# Patient Record
Sex: FEMALE | Race: WHITE | NOT HISPANIC OR LATINO | Employment: OTHER | URBAN - METROPOLITAN AREA
[De-identification: names, ages, dates, MRNs, and addresses within clinical notes are randomized per-mention and may not be internally consistent; named-entity substitution may affect disease eponyms.]

---

## 2023-04-04 LAB — URINE CULTURE: NORMAL

## 2023-04-11 ENCOUNTER — DOCUMENTATION (OUTPATIENT)
Dept: PRIMARY CARE | Facility: CLINIC | Age: 78
End: 2023-04-11
Payer: MEDICARE

## 2023-04-11 PROBLEM — N32.81 OAB (OVERACTIVE BLADDER): Status: ACTIVE | Noted: 2023-04-11

## 2023-04-11 PROBLEM — R60.0 EDEMA OF BOTH LEGS: Status: ACTIVE | Noted: 2023-04-11

## 2023-04-11 PROBLEM — R73.9 BORDERLINE HYPERGLYCEMIA: Status: ACTIVE | Noted: 2023-04-11

## 2023-04-11 PROBLEM — M25.561 BILATERAL KNEE PAIN: Status: ACTIVE | Noted: 2023-04-11

## 2023-04-11 PROBLEM — M25.552 ARTHRALGIA OF LEFT HIP: Status: ACTIVE | Noted: 2023-04-11

## 2023-04-11 PROBLEM — G62.9 PERIPHERAL NEUROPATHY: Status: ACTIVE | Noted: 2023-04-11

## 2023-04-11 PROBLEM — N32.9 LESION OF BLADDER: Status: ACTIVE | Noted: 2023-04-11

## 2023-04-11 PROBLEM — H61.20 CERUMEN IMPACTION: Status: ACTIVE | Noted: 2023-04-11

## 2023-04-11 PROBLEM — R26.89 IMBALANCE: Status: ACTIVE | Noted: 2023-04-11

## 2023-04-11 PROBLEM — M17.0 PRIMARY OSTEOARTHRITIS OF BOTH KNEES: Status: ACTIVE | Noted: 2023-04-11

## 2023-04-11 PROBLEM — M25.562 BILATERAL KNEE PAIN: Status: ACTIVE | Noted: 2023-04-11

## 2023-04-11 PROBLEM — M25.462 EFFUSION OF LEFT KNEE: Status: ACTIVE | Noted: 2023-04-11

## 2023-04-11 PROBLEM — E03.9 ADULT MYXEDEMA: Status: ACTIVE | Noted: 2023-04-11

## 2023-04-11 PROBLEM — M85.80 OSTEOPENIA: Status: ACTIVE | Noted: 2023-04-11

## 2023-04-11 PROBLEM — I83.819 VARICOSE VEINS WITH PAIN: Status: ACTIVE | Noted: 2023-04-11

## 2023-04-11 PROBLEM — R92.8 ABNORMAL MAMMOGRAM: Status: ACTIVE | Noted: 2023-04-11

## 2023-04-11 PROBLEM — R82.90 ABNORMAL FINDING IN URINE: Status: ACTIVE | Noted: 2023-04-11

## 2023-04-11 PROBLEM — I87.2 VENOUS INSUFFICIENCY: Status: ACTIVE | Noted: 2023-04-11

## 2023-04-11 PROBLEM — R60.0 LOWER EXTREMITY EDEMA: Status: ACTIVE | Noted: 2023-04-11

## 2023-04-11 PROBLEM — M17.12 ARTHRITIS OF KNEE, LEFT: Status: ACTIVE | Noted: 2023-04-11

## 2023-04-11 PROBLEM — J30.9 ALLERGIC RHINITIS: Status: ACTIVE | Noted: 2023-04-11

## 2023-04-11 PROBLEM — M19.90 OSTEOARTHRITIS: Status: ACTIVE | Noted: 2023-04-11

## 2023-04-11 PROBLEM — M25.511 SHOULDER PAIN, RIGHT: Status: ACTIVE | Noted: 2023-04-11

## 2023-04-11 PROBLEM — R42 EPISODIC LIGHTHEADEDNESS: Status: ACTIVE | Noted: 2023-04-11

## 2023-04-11 PROBLEM — R42 DIZZINESS: Status: ACTIVE | Noted: 2023-04-11

## 2023-04-11 PROBLEM — D64.9 ANEMIA: Status: ACTIVE | Noted: 2023-04-11

## 2023-04-11 PROBLEM — M99.05 SEGMENTAL AND SOMATIC DYSFUNCTION OF PELVIC REGION: Status: ACTIVE | Noted: 2023-04-11

## 2023-04-11 PROBLEM — N39.0 UTI (URINARY TRACT INFECTION): Status: ACTIVE | Noted: 2023-04-11

## 2023-04-11 PROBLEM — N39.41 URGE INCONTINENCE OF URINE: Status: ACTIVE | Noted: 2023-04-11

## 2023-04-11 RX ORDER — BETAMETHASONE DIPROPIONATE 0.5 MG/G
CREAM TOPICAL 2 TIMES DAILY
COMMUNITY
Start: 2012-11-16 | End: 2024-01-04 | Stop reason: SDUPTHER

## 2023-04-11 RX ORDER — ASPIRIN 81 MG/1
1 TABLET ORAL DAILY
COMMUNITY
End: 2024-04-23 | Stop reason: HOSPADM

## 2023-04-11 RX ORDER — LORATADINE 10 MG/1
1 TABLET ORAL DAILY PRN
COMMUNITY

## 2023-04-11 RX ORDER — DIAPER,BRIEF,INFANT-TODD,DISP
1 EACH MISCELLANEOUS DAILY
COMMUNITY
Start: 2013-11-01

## 2023-04-11 RX ORDER — FLUTICASONE PROPIONATE 50 MCG
2 SPRAY, SUSPENSION (ML) NASAL DAILY
COMMUNITY
Start: 2016-01-22 | End: 2023-04-13 | Stop reason: SDUPTHER

## 2023-04-11 RX ORDER — VIT C/E/ZN/COPPR/LUTEIN/ZEAXAN 250MG-90MG
1 CAPSULE ORAL DAILY
COMMUNITY
Start: 2013-11-01

## 2023-04-11 RX ORDER — MIRABEGRON 50 MG/1
1 TABLET, EXTENDED RELEASE ORAL DAILY
COMMUNITY
Start: 2015-02-02 | End: 2023-12-11 | Stop reason: SDUPTHER

## 2023-04-11 RX ORDER — AZELASTINE 1 MG/ML
2 SPRAY, METERED NASAL 2 TIMES DAILY
COMMUNITY
Start: 2016-08-17 | End: 2023-04-13 | Stop reason: SDUPTHER

## 2023-04-12 ENCOUNTER — OFFICE VISIT (OUTPATIENT)
Dept: PRIMARY CARE | Facility: CLINIC | Age: 78
End: 2023-04-12
Payer: MEDICARE

## 2023-04-12 VITALS
WEIGHT: 145.4 LBS | BODY MASS INDEX: 24.82 KG/M2 | DIASTOLIC BLOOD PRESSURE: 79 MMHG | SYSTOLIC BLOOD PRESSURE: 119 MMHG | HEIGHT: 64 IN

## 2023-04-12 DIAGNOSIS — Z78.0 ASYMPTOMATIC POSTMENOPAUSAL STATE: Primary | ICD-10-CM

## 2023-04-12 DIAGNOSIS — Z78.9 HEALTH MAINTENANCE ALTERATION: ICD-10-CM

## 2023-04-12 PROCEDURE — 99214 OFFICE O/P EST MOD 30 MIN: CPT | Performed by: INTERNAL MEDICINE

## 2023-04-12 NOTE — PATIENT INSTRUCTIONS
Paty,     For your urinary issues, discuss with urogynecology regarding the role of pelvic floor physical therapy and the risks/benefits of procedures like Botox.   For your concern of imbalance, discuss with neurology the role of a repeat MRI since this problem is relatively new since your last MRI in November 2022; if she does not feel necessary and persistent concern remains let me know and I can order an MRI of the brain and consider another neurology referral.   You are due for a bone density on or after April 16, 2023.   I support you trying knee injections with Dr. Castro; call his office to schedule.   For your lip lesion, if dental does not have a clear answer, let me know and I can refer to ENT. I think this is a type of benign cyst but deserves to be looked at by someone with more expertise.   You will be called by [facility] to schedule dermatology.       CL

## 2023-04-13 DIAGNOSIS — J30.9 ALLERGIC RHINITIS, UNSPECIFIED SEASONALITY, UNSPECIFIED TRIGGER: ICD-10-CM

## 2023-04-17 RX ORDER — AZELASTINE 1 MG/ML
2 SPRAY, METERED NASAL 2 TIMES DAILY
Qty: 90 ML | Refills: 1 | Status: SHIPPED | OUTPATIENT
Start: 2023-04-17 | End: 2023-12-13

## 2023-04-17 RX ORDER — FLUTICASONE PROPIONATE 50 MCG
2 SPRAY, SUSPENSION (ML) NASAL DAILY
Qty: 48 G | Refills: 1 | Status: SHIPPED | OUTPATIENT
Start: 2023-04-17

## 2023-04-20 LAB
BACTERIA, URINE: ABNORMAL /HPF
MUCUS, URINE: ABNORMAL /LPF
RBC, URINE: 47 /HPF (ref 0–5)
RENAL EPITHELIAL CELLS, URINE: 3 /HPF
SQUAMOUS EPITHELIAL CELLS, URINE: 11 /HPF
WBC CLUMPS, URINE: ABNORMAL /HPF
WBC, URINE: 159 /HPF (ref 0–5)

## 2023-04-22 LAB
URINE CULTURE: ABNORMAL
URINE CULTURE: ABNORMAL

## 2023-05-03 LAB
MUCUS, URINE: NORMAL /LPF
RBC, URINE: 1 /HPF (ref 0–5)
SQUAMOUS EPITHELIAL CELLS, URINE: 3 /HPF
WBC, URINE: 4 /HPF (ref 0–5)

## 2023-05-04 LAB — URINE CULTURE: NORMAL

## 2023-06-29 LAB — URINE CULTURE: ABNORMAL

## 2023-07-04 LAB — URINE CULTURE: NORMAL

## 2023-08-23 ENCOUNTER — OFFICE VISIT (OUTPATIENT)
Dept: PRIMARY CARE | Facility: CLINIC | Age: 78
End: 2023-08-23
Payer: MEDICARE

## 2023-08-23 DIAGNOSIS — M17.9 OSTEOARTHRITIS OF KNEE, UNSPECIFIED LATERALITY, UNSPECIFIED OSTEOARTHRITIS TYPE: Primary | ICD-10-CM

## 2023-08-23 PROCEDURE — 1126F AMNT PAIN NOTED NONE PRSNT: CPT | Performed by: INTERNAL MEDICINE

## 2023-08-23 PROCEDURE — 99213 OFFICE O/P EST LOW 20 MIN: CPT | Performed by: INTERNAL MEDICINE

## 2023-08-23 NOTE — PROGRESS NOTES
Zarina Holliday is a 78 yo F presenting for FU.   CPE/MCR wellness 12.22      1. Barry hematuria s/p neg urologic workup 11.2022   -s/p cystoscopy polypoid ureothelial mucosa with mild atypia favor reactive      2. OAB, FU urogyne, recurr UTI  - still with urgency/'pinching'/nocturia  -Myrbetriq 50    -on methamine via urogyne   -has FU planned with urogyne for planned 6 mo course methanime    3. BIlateral knee OA s/p sports referral (Matthew) 1.23   -using voltaren qid   -11.22 plain films moderate DJD   -s.p multiple ortho visits and surgical consideration in interval     4. Chronic venous insufficiency   -US 6.22   -compression hose      5. R vision changes 10.22 s/p neg MRI/MRA 11.22 concern for TIA   -asa 81 started 12.22 with me      6. Worsening imbalance over past few months   -has FU with opthal   -has FU with neuro      #HM   -screen labs 12.22   -mammo 1.4.23   -dexa 5.11.23  -cscope 2018 no further    -shingrix x2; tdap 2021; pneumovax UTD           Gen alert well appearing NAD   HEENT mmm pharynx clear       A/P   Paty is doing well.   She is low risk for planned TKR spring 2023 and can proceed without further optimization.

## 2023-10-04 DIAGNOSIS — N39.0 RECURRENT UTI: Primary | ICD-10-CM

## 2023-10-06 RX ORDER — METHENAMINE HIPPURATE 1000 MG/1
1 TABLET ORAL 2 TIMES DAILY
Qty: 60 TABLET | Refills: 11 | Status: SHIPPED | OUTPATIENT
Start: 2023-10-06

## 2023-11-01 ENCOUNTER — OFFICE VISIT (OUTPATIENT)
Dept: OBSTETRICS AND GYNECOLOGY | Facility: CLINIC | Age: 78
End: 2023-11-01
Payer: MEDICARE

## 2023-11-01 VITALS
DIASTOLIC BLOOD PRESSURE: 77 MMHG | WEIGHT: 146 LBS | HEART RATE: 99 BPM | HEIGHT: 65 IN | BODY MASS INDEX: 24.32 KG/M2 | SYSTOLIC BLOOD PRESSURE: 135 MMHG

## 2023-11-01 DIAGNOSIS — R31.29 HEMATURIA, MICROSCOPIC: ICD-10-CM

## 2023-11-01 DIAGNOSIS — N39.0 RECURRENT UTI: ICD-10-CM

## 2023-11-01 LAB
POC APPEARANCE, URINE: ABNORMAL
POC BILIRUBIN, URINE: ABNORMAL
POC BLOOD, URINE: ABNORMAL
POC COLOR, URINE: ABNORMAL
POC GLUCOSE, URINE: NEGATIVE MG/DL
POC KETONES, URINE: ABNORMAL MG/DL
POC LEUKOCYTES, URINE: NEGATIVE
POC NITRITE,URINE: NEGATIVE
POC PH, URINE: 5.5 PH
POC PROTEIN, URINE: ABNORMAL MG/DL
POC SPECIFIC GRAVITY, URINE: 1.02
POC UROBILINOGEN, URINE: 0.2 EU/DL

## 2023-11-01 PROCEDURE — 99213 OFFICE O/P EST LOW 20 MIN: CPT | Mod: PO | Performed by: OBSTETRICS & GYNECOLOGY

## 2023-11-01 PROCEDURE — 1159F MED LIST DOCD IN RCRD: CPT | Performed by: OBSTETRICS & GYNECOLOGY

## 2023-11-01 PROCEDURE — 99213 OFFICE O/P EST LOW 20 MIN: CPT | Performed by: OBSTETRICS & GYNECOLOGY

## 2023-11-01 PROCEDURE — 81003 URINALYSIS AUTO W/O SCOPE: CPT | Performed by: OBSTETRICS & GYNECOLOGY

## 2023-11-01 PROCEDURE — 87086 URINE CULTURE/COLONY COUNT: CPT | Performed by: OBSTETRICS & GYNECOLOGY

## 2023-11-01 PROCEDURE — 1125F AMNT PAIN NOTED PAIN PRSNT: CPT | Performed by: OBSTETRICS & GYNECOLOGY

## 2023-11-01 ASSESSMENT — PAIN SCALES - GENERAL: PAINLEVEL: 8

## 2023-11-01 NOTE — PROGRESS NOTES
[facility] Department of Urogynecology   Sylvie Key MD, MPH   [phone]    ASSESSMENT AND PLAN:   78 y.o. female being assessed for recurrent UTIs.        Diagnoses:   #1 Recurrent UTIs     Plan:   1. Recurrent UTIs   - She has not had a UTI since her last visit. Last positive urine cx was on 04/19/23 with Proteus mirabilis.   - Patient was concerned she may have a UTI because of lower abdominal pain, lower back pain, and low energy levels. She did recently drink carbonated water. We discussed that carbonated water can affect bowels and bladder; the acidity can cause bladder irritation or cause bloating. She also ate cheese as well, which she does not normally eat which may have irritated her bowels. If she does not have a UTI and her sxs continue, she should see her PCP. Of note, she is going to see her PCP tomorrow.   - We will send urine for cx and for microscopic evaluation to evaluate if there are RBC's in her urine.   - If she has a UTI, she will be called and given a rx for antibiotics. Discontinue Hiprex when on antibiotics and restart afterwards.   - As she has not had a UTI since the last visit, she can consider discontinuing Hiprex and continuing with tv estrogen cream. If she did have a UTI today, she should take Hiprex BID after finishing the antibiotics (she has been taking Hiprex 1x daily).      Follow-up with Dr. Key for Malinda management.      Scribe Attestation:   ILeola, am scribing for virtually, and in the presence Syvlie Key MD MPH on 11/1/23 at 3:31 PM.     Problem List Items Addressed This Visit    None  Visit Diagnoses       Recurrent UTI        Relevant Orders    POCT UA Automated manually resulted (Completed)    Hematuria, microscopic        Relevant Orders    Urine Culture           I spent a total of 20 minutes in face to face and non face to face time.      Sylvie Key MD, MPH, FACOG     Established    HISTORY OF PRESENT ILLNESS:     Zarina DONIS  "Mg is a 78 y.o. female who presents for follow up on Malinda.     Record Review:   - 5/3/23 Dr. Sylvie Key note reviewed: Malinda - She was on Hiprex and after 6 months we would consider discontinuing. Last positive urine cx was on 04/19/23 with Proteus mirabilis. Using tv estrogen cream 2x per week. She was to call if she had UTI sxs.     Urine Culture Hx:   - 7/3/23 NO SIGNIFICANT GROWTH.   - 6/28/23 MULTIPLE ORGANISMS PRESENT, PROBABLE CONTAMINATION  - 05/03/23 NO SIGNIFICANT GROWTH.   - 04/19/23 Proteus mirabilis >100,000 CFU/ML  - 03/03/23 NO SIGNIFICANT GROWTH.   - 02/09/23 NO SIGNIFICANT GROWTH.  - 01/23/23 Escherichia coli 20,000-80,000 CFU/ML  - 01/20/23 MULTIPLE ORGANISMS PRESENT, PROBABLE CONTAMINATION  - 11/29/22 MIXED URETHRAL NEREIDA.  - 6/26/19 NO SIGNIFICANT GROWTH.  - 6/18/19 MULTIPLE ORGANISMS PRESENT, PROBABLE CONTAMINATION PLEASE REPEAT CULTURE.      Urinary Symptoms:   - She thought her urine looked dark today, but did not visibly see blood in her urine. Last year, she went to the hospital in November and did see blood in her urine.  - Denies dysuria, urgency, or leakage of urine.   - Feels the tv estrogen has been working well.   - Takes Hiprex 1x daily since September 2023 instead of BID.   - Does not drink caffeine and drinks a lot of water.   - She thought she had a UTI since the past 2 days. Endorses lower abdominal pain and lower back pain. She feels like \"her entire inside is bruised\". She has not had a fever. She also has low energy and feels irritable. She says she is going to see her PCP tomorrow.     Bowel Symptoms:  - Denies constipation. BMs have not been abnormal. Over the weekend, she ate pork, chips, carbonated water, and cheese which she usually does not eat.     Interval History:   - Received flu shot on 10/3/23.   - Recently returned home after 6 weeks of traveling.     Past Medical History:       Past Medical History:   Diagnosis Date    Acute upper respiratory infection, " unspecified 12/04/2017    Viral upper respiratory infection    Anesthesia of skin 12/11/2018    Numbness and tingling of right leg    Encounter for other screening for malignant neoplasm of breast 11/01/2017    Breast cancer screening, high risk patient    Encounter for screening for malignant neoplasm of colon 12/02/2017    Colon cancer screening    Encounter for screening mammogram for malignant neoplasm of breast 10/31/2016    Other screening mammogram    Other conditions influencing health status     History of vaginal delivery    Other conditions influencing health status 01/22/2016    History of cough    Pain in right foot 12/11/2018    Right foot pain    Pain in right hip 12/11/2018    Right hip pain    Pain in right leg 12/11/2018    Right leg pain    Personal history of colonic polyps 07/06/2017    History of adenomatous polyp of colon    Personal history of diseases of the skin and subcutaneous tissue 11/01/2013    History of dermatitis    Personal history of other diseases of the nervous system and sense organs     History of migraine headaches    Personal history of other diseases of the respiratory system 12/01/2017    History of sore throat    Personal history of other diseases of the respiratory system 12/01/2017    History of sinusitis    Personal history of other diseases of the respiratory system 01/22/2016    History of acute bronchitis    Personal history of other specified conditions 09/25/2017    History of edema    Personal history of transient ischemic attack (TIA), and cerebral infarction without residual deficits     History of transient cerebral ischemia    Postpartum depression     Postpartum depression    Reaction to severe stress, unspecified 08/17/2016    Stress    Strain of muscle, fascia and tendon of lower back, initial encounter 03/16/2015    Strain of coccyx    Unspecified abnormal findings in urine     Abnormal finding in urine          Past Surgical History:       Past Surgical  History:   Procedure Laterality Date    CATARACT EXTRACTION  2018    Cataract Surgery     SECTION, CLASSIC  2018     Section    HYSTERECTOMY  2018    Hysterectomy    MR HEAD ANGIO WO IV CONTRAST  2022    MR HEAD ANGIO WO IV CONTRAST 2022 [facility]    MR NECK ANGIO WO IV CONTRAST  2022    MR NECK ANGIO WO IV CONTRAST 2022 [facility]    OTHER SURGICAL HISTORY  2018    Dental Surgery    OTHER SURGICAL HISTORY  2018    Reported Hx Of 'Facelift'    TONSILLECTOMY  2018    Tonsillectomy    TOTAL ABDOMINAL HYSTERECTOMY W/ BILATERAL SALPINGOOPHORECTOMY  2018    Salpingo-oophorectomy Bilateral         Medications:       Prior to Admission medications    Medication Sig Start Date End Date Taking? Authorizing Provider   aspirin 81 mg EC tablet Take 1 tablet (81 mg) by mouth once daily.   Yes Historical Provider, MD   azelastine (Astelin) 137 mcg (0.1 %) nasal spray Administer 2 sprays into each nostril in the morning and 2 sprays before bedtime. 23  Yes Nneka Albarran MD   betamethasone, augmented, (Diprolene AF) 0.05 % cream 2 times a day. Apply sparingly to affected areas 12  Yes Historical Provider, MD   calcium citrate-vitamin D3 250 mg-5 mcg (200 unit) tablet Take 1 tablet by mouth once daily. 13  Yes Historical Provider, MD   cholecalciferol (Vitamin D-3) 25 MCG (1000 UT) capsule Take 1 capsule (25 mcg) by mouth once daily. 13  Yes Historical Provider, MD   diclofenac sodium 1 % kit Apply 4 g topically in the morning and 4 g at noon and 4 g in the evening and 4 g before bedtime. APPLY TO AFFECTED AREA OF LOWER EXTREMITIES. DO NOT APPLY MORE THAN 16 GRAMS DAILY TO ANY ONE AFFECTED JOINT. 22  Yes Historical Provider, MD   fish oil concentrate (Omega-3) 120-180 mg capsule Take 1 capsule (1 g) by mouth once daily.   Yes Historical Provider, MD   fluticasone (Flonase) 50 mcg/actuation nasal  "spray Administer 2 sprays into each nostril once daily. 4/17/23  Yes Nneka Albarran MD   loratadine (Claritin) 10 mg tablet Take 1 tablet (10 mg) by mouth once daily as needed.   Yes Historical Provider, MD   methenamine hippurate (Hiprex) 1 gram tablet TAKE 1 TABLET TWICE A DAY 10/6/23  Yes Sylvie Key MD MPH   mirabegron (Myrbetriq) 50 mg tablet extended release 24 hr 24 hr tablet Take 1 tablet (50 mg) by mouth once daily. 2/2/15  Yes Historical Provider, MD   VITAMIN B COMPLEX ORAL Take 1 tablet by mouth once daily.   Yes Historical Provider, MD RAINES  Review of Systems       PHYSICAL EXAM:      /77   Pulse 99   Ht 1.651 m (5' 5\")   Wt 66.2 kg (146 lb)   BMI 24.30 kg/m²            Declines chaperone for physical exam.      Well developed, well nourished, in no apparent distress.   Neurologic/Psychiatric:  Awake, Alert and Oriented times 3.  Affect normal.     Data and DIAGNOSTIC STUDIES REVIEWED   No results found.   Lab Results   Component Value Date    URINECULTURE NO SIGNIFICANT GROWTH. 07/03/2023      Lab Results   Component Value Date    GLUCOSE 85 12/05/2022    CALCIUM 9.2 12/05/2022     12/05/2022    K 4.4 12/05/2022    CO2 31 12/05/2022     12/05/2022    BUN 13 12/05/2022    CREATININE 0.71 12/05/2022     Lab Results   Component Value Date    WBC 4.7 12/05/2022    HGB 12.6 12/05/2022    HCT 38.8 12/05/2022    MCV 96 12/05/2022     12/05/2022        "

## 2023-11-02 ENCOUNTER — OFFICE VISIT (OUTPATIENT)
Dept: PRIMARY CARE | Facility: CLINIC | Age: 78
End: 2023-11-02
Payer: MEDICARE

## 2023-11-02 VITALS — SYSTOLIC BLOOD PRESSURE: 123 MMHG | DIASTOLIC BLOOD PRESSURE: 73 MMHG

## 2023-11-02 DIAGNOSIS — M54.50 LOW BACK PAIN WITH RADIATION: Primary | ICD-10-CM

## 2023-11-02 DIAGNOSIS — R10.9 ABDOMINAL CRAMPING: ICD-10-CM

## 2023-11-02 LAB — BACTERIA UR CULT: NORMAL

## 2023-11-02 PROCEDURE — 1125F AMNT PAIN NOTED PAIN PRSNT: CPT | Performed by: INTERNAL MEDICINE

## 2023-11-02 PROCEDURE — 1159F MED LIST DOCD IN RCRD: CPT | Performed by: INTERNAL MEDICINE

## 2023-11-02 PROCEDURE — 99213 OFFICE O/P EST LOW 20 MIN: CPT | Performed by: INTERNAL MEDICINE

## 2023-11-02 NOTE — PROGRESS NOTES
Subjective   Patient ID: Zarina Holliday is a 78 y.o. female who presents for No chief complaint on file..    HPI sick visit no chest pain no shortness of breath but some lower abdominal crampy discomfort some low back pain with radiation and an ongoing bladder issue with hematuria she is following up with urology for that she had been traveling drove home from [place] month of flyfishing no injury no prior ongoing issue with her low back has been treating conservatively    Review of Systems    Objective   There were no vitals taken for this visit.    Physical Exam vital signs noted alert and oriented x3 NCAT no JVD chest clear to auscultation CV regular rate and rhythm S1-S2 abdomen soft nontender normal active bowel sounds LS spine normal curvature nontender none tightening posterior paraspinal muscles negative straight leg raise negative logroll negative SI joint tenderness extremities no clubbing cyanosis or edema normal distal pulses normal gait DTR 2+    Assessment/Plan impression low back pain with radiation abdominal cramping  Plan she is following up with urology for her hematuria and/or possible infection she had some loose stools normally she has regular bowel movements she has yet to be on an antibiotic she thought she might also have flulike symptoms although has not had fever if needed okay for Pepto-Bismol once or twice but may use Tylenol and heating pad as well as good hydration back hygiene and stretches for the lower spine follow-up if no better and/or imaging and with Dr. Shaikh and urology

## 2023-11-03 ENCOUNTER — LAB (OUTPATIENT)
Dept: LAB | Facility: LAB | Age: 78
End: 2023-11-03
Payer: MEDICARE

## 2023-11-03 LAB — HOLD SPECIMEN: NORMAL

## 2023-11-03 PROCEDURE — 81001 URINALYSIS AUTO W/SCOPE: CPT

## 2023-11-04 LAB
APPEARANCE UR: CLEAR
BILIRUB UR STRIP.AUTO-MCNC: NEGATIVE MG/DL
COLOR UR: YELLOW
GLUCOSE UR STRIP.AUTO-MCNC: NEGATIVE MG/DL
KETONES UR STRIP.AUTO-MCNC: NEGATIVE MG/DL
LEUKOCYTE ESTERASE UR QL STRIP.AUTO: NEGATIVE
MUCOUS THREADS #/AREA URNS AUTO: NORMAL /LPF
NITRITE UR QL STRIP.AUTO: NEGATIVE
PH UR STRIP.AUTO: 5 [PH]
PROT UR STRIP.AUTO-MCNC: NEGATIVE MG/DL
RBC # UR STRIP.AUTO: ABNORMAL /UL
RBC #/AREA URNS AUTO: NORMAL /HPF
SP GR UR STRIP.AUTO: 1.01
SQUAMOUS #/AREA URNS AUTO: NORMAL /HPF
UROBILINOGEN UR STRIP.AUTO-MCNC: <2 MG/DL
WBC #/AREA URNS AUTO: NORMAL /HPF

## 2023-11-09 ENCOUNTER — OFFICE VISIT (OUTPATIENT)
Dept: ORTHOPEDIC SURGERY | Facility: CLINIC | Age: 78
End: 2023-11-09
Payer: MEDICARE

## 2023-11-09 DIAGNOSIS — M17.0 PRIMARY OSTEOARTHRITIS OF BOTH KNEES: Primary | ICD-10-CM

## 2023-11-09 PROCEDURE — 2500000004 HC RX 250 GENERAL PHARMACY W/ HCPCS (ALT 636 FOR OP/ED): Performed by: ORTHOPAEDIC SURGERY

## 2023-11-09 PROCEDURE — 2500000005 HC RX 250 GENERAL PHARMACY W/O HCPCS: Performed by: ORTHOPAEDIC SURGERY

## 2023-11-09 PROCEDURE — 1159F MED LIST DOCD IN RCRD: CPT | Performed by: ORTHOPAEDIC SURGERY

## 2023-11-09 PROCEDURE — 1125F AMNT PAIN NOTED PAIN PRSNT: CPT | Performed by: ORTHOPAEDIC SURGERY

## 2023-11-09 PROCEDURE — 20610 DRAIN/INJ JOINT/BURSA W/O US: CPT | Mod: 50 | Performed by: ORTHOPAEDIC SURGERY

## 2023-11-09 PROCEDURE — 20610 DRAIN/INJ JOINT/BURSA W/O US: CPT | Performed by: ORTHOPAEDIC SURGERY

## 2023-11-09 RX ORDER — LIDOCAINE HYDROCHLORIDE 10 MG/ML
4 INJECTION INFILTRATION; PERINEURAL
Status: COMPLETED | OUTPATIENT
Start: 2023-11-09 | End: 2023-11-09

## 2023-11-09 RX ORDER — TRIAMCINOLONE ACETONIDE 40 MG/ML
1 INJECTION, SUSPENSION INTRA-ARTICULAR; INTRAMUSCULAR
Status: COMPLETED | OUTPATIENT
Start: 2023-11-09 | End: 2023-11-09

## 2023-11-09 RX ADMIN — TRIAMCINOLONE ACETONIDE 1 ML: 40 INJECTION, SUSPENSION INTRA-ARTICULAR; INTRAMUSCULAR at 13:30

## 2023-11-09 RX ADMIN — LIDOCAINE HYDROCHLORIDE 4 ML: 10 INJECTION, SOLUTION INFILTRATION; PERINEURAL at 13:30

## 2023-11-09 NOTE — PROGRESS NOTES
L Inj/Asp: bilateral knee on 11/9/2023 1:30 PM  Indications: pain and joint swelling  Details: 22 G needle, anterolateral approach  Medications (Right): 1 mL triamcinolone acetonide 40 mg/mL; 4 mL lidocaine 10 mg/mL (1 %)  Medications (Left): 1 mL triamcinolone acetonide 40 mg/mL; 4 mL lidocaine 10 mg/mL (1 %)    Discussion:  I discussed the conservative treatment options for knee osteoarthritis including but not limited to physical therapy, oral NSAIDS, activity and lifestyle modification, and corticosteroid injections. Pt has elected to undergo a cortisone injection today. I have explained the risk and benefits of an injection including the possibility of joint infection, bleeding, damage to cartilage, allergic reaction. Patient verbalized understanding and gave verbal consent wishes to proceed with a intra-articular cortisone injection for their knee.    Procedure:  After discussing the risk and benefits of the procedure, we proceeded with an intra-articular bilateral knee injection.    With the patient's informed verbal consent, the bilateral knees were prepped in standard sterile fashion with Chlorhexidine. The skin was then anesthetized with ethyl chloride spray and cleaned again with Chlorhexidine. The bilateral knees were then apirated/injected with a prefilled 20-gauge syringe of 40 mg Kenalog + 4 ml Lidocaine using the lateral approach without complications.  The patient tolerated this well and felt immediate initial relief of symptoms. A bandaid was applied and the patient ambulated out of the clinic on ther own accord without difficulty. Patient was instructed to avoid physical activity for 24-48 hours to prevent the knees from swelling and may ice the knees as tolerated. Patient should contact the office if any signs of of infection appear: redness, fever, chills, drainage, swelling or warmth to the knees.  Pt understands that the injections can be repeated no sooner than 3 months.      Procedure,  treatment alternatives, risks and benefits explained, specific risks discussed. Consent was given by the patient. Immediately prior to procedure a time out was called to verify the correct patient, procedure, equipment, support staff and site/side marked as required. Patient was prepped and draped in the usual sterile fashion.

## 2023-11-21 ENCOUNTER — LAB (OUTPATIENT)
Dept: LAB | Facility: LAB | Age: 78
End: 2023-11-21
Payer: MEDICARE

## 2023-11-21 ENCOUNTER — TELEPHONE (OUTPATIENT)
Dept: OBSTETRICS AND GYNECOLOGY | Facility: CLINIC | Age: 78
End: 2023-11-21
Payer: MEDICARE

## 2023-11-21 DIAGNOSIS — N30.00 ACUTE CYSTITIS WITHOUT HEMATURIA: ICD-10-CM

## 2023-11-21 DIAGNOSIS — N30.00 ACUTE CYSTITIS WITHOUT HEMATURIA: Primary | ICD-10-CM

## 2023-11-21 PROCEDURE — 87086 URINE CULTURE/COLONY COUNT: CPT

## 2023-11-21 NOTE — TELEPHONE ENCOUNTER
Calling because she said she feels like she has been punched in gut and she wants to know if there is anything that she can do or if there is anything that is recommended

## 2023-11-22 LAB — BACTERIA UR CULT: NORMAL

## 2023-12-08 ENCOUNTER — TELEPHONE (OUTPATIENT)
Dept: OBSTETRICS AND GYNECOLOGY | Facility: CLINIC | Age: 78
End: 2023-12-08
Payer: MEDICARE

## 2023-12-08 NOTE — TELEPHONE ENCOUNTER
Spoke with pt for follow up on symptoms - they come and go and she is tracking when they occur. She has an appt on [place] 3. Offered suggestion to try Azo for comfort measure when she experiences the pain. All questions answered, & Zarina Holliday verbalized understanding.

## 2023-12-08 NOTE — TELEPHONE ENCOUNTER
----- Message from Zarina Holliday sent at 11/22/2023  5:25 PM EST -----  Regarding: Test results for 11/21   Contact: [phone]  The results say no significant results.   Therefore I am concerned about the discomfort and urine frequency that I am experiencing….  Hope to hear from you soon.   Thanks and Happy Thanksgiving.

## 2023-12-11 ENCOUNTER — OFFICE VISIT (OUTPATIENT)
Dept: PRIMARY CARE | Facility: CLINIC | Age: 78
End: 2023-12-11
Payer: MEDICARE

## 2023-12-11 VITALS — SYSTOLIC BLOOD PRESSURE: 122 MMHG | BODY MASS INDEX: 25.06 KG/M2 | DIASTOLIC BLOOD PRESSURE: 81 MMHG | HEIGHT: 64 IN

## 2023-12-11 DIAGNOSIS — E55.9 VITAMIN D DEFICIENCY: ICD-10-CM

## 2023-12-11 DIAGNOSIS — R73.9 BORDERLINE HYPERGLYCEMIA: ICD-10-CM

## 2023-12-11 DIAGNOSIS — E78.5 DYSLIPIDEMIA: ICD-10-CM

## 2023-12-11 DIAGNOSIS — N32.81 OVERACTIVE BLADDER: ICD-10-CM

## 2023-12-11 DIAGNOSIS — R79.89 ABNORMAL TSH: ICD-10-CM

## 2023-12-11 DIAGNOSIS — Z12.31 BREAST CANCER SCREENING BY MAMMOGRAM: ICD-10-CM

## 2023-12-11 DIAGNOSIS — N30.00 ACUTE CYSTITIS WITHOUT HEMATURIA: Primary | ICD-10-CM

## 2023-12-11 PROCEDURE — 99214 OFFICE O/P EST MOD 30 MIN: CPT | Performed by: INTERNAL MEDICINE

## 2023-12-11 PROCEDURE — 1159F MED LIST DOCD IN RCRD: CPT | Performed by: INTERNAL MEDICINE

## 2023-12-11 PROCEDURE — 1125F AMNT PAIN NOTED PAIN PRSNT: CPT | Performed by: INTERNAL MEDICINE

## 2023-12-11 RX ORDER — MIRABEGRON 50 MG/1
50 TABLET, EXTENDED RELEASE ORAL DAILY
Qty: 90 TABLET | Refills: 0 | Status: SHIPPED | OUTPATIENT
Start: 2023-12-11 | End: 2024-02-21

## 2023-12-11 RX ORDER — SULFAMETHOXAZOLE AND TRIMETHOPRIM 800; 160 MG/1; MG/1
1 TABLET ORAL 2 TIMES DAILY
Qty: 10 TABLET | Refills: 0 | Status: SHIPPED | OUTPATIENT
Start: 2023-12-11 | End: 2023-12-16

## 2023-12-11 NOTE — PROGRESS NOTES
"    Paty Holliday is a 77 yo F presenting for acute visit.   She is due for CPE/MCR wellness in 1.2024 and will be seeing Dr. Espinoza for this - seeing urogyne in FU also in 1.2024.     *Desires abx in case of UTI while on trip to [place] 2.2024   *Dysuria - has FU with urogyne pending 1.2024   *Plans TKR in 4.2024 - at time of 1.2024 CPE, Dr. Espinoza can obtain EKG and fax to Dr. Schneider.     Chronic PL:    1. Barry hematuria s/p neg urologic workup 11.2022   -s/p cystoscopy polypoid ureothelial mucosa with mild atypia favor reactive      2. OAB, FU urogyne, recurr UTI  - previously had noted urgency/'pinching'/nocturia and intercurrent more significant pelvic pain s/p FU with urogyne  -Myrbetriq 50   -methamine x6 mo course tried with urogyne - still taking     3. BIlateral knee OA s/p sports referral (Matthew) 1.23 with plan for knee replacement 4.2024 with Dr. Schneider.   -using voltaren qid   -11.22 plain films moderate DJD   -s.p multiple ortho visits and surgical consideration in interval  -last injxn 11.23   -pending TKR 4.2024      4. Chronic venous insufficiency   -US 6.22   -compression hose      5. R vision changes 10.22 s/p neg MRI/MRA 11.22 concern for TIA   -asa 81 started 12.22 with me      6. Worsening imbalance over past few months   -has FU with opthal   -has FU with neuro        #HM   -screen labs 12.22 [ ]due  -mammo 1.4.23 [ ]due 1.4.24  -dexa 5.11.23  -cscope 2018 no further    -shingrix x2; tdap 2021; pneumovax UTD        64\"   145 in 4.23       Gen alert well appearing NAD   HENT mmm pharynx clear   CV rrr nl s1/2 no m/r/h         "

## 2023-12-13 ENCOUNTER — APPOINTMENT (OUTPATIENT)
Dept: PRIMARY CARE | Facility: CLINIC | Age: 78
End: 2023-12-13
Payer: MEDICARE

## 2023-12-13 DIAGNOSIS — J30.9 ALLERGIC RHINITIS, UNSPECIFIED SEASONALITY, UNSPECIFIED TRIGGER: ICD-10-CM

## 2023-12-13 RX ORDER — AZELASTINE 1 MG/ML
2 SPRAY, METERED NASAL 2 TIMES DAILY
Qty: 90 ML | Refills: 3 | Status: SHIPPED | OUTPATIENT
Start: 2023-12-13

## 2023-12-18 ENCOUNTER — LAB (OUTPATIENT)
Dept: LAB | Facility: LAB | Age: 78
End: 2023-12-18
Payer: MEDICARE

## 2023-12-18 DIAGNOSIS — E55.9 VITAMIN D DEFICIENCY: ICD-10-CM

## 2023-12-18 DIAGNOSIS — E78.5 DYSLIPIDEMIA: ICD-10-CM

## 2023-12-18 DIAGNOSIS — N30.00 ACUTE CYSTITIS WITHOUT HEMATURIA: ICD-10-CM

## 2023-12-18 DIAGNOSIS — R73.9 BORDERLINE HYPERGLYCEMIA: ICD-10-CM

## 2023-12-18 DIAGNOSIS — R79.89 ABNORMAL TSH: ICD-10-CM

## 2023-12-18 DIAGNOSIS — Z12.31 BREAST CANCER SCREENING BY MAMMOGRAM: ICD-10-CM

## 2023-12-18 LAB
25(OH)D3 SERPL-MCNC: 40 NG/ML (ref 30–100)
ALBUMIN SERPL BCP-MCNC: 4 G/DL (ref 3.4–5)
ALP SERPL-CCNC: 41 U/L (ref 33–136)
ALT SERPL W P-5'-P-CCNC: 17 U/L (ref 7–45)
ANION GAP SERPL CALC-SCNC: 12 MMOL/L (ref 10–20)
AST SERPL W P-5'-P-CCNC: 14 U/L (ref 9–39)
BASOPHILS # BLD AUTO: 0.06 X10*3/UL (ref 0–0.1)
BASOPHILS NFR BLD AUTO: 1.1 %
BILIRUB SERPL-MCNC: 0.7 MG/DL (ref 0–1.2)
BUN SERPL-MCNC: 14 MG/DL (ref 6–23)
CALCIUM SERPL-MCNC: 9 MG/DL (ref 8.6–10.6)
CHLORIDE SERPL-SCNC: 106 MMOL/L (ref 98–107)
CHOLEST SERPL-MCNC: 253 MG/DL (ref 0–199)
CHOLESTEROL/HDL RATIO: 3
CO2 SERPL-SCNC: 28 MMOL/L (ref 21–32)
CREAT SERPL-MCNC: 0.82 MG/DL (ref 0.5–1.05)
EOSINOPHIL # BLD AUTO: 0.04 X10*3/UL (ref 0–0.4)
EOSINOPHIL NFR BLD AUTO: 0.7 %
ERYTHROCYTE [DISTWIDTH] IN BLOOD BY AUTOMATED COUNT: 12.6 % (ref 11.5–14.5)
EST. AVERAGE GLUCOSE BLD GHB EST-MCNC: 108 MG/DL
GFR SERPL CREATININE-BSD FRML MDRD: 73 ML/MIN/1.73M*2
GLUCOSE SERPL-MCNC: 90 MG/DL (ref 74–99)
HBA1C MFR BLD: 5.4 %
HCT VFR BLD AUTO: 41.5 % (ref 36–46)
HDLC SERPL-MCNC: 84.1 MG/DL
HGB BLD-MCNC: 13.3 G/DL (ref 12–16)
IMM GRANULOCYTES # BLD AUTO: 0.01 X10*3/UL (ref 0–0.5)
IMM GRANULOCYTES NFR BLD AUTO: 0.2 % (ref 0–0.9)
LDLC SERPL CALC-MCNC: 156 MG/DL
LYMPHOCYTES # BLD AUTO: 1.88 X10*3/UL (ref 0.8–3)
LYMPHOCYTES NFR BLD AUTO: 34.4 %
MCH RBC QN AUTO: 32.2 PG (ref 26–34)
MCHC RBC AUTO-ENTMCNC: 32 G/DL (ref 32–36)
MCV RBC AUTO: 101 FL (ref 80–100)
MONOCYTES # BLD AUTO: 0.37 X10*3/UL (ref 0.05–0.8)
MONOCYTES NFR BLD AUTO: 6.8 %
NEUTROPHILS # BLD AUTO: 3.1 X10*3/UL (ref 1.6–5.5)
NEUTROPHILS NFR BLD AUTO: 56.8 %
NON HDL CHOLESTEROL: 169 MG/DL (ref 0–149)
NRBC BLD-RTO: 0 /100 WBCS (ref 0–0)
PLATELET # BLD AUTO: 205 X10*3/UL (ref 150–450)
POTASSIUM SERPL-SCNC: 4.2 MMOL/L (ref 3.5–5.3)
PROT SERPL-MCNC: 6.3 G/DL (ref 6.4–8.2)
RBC # BLD AUTO: 4.13 X10*6/UL (ref 4–5.2)
SODIUM SERPL-SCNC: 142 MMOL/L (ref 136–145)
TRIGL SERPL-MCNC: 67 MG/DL (ref 0–149)
TSH SERPL-ACNC: 2.72 MIU/L (ref 0.44–3.98)
VLDL: 13 MG/DL (ref 0–40)
WBC # BLD AUTO: 5.5 X10*3/UL (ref 4.4–11.3)

## 2023-12-18 PROCEDURE — 36415 COLL VENOUS BLD VENIPUNCTURE: CPT

## 2023-12-18 PROCEDURE — 85025 COMPLETE CBC W/AUTO DIFF WBC: CPT

## 2023-12-18 PROCEDURE — 80053 COMPREHEN METABOLIC PANEL: CPT

## 2023-12-18 PROCEDURE — 80061 LIPID PANEL: CPT

## 2023-12-18 PROCEDURE — 82306 VITAMIN D 25 HYDROXY: CPT

## 2023-12-18 PROCEDURE — 84443 ASSAY THYROID STIM HORMONE: CPT

## 2023-12-18 PROCEDURE — 83036 HEMOGLOBIN GLYCOSYLATED A1C: CPT

## 2024-01-03 ENCOUNTER — OFFICE VISIT (OUTPATIENT)
Dept: OBSTETRICS AND GYNECOLOGY | Facility: CLINIC | Age: 79
End: 2024-01-03
Payer: MEDICARE

## 2024-01-03 VITALS
HEART RATE: 69 BPM | DIASTOLIC BLOOD PRESSURE: 83 MMHG | SYSTOLIC BLOOD PRESSURE: 139 MMHG | HEIGHT: 64 IN | BODY MASS INDEX: 25.1 KG/M2 | WEIGHT: 147 LBS

## 2024-01-03 DIAGNOSIS — Z87.440 HISTORY OF UTI: Primary | ICD-10-CM

## 2024-01-03 DIAGNOSIS — R39.89 BLADDER PAIN: ICD-10-CM

## 2024-01-03 PROCEDURE — 1125F AMNT PAIN NOTED PAIN PRSNT: CPT | Performed by: OBSTETRICS & GYNECOLOGY

## 2024-01-03 PROCEDURE — 1159F MED LIST DOCD IN RCRD: CPT | Performed by: OBSTETRICS & GYNECOLOGY

## 2024-01-03 PROCEDURE — 99213 OFFICE O/P EST LOW 20 MIN: CPT | Performed by: OBSTETRICS & GYNECOLOGY

## 2024-01-03 ASSESSMENT — PROMIS GLOBAL HEALTH SCALE
RATE_PHYSICAL_HEALTH: VERY GOOD
EMOTIONAL_PROBLEMS: SOMETIMES
RATE_MENTAL_HEALTH: EXCELLENT
RATE_AVERAGE_FATIGUE: MILD
CARRYOUT_SOCIAL_ACTIVITIES: EXCELLENT
CARRYOUT_PHYSICAL_ACTIVITIES: COMPLETELY
RATE_GENERAL_HEALTH: VERY GOOD
RATE_AVERAGE_PAIN: 6
RATE_SOCIAL_SATISFACTION: EXCELLENT
RATE_QUALITY_OF_LIFE: EXCELLENT

## 2024-01-03 NOTE — PROGRESS NOTES
[facility] Department of Urogynecology   Sylvie Key MD, MPH   [phone]    ASSESSMENT AND PLAN:   78 y.o. female being assessed for recurrent UTIs.         Diagnoses:   #1 Recurrent UTIs     Plan:   1. Recurrent UTIs   - Last positive urine cx was on 04/19/23 with Proteus mirabilis.   - Uses vaginal estrogen 2x per week and takes Hiprex 1x daily. Encouraged patient to continue with this UTI prevention tx as it works for her.   - We reviewed that in the past she has had hematuria in the setting of UTI, which is part of the infection but is not concerning once the infection has been treated.   - Patient is endorsing having cramping and burning sxs near her bladder. She previously reported lower abdominal pain in 11/2023 and had x2 negative urine cultures that month. Advised her to try Azo for potential bladder spasms.   - At the end of January, she is going to [place] for 2 months and can visit urgent care if she has sxs of a UTI. Patient was advised to ask for urine culture results if she receives antibiotic treatment in [place]. She can always call the office if she has any concerns.      Follow-up with Dr. Key in April 2024 before her knee replacement on 4/22/24.     Scribe Attestation:   I, Leola Martinez, am scribing for virtually, and in the presence of Sylvie Key MD MPH on 1/3/24 at 11:52 AM.     Problem List Items Addressed This Visit    None     I spent a total of 20 minutes in face to face and non face to face time.      Sylvie Key MD, MPH, FACOG     I Dr. Key, personally performed the services described in the documentation as scribed in my presence and confirm it is both complete and accurate.  Sylvie Key MD, MPH, FACOG      Established    HISTORY OF PRESENT ILLNESS:     Zarina Holliday is a 78 y.o. female who presents for FUV on recurrent UTI.     Record Review:   - 12/18/23 Creatinine was 0.82   - 11/1/23 Dr. Sylvie Key note reviewed:  Last positive urine cx was  on 04/19/23 with Proteus mirabilis. Still on Hiprex, she was told she can discontinue Hiprex and continue with tv estrogen cream. If she did have a UTI at this visit, she should take Hiprex BID after finishing the antibiotics (she had been taking Hiprex 1x daily).    - 5/3/23 Dr. Sylvie Key note reviewed: Malinda - She was on Hiprex and after 6 months we would consider discontinuing. Last positive urine cx was on 04/19/23 with Proteus mirabilis. Using tv estrogen cream 2x per week. She was to call if she had UTI sxs.   - 4/20/23 UA Micro: 47 RBC/HPF, but she had a UTI.      Urine Culture Hx:   - 11/21/23 NO SIGNIFICANT GROWTH.   - 11/1/23 NO SIGNIFICANT GROWTH.   - 7/3/23 NO SIGNIFICANT GROWTH.   - 6/28/23 MULTIPLE ORGANISMS PRESENT, PROBABLE CONTAMINATION  - 05/03/23 NO SIGNIFICANT GROWTH.   - 04/19/23 Proteus mirabilis >100,000 CFU/ML  - 03/03/23 NO SIGNIFICANT GROWTH.   - 02/09/23 NO SIGNIFICANT GROWTH.  - 01/23/23 Escherichia coli 20,000-80,000 CFU/ML  - 01/20/23 MULTIPLE ORGANISMS PRESENT, PROBABLE CONTAMINATION     Urinary Symptoms:   - In Nov. 2023, she had lower abdominal cramping but had negative urine cultures with Dr. Key. She is unsure what caused this since it was not a UTI.   - PCP, Dr. Putnam, thought she was having bladder spasms.   - Patient is not drinking tea/carbonated drinks/alcohol and only drinks hot water. She will have irritation if she drinks bladder irritants.   - She feels bladder pressure that does not go away. She also describes cramping and burning sensations. She tries to modify exercises so she does not tilt her pelvis the wrong way and worsen her pain.   - She has taken Tylenol for pain, but has not tried Azo.   - At the end of January, she is going to [place] for 2 months and wanted to make sure her bladder sxs were checked before that.   - Still uses Hiprex and vaginal estrogen. Uses vaginal estrogen 2x per week and takes Hiprex 1x daily.  - Patient thought she had hematuria  without presence of a UTI. (On 4/20/23 UA Micro: 47 RBC/HPF, but she had a UTI.)    Past Medical History:       Past Medical History:   Diagnosis Date    Acute upper respiratory infection, unspecified 12/04/2017    Viral upper respiratory infection    Anesthesia of skin 12/11/2018    Numbness and tingling of right leg    Encounter for other screening for malignant neoplasm of breast 11/01/2017    Breast cancer screening, high risk patient    Encounter for screening for malignant neoplasm of colon 12/02/2017    Colon cancer screening    Encounter for screening mammogram for malignant neoplasm of breast 10/31/2016    Other screening mammogram    Other conditions influencing health status     History of vaginal delivery    Other conditions influencing health status 01/22/2016    History of cough    Pain in right foot 12/11/2018    Right foot pain    Pain in right hip 12/11/2018    Right hip pain    Pain in right leg 12/11/2018    Right leg pain    Personal history of colonic polyps 07/06/2017    History of adenomatous polyp of colon    Personal history of diseases of the skin and subcutaneous tissue 11/01/2013    History of dermatitis    Personal history of other diseases of the nervous system and sense organs     History of migraine headaches    Personal history of other diseases of the respiratory system 12/01/2017    History of sore throat    Personal history of other diseases of the respiratory system 12/01/2017    History of sinusitis    Personal history of other diseases of the respiratory system 01/22/2016    History of acute bronchitis    Personal history of other specified conditions 09/25/2017    History of edema    Personal history of transient ischemic attack (TIA), and cerebral infarction without residual deficits     History of transient cerebral ischemia    Postpartum depression     Postpartum depression    Reaction to severe stress, unspecified 08/17/2016    Stress    Strain of muscle, fascia and tendon  of lower back, initial encounter 2015    Strain of coccyx    Unspecified abnormal findings in urine     Abnormal finding in urine          Past Surgical History:       Past Surgical History:   Procedure Laterality Date    CATARACT EXTRACTION  2018    Cataract Surgery     SECTION, CLASSIC  2018     Section    HYSTERECTOMY  2018    Hysterectomy    MR HEAD ANGIO WO IV CONTRAST  2022    MR HEAD ANGIO WO IV CONTRAST 2022 [facility]    MR NECK ANGIO WO IV CONTRAST  2022    MR NECK ANGIO WO IV CONTRAST 2022 [facility]    OTHER SURGICAL HISTORY  2018    Dental Surgery    OTHER SURGICAL HISTORY  2018    Reported Hx Of 'Facelift'    TONSILLECTOMY  2018    Tonsillectomy    TOTAL ABDOMINAL HYSTERECTOMY W/ BILATERAL SALPINGOOPHORECTOMY  2018    Salpingo-oophorectomy Bilateral         Medications:       Prior to Admission medications    Medication Sig Start Date End Date Taking? Authorizing Provider   aspirin 81 mg EC tablet Take 1 tablet (81 mg) by mouth once daily.   Yes Historical Provider, MD   azelastine (Astelin) 137 mcg (0.1 %) nasal spray USE 2 SPRAYS IN EACH NOSTRIL IN THE MORNING AND 2 SPRAYS BEFORE BEDTIME 23  Yes Josefina Hatfield MD   betamethasone, augmented, (Diprolene AF) 0.05 % cream 2 times a day. Apply sparingly to affected areas 12  Yes Historical Provider, MD   calcium citrate-vitamin D3 250 mg-5 mcg (200 unit) tablet Take 1 tablet by mouth once daily. 13  Yes Historical Provider, MD   cholecalciferol (Vitamin D-3) 25 MCG (1000 UT) capsule Take 1 capsule (25 mcg) by mouth once daily. 13  Yes Historical Provider, MD   diclofenac sodium 1 % kit Apply 4 g topically in the morning and 4 g at noon and 4 g in the evening and 4 g before bedtime. APPLY TO AFFECTED AREA OF LOWER EXTREMITIES. DO NOT APPLY MORE THAN 16 GRAMS DAILY TO ANY ONE AFFECTED JOINT. 22  Yes Historical Provider, MD   fish  "oil concentrate (Omega-3) 120-180 mg capsule Take 1 capsule (1 g) by mouth once daily.   Yes Historical Provider, MD   fluticasone (Flonase) 50 mcg/actuation nasal spray Administer 2 sprays into each nostril once daily. 4/17/23  Yes Nneka Albarran MD   loratadine (Claritin) 10 mg tablet Take 1 tablet (10 mg) by mouth once daily as needed.   Yes Historical Provider, MD   methenamine hippurate (Hiprex) 1 gram tablet TAKE 1 TABLET TWICE A DAY 10/6/23  Yes Sylvie Key MD MPH   mirabegron (Myrbetriq) 50 mg tablet extended release 24 hr 24 hr tablet Take 1 tablet (50 mg) by mouth once daily. 12/11/23 12/10/24  Nneka Albarran MD   VITAMIN B COMPLEX ORAL Take 1 tablet by mouth once daily.    Historical Provider, MD RAINES  Review of Systems       PHYSICAL EXAM:      /83   Pulse 69   Ht 1.626 m (5' 4\")   Wt 66.7 kg (147 lb)   BMI 25.23 kg/m²      No LMP recorded.      Declines chaperone for physical exam.      Well developed, well nourished, in no apparent distress.   Neurologic/Psychiatric:  Awake, Alert and Oriented times 3.  Affect normal.     Data and DIAGNOSTIC STUDIES REVIEWED   No results found.   Lab Results   Component Value Date    URINECULTURE No significant growth 11/21/2023      Lab Results   Component Value Date    GLUCOSE 90 12/18/2023    CALCIUM 9.0 12/18/2023     12/18/2023    K 4.2 12/18/2023    CO2 28 12/18/2023     12/18/2023    BUN 14 12/18/2023    CREATININE 0.82 12/18/2023     Lab Results   Component Value Date    WBC 5.5 12/18/2023    HGB 13.3 12/18/2023    HCT 41.5 12/18/2023     (H) 12/18/2023     12/18/2023        "

## 2024-01-04 ENCOUNTER — OFFICE VISIT (OUTPATIENT)
Dept: PRIMARY CARE | Facility: CLINIC | Age: 79
End: 2024-01-04
Payer: MEDICARE

## 2024-01-04 VITALS
DIASTOLIC BLOOD PRESSURE: 72 MMHG | BODY MASS INDEX: 24.49 KG/M2 | WEIGHT: 147 LBS | HEIGHT: 65 IN | SYSTOLIC BLOOD PRESSURE: 132 MMHG | HEART RATE: 73 BPM | OXYGEN SATURATION: 98 %

## 2024-01-04 DIAGNOSIS — Z87.440 HISTORY OF UTI: Primary | ICD-10-CM

## 2024-01-04 DIAGNOSIS — M54.10 NERVE ROOT PAIN: Primary | ICD-10-CM

## 2024-01-04 DIAGNOSIS — E78.5 DYSLIPIDEMIA: ICD-10-CM

## 2024-01-04 PROCEDURE — 1160F RVW MEDS BY RX/DR IN RCRD: CPT | Performed by: STUDENT IN AN ORGANIZED HEALTH CARE EDUCATION/TRAINING PROGRAM

## 2024-01-04 PROCEDURE — G0439 PPPS, SUBSEQ VISIT: HCPCS | Performed by: STUDENT IN AN ORGANIZED HEALTH CARE EDUCATION/TRAINING PROGRAM

## 2024-01-04 PROCEDURE — 1170F FXNL STATUS ASSESSED: CPT | Performed by: STUDENT IN AN ORGANIZED HEALTH CARE EDUCATION/TRAINING PROGRAM

## 2024-01-04 PROCEDURE — 1036F TOBACCO NON-USER: CPT | Performed by: STUDENT IN AN ORGANIZED HEALTH CARE EDUCATION/TRAINING PROGRAM

## 2024-01-04 PROCEDURE — 1159F MED LIST DOCD IN RCRD: CPT | Performed by: STUDENT IN AN ORGANIZED HEALTH CARE EDUCATION/TRAINING PROGRAM

## 2024-01-04 PROCEDURE — 1125F AMNT PAIN NOTED PAIN PRSNT: CPT | Performed by: STUDENT IN AN ORGANIZED HEALTH CARE EDUCATION/TRAINING PROGRAM

## 2024-01-04 RX ORDER — NITROFURANTOIN 25; 75 MG/1; MG/1
100 CAPSULE ORAL EVERY 12 HOURS SCHEDULED
Qty: 10 CAPSULE | Refills: 0 | Status: SHIPPED | OUTPATIENT
Start: 2024-01-04 | End: 2024-01-16 | Stop reason: ALTCHOICE

## 2024-01-04 RX ORDER — BETAMETHASONE DIPROPIONATE 0.5 MG/G
CREAM TOPICAL 2 TIMES DAILY
Qty: 50 G | Refills: 3 | Status: SHIPPED | OUTPATIENT
Start: 2024-01-04 | End: 2025-01-03

## 2024-01-04 ASSESSMENT — PATIENT HEALTH QUESTIONNAIRE - PHQ9
2. FEELING DOWN, DEPRESSED OR HOPELESS: NOT AT ALL
SUM OF ALL RESPONSES TO PHQ9 QUESTIONS 1 AND 2: 0
1. LITTLE INTEREST OR PLEASURE IN DOING THINGS: NOT AT ALL

## 2024-01-04 ASSESSMENT — ENCOUNTER SYMPTOMS
DEPRESSION: 0
OCCASIONAL FEELINGS OF UNSTEADINESS: 1
LOSS OF SENSATION IN FEET: 0

## 2024-01-04 ASSESSMENT — ACTIVITIES OF DAILY LIVING (ADL)
DOING_HOUSEWORK: INDEPENDENT
DRESSING: INDEPENDENT
BATHING: INDEPENDENT
MANAGING_FINANCES: INDEPENDENT
TAKING_MEDICATION: INDEPENDENT
GROCERY_SHOPPING: INDEPENDENT

## 2024-01-04 NOTE — PATIENT INSTRUCTIONS
Thank you for coming today!    We discussed that your cholesterol is likely higher due to diet, and you have already made good changes since the labs resulted. Let's recheck when you are back from [place]. If the cholesterol is still high, we could consider doing a cardiac CT calcium score to see if there is any plaque build up in the coronary arteries.    I will forward my medical optimization note to Dr. Schneider.     You are planning to schedule your mammogram.    It was a pleasure meeting you!

## 2024-01-04 NOTE — PROGRESS NOTES
"Subjective   Patient ID: Zarina Holliday is a 78 y.o. female with recurrent UTIs who presents for Annual Exam.    HPI  Concerns today:  #cholesterol on labs  -already changed diet    #Pre op optimization  -needs note for medical optimization sent to Dr. Schneider  -States that surgery will do an EKG in their office, so does not need one today     #Imbalance  -Has noticed some worsening dizzy, out of balance feelings over time  -Does yoga and pilates   -Walks with walking sticks with hiking    Chronic issues:  #Recurrent UTIs  -Sees urogyn Sylvie Key  -vaginal estrogen 2x per week, hiprex 1x daily    #OAB  -myrbetriq 50    #Barry hematuria s/p neg urologic workup 11.2022   -s/p cystoscopy polypoid ureothelial mucosa with mild atypia favor reactive     #Knee arthritis  -TKR planned for 4/2024 left  -Voltaren, injections    #Chronic venous insufficiency  -Compression hose  -R wose than L    #R vision changes 10.22 s/p neg MRI/MRA 11.22 concern for TIA   -asa 81 started 12.22   -had a lot of side effects with plavix and statin    #Worsening imbalance over past few months   -has FU with opthal   -has FU with neuro       #HM   -screen labs 12.23-- reassuring aside from increased LDL   -mammo 1.4.23-- due, patient plans to schedule   -dexa 5.11.23  -cscope 2018 no further    -shingrix x2; tdap 2021; pneumovax UTD     Social history:  -Swims and exercises, yoga everyday  -She and  live in [place] 2 months of the year  -3 grown children, 9 grandchildren  -Retired CPA  -No alcohol or smoking      Objective   Visit Vitals  /72   Pulse 73   Ht 1.651 m (5' 5\")   Wt 66.7 kg (147 lb)   SpO2 98%   BMI 24.46 kg/m²   Smoking Status Never   BSA 1.75 m²      Physical Exam  General: Well appearing, conversational, in no acute distress  HEENT: EOMI, PERRL, nares patent without congestion, MMM, moderate cerumen in bilateral EACs, not occluding or impacting   CV: RRR, no murmurs  Resp: Lungs CTAB, normal work of " breathing  GI: Soft, nondistended, nontender  Ext: Wearing compression socks, mild swelling on R  Skin: Warm, dry, no rashes  Neuro: Awake, alert, oriented x3, moving all 4 extremities, nonfocal, normal gait, ambulates without assistance  Psych: Appropriate mood and affect      Assessment/Plan   Zarina Holliday is a 78 y.o. female with recurrent UTIs who presents for Annual Exam. Doing well overall, discussed her elevated cholesterol today. Patient plans to make dietary changes and recheck when she returns from [place], declines statin at this time. Will also consider CT cardiac score if cholesterol still elevated.     For her upcoming L TKA, her RCRI score is 1 for possible prior TIA which is a 6% 30-day risk of death, MI, or cardiac arrest.     She is on ASA, unable to tolerate statin or clopidogrel. MRI/MRA in 2022 was reassuring. In terms of cardiac work-up, she had a normal echo in 2022.    She is medically optimized for her upcoming procedure    Follow up in 3 months for cholesterol follow-up.      Problem List Items Addressed This Visit    None  Visit Diagnoses       Nerve root pain    -  Primary    Relevant Medications    betamethasone, augmented, (Diprolene AF) 0.05 % cream    Dyslipidemia        Relevant Orders    Lipid Panel                 Maria Espinoza MD MPH

## 2024-01-05 ENCOUNTER — HOSPITAL ENCOUNTER (OUTPATIENT)
Dept: RADIOLOGY | Facility: HOSPITAL | Age: 79
Discharge: HOME | End: 2024-01-05
Payer: MEDICARE

## 2024-01-05 DIAGNOSIS — E78.5 HYPERLIPIDEMIA, UNSPECIFIED HYPERLIPIDEMIA TYPE: Primary | ICD-10-CM

## 2024-01-05 DIAGNOSIS — Z12.31 BREAST CANCER SCREENING BY MAMMOGRAM: ICD-10-CM

## 2024-01-05 PROCEDURE — 77063 BREAST TOMOSYNTHESIS BI: CPT | Performed by: RADIOLOGY

## 2024-01-05 PROCEDURE — 77067 SCR MAMMO BI INCL CAD: CPT | Performed by: RADIOLOGY

## 2024-01-05 PROCEDURE — 77067 SCR MAMMO BI INCL CAD: CPT

## 2024-01-11 DIAGNOSIS — E78.5 DYSLIPIDEMIA: Primary | ICD-10-CM

## 2024-01-16 ENCOUNTER — OFFICE VISIT (OUTPATIENT)
Dept: CARDIOLOGY | Facility: HOSPITAL | Age: 79
End: 2024-01-16
Payer: MEDICARE

## 2024-01-16 DIAGNOSIS — E78.5 DYSLIPIDEMIA: Primary | ICD-10-CM

## 2024-01-16 LAB
ATRIAL RATE: 81 BPM
P AXIS: 79 DEGREES
P OFFSET: 193 MS
P ONSET: 140 MS
PR INTERVAL: 128 MS
Q ONSET: 204 MS
QRS COUNT: 14 BEATS
QRS DURATION: 90 MS
QT INTERVAL: 360 MS
QTC CALCULATION(BAZETT): 418 MS
QTC FREDERICIA: 397 MS
R AXIS: 53 DEGREES
T AXIS: 65 DEGREES
T OFFSET: 384 MS
VENTRICULAR RATE: 81 BPM

## 2024-01-16 PROCEDURE — 93005 ELECTROCARDIOGRAM TRACING: CPT | Performed by: INTERNAL MEDICINE

## 2024-01-16 PROCEDURE — 1036F TOBACCO NON-USER: CPT | Performed by: INTERNAL MEDICINE

## 2024-01-16 PROCEDURE — 1125F AMNT PAIN NOTED PAIN PRSNT: CPT | Performed by: INTERNAL MEDICINE

## 2024-01-16 PROCEDURE — 99204 OFFICE O/P NEW MOD 45 MIN: CPT | Performed by: INTERNAL MEDICINE

## 2024-01-16 PROCEDURE — 93010 ELECTROCARDIOGRAM REPORT: CPT | Performed by: INTERNAL MEDICINE

## 2024-01-16 PROCEDURE — 99214 OFFICE O/P EST MOD 30 MIN: CPT | Performed by: INTERNAL MEDICINE

## 2024-01-16 PROCEDURE — 1159F MED LIST DOCD IN RCRD: CPT | Performed by: INTERNAL MEDICINE

## 2024-01-16 RX ORDER — ATORVASTATIN CALCIUM 20 MG/1
20 TABLET, FILM COATED ORAL DAILY
COMMUNITY
End: 2024-01-25 | Stop reason: SDUPTHER

## 2024-01-16 RX ORDER — CLOPIDOGREL BISULFATE 75 MG/1
TABLET ORAL DAILY
COMMUNITY
End: 2024-04-08 | Stop reason: ALTCHOICE

## 2024-01-16 ASSESSMENT — ENCOUNTER SYMPTOMS
LOSS OF SENSATION IN FEET: 0
DEPRESSION: 0
OCCASIONAL FEELINGS OF UNSTEADINESS: 0

## 2024-01-17 VITALS
BODY MASS INDEX: 24.77 KG/M2 | HEIGHT: 64 IN | WEIGHT: 145.1 LBS | DIASTOLIC BLOOD PRESSURE: 75 MMHG | HEART RATE: 81 BPM | SYSTOLIC BLOOD PRESSURE: 125 MMHG

## 2024-01-17 NOTE — PROGRESS NOTES
Chief complaint:  I am seeing patient in consultation for Dr. Albarran regarding hyperlipidemia as well as preoperative evaluation before knee surgery.    HPI:  Patient is a very pleasant 78-year-old female.  She denies any history of hypertension or diabetes.  She does have a history of hyperlipidemia.  She denies any smoking history or family history of premature coronary disease.    Patient denies any previous MIs.  There is a questionable history of a possible TIA, but her MRI imaging was unremarkable.  She denies any history of rheumatic fever or history of heart murmur.  She denies any palpitations, presyncope or syncope.  She is quite active doing some swimming as well as Pilates.  She tolerates this fine without any exertionally mediated chest pain, shortness of breath, or claudication.  She denies any orthopnea, PND or significant peripheral edema.    Given her history of a possible TIA as well as her significantly elevated cholesterol level, she has been scheduled for carotid ultrasounds and a coronary artery calcium scan in the near future.  She comes in today to establish cardiology follow-up.    PMH/PSH:  Past medical history is remarkable for the hypercholesterolemia.  She is on aspirin due to the questionable TIA.  She also struggles with bilateral knee arthritis as well as bladder spasms.  Patient's previous surgeries include a hysterectomy as well as a .  She has had a tonsillectomy.  She is also anticipating upcoming left knee replacement surgery.    Allergies: Adhesive tape.  EtOH: Denies use.  Caffeine: Limited use.    FH/SH:  Patient is a pleasant 78-year-old retired female.  She worked as an  at [employer] for many years.  She has been  for 57 years.  She has 3 children and 9 grandchildren.  She grew up in [place].  She follows regularly with Dr. Albarran.    Review of systems:  All other systems have been reviewed and are negative for  complaint.    Physical exam:  Vital signs:   P-81   BP-125/75 (blood pressure was checked in both arms and was equal bilaterally at 125/75).  HEENT: Normal EOMs without thyromegaly or lymphadenopathy.  Lungs: Clear with good air exchange and no crackles or wheezing.  Cardiac: Normal JVP and carotid upstrokes without bruit.  There is a normal S1 and S2 without murmur rub or S3.  Abdomen: Nontender with normal bowel sounds and no bruits.  Extremities: No edema.  Skin: No acute rash.  Neuro: Grossly intact without focal motor deficit.  Vascular: Normal brachial, radial and ulnar pulses bilaterally.  Normal femoral, popliteal and distal pulses bilaterally.    Lipid panel: Cholesterol-253, HDL-84, LDL-156, TG-67.    Impression/plan:  Patient is a pleasant elderly female who is quite vigorous and active without any concerning cardiac symptomatology currently.  Her blood pressure is in good range without need for antihypertensive therapy.    Given her questionable history of a TIA, I do think it is reasonable to continue her on aspirin at 81 mg daily.  I will get her on atorvastatin at 20 mg daily which hopefully she will tolerate since her lipids are rather elevated.    I will review her carotid ultrasound results with her to be sure that we do not need to entertain any additional evaluation/therapy of potential carotid disease.  I will also review her coronary artery  calcium score which will help determine how aggressive to be with her lipid-lowering therapy.    I will see her back in several months.  Should her carotid ultrasound testing look good, I do think she will be reasonable risk to proceed with knee surgery in April as planned.  I will plan on reassessing her lipid panel when she returns to be sure that this is responding well to low-dose atorvastatin.    Patient instructions:    Continue aspirin and begin atorvastatin as directed.    Obtain your carotid ultrasounds and coronary artery calcium scan as scheduled  and call to review results.    Return to clinic in 2 months.    Obtain repeat fasting lipid panel just before your next visit.

## 2024-01-18 ENCOUNTER — OFFICE VISIT (OUTPATIENT)
Dept: ORTHOPEDIC SURGERY | Facility: CLINIC | Age: 79
End: 2024-01-18
Payer: MEDICARE

## 2024-01-18 DIAGNOSIS — M17.0 PRIMARY OSTEOARTHRITIS OF BOTH KNEES: Primary | ICD-10-CM

## 2024-01-18 PROCEDURE — 2500000005 HC RX 250 GENERAL PHARMACY W/O HCPCS: Performed by: ORTHOPAEDIC SURGERY

## 2024-01-18 PROCEDURE — 1159F MED LIST DOCD IN RCRD: CPT | Performed by: ORTHOPAEDIC SURGERY

## 2024-01-18 PROCEDURE — 20610 DRAIN/INJ JOINT/BURSA W/O US: CPT | Mod: 50

## 2024-01-18 PROCEDURE — 1160F RVW MEDS BY RX/DR IN RCRD: CPT | Performed by: ORTHOPAEDIC SURGERY

## 2024-01-18 PROCEDURE — 2500000004 HC RX 250 GENERAL PHARMACY W/ HCPCS (ALT 636 FOR OP/ED): Performed by: ORTHOPAEDIC SURGERY

## 2024-01-18 PROCEDURE — 1036F TOBACCO NON-USER: CPT | Performed by: ORTHOPAEDIC SURGERY

## 2024-01-18 PROCEDURE — 1125F AMNT PAIN NOTED PAIN PRSNT: CPT | Performed by: ORTHOPAEDIC SURGERY

## 2024-01-18 ASSESSMENT — PAIN - FUNCTIONAL ASSESSMENT: PAIN_FUNCTIONAL_ASSESSMENT: NO/DENIES PAIN

## 2024-01-19 ENCOUNTER — HOSPITAL ENCOUNTER (OUTPATIENT)
Dept: RADIOLOGY | Facility: CLINIC | Age: 79
Discharge: HOME | End: 2024-01-19
Payer: MEDICARE

## 2024-01-19 DIAGNOSIS — E78.5 HYPERLIPIDEMIA, UNSPECIFIED HYPERLIPIDEMIA TYPE: ICD-10-CM

## 2024-01-19 PROCEDURE — 75571 CT HRT W/O DYE W/CA TEST: CPT

## 2024-01-23 ENCOUNTER — TELEPHONE (OUTPATIENT)
Dept: CARDIOLOGY | Facility: CLINIC | Age: 79
End: 2024-01-23
Payer: MEDICARE

## 2024-01-25 ENCOUNTER — TELEPHONE (OUTPATIENT)
Dept: CARDIOLOGY | Facility: HOSPITAL | Age: 79
End: 2024-01-25
Payer: MEDICARE

## 2024-01-25 DIAGNOSIS — G45.9 TIA (TRANSIENT ISCHEMIC ATTACK): ICD-10-CM

## 2024-01-25 DIAGNOSIS — G45.1 TIA INVOLVING CAROTID ARTERY: ICD-10-CM

## 2024-01-25 DIAGNOSIS — E78.5 HYPERLIPIDEMIA, UNSPECIFIED HYPERLIPIDEMIA TYPE: Primary | ICD-10-CM

## 2024-01-25 RX ORDER — ATORVASTATIN CALCIUM 10 MG/1
10 TABLET, FILM COATED ORAL DAILY
Qty: 90 TABLET | Refills: 3 | Status: SHIPPED | OUTPATIENT
Start: 2024-01-25 | End: 2024-04-08 | Stop reason: ALTCHOICE

## 2024-01-26 NOTE — RESULT ENCOUNTER NOTE
Your calcium score is very low at 11.88. Only one vessel has a little bit of calcium, the rest have none. This is in the lowest risk category.

## 2024-02-04 PROCEDURE — 2500000004 HC RX 250 GENERAL PHARMACY W/ HCPCS (ALT 636 FOR OP/ED): Performed by: ORTHOPAEDIC SURGERY

## 2024-02-04 PROCEDURE — 20610 DRAIN/INJ JOINT/BURSA W/O US: CPT | Performed by: ORTHOPAEDIC SURGERY

## 2024-02-04 PROCEDURE — 2500000005 HC RX 250 GENERAL PHARMACY W/O HCPCS: Performed by: ORTHOPAEDIC SURGERY

## 2024-02-04 RX ORDER — TRIAMCINOLONE ACETONIDE 40 MG/ML
1 INJECTION, SUSPENSION INTRA-ARTICULAR; INTRAMUSCULAR
Status: COMPLETED | OUTPATIENT
Start: 2024-02-04 | End: 2024-02-04

## 2024-02-04 RX ORDER — LIDOCAINE HYDROCHLORIDE 10 MG/ML
4 INJECTION INFILTRATION; PERINEURAL
Status: COMPLETED | OUTPATIENT
Start: 2024-02-04 | End: 2024-02-04

## 2024-02-04 RX ADMIN — LIDOCAINE HYDROCHLORIDE 4 ML: 10 INJECTION, SOLUTION INFILTRATION; PERINEURAL at 14:55

## 2024-02-04 RX ADMIN — TRIAMCINOLONE ACETONIDE 1 ML: 40 INJECTION, SUSPENSION INTRA-ARTICULAR; INTRAMUSCULAR at 14:55

## 2024-02-04 NOTE — PROGRESS NOTES
L Inj/Asp: bilateral knee on 2/4/2024 2:55 PM  Indications: pain and joint swelling  Details: 22 G needle, anterolateral approach  Medications (Right): 1 mL triamcinolone acetonide 40 mg/mL; 4 mL lidocaine 10 mg/mL (1 %)  Medications (Left): 1 mL triamcinolone acetonide 40 mg/mL; 4 mL lidocaine 10 mg/mL (1 %)    Discussion:  I discussed the conservative treatment options for knee osteoarthritis including but not limited to physical therapy, oral NSAIDS, activity and lifestyle modification, and corticosteroid injections. Pt has elected to undergo a cortisone injection today. I have explained the risk and benefits of an injection including the possibility of joint infection, bleeding, damage to cartilage, allergic reaction. Patient verbalized understanding and gave verbal consent wishes to proceed with a intra-articular cortisone injection for their knee.    Procedure:  After discussing the risk and benefits of the procedure, we proceeded with an intra-articular bilateral knee injection.    With the patient's informed verbal consent, the bilateral knees were prepped in standard sterile fashion with Chlorhexidine. The skin was then anesthetized with ethyl chloride spray and cleaned again with Chlorhexidine. The bilateral knees were then apirated/injected with a prefilled 20-gauge syringe of 40 mg Kenalog + 4 ml Lidocaine using the lateral approach without complications.  The patient tolerated this well and felt immediate initial relief of symptoms. A bandaid was applied and the patient ambulated out of the clinic on ther own accord without difficulty. Patient was instructed to avoid physical activity for 24-48 hours to prevent the knees from swelling and may ice the knees as tolerated. Patient should contact the office if any signs of of infection appear: redness, fever, chills, drainage, swelling or warmth to the knees.  Pt understands that the injections can be repeated no sooner than 3 months.      Procedure,  treatment alternatives, risks and benefits explained, specific risks discussed. Consent was given by the patient. Immediately prior to procedure a time out was called to verify the correct patient, procedure, equipment, support staff and site/side marked as required. Patient was prepped and draped in the usual sterile fashion.

## 2024-02-18 ENCOUNTER — TRANSCRIBE ORDERS (OUTPATIENT)
Dept: OPERATING ROOM | Facility: HOSPITAL | Age: 79
End: 2024-02-18
Payer: MEDICARE

## 2024-02-18 DIAGNOSIS — M17.12 UNILATERAL PRIMARY OSTEOARTHRITIS, LEFT KNEE: Primary | ICD-10-CM

## 2024-02-19 PROBLEM — M17.12 UNILATERAL PRIMARY OSTEOARTHRITIS, LEFT KNEE: Status: ACTIVE | Noted: 2024-02-18

## 2024-02-21 DIAGNOSIS — N32.81 OVERACTIVE BLADDER: ICD-10-CM

## 2024-02-21 RX ORDER — MIRABEGRON 50 MG/1
50 TABLET, FILM COATED, EXTENDED RELEASE ORAL DAILY
Qty: 90 TABLET | Refills: 0 | Status: SHIPPED | OUTPATIENT
Start: 2024-02-21 | End: 2024-02-29 | Stop reason: SDUPTHER

## 2024-02-29 DIAGNOSIS — N32.81 OVERACTIVE BLADDER: ICD-10-CM

## 2024-02-29 RX ORDER — MIRABEGRON 50 MG/1
50 TABLET, EXTENDED RELEASE ORAL DAILY
Qty: 90 TABLET | Refills: 0 | Status: SHIPPED | OUTPATIENT
Start: 2024-02-29

## 2024-03-04 ENCOUNTER — APPOINTMENT (OUTPATIENT)
Dept: PREADMISSION TESTING | Facility: HOSPITAL | Age: 79
End: 2024-03-04
Payer: MEDICARE

## 2024-04-03 ENCOUNTER — ANCILLARY PROCEDURE (OUTPATIENT)
Dept: VASCULAR MEDICINE | Facility: CLINIC | Age: 79
End: 2024-04-03
Payer: MEDICARE

## 2024-04-03 DIAGNOSIS — G45.9 TIA (TRANSIENT ISCHEMIC ATTACK): ICD-10-CM

## 2024-04-03 DIAGNOSIS — G45.1 TIA INVOLVING CAROTID ARTERY: ICD-10-CM

## 2024-04-03 PROCEDURE — 93880 EXTRACRANIAL BILAT STUDY: CPT

## 2024-04-03 PROCEDURE — 93880 EXTRACRANIAL BILAT STUDY: CPT | Performed by: SURGERY

## 2024-04-04 ENCOUNTER — OFFICE VISIT (OUTPATIENT)
Dept: ORTHOPEDIC SURGERY | Facility: CLINIC | Age: 79
End: 2024-04-04
Payer: MEDICARE

## 2024-04-04 VITALS — HEIGHT: 65 IN | WEIGHT: 142.8 LBS | BODY MASS INDEX: 23.79 KG/M2

## 2024-04-04 DIAGNOSIS — M17.12 PRIMARY OSTEOARTHRITIS OF LEFT KNEE: Primary | ICD-10-CM

## 2024-04-04 PROCEDURE — 1159F MED LIST DOCD IN RCRD: CPT | Performed by: ORTHOPAEDIC SURGERY

## 2024-04-04 PROCEDURE — 1036F TOBACCO NON-USER: CPT | Performed by: ORTHOPAEDIC SURGERY

## 2024-04-04 PROCEDURE — 1160F RVW MEDS BY RX/DR IN RCRD: CPT | Performed by: ORTHOPAEDIC SURGERY

## 2024-04-04 PROCEDURE — 99214 OFFICE O/P EST MOD 30 MIN: CPT | Performed by: ORTHOPAEDIC SURGERY

## 2024-04-04 ASSESSMENT — PAIN - FUNCTIONAL ASSESSMENT: PAIN_FUNCTIONAL_ASSESSMENT: NO/DENIES PAIN

## 2024-04-04 NOTE — PROGRESS NOTES
Patient is a 78-year-old female presents today for discussion of upcoming left total knee arthroplasty.  She is failed a greater than 3-month trial reasonable conservative treatment including cortisone injections, naproxen and home exercise program.  She is an avid swimmer.  She has pain going downstairs.  She rates her pain at times is 7 out of 10.  She has never had any surgery on the knee.  She would like to proceed with total knee arthroplasty which is indicated at this time.    Left knee:  AAOx3, NAD, walks with a moderate antalgic gait  Varus allignment  Range of motion lacks 10 degrees of full extension and flexes to 110 degrees  Stable to varus/valgus/anterior/posterior stress through out the range of motion  Slight laxity with varus stress  Diffuse medial  joint line tenderness to palpation  Moderate effusion  SILT in a bijal/saph/per/tib distribution  5/5 knee extension/df/pf/ehl  ½ dorsalis pedis and posterior tibial pulse  no popliteal lymphadenopathy  no other overlying lesions  mood: euthymic  Respirations non labored    Plain films were reviewed by myself in clinic today.  They have advanced osteoarthritis of their knee with significant joint space narrowing, bone on bone changes, underlying sclerosis and tricompartmental osteophytic change.  Worst in the patellofemoral compartment.    Patient is now failed a greater than 3-month trial of reasonable conservative treatment and wishes proceed with surgery which is indicated at this time.  Discussed the risk benefits alternatives to surgery.  All of their questions were answered.    Procedure: left total knee  [place] [facility] [place]  ICD10: M17.12  CPT: 78195  Stay: overnight  Equipment: [employer] [facility]    I talked with the patient at length about risks, limitations, benefits and alternatives to total knee replacement today. I reviewed concerns about implant wear, loosening, breakage, infection and infection prophylaxis, DVT, PE, death and other medical and  anesthetic complications of surgery. We talked about the potential for persistent pain following surgery since there are many possible causes for knee and leg pain. The patient was advised that knee replacement will only relieve pain that is coming from the knee. We talked about limited range of motion following knee replacement and the importance of physical therapy and their motivation. We talked about improvements in pain management post-operatively and our accelerated rehab program. We talked about wound healing issues and neurovascular complications of surgery. I reviewed functional and activity restrictions in detail. We discussed the possible need for a homologous blood transfusion. We discussed the fact that many of our patients are able to go home in 1 day or less depending on their health, mobility, pre-op preparation, individual home situation and personal preference.  The patient has identified their personal goals of their joint replacement surgery and recovery and we have discussed them. These are documented in our [employer] patient engagement platform. In addition, we have discussed the advantages and disadvantages of various implant and fixation options, as well as various surgical approaches.  The basic concepts of the joint replacement procedure has been reviewed with the patient and the patient has been provided the opportunity to see an actual implant either in the office or in our pre-op education class.  All of the patients questions were answered. The patient can call my office to schedule surgery and the pre-op teaching class. I told the patient that they should contact their primary care physician to discuss fitness for surgery.    This note was created using voice recognition software and was not corrected for typographical or grammatical errors.

## 2024-04-05 ENCOUNTER — OFFICE VISIT (OUTPATIENT)
Dept: OBSTETRICS AND GYNECOLOGY | Facility: CLINIC | Age: 79
End: 2024-04-05
Payer: MEDICARE

## 2024-04-05 VITALS
BODY MASS INDEX: 23.8 KG/M2 | DIASTOLIC BLOOD PRESSURE: 67 MMHG | HEART RATE: 80 BPM | WEIGHT: 143 LBS | SYSTOLIC BLOOD PRESSURE: 148 MMHG

## 2024-04-05 DIAGNOSIS — N39.0 RECURRENT UTI: Primary | ICD-10-CM

## 2024-04-05 DIAGNOSIS — N30.00 ACUTE CYSTITIS WITHOUT HEMATURIA: ICD-10-CM

## 2024-04-05 DIAGNOSIS — N95.2 VAGINAL ATROPHY: ICD-10-CM

## 2024-04-05 LAB
APPEARANCE UR: ABNORMAL
BILIRUB UR STRIP.AUTO-MCNC: ABNORMAL MG/DL
CAOX CRY #/AREA UR COMP ASSIST: ABNORMAL /HPF
COLOR UR: ABNORMAL
GLUCOSE UR STRIP.AUTO-MCNC: NORMAL MG/DL
HYALINE CASTS #/AREA URNS AUTO: ABNORMAL /LPF
KETONES UR STRIP.AUTO-MCNC: NEGATIVE MG/DL
LEUKOCYTE ESTERASE UR QL STRIP.AUTO: NEGATIVE
MUCOUS THREADS #/AREA URNS AUTO: ABNORMAL /LPF
NITRITE UR QL STRIP.AUTO: ABNORMAL
PH UR STRIP.AUTO: 5.5 [PH]
PROT UR STRIP.AUTO-MCNC: ABNORMAL MG/DL
RBC # UR STRIP.AUTO: NEGATIVE /UL
RBC #/AREA URNS AUTO: ABNORMAL /HPF
SP GR UR STRIP.AUTO: 1.02
SQUAMOUS #/AREA URNS AUTO: ABNORMAL /HPF
UROBILINOGEN UR STRIP.AUTO-MCNC: ABNORMAL MG/DL
WBC #/AREA URNS AUTO: >50 /HPF

## 2024-04-05 PROCEDURE — 1036F TOBACCO NON-USER: CPT | Performed by: OBSTETRICS & GYNECOLOGY

## 2024-04-05 PROCEDURE — 1160F RVW MEDS BY RX/DR IN RCRD: CPT | Performed by: OBSTETRICS & GYNECOLOGY

## 2024-04-05 PROCEDURE — 1126F AMNT PAIN NOTED NONE PRSNT: CPT | Performed by: OBSTETRICS & GYNECOLOGY

## 2024-04-05 PROCEDURE — 99214 OFFICE O/P EST MOD 30 MIN: CPT | Performed by: OBSTETRICS & GYNECOLOGY

## 2024-04-05 PROCEDURE — 81001 URINALYSIS AUTO W/SCOPE: CPT | Performed by: OBSTETRICS & GYNECOLOGY

## 2024-04-05 PROCEDURE — 87086 URINE CULTURE/COLONY COUNT: CPT | Performed by: OBSTETRICS & GYNECOLOGY

## 2024-04-05 PROCEDURE — 1159F MED LIST DOCD IN RCRD: CPT | Performed by: OBSTETRICS & GYNECOLOGY

## 2024-04-05 RX ORDER — SULFAMETHOXAZOLE AND TRIMETHOPRIM 800; 160 MG/1; MG/1
1 TABLET ORAL 2 TIMES DAILY
Qty: 10 TABLET | Refills: 0 | Status: SHIPPED | OUTPATIENT
Start: 2024-04-05 | End: 2024-04-10

## 2024-04-05 ASSESSMENT — PAIN SCALES - GENERAL: PAINLEVEL: 0-NO PAIN

## 2024-04-05 NOTE — H&P (VIEW-ONLY)
No results found for this or any previous visit (from the past 24 hour(s)).     Assessment & Plan:    78 y.o.  female  scheduled for L TKR on 4/22/24 with Dr. Schneider for  knee pain and OA L knee.   She has failed conservative therapy.     Presents to CPM today for perioperative risk stratification and optimization    Pt denies dysuria, hematuria, fevers, chills, and myalgias. Patient denies Cx pain, SOB, RODRIGUEZ, and NVDC. Patient also denies Hx DVT/PE. Current medications were reviewed and a presurgical medication schedule was provided. He has no questions at this time.     She has frequent UTI's is currently on Bactrim by her Urologist d/t trip home from [place]    Past Medical History:   Diagnosis Date    Acute upper respiratory infection, unspecified 12/04/2017    Viral upper respiratory infection    Anesthesia of skin 12/11/2018    Numbness and tingling of right leg    Dizziness     Encounter for other screening for malignant neoplasm of breast 11/01/2017    Breast cancer screening, high risk patient    Encounter for screening for malignant neoplasm of colon 12/02/2017    Colon cancer screening    Encounter for screening mammogram for malignant neoplasm of breast 10/31/2016    Other screening mammogram    GERD (gastroesophageal reflux disease)     Hyperlipidemia     Hypertension     Other conditions influencing health status     History of vaginal delivery    Other conditions influencing health status 01/22/2016    History of cough    Pain in right foot 12/11/2018    Right foot pain    Pain in right hip 12/11/2018    Right hip pain    Pain in right leg 12/11/2018    Right leg pain    Personal history of colonic polyps 07/06/2017    History of adenomatous polyp of colon    Personal history of diseases of the skin and subcutaneous tissue 11/01/2013    History of dermatitis    Personal history of other diseases of the nervous system and sense organs     History of migraine headaches    Personal history of other  diseases of the respiratory system 2017    History of sore throat    Personal history of other diseases of the respiratory system 2017    History of sinusitis    Personal history of other diseases of the respiratory system 2016    History of acute bronchitis    Personal history of other specified conditions 2017    History of edema    Personal history of transient ischemic attack (TIA), and cerebral infarction without residual deficits     History of transient cerebral ischemia    Postpartum depression     Postpartum depression    Reaction to severe stress, unspecified 2016    Stress    Strain of muscle, fascia and tendon of lower back, initial encounter 2015    Strain of coccyx    TIA (transient ischemic attack)     Unspecified abnormal findings in urine     Abnormal finding in urine    Urinary tract infection        Past Surgical History:   Procedure Laterality Date    CATARACT EXTRACTION  2018    Cataract Surgery     SECTION, CLASSIC  2018     Section    COLONOSCOPY      HYSTERECTOMY  2018    Hysterectomy    MR HEAD ANGIO WO IV CONTRAST  2022    MR HEAD ANGIO WO IV CONTRAST 2022 [facility]    MR NECK ANGIO WO IV CONTRAST  2022    MR NECK ANGIO WO IV CONTRAST 2022 [facility]    OTHER SURGICAL HISTORY  2018    Dental Surgery    OTHER SURGICAL HISTORY  2018    Reported Hx Of 'Facelift'    TONSILLECTOMY  2018    Tonsillectomy    TOTAL ABDOMINAL HYSTERECTOMY W/ BILATERAL SALPINGOOPHORECTOMY  2018    Salpingo-oophorectomy Bilateral       Family History   Problem Relation Name Age of Onset    Depression Mother      COPD Mother      Alcohol abuse Mother      Asthma Mother      Leukemia Mother      Suicide Attempts Mother      Polycythemia Mother          VERA    Leukemia Father      Alcohol abuse Father      Other (htn) Sister      Lung disease Brother      Goiter Paternal Grandmother       Depression Other FAMILY HISTORY     COPD Other FAMILY HISTORY     Asthma Other FAMILY HISTORY     Diabetes type II Other FAMILY HISTORY        Allergies   Allergen Reactions    Adhesive Tape-Silicones Other     Blisters, chin itching    Hydrocodone Other     itchiness, hot/cold sweats       Current Outpatient Medications on File Prior to Visit   Medication Sig Dispense Refill    acetaminophen (Tylenol) 500 mg tablet Take 2 tablets (1,000 mg) by mouth every 6 hours if needed for mild pain (1 - 3).      aspirin 81 mg EC tablet Take 1 tablet (81 mg) by mouth once daily.      azelastine (Astelin) 137 mcg (0.1 %) nasal spray USE 2 SPRAYS IN EACH NOSTRIL IN THE MORNING AND 2 SPRAYS BEFORE BEDTIME (Patient taking differently: Administer 2 sprays into each nostril 2 times a day as needed.) 90 mL 3    calcium citrate-vitamin D3 250 mg-5 mcg (200 unit) tablet Take 1 tablet by mouth once daily.      cholecalciferol (Vitamin D-3) 25 MCG (1000 UT) capsule Take 1 capsule (25 mcg) by mouth once daily.      diclofenac sodium 1 % kit Apply 4 g topically 4 times a day as needed. APPLY TO AFFECTED AREA OF LOWER EXTREMITIES. DO NOT APPLY MORE THAN 16 GRAMS DAILY TO ANY ONE AFFECTED JOINT      fish oil concentrate (Omega-3) 120-180 mg capsule Take 1 capsule (1 g) by mouth once daily.      fluticasone (Flonase) 50 mcg/actuation nasal spray Administer 2 sprays into each nostril once daily. (Patient taking differently: Administer 2 sprays into each nostril once daily as needed.) 48 g 1    loratadine (Claritin) 10 mg tablet Take 1 tablet (10 mg) by mouth once daily as needed.      methenamine hippurate (Hiprex) 1 gram tablet TAKE 1 TABLET TWICE A DAY 60 tablet 11    mirabegron (Myrbetriq) 50 mg tablet extended release 24 hr 24 hr tablet Take 1 tablet (50 mg) by mouth once daily. 90 tablet 0    sulfamethoxazole-trimethoprim (Bactrim DS) 800-160 mg tablet Take 1 tablet by mouth 2 times a day for 5 days. (Patient taking differently: Take 1  tablet by mouth 2 times a day. Started 4/5/24) 10 tablet 0    VITAMIN B COMPLEX ORAL Take 1 tablet by mouth once daily.      betamethasone, augmented, (Diprolene AF) 0.05 % cream Apply topically 2 times a day. Apply sparingly to affected areas (Patient not taking: Reported on 4/8/2024) 50 g 3    [DISCONTINUED] atorvastatin (Lipitor) 10 mg tablet Take 1 tablet (10 mg) by mouth once daily. (Patient not taking: Reported on 4/5/2024) 90 tablet 3    [DISCONTINUED] clopidogrel (Plavix) 75 mg tablet Take by mouth once daily.       No current facility-administered medications on file prior to visit.       Review of Systems   All other systems reviewed and are negative.     Physical Exam  Constitutional:       Appearance: Normal appearance.   HENT:      Nose: Nose normal.      Mouth/Throat:      Mouth: Mucous membranes are moist.      Pharynx: Oropharynx is clear.   Eyes:      Extraocular Movements: Extraocular movements intact.      Conjunctiva/sclera: Conjunctivae normal.      Pupils: Pupils are equal, round, and reactive to light.   Cardiovascular:      Rate and Rhythm: Normal rate and regular rhythm.      Pulses: Normal pulses.      Heart sounds: Normal heart sounds.   Pulmonary:      Effort: Pulmonary effort is normal.      Breath sounds: Normal breath sounds.   Abdominal:      General: Bowel sounds are normal.      Palpations: Abdomen is soft.   Musculoskeletal:         General: Normal range of motion.      Cervical back: Normal range of motion and neck supple.   Skin:     General: Skin is warm and dry.      Capillary Refill: Capillary refill takes 2 to 3 seconds.   Neurological:      General: No focal deficit present.      Mental Status: She is alert and oriented to person, place, and time.   Psychiatric:         Behavior: Behavior normal.        PAT AIRWAY:   Airway:     Mallampati::  III    TM distance::  >3 FB    Neck ROM::  Full      Visit Vitals  BP 99/62   Pulse 72   Temp 36.7 °C (98.1 °F) (Temporal)   Resp 16  "  Ht 1.651 m (5' 5\")   Wt 63.9 kg (140 lb 14 oz)   SpO2 96%   BMI 23.44 kg/m²   OB Status Postmenopausal   Smoking Status Never   BSA 1.71 m²       Neuro:  Peripheral neuropathy    HEENT/Airway:  No diagnosis or significant findings on chart review or clinical presentation and evaluation.     Cardiovascular:  Venous insufficiency  CAC score 11.8 1/5/24 1/25/24 Carotid US  Right Carotid: Findings are consistent with less than 50% stenosis of the right proximal internal carotid artery. Laminar flow seen by color Doppler. Right external carotid artery appears patent with no evidence of stenosis. The right vertebral artery is patent with antegrade flow. No evidence of hemodynamically significant stenosis in the right subclavian artery.  Left Carotid: Findings are consistent with less than 50% stenosis of the left proximal internal carotid artery. Laminar flow seen by color Doppler. Left external carotid artery appears patent with no evidence of stenosis. The left vertebral artery is patent with antegrade flow. No evidence of hemodynamically significant stenosis in the left subclavian artery.     1/16/24 EKG  Normal sinus rhythm  Normal ECG  When compared with ECG of 10-AUG-2016 09:09,  No significant change was found    6/15/22 Echo  1. The left ventricular systolic function is normal with a 60-65% estimated ejection fraction.   2. RVSP within normal limits.    Pulmonary:  No diagnosis or significant findings on chart review or clinical presentation and evaluation.   ARISCAT: <26 points, 1.6% risk of in-hospital postoperative pulmonary complication  PRODIGY: Low risk for opioid induced respiratory depression  Discussed smoking cessation and deep breathing handout given    Renal:   No diagnosis or significant findings on chart review, or clinical presentation and evaluation.   Pt at Low risk for perioperative JABARI based on Dynamic Predictive Scoring Tool for Perioperative JABARI  Lab Results   Component Value Date    " GLUCOSE 90 12/18/2023    CALCIUM 9.0 12/18/2023     12/18/2023    K 4.2 12/18/2023    CO2 28 12/18/2023     12/18/2023    BUN 14 12/18/2023    CREATININE 0.82 12/18/2023         Endocrine:  Adult myxedema  Lab Results   Component Value Date    HGBA1C 5.4 12/18/2023       Hematologic:  Anemia    Lab Results   Component Value Date    WBC 5.5 12/18/2023    HGB 13.3 12/18/2023    HCT 41.5 12/18/2023     (H) 12/18/2023     12/18/2023       Caprini Score 8, patient at High risk for postoperative DVT. Pt supplied education/VTE handout    Gastrointestinal:   No diagnosis or significant findings on chart review or clinical presentation and evaluation.     :  Recurrent UTI, 4/5/24 saw Dr Key who prescribed Bactrim -160 mg tablet; take 1 tablet by mouth 2 times a day for 5 days. This rx was susceptible to last infection.   - She can take Azo, but do not take Hiprex again until she finishes her antibiotics.  OAB (overactive bladder)  Lesion of bladder  UTI (urinary tract infection)  Urge incontinence of urine    Infectious disease:   No diagnosis or significant findings on chart review or clinical presentation and evaluation.   Prescription provided for CHG body wash and dental rinse. CHG use instructions reviewed and provided to patient.  Staph screen collected    Musculoskeletal:   See HPI    Preoperative risk assessment  ASA III  RCRI - 0 points  Class I Risk 3.9%  KHAI - 2 points Risk for CHAPARRITA   NSQIP - Predicted length of stay days 0-1  ARISCAT - 7 points Low Risk 1.6%  DASI 50.7 Points 9.Mets  RHINA - 0.1%  JHFRAT - points risk for falls  Clearance - not indicated  PAT Testing - CBC, BMP, UA(per MD order) MRSA PCR, EKG    *CLEARED FOR SURGERY   Seen by Cardiologist Dr Aguilar 1/16/24 who noted:  Impression/plan:  Patient is a pleasant elderly female who is quite vigorous and active without any concerning cardiac symptomatology currently.  Her blood pressure is in good range without  need for antihypertensive therapy.     Given her questionable history of a TIA, I do think it is reasonable to continue her on aspirin at 81 mg daily.  I will get her on atorvastatin at 20 mg daily which hopefully she will tolerate since her lipids are rather elevated.     I will review her carotid ultrasound results with her to be sure that we do not need to entertain any additional evaluation/therapy of potential carotid disease.  I will also review her coronary artery  calcium score which will help determine how aggressive to be with her lipid-lowering therapy.     I will see her back in several months.  Should her carotid ultrasound testing look good, I do think she will be reasonable risk to proceed with knee surgery in April as planned.  I will plan on reassessing her lipid panel when she returns to be sure that this is responding well to low-dose atorvastatin.       PENDING LABS/EKG-LABS REVIEWED, STABLE. OK TO PROCEED    Anesthesia:  No anesthesia complications    denies dental issues  Non smoker  Denies ETOH or Drug abuse  denies personal/family issues with Anesthesia  No nickel, shell fish, or iodine allergies      Patient is at acceptable risk to proceed with planned surgical procedure. Further cardiac risk stratification deferred at this time.This patient is low risk candidate undergoing moderate risk procedure, patient is medically optimized for surgery    Discussed with patient medication instructions, NPO guidelines, and any questions or concerns. Patient does not need further workup prior to preceding with elective surgery based on based on risk assessment. .     CHLORHEXIDINE .12% DENTAL RINSE E PRESCIRBED PER  INFECTION PREVENTION PROTOCOL. PATIENT EDUCATED   Patient advised to call [facility] if does not receive Mouthwash    Face to Face patient contact time 45    MEGHNA Alvarez-CNP 4/8/2024 2:30 PM

## 2024-04-05 NOTE — PROGRESS NOTES
[facility] Department of Urogynecology   Sylvie Key MD, MPH   [phone]    ASSESSMENT AND PLAN:   78 y.o. female being assessed for recurrent UTIs.           Diagnoses:   #1 Recurrent UTIs   #2 vaginal atrophy    Plan:   1. Recurrent UTIs   - Last positive urine cx was on 04/19/23 with Proteus mirabilis.   - She is here today with concerns of an acute UTI.   - Urine was sent for culture. If culture results indicate she needs to switch abx, will plan to follow up with patient with a different rx.   - Rx: Bactrim -160 mg tablet; take 1 tablet by mouth 2 times a day for 5 days. This rx was susceptible to last infection.   - She can take Azo, but do not take Hiprex again until she finishes her antibiotics.    2. GSM  - continue vaginal estrogen cream        Follow-up with Dr. Key for UTI management. Of note, she is planning a knee replacement on 4/22/24.      Scribe Attestation:   ILeola, am scribing for virtually, and in the presence of Sylvie Key MD MPH on 4/5/24 at 10:33 AM.     Problem List Items Addressed This Visit          Genitourinary and Reproductive    UTI (urinary tract infection)    Relevant Medications    sulfamethoxazole-trimethoprim (Bactrim DS) 800-160 mg tablet    Other Relevant Orders    Urine Culture     Other Visit Diagnoses       Recurrent UTI    -  Primary    Relevant Medications    sulfamethoxazole-trimethoprim (Bactrim DS) 800-160 mg tablet    Other Relevant Orders    Urinalysis with Reflex Microscopic    Urine Culture           I spent a total of 30 minutes in face to face and non face to face time.      Sylvie Key MD, MPH, FACOG     I Dr. Key, personally performed the services described in the documentation as scribed in my presence and confirm it is both complete and accurate.  Sylvie Key MD, MPH, FACOG      Established    HISTORY OF PRESENT ILLNESS:     Zarina Holliday is a 78 y.o. female who presents for recurrent UTI.     Record  Review:   - 4/4/24 Ortho note (Dr. Schneider) - Discussed upcoming left total knee arthroplasty.   - 1/3/24 note: Malinda - Last positive urine cx was on 04/19/23 with Proteus mirabilis. Uses vaginal estrogen 2x per week and takes Hiprex 1x daily.   - 12/18/23 Creatinine was 0.82   - 11/1/23 Dr. Sylvie Key note reviewed:  Last positive urine cx was on 04/19/23 with Proteus mirabilis. Still on Hiprex, she was told she can discontinue Hiprex and continue with tv estrogen cream. If she did have a UTI at this visit, she should take Hiprex BID after finishing the antibiotics (she had been taking Hiprex 1x daily).    - 5/3/23 Dr. Sylvie Key note reviewed: Malinda - She was on Hiprex and after 6 months we would consider discontinuing. Last positive urine cx was on 04/19/23 with Proteus mirabilis. Using tv estrogen cream 2x per week. She was to call if she had UTI sxs.   - 4/20/23 UA Micro: 47 RBC/HPF, but she had a UTI.     Urine Culture Hx:    - 11/21/23 NO SIGNIFICANT GROWTH.   - 11/1/23 NO SIGNIFICANT GROWTH.   - 7/3/23 NO SIGNIFICANT GROWTH.   - 6/28/23 MULTIPLE ORGANISMS PRESENT, PROBABLE CONTAMINATION  - 05/03/23 NO SIGNIFICANT GROWTH.   - 04/19/23 Proteus mirabilis >100,000 CFU/ML     Urinary Symptoms:   - Taking Azo now since she thinks she has a UTI.   - She recently came back from [place] after a 2 month visit and had no UTIs when she was there. Patient did get food poisoning on the plane when she was traveling home and then got dehydrated because she couldn't keep fluids down. Patient got home the night of 4/1/24. She then felt like her bladder was affected and tried to increase fluid intake and take Azo.   - This week she has not been taking Hiprex due to vomiting, but she is now able to keep fluids and pills down without vomiting.   - Azo 3x daily has helped with pain.     Interval History:   - Brother passed away yesterday.   - Reports allergy to adhesive tape.     Past Medical History:     Past Medical  History:   Diagnosis Date    Acute upper respiratory infection, unspecified 12/04/2017    Viral upper respiratory infection    Anesthesia of skin 12/11/2018    Numbness and tingling of right leg    Encounter for other screening for malignant neoplasm of breast 11/01/2017    Breast cancer screening, high risk patient    Encounter for screening for malignant neoplasm of colon 12/02/2017    Colon cancer screening    Encounter for screening mammogram for malignant neoplasm of breast 10/31/2016    Other screening mammogram    Other conditions influencing health status     History of vaginal delivery    Other conditions influencing health status 01/22/2016    History of cough    Pain in right foot 12/11/2018    Right foot pain    Pain in right hip 12/11/2018    Right hip pain    Pain in right leg 12/11/2018    Right leg pain    Personal history of colonic polyps 07/06/2017    History of adenomatous polyp of colon    Personal history of diseases of the skin and subcutaneous tissue 11/01/2013    History of dermatitis    Personal history of other diseases of the nervous system and sense organs     History of migraine headaches    Personal history of other diseases of the respiratory system 12/01/2017    History of sore throat    Personal history of other diseases of the respiratory system 12/01/2017    History of sinusitis    Personal history of other diseases of the respiratory system 01/22/2016    History of acute bronchitis    Personal history of other specified conditions 09/25/2017    History of edema    Personal history of transient ischemic attack (TIA), and cerebral infarction without residual deficits     History of transient cerebral ischemia    Postpartum depression     Postpartum depression    Reaction to severe stress, unspecified 08/17/2016    Stress    Strain of muscle, fascia and tendon of lower back, initial encounter 03/16/2015    Strain of coccyx    Unspecified abnormal findings in urine     Abnormal  finding in urine          Past Surgical History:     Past Surgical History:   Procedure Laterality Date    CATARACT EXTRACTION  2018    Cataract Surgery     SECTION, CLASSIC  2018     Section    HYSTERECTOMY  2018    Hysterectomy    MR HEAD ANGIO WO IV CONTRAST  2022    MR HEAD ANGIO WO IV CONTRAST 2022 [facility]    MR NECK ANGIO WO IV CONTRAST  2022    MR NECK ANGIO WO IV CONTRAST 2022 [facility]    OTHER SURGICAL HISTORY  2018    Dental Surgery    OTHER SURGICAL HISTORY  2018    Reported Hx Of 'Facelift'    TONSILLECTOMY  2018    Tonsillectomy    TOTAL ABDOMINAL HYSTERECTOMY W/ BILATERAL SALPINGOOPHORECTOMY  2018    Salpingo-oophorectomy Bilateral         Medications:     Prior to Admission medications    Medication Sig Start Date End Date Taking? Authorizing Provider   aspirin 81 mg EC tablet Take 1 tablet (81 mg) by mouth once daily.   Yes Historical Provider, MD   azelastine (Astelin) 137 mcg (0.1 %) nasal spray USE 2 SPRAYS IN EACH NOSTRIL IN THE MORNING AND 2 SPRAYS BEFORE BEDTIME 23  Yes Josefina Hatfield MD   betamethasone, augmented, (Diprolene AF) 0.05 % cream Apply topically 2 times a day. Apply sparingly to affected areas 1/4/24 1/3/25 Yes Maria Espinoza MD MPH   calcium citrate-vitamin D3 250 mg-5 mcg (200 unit) tablet Take 1 tablet by mouth once daily. 13  Yes Historical Provider, MD   cholecalciferol (Vitamin D-3) 25 MCG (1000 UT) capsule Take 1 capsule (25 mcg) by mouth once daily. 13  Yes Historical Provider, MD   diclofenac sodium 1 % kit Apply 4 g topically in the morning and 4 g at noon and 4 g in the evening and 4 g before bedtime. APPLY TO AFFECTED AREA OF LOWER EXTREMITIES. DO NOT APPLY MORE THAN 16 GRAMS DAILY TO ANY ONE AFFECTED JOINT. 22  Yes Historical Provider, MD   fish oil concentrate (Omega-3) 120-180 mg capsule Take 1 capsule (1 g) by mouth once daily.   Yes Historical  Provider, MD   fluticasone (Flonase) 50 mcg/actuation nasal spray Administer 2 sprays into each nostril once daily. 4/17/23  Yes Nneka Albarran MD   loratadine (Claritin) 10 mg tablet Take 1 tablet (10 mg) by mouth once daily as needed.   Yes Historical Provider, MD   methenamine hippurate (Hiprex) 1 gram tablet TAKE 1 TABLET TWICE A DAY 10/6/23  Yes Sylvie Key MD MPH   mirabegron (Myrbetriq) 50 mg tablet extended release 24 hr 24 hr tablet Take 1 tablet (50 mg) by mouth once daily. 2/29/24  Yes Maria Espinoza MD MPH   VITAMIN B COMPLEX ORAL Take 1 tablet by mouth once daily.   Yes Historical Provider, MD   atorvastatin (Lipitor) 10 mg tablet Take 1 tablet (10 mg) by mouth once daily.  Patient not taking: Reported on 4/5/2024 1/25/24   Silvino Aguilar MD   clopidogrel (Plavix) 75 mg tablet Take by mouth once daily.    Historical Provider, MD   sulfamethoxazole-trimethoprim (Bactrim DS) 800-160 mg tablet Take 1 tablet by mouth 2 times a day for 5 days. 4/5/24 4/10/24  Sylvie Key MD MPH        ROS  Review of Systems       PHYSICAL EXAM:    /67   Pulse 80   Wt 64.9 kg (143 lb)   BMI 23.80 kg/m²   No LMP recorded. Patient is postmenopausal.    Declines chaperone for physical exam.      Well developed, well nourished, in no apparent distress.   Neurologic/Psychiatric:  Awake, Alert and Oriented times 3.  Affect normal.       Data and DIAGNOSTIC STUDIES REVIEWED   Vascular US Carotid Artery Duplex Bilateral    Result Date: 4/3/2024           [facility] [address], [place] [address]            Tel [phone]  Vascular Lab Report [facility] US CAROTID ARTERY DUPLEX BILATERAL  Patient Name:      FRAN Lange Physician:  [id number] Selvin Albrecht MD Study Date:        4/3/2024             Ordering Physician: [id number] SILVINO AGUILAR  MRN/PID:           [id number]             Technologist:       Ashia Koehlerer RVT Accession#:        [id number]         Technologist 2: Date of Birth/Age: 1945 / 78 years Encounter#:         [id number] Gender:            F Admission Status:  Outpatient           Location Performed: [facility]  Diagnosis/ICD: Carotid artery syndrome (hemispheric)-G45.1 CPT Codes:     46239 Cerebrovascular Carotid Duplex scan complete  CONCLUSIONS: Right Carotid: Findings are consistent with less than 50% stenosis of the right proximal internal carotid artery. Laminar flow seen by color Doppler. Right external carotid artery appears patent with no evidence of stenosis. The right vertebral artery is patent with antegrade flow. No evidence of hemodynamically significant stenosis in the right subclavian artery. Left Carotid: Findings are consistent with less than 50% stenosis of the left proximal internal carotid artery. Laminar flow seen by color Doppler. Left external carotid artery appears patent with no evidence of stenosis. The left vertebral artery is patent with antegrade flow. No evidence of hemodynamically significant stenosis in the left subclavian artery.  Imaging & Doppler Findings: Right Plaque Morph: The proximal right internal carotid artery demonstrates calcified and homogenous plaque. Left Plaque Morph: The proximal left internal carotid artery demonstrates calcified plaque.   Right                        Left   PSV      EDV                PSV       cm/s           CCA P    108 cm/s 82 cm/s            CCA D    101 cm/s 82 cm/s  22 cm/s   ICA P    82 cm/s  15 cm/s 86 cm/s            ICA M    118 cm/s 112 cm/s           ICA D    83 cm/s 113 cm/s            ECA     100 cm/s 58 cm/s          Vertebral  74 cm/s 223 cm/s         Subclavian 124 cm/s               Right Left ICA/CCA Ratio  1.0  0.8   [id number] Selvin Albrecht MD Electronically signed by [id number] Selvin Albrecht MD on 4/3/2024 at 9:46:23 AM  ** Final **       Lab Results   Component Value Date    URINECULTURE No significant growth 11/21/2023      Lab Results   Component Value Date    GLUCOSE 90 12/18/2023    CALCIUM 9.0 12/18/2023     12/18/2023    K 4.2 12/18/2023    CO2 28 12/18/2023     12/18/2023    BUN 14 12/18/2023    CREATININE 0.82 12/18/2023     Lab Results   Component Value Date    WBC 5.5 12/18/2023    HGB 13.3 12/18/2023    HCT 41.5 12/18/2023     (H) 12/18/2023     12/18/2023

## 2024-04-05 NOTE — CPM/PAT H&P
No results found for this or any previous visit (from the past 24 hour(s)).     Assessment & Plan:    78 y.o.  female  scheduled for L TKR on 4/22/24 with Dr. Schneider for  knee pain and OA L knee.   She has failed conservative therapy.     Presents to CPM today for perioperative risk stratification and optimization    Pt denies dysuria, hematuria, fevers, chills, and myalgias. Patient denies Cx pain, SOB, RODRIGUEZ, and NVDC. Patient also denies Hx DVT/PE. Current medications were reviewed and a presurgical medication schedule was provided. He has no questions at this time.     She has frequent UTI's is currently on Bactrim by her Urologist d/t trip home from [place]    Past Medical History:   Diagnosis Date    Acute upper respiratory infection, unspecified 12/04/2017    Viral upper respiratory infection    Anesthesia of skin 12/11/2018    Numbness and tingling of right leg    Dizziness     Encounter for other screening for malignant neoplasm of breast 11/01/2017    Breast cancer screening, high risk patient    Encounter for screening for malignant neoplasm of colon 12/02/2017    Colon cancer screening    Encounter for screening mammogram for malignant neoplasm of breast 10/31/2016    Other screening mammogram    GERD (gastroesophageal reflux disease)     Hyperlipidemia     Hypertension     Other conditions influencing health status     History of vaginal delivery    Other conditions influencing health status 01/22/2016    History of cough    Pain in right foot 12/11/2018    Right foot pain    Pain in right hip 12/11/2018    Right hip pain    Pain in right leg 12/11/2018    Right leg pain    Personal history of colonic polyps 07/06/2017    History of adenomatous polyp of colon    Personal history of diseases of the skin and subcutaneous tissue 11/01/2013    History of dermatitis    Personal history of other diseases of the nervous system and sense organs     History of migraine headaches    Personal history of other  diseases of the respiratory system 2017    History of sore throat    Personal history of other diseases of the respiratory system 2017    History of sinusitis    Personal history of other diseases of the respiratory system 2016    History of acute bronchitis    Personal history of other specified conditions 2017    History of edema    Personal history of transient ischemic attack (TIA), and cerebral infarction without residual deficits     History of transient cerebral ischemia    Postpartum depression     Postpartum depression    Reaction to severe stress, unspecified 2016    Stress    Strain of muscle, fascia and tendon of lower back, initial encounter 2015    Strain of coccyx    TIA (transient ischemic attack)     Unspecified abnormal findings in urine     Abnormal finding in urine    Urinary tract infection        Past Surgical History:   Procedure Laterality Date    CATARACT EXTRACTION  2018    Cataract Surgery     SECTION, CLASSIC  2018     Section    COLONOSCOPY      HYSTERECTOMY  2018    Hysterectomy    MR HEAD ANGIO WO IV CONTRAST  2022    MR HEAD ANGIO WO IV CONTRAST 2022 [facility]    MR NECK ANGIO WO IV CONTRAST  2022    MR NECK ANGIO WO IV CONTRAST 2022 [facility]    OTHER SURGICAL HISTORY  2018    Dental Surgery    OTHER SURGICAL HISTORY  2018    Reported Hx Of 'Facelift'    TONSILLECTOMY  2018    Tonsillectomy    TOTAL ABDOMINAL HYSTERECTOMY W/ BILATERAL SALPINGOOPHORECTOMY  2018    Salpingo-oophorectomy Bilateral       Family History   Problem Relation Name Age of Onset    Depression Mother      COPD Mother      Alcohol abuse Mother      Asthma Mother      Leukemia Mother      Suicide Attempts Mother      Polycythemia Mother          VERA    Leukemia Father      Alcohol abuse Father      Other (htn) Sister      Lung disease Brother      Goiter Paternal Grandmother       Depression Other FAMILY HISTORY     COPD Other FAMILY HISTORY     Asthma Other FAMILY HISTORY     Diabetes type II Other FAMILY HISTORY        Allergies   Allergen Reactions    Adhesive Tape-Silicones Other     Blisters, chin itching    Hydrocodone Other     itchiness, hot/cold sweats       Current Outpatient Medications on File Prior to Visit   Medication Sig Dispense Refill    acetaminophen (Tylenol) 500 mg tablet Take 2 tablets (1,000 mg) by mouth every 6 hours if needed for mild pain (1 - 3).      aspirin 81 mg EC tablet Take 1 tablet (81 mg) by mouth once daily.      azelastine (Astelin) 137 mcg (0.1 %) nasal spray USE 2 SPRAYS IN EACH NOSTRIL IN THE MORNING AND 2 SPRAYS BEFORE BEDTIME (Patient taking differently: Administer 2 sprays into each nostril 2 times a day as needed.) 90 mL 3    calcium citrate-vitamin D3 250 mg-5 mcg (200 unit) tablet Take 1 tablet by mouth once daily.      cholecalciferol (Vitamin D-3) 25 MCG (1000 UT) capsule Take 1 capsule (25 mcg) by mouth once daily.      diclofenac sodium 1 % kit Apply 4 g topically 4 times a day as needed. APPLY TO AFFECTED AREA OF LOWER EXTREMITIES. DO NOT APPLY MORE THAN 16 GRAMS DAILY TO ANY ONE AFFECTED JOINT      fish oil concentrate (Omega-3) 120-180 mg capsule Take 1 capsule (1 g) by mouth once daily.      fluticasone (Flonase) 50 mcg/actuation nasal spray Administer 2 sprays into each nostril once daily. (Patient taking differently: Administer 2 sprays into each nostril once daily as needed.) 48 g 1    loratadine (Claritin) 10 mg tablet Take 1 tablet (10 mg) by mouth once daily as needed.      methenamine hippurate (Hiprex) 1 gram tablet TAKE 1 TABLET TWICE A DAY 60 tablet 11    mirabegron (Myrbetriq) 50 mg tablet extended release 24 hr 24 hr tablet Take 1 tablet (50 mg) by mouth once daily. 90 tablet 0    sulfamethoxazole-trimethoprim (Bactrim DS) 800-160 mg tablet Take 1 tablet by mouth 2 times a day for 5 days. (Patient taking differently: Take 1  tablet by mouth 2 times a day. Started 4/5/24) 10 tablet 0    VITAMIN B COMPLEX ORAL Take 1 tablet by mouth once daily.      betamethasone, augmented, (Diprolene AF) 0.05 % cream Apply topically 2 times a day. Apply sparingly to affected areas (Patient not taking: Reported on 4/8/2024) 50 g 3    [DISCONTINUED] atorvastatin (Lipitor) 10 mg tablet Take 1 tablet (10 mg) by mouth once daily. (Patient not taking: Reported on 4/5/2024) 90 tablet 3    [DISCONTINUED] clopidogrel (Plavix) 75 mg tablet Take by mouth once daily.       No current facility-administered medications on file prior to visit.       Review of Systems   All other systems reviewed and are negative.     Physical Exam  Constitutional:       Appearance: Normal appearance.   HENT:      Nose: Nose normal.      Mouth/Throat:      Mouth: Mucous membranes are moist.      Pharynx: Oropharynx is clear.   Eyes:      Extraocular Movements: Extraocular movements intact.      Conjunctiva/sclera: Conjunctivae normal.      Pupils: Pupils are equal, round, and reactive to light.   Cardiovascular:      Rate and Rhythm: Normal rate and regular rhythm.      Pulses: Normal pulses.      Heart sounds: Normal heart sounds.   Pulmonary:      Effort: Pulmonary effort is normal.      Breath sounds: Normal breath sounds.   Abdominal:      General: Bowel sounds are normal.      Palpations: Abdomen is soft.   Musculoskeletal:         General: Normal range of motion.      Cervical back: Normal range of motion and neck supple.   Skin:     General: Skin is warm and dry.      Capillary Refill: Capillary refill takes 2 to 3 seconds.   Neurological:      General: No focal deficit present.      Mental Status: She is alert and oriented to person, place, and time.   Psychiatric:         Behavior: Behavior normal.        PAT AIRWAY:   Airway:     Mallampati::  III    TM distance::  >3 FB    Neck ROM::  Full      Visit Vitals  BP 99/62   Pulse 72   Temp 36.7 °C (98.1 °F) (Temporal)   Resp 16  "  Ht 1.651 m (5' 5\")   Wt 63.9 kg (140 lb 14 oz)   SpO2 96%   BMI 23.44 kg/m²   OB Status Postmenopausal   Smoking Status Never   BSA 1.71 m²       Neuro:  Peripheral neuropathy    HEENT/Airway:  No diagnosis or significant findings on chart review or clinical presentation and evaluation.     Cardiovascular:  Venous insufficiency  CAC score 11.8 1/5/24 1/25/24 Carotid US  Right Carotid: Findings are consistent with less than 50% stenosis of the right proximal internal carotid artery. Laminar flow seen by color Doppler. Right external carotid artery appears patent with no evidence of stenosis. The right vertebral artery is patent with antegrade flow. No evidence of hemodynamically significant stenosis in the right subclavian artery.  Left Carotid: Findings are consistent with less than 50% stenosis of the left proximal internal carotid artery. Laminar flow seen by color Doppler. Left external carotid artery appears patent with no evidence of stenosis. The left vertebral artery is patent with antegrade flow. No evidence of hemodynamically significant stenosis in the left subclavian artery.     1/16/24 EKG  Normal sinus rhythm  Normal ECG  When compared with ECG of 10-AUG-2016 09:09,  No significant change was found    6/15/22 Echo  1. The left ventricular systolic function is normal with a 60-65% estimated ejection fraction.   2. RVSP within normal limits.    Pulmonary:  No diagnosis or significant findings on chart review or clinical presentation and evaluation.   ARISCAT: <26 points, 1.6% risk of in-hospital postoperative pulmonary complication  PRODIGY: Low risk for opioid induced respiratory depression  Discussed smoking cessation and deep breathing handout given    Renal:   No diagnosis or significant findings on chart review, or clinical presentation and evaluation.   Pt at Low risk for perioperative JABARI based on Dynamic Predictive Scoring Tool for Perioperative JABARI  Lab Results   Component Value Date    " GLUCOSE 90 12/18/2023    CALCIUM 9.0 12/18/2023     12/18/2023    K 4.2 12/18/2023    CO2 28 12/18/2023     12/18/2023    BUN 14 12/18/2023    CREATININE 0.82 12/18/2023         Endocrine:  Adult myxedema  Lab Results   Component Value Date    HGBA1C 5.4 12/18/2023       Hematologic:  Anemia    Lab Results   Component Value Date    WBC 5.5 12/18/2023    HGB 13.3 12/18/2023    HCT 41.5 12/18/2023     (H) 12/18/2023     12/18/2023       Caprini Score 8, patient at High risk for postoperative DVT. Pt supplied education/VTE handout    Gastrointestinal:   No diagnosis or significant findings on chart review or clinical presentation and evaluation.     :  Recurrent UTI, 4/5/24 saw Dr Key who prescribed Bactrim -160 mg tablet; take 1 tablet by mouth 2 times a day for 5 days. This rx was susceptible to last infection.   - She can take Azo, but do not take Hiprex again until she finishes her antibiotics.  OAB (overactive bladder)  Lesion of bladder  UTI (urinary tract infection)  Urge incontinence of urine    Infectious disease:   No diagnosis or significant findings on chart review or clinical presentation and evaluation.   Prescription provided for CHG body wash and dental rinse. CHG use instructions reviewed and provided to patient.  Staph screen collected    Musculoskeletal:   See HPI    Preoperative risk assessment  ASA III  RCRI - 0 points  Class I Risk 3.9%  KHAI - 2 points Risk for CHAPARRITA   NSQIP - Predicted length of stay days 0-1  ARISCAT - 7 points Low Risk 1.6%  DASI 50.7 Points 9.Mets  RHINA - 0.1%  JHFRAT - points risk for falls  Clearance - not indicated  PAT Testing - CBC, BMP, UA(per MD order) MRSA PCR, EKG    *CLEARED FOR SURGERY   Seen by Cardiologist Dr Aguilar 1/16/24 who noted:  Impression/plan:  Patient is a pleasant elderly female who is quite vigorous and active without any concerning cardiac symptomatology currently.  Her blood pressure is in good range without  need for antihypertensive therapy.     Given her questionable history of a TIA, I do think it is reasonable to continue her on aspirin at 81 mg daily.  I will get her on atorvastatin at 20 mg daily which hopefully she will tolerate since her lipids are rather elevated.     I will review her carotid ultrasound results with her to be sure that we do not need to entertain any additional evaluation/therapy of potential carotid disease.  I will also review her coronary artery  calcium score which will help determine how aggressive to be with her lipid-lowering therapy.     I will see her back in several months.  Should her carotid ultrasound testing look good, I do think she will be reasonable risk to proceed with knee surgery in April as planned.  I will plan on reassessing her lipid panel when she returns to be sure that this is responding well to low-dose atorvastatin.       PENDING LABS/EKG-LABS REVIEWED, STABLE. OK TO PROCEED    Anesthesia:  No anesthesia complications    denies dental issues  Non smoker  Denies ETOH or Drug abuse  denies personal/family issues with Anesthesia  No nickel, shell fish, or iodine allergies      Patient is at acceptable risk to proceed with planned surgical procedure. Further cardiac risk stratification deferred at this time.This patient is low risk candidate undergoing moderate risk procedure, patient is medically optimized for surgery    Discussed with patient medication instructions, NPO guidelines, and any questions or concerns. Patient does not need further workup prior to preceding with elective surgery based on based on risk assessment. .     CHLORHEXIDINE .12% DENTAL RINSE E PRESCIRBED PER  INFECTION PREVENTION PROTOCOL. PATIENT EDUCATED   Patient advised to call [facility] if does not receive Mouthwash    Face to Face patient contact time 45    MEGHNA Alvarez-CNP 4/8/2024 2:30 PM

## 2024-04-06 LAB — BACTERIA UR CULT: ABNORMAL

## 2024-04-08 ENCOUNTER — PRE-ADMISSION TESTING (OUTPATIENT)
Dept: PREADMISSION TESTING | Facility: HOSPITAL | Age: 79
End: 2024-04-08
Payer: MEDICARE

## 2024-04-08 ENCOUNTER — HOSPITAL ENCOUNTER (OUTPATIENT)
Dept: RADIOLOGY | Facility: HOSPITAL | Age: 79
Discharge: HOME | End: 2024-04-08
Payer: MEDICARE

## 2024-04-08 ENCOUNTER — TELEPHONE (OUTPATIENT)
Dept: OBSTETRICS AND GYNECOLOGY | Facility: CLINIC | Age: 79
End: 2024-04-08
Payer: MEDICARE

## 2024-04-08 VITALS
BODY MASS INDEX: 23.47 KG/M2 | OXYGEN SATURATION: 96 % | TEMPERATURE: 98.1 F | WEIGHT: 140.87 LBS | RESPIRATION RATE: 16 BRPM | DIASTOLIC BLOOD PRESSURE: 62 MMHG | HEIGHT: 65 IN | SYSTOLIC BLOOD PRESSURE: 99 MMHG | HEART RATE: 72 BPM

## 2024-04-08 DIAGNOSIS — N39.41 URGE INCONTINENCE OF URINE: ICD-10-CM

## 2024-04-08 DIAGNOSIS — Z01.818 PREOP TESTING: Primary | ICD-10-CM

## 2024-04-08 DIAGNOSIS — R73.09 ELEVATED GLUCOSE: ICD-10-CM

## 2024-04-08 DIAGNOSIS — M17.12 UNILATERAL PRIMARY OSTEOARTHRITIS, LEFT KNEE: ICD-10-CM

## 2024-04-08 LAB
ANION GAP SERPL CALC-SCNC: 13 MMOL/L (ref 10–20)
BASOPHILS # BLD AUTO: 0.07 X10*3/UL (ref 0–0.1)
BASOPHILS NFR BLD AUTO: 1 %
BUN SERPL-MCNC: 13 MG/DL (ref 6–23)
CALCIUM SERPL-MCNC: 9.2 MG/DL (ref 8.6–10.3)
CHLORIDE SERPL-SCNC: 101 MMOL/L (ref 98–107)
CO2 SERPL-SCNC: 27 MMOL/L (ref 21–32)
CREAT SERPL-MCNC: 0.98 MG/DL (ref 0.5–1.05)
EGFRCR SERPLBLD CKD-EPI 2021: 59 ML/MIN/1.73M*2
EOSINOPHIL # BLD AUTO: 0.06 X10*3/UL (ref 0–0.4)
EOSINOPHIL NFR BLD AUTO: 0.9 %
ERYTHROCYTE [DISTWIDTH] IN BLOOD BY AUTOMATED COUNT: 11.7 % (ref 11.5–14.5)
EST. AVERAGE GLUCOSE BLD GHB EST-MCNC: 108 MG/DL
GLUCOSE SERPL-MCNC: 89 MG/DL (ref 74–99)
HBA1C MFR BLD: 5.4 %
HCT VFR BLD AUTO: 38.9 % (ref 36–46)
HGB BLD-MCNC: 12.3 G/DL (ref 12–16)
IMM GRANULOCYTES # BLD AUTO: 0.03 X10*3/UL (ref 0–0.5)
IMM GRANULOCYTES NFR BLD AUTO: 0.4 % (ref 0–0.9)
LYMPHOCYTES # BLD AUTO: 1.6 X10*3/UL (ref 0.8–3)
LYMPHOCYTES NFR BLD AUTO: 23.7 %
MCH RBC QN AUTO: 29.9 PG (ref 26–34)
MCHC RBC AUTO-ENTMCNC: 31.6 G/DL (ref 32–36)
MCV RBC AUTO: 94 FL (ref 80–100)
MONOCYTES # BLD AUTO: 0.44 X10*3/UL (ref 0.05–0.8)
MONOCYTES NFR BLD AUTO: 6.5 %
NEUTROPHILS # BLD AUTO: 4.56 X10*3/UL (ref 1.6–5.5)
NEUTROPHILS NFR BLD AUTO: 67.5 %
NRBC BLD-RTO: ABNORMAL /100{WBCS}
PLATELET # BLD AUTO: 271 X10*3/UL (ref 150–450)
POTASSIUM SERPL-SCNC: 4.4 MMOL/L (ref 3.5–5.3)
RBC # BLD AUTO: 4.12 X10*6/UL (ref 4–5.2)
SODIUM SERPL-SCNC: 137 MMOL/L (ref 136–145)
WBC # BLD AUTO: 6.8 X10*3/UL (ref 4.4–11.3)

## 2024-04-08 PROCEDURE — 77073 BONE LENGTH STUDIES: CPT

## 2024-04-08 PROCEDURE — 87086 URINE CULTURE/COLONY COUNT: CPT

## 2024-04-08 PROCEDURE — 77073 BONE LENGTH STUDIES: CPT | Performed by: RADIOLOGY

## 2024-04-08 PROCEDURE — 83036 HEMOGLOBIN GLYCOSYLATED A1C: CPT

## 2024-04-08 PROCEDURE — 85025 COMPLETE CBC W/AUTO DIFF WBC: CPT

## 2024-04-08 PROCEDURE — 87081 CULTURE SCREEN ONLY: CPT

## 2024-04-08 PROCEDURE — 80048 BASIC METABOLIC PNL TOTAL CA: CPT

## 2024-04-08 PROCEDURE — 99204 OFFICE O/P NEW MOD 45 MIN: CPT | Performed by: NURSE PRACTITIONER

## 2024-04-08 PROCEDURE — 36415 COLL VENOUS BLD VENIPUNCTURE: CPT

## 2024-04-08 PROCEDURE — 73562 X-RAY EXAM OF KNEE 3: CPT | Mod: LT

## 2024-04-08 RX ORDER — CHLORHEXIDINE GLUCONATE ORAL RINSE 1.2 MG/ML
15 SOLUTION DENTAL DAILY
Qty: 30 ML | Refills: 0 | Status: SHIPPED | OUTPATIENT
Start: 2024-04-08 | End: 2024-04-10

## 2024-04-08 RX ORDER — ACETAMINOPHEN 500 MG
1000 TABLET ORAL EVERY 6 HOURS PRN
COMMUNITY
End: 2024-04-23 | Stop reason: HOSPADM

## 2024-04-08 ASSESSMENT — DUKE ACTIVITY SCORE INDEX (DASI)
CAN YOU PARTICIPATE IN MODERATE RECREATIONAL ACTIVITIES LIKE GOLF, BOWLING, DANCING, DOUBLES TENNIS OR THROWING A BASEBALL OR FOOTBALL: YES
DASI METS SCORE: 9
CAN YOU DO MODERATE WORK AROUND THE HOUSE LIKE VACUUMING, SWEEPING FLOORS OR CARRYING GROCERIES: YES
CAN YOU WALK A BLOCK OR TWO ON LEVEL GROUND: YES
CAN YOU DO HEAVY WORK AROUND THE HOUSE LIKE SCRUBBING FLOORS OR LIFTING AND MOVING HEAVY FURNITURE: YES
CAN YOU DO YARD WORK LIKE RAKING LEAVES, WEEDING OR PUSHING A MOWER: YES
TOTAL_SCORE: 50.7
CAN YOU DO LIGHT WORK AROUND THE HOUSE LIKE DUSTING OR WASHING DISHES: YES
CAN YOU RUN A SHORT DISTANCE: YES
CAN YOU HAVE SEXUAL RELATIONS: YES
CAN YOU PARTICIPATE IN STRENOUS SPORTS LIKE SWIMMING, SINGLES TENNIS, FOOTBALL, BASKETBALL, OR SKIING: NO
CAN YOU CLIMB A FLIGHT OF STAIRS OR WALK UP A HILL: YES
CAN YOU TAKE CARE OF YOURSELF (EAT, DRESS, BATHE, OR USE TOILET): YES
CAN YOU WALK INDOORS, SUCH AS AROUND YOUR HOUSE: YES

## 2024-04-08 ASSESSMENT — PAIN DESCRIPTION - DESCRIPTORS: DESCRIPTORS: ACHING;SHARP

## 2024-04-08 ASSESSMENT — PAIN - FUNCTIONAL ASSESSMENT: PAIN_FUNCTIONAL_ASSESSMENT: 0-10

## 2024-04-08 ASSESSMENT — PAIN SCALES - GENERAL: PAINLEVEL_OUTOF10: 6

## 2024-04-08 NOTE — TELEPHONE ENCOUNTER
Zarina Holliday informed of urine culture results, and that test must be repeated.  Urine culture order placed.  Questions answered and pt verbalized understanding.

## 2024-04-08 NOTE — PREPROCEDURE INSTRUCTIONS
Medication List            Accurate as of April 8, 2024  1:19 PM. Always use your most recent med list.                acetaminophen 500 mg tablet  Commonly known as: Tylenol  Notes to patient: If needed     aspirin 81 mg EC tablet  Medication Adjustments for Surgery: Take morning of surgery with sip of water, no other fluids  Notes to patient: If a morning med     azelastine 137 mcg (0.1 %) nasal spray  Commonly known as: Astelin  USE 2 SPRAYS IN EACH NOSTRIL IN THE MORNING AND 2 SPRAYS BEFORE BEDTIME  Notes to patient: Ok day of surgery if needed     betamethasone (augmented) 0.05 % cream  Commonly known as: Diprolene AF  Apply topically 2 times a day. Apply sparingly to affected areas  Medication Adjustments for Surgery: Stop 1 day before surgery     calcium citrate-vitamin D3 250 mg-5 mcg (200 unit) tablet  Medication Adjustments for Surgery: Stop 7 days before surgery  Notes to patient: Last dose 4/14/24     chlorhexidine 0.12 % solution  Commonly known as: Peridex  Use 15 mL in the mouth or throat once daily for 2 doses.  Notes to patient: As prescribed     cholecalciferol 25 MCG (1000 UT) capsule  Commonly known as: Vitamin D-3  Medication Adjustments for Surgery: Stop 7 days before surgery  Notes to patient: Last dose 4/14/24     Claritin 10 mg tablet  Generic drug: loratadine  Medication Adjustments for Surgery: Take morning of surgery with sip of water, no other fluids  Notes to patient: If a morning med and or if needed     diclofenac sodium 1 % kit  Medication Adjustments for Surgery: Stop 7 days before surgery     fish oil concentrate 120-180 mg capsule  Commonly known as: Omega-3  Medication Adjustments for Surgery: Stop 7 days before surgery  Notes to patient: Last dose 4/14/24     fluticasone 50 mcg/actuation nasal spray  Commonly known as: Flonase  Administer 2 sprays into each nostril once daily.  Notes to patient: As prescribed     methenamine hippurate 1 gram tablet  Commonly known as:  Hiprex  TAKE 1 TABLET TWICE A DAY  Notes to patient: As prescribed     mirabegron 50 mg tablet extended release 24 hr 24 hr tablet  Commonly known as: Myrbetriq  Take 1 tablet (50 mg) by mouth once daily.  Notes to patient: As prescribed     sulfamethoxazole-trimethoprim 800-160 mg tablet  Commonly known as: Bactrim DS  Take 1 tablet by mouth 2 times a day for 5 days.  Notes to patient: As prescribed     VITAMIN B COMPLEX ORAL  Medication Adjustments for Surgery: Stop 7 days before surgery  Notes to patient: Last dose 4/14/24            LAST DOSE OF VITAMINS, SUPPLEMENTS, HERBS, PROBIOTICS, AND FISH OIL, NO MOTRIN OR ADVIL OR ALEVE AFTER 4/14/24    CONTACT SURGEON'S OFFICE IF YOU DEVELOP:  * Fever = 100.4 F   * New respiratory symptoms (e.g. cough, shortness of breath, respiratory distress, sore throat)  * Recent loss of taste or smell  *Flu like symptoms such as headache, fatigue or gastrointestinal symptoms  * You develop any open sores, shingles, burning or painful urination   AND/OR:  * You no longer wish to have the surgery.  * Any other personal circumstances change that may lead to the need to cancel or defer this surgery.  *You were admitted to any hospital within one week of your planned procedure.    SMOKING:  *Quitting smoking can make a huge difference to your health and recovery from surgery.    *If you need help with quitting, call 6-921-QUIT-NOW.    THE DAY BEFORE SURGERY:  *Do not eat any food after midnight the night before your surgery.   *You may have up to TEN OUNCES of clear liquids until TWO hours before your instructed ARRIVAL TIME to hospital. This includes water, black tea/coffee, (no milk or cream) apple juice, clear broth and electrolyte drinks (Gatorade). Please avoid clear liquids that are red in color.   *You may chew gum/mints up to TWO hours before your surgery/procedure.    SURGICAL TIME:  *You will be contacted between 2 p.m. and 3 p.m. the business day before your surgery with your  arrival time.  *If you haven't received a call by 3pm, call [phone]  *Scheduled surgery times may change and you will be notified if this occurs-check your personal voicemail for any updates.    ON THE MORNING OF SURGERY:  *Wear comfortable, loose fitting clothing.   *Do not use moisturizers, creams, lotions or perfume.  *All jewelry and valuables should be left at home.  *Prosthetic devices such as contact lenses, hearing aids, dentures, eyelash extensions, hairpins and body piercing must be removed before surgery.    BRING WITH YOU:  *Photo ID and insurance card  *Current list of medications and allergies  *Pacemaker/Defibrillator/Heart stent cards  *CPAP machine and mask  *Slings/splints/crutches  *Copy of your complete Advanced Directive/DHPOA-if applicable  *Neurostimulator implant remote    PARKING AND ARRIVAL:  *Check in at the Main Entrance desk and let them know you are here for surgery.    IF YOU ARE HAVING OUTPATIENT/SAME DAY SURGERY:  *A responsible adult MUST accompany you at the time of discharge and stay with you for 24 hours after your surgery.  *You may NOT drive yourself home after surgery.  *You may use a taxi or ride sharing service ([employer], Uber) to return home ONLY if you are accompanied by a friend or family member.  *Instructions for resuming your medications will be provided by your surgeon.    Thank you for coming to Pre Admission testing.     If I have prescribe medication please don't forget to  at your pharmacy.     Any questions about today's visit call [phone] and leave a message in the general mailbox.    Patient instructed to ambulate as soon as possible postoperatively to decrease thromboembolic risk.    MEGHNA Alvarez-CNP      Thank you for visiting [facility].  If you have any changes to your health condition, please call the surgeons office to alert them and give them details of your symptoms.    Eva ROYC  Department of  Anesthesiology and Perioperative Medicine      Preoperative Fasting Guidelines    Why must I stop eating and drinking near surgery time?  With sedation, food or liquid in your stomach can enter your lungs causing serious complications  Increases nausea and vomiting    When do I need to stop eating and drinking before my surgery?  Do not eat any food after midnight the night before your surgery/procedure.  You may have up to TEN ounces of clear liquid until TWO hours before your instructed arrival time to the hospital.  This includes water, black tea/coffee, (no milk or cream) apple juice, and electrolyte drinks (Gatorade)  You may chew gum until TWO hours before your surgery/procedure      Additional Instructions:     The Day before Surgery:  -Review your medication instructions, stop indicated medications  -You will be contacted in the evening regarding the time of your arrival to facility and surgery time    Day of Surgery:  -Review your medication instructions, take indicated medications  -Wear comfortable loose fitting clothing  -Do not use moisturizers, creams, lotions or perfume  -All jewelry and valuables should be left at home              Preoperative Brain Exercises    What are brain exercises?  A brain exercise is any activity that engages your thinking (cognitive) skills.    What types of activities are considered brain exercises?  Jigsaw puzzles, crossword puzzles, word jumble, memory games, word search, and many more.  Many can be found free online or on your phone via a mobile oanh.    Why should I do brain exercises before my surgery?  More recent research has shown brain exercise before surgery can lower the risk of postoperative delirium (confusion) which can be especially important for older adults.  Patients who did brain exercises for 5 to 10 hours the days before surgery, cut their risk of postoperative delirium in half up to 1 week after surgery.                  [facility]    Preoperative Deep Breathing Exercises    Why it is important to do deep breathing exercises before my surgery?  Deep breathing exercises strengthen your breathing muscles.  This helps you to recover after your surgery and decreases the chance of breathing complications.      How are the deep breathing exercises done?  Sit straight with your back supported.  Breathe in deeply and slowly through your nose. Your lower rib cage should expand and your abdomen may move forward.  Hold that breath for 3 to 5 seconds.  Breathe out through pursed lips, slowly and completely.  Rest and repeat 10 times every hour while awake.  Rest longer if you become dizzy or lightheaded.                Patient and Family Education             Ways You Can Help Prevent Blood Clots             This handout explains some simple things you can do to help prevent blood clots.      Blood clots are blockages that can form in the body's veins. When a blood clot forms in your deep veins, it may be called a deep vein thrombosis, or DVT for short. Blood clots can happen in any part of the body where blood flows, but they are most common in the arms and legs. If a piece of a blood clot breaks free and travels to the lungs, it is called a pulmonary embolus (PE). A PE can be a very serious problem.         Being in the hospital or having surgery can raise your chances of getting a blood clot because you may not be well enough to move around as much as you normally do.         Ways you can help prevent blood clots in the hospital         Wearing SCDs. SCDs stands for Sequential Compression Devices.   SCDs are special sleeves that wrap around your legs  They attach to a pump that fills them with air to gently squeeze your legs every few minutes.   This helps return the blood in your legs to your heart.   SCDs should only be taken off when walking or bathing.   SCDs may not be comfortable, but they can help save your life.               Wearing  compression stockings - if your doctor orders them. These special snug fitting stockings gently squeeze your legs to help blood flow.       Walking. Walking helps move the blood in your legs.   If your doctor says it is ok, try walking the halls at least   5 times a day. Ask us to help you get up, so you don't fall.      Taking any blood thinning medicines your doctor orders.        Page 1 of 2     [facility]; 3/23   Ways you can help prevent blood clots at home       Wearing compression stockings - if your doctor orders them. ? Walking - to help move the blood in your legs.       Taking any blood thinning medicines your doctor orders.      Signs of a blood clot or PE      Tell your doctor or nurse know right away if you have of the problems listed below.    If you are at home, seek medical care right away. Call 911 for chest pain or problems breathing.               Signs of a blood clot (DVT) - such as pain,  swelling, redness or warmth in your arm or leg      Signs of a pulmonary embolism (PE) - such as chest     pain or feeling short of breath         [facility]    Preoperative Deep Breathing Exercises    Why it is important to do deep breathing exercises before my surgery?  Deep breathing exercises strengthen your breathing muscles.  This helps you to recover after your surgery and decreases the chance of breathing complications.      How are the deep breathing exercises done?  Sit straight with your back supported.  Breathe in deeply and slowly through your nose. Your lower rib cage should expand and your abdomen may move forward.  Hold that breath for 3 to 5 seconds.  Breathe out through pursed lips, slowly and completely.  Rest and repeat 10 times every hour while awake.  Rest longer if you become dizzy or lightheaded.      Patient Information: Incentive Spirometer  What is an incentive spirometer?  An incentive spirometer is a device used before and after surgery to  “exercise” your lungs.  It helps you to take deeper breaths to expand your lungs.  Below is an example of a basic incentive spirometer.  The device you receive may differ slightly but they all function the same.    Why do I need to use an incentive spirometer?  Using your incentive spirometer prepares your lungs for surgery and helps prevent lung problems after surgery.  How do I use my incentive spirometer?  When you're using your incentive spirometer, make sure to breathe through your mouth. If you breathe through your nose, the incentive spirometer won't work properly. You can hold your nose if you have trouble.  If you feel dizzy at any time, stop and rest. Try again at a later time.  Follow the steps below:  Set up your incentive spirometer, expand the flexible tubing and connect to the outlet.  Sit upright in a chair or bed. Hold the incentive spirometer at eye level.   Put the mouthpiece in your mouth and close your lips tightly around it. Slowly breathe out (exhale) completely.  Breathe in (inhale) slowly through your mouth as deeply as you can. As you take a breath, you will see the piston rise inside the large column. While the piston rises, the indicator should move upwards. It should stay in between the 2 arrows (see Figure).  Try to get the piston as high as you can, while keeping the indicator between the arrows.   If the indicator doesn't stay between the arrows, you're breathing either too fast or too slow.  When you get it as high as you can, hold your breath for 10 seconds, or as long as possible. While you're holding your breath, the piston will slowly fall to the base of the spirometer.  Once the piston reaches the bottom of the spirometer, breathe out slowly through your mouth. Rest for a few seconds.  Repeat 10 times. Try to get the piston to the same level with each breath.  Repeat every hour while awake  You can carefully clean the outside of the mouthpiece with an alcohol wipe or soap and  water.

## 2024-04-08 NOTE — TELEPHONE ENCOUNTER
----- Message from Zarina Holliday sent at 4/8/2024  9:14 AM EDT -----  Regarding: UTI  Contact: [phone]  Saw you on  Friday of last week.    You prescribed medication at that time but had not seen the culture.   It is abnormal in several areas on the report.   You thought you might have to change the meds but I am writing to check to see if that is necessary..   Also last week I sent several messages to My Chart and had no response.   Thanks and I look forward to hearing from someone… Paty

## 2024-04-09 ENCOUNTER — APPOINTMENT (OUTPATIENT)
Dept: RADIOLOGY | Facility: HOSPITAL | Age: 79
End: 2024-04-09
Payer: MEDICARE

## 2024-04-09 LAB — BACTERIA UR CULT: NORMAL

## 2024-04-10 LAB — STAPHYLOCOCCUS SPEC CULT: NORMAL

## 2024-04-11 NOTE — PROGRESS NOTES
Thank you for attending our Joint Replacement class today in preparation for your upcoming surgery.  Topics discussed include:    MyChart Enrollment  Communication with Care Team  My Chart is the best form of communication to reach all of your caregivers  You can send messages to specific care givers, or a care team  Continued Education  You will be enrolled in a Total Joint Replacement care plan to receive additional education before and after surgery  You can review a short recording of the class content  Access to Medical Records  You can access test results, office notes, appointments, etc.  You can connect to other healthcare systems who use "[email or web address] ([facility], [facility], [facility], etc.)  [place]  Program Information  Consent to Enroll    Background/Understanding of Joint Replacement Surgery  Potential for same day discharge  Any questions or concerns to be directed to the surgeon's office    How to Prepare for Surgery  Use of Nicotine Products/Smoking  Stop several weeks before surgery  Such products slow down the healing process and increase risk of post-op infection and complications  Clearance for Surgery  Medical Clearance by Specialists  Dental Clearance  Cracked/Broken/Loose teeth left untreated may postpone surgery  The importance of post-op antibiotics for dental visits per surgeon protocol  Preadmission Testing  **Potential for postponed surgery if appropriate clearance is not obtained  Medication Instruction  Follow instructions provided by the doctor who prescribes your medication (typically, but not limited to cardiologist)  Preadmission testing will provide additional instructions during your appointment on what to stop and what to take as you get closer to surgery  For clarification of these instructions, please call preadmission testing directly - [phone]  Tips for Preparing the home for discharge from the hospital  Care Partner  Requirement for surgery, the patient must have a plan to have  help at home  Potential for postponed surgery if plan for home support cannot be established  How the care partner can help after surgery  CHG Body Wash/Mouth Wash  Follow the instructions given at preadmission testing  Body wash is to be used on the body and hair for 5 washes  Mouthwash is to be used the night before and morning of surgery  **This is a system-wide protocol developed by infectious disease professionals, we will not alter our recommendations for those with sensitive skin or those who have special hair needs.  Please follow the instructions as they are written as this will provide the best infection prevention measures for surgery.  Should you have an allergy to one of the products, please discuss with your preadmission team**    What to Expect in the Hospital/At Home  Morning of Surgery NPO Guidelines  Nothing to eat after midnight  Water can be consumed up to 2 hours prior to arrival  Surgical and Post-Surgical Care Team  Surgical Team  Anesthesia Team  Nursing  Physical Therapy  Care Coordinating  Pharmacy  Hospital Arrival Instructions  Arrive at the time provided to you  Consider traffic patterns (rush-hour) based on arrival time  Have arrangements made for a ride home  If discharging same day, care partner should remain at the hospital  Recovering after Surgery  Recovery Room - Visitors are not brought back  Transition to hospital room - 2nd Floor, Visitors will be directed to your room  The presence of and strategies for controlling surgical pain and swelling  The importance of early mobility  Side effects after surgery  What to expect if staying overnight    Discharge Planning  The intended plan for discharge will be for patients to discharge home  All patients require a care partner (family, friend, neighbor, etc.) to stay with the patient for the first few nights after surgery  The inability to secure help at home will postpone surgery  Home Care Services set up per surgeon order  Physical  Therapy  Occupational Therapy  **If desired, private duty care can be arranged by the patient ahead of time**  Outpatient Physical Therapy per surgeon order    Recovering at Home  Wound Care  Follow wound care instructions found in your discharge paperwork  Bandage is water-resistant and you may shower with the bandage  Do not scrub directly over the bandage  Do not submerge in water until cleared (bathtub, hot tub, pool, etc.)    Post-Op Risk Prevention  Infection Prevention  Promptly seek treatment for any infections post-operatively  Routine dental visits must be postponed for 3 months after surgery  Your surgeon may require antibiotics prior to future dental visits  Any concerns for infection not related directly to the knee or the hip should be managed by your primary care provider  Blood Clots  Be sure to complete the course of blood thinning medication as prescribed by your surgeon  Movement every 1-2 hours during the day is encouraged to prevent blood clots  Monitor for signs of blood clots  Wear compression stockings as prescribed by your surgeon  Constipation  Constipation is common following surgery  Drink plenty of fluids  Take stool softener/laxative as prescribed by your surgeon  Move around frequently  Eat foods high in fiber  Fall Prevention  Prepare home ahead of time to clear space to move with walker  Remove throw rugs and electrical cords from walkways  Install railings near any stairways with more than 2 steps  Use night lights for increased visibility at night  Continue to use your assistive device until cleared by surgeon or physical therapy  Dislocation Prevention - Not all procedures will have dislocation precautions  Follow dislocation precautions provided by your surgeon  It is OK to resume sexual activity about 6 weeks following surgery  Be sure to follow any dislocation precautions assigned    Durable Medical Equipment  Cold Therapy  Breg Cold Therapy Machines  Ice/Gel Packs  Assistive  Devices  Folding Walker with Wheels (in the front only)  No Rollators  Crutches if approved by Physical Therapy and Surgeon after surgery  Hip Kits  Raised Toilet Seats  Additional Compression Stockings    Joint Preservation  Healthy Activities when Cleared  Walking  Swimming  Bike Riding  Activities to Avoid  Refrain from repetitive motions which have a high impact on the joint  Gradual Progression  Progress activity slowly, listen to your body  Common Findings - NORMAL after surgery  Clicking/Grinding  Numbness near incision    Physical Therapy  Prehabilitation exercises  START TODAY ON BOTH LEGS  Surgery Specific Precautions  Follow surgery specific precautions found in your discharge paperwork    Follow-Up Visit  All patients will see their surgeon for a follow up visit after surgery  The visit may range from 2-6 weeks after surgery and is surgeon specific      Please don't hesitate to reach out if you have any additional questions or concerns.    Mary Beth Vinson MBA, BSN, RN-BC  STEPHEN SchusterN, RN  [facility] Program Navigators  [facility]   [phone]

## 2024-04-15 ENCOUNTER — TELEPHONE (OUTPATIENT)
Dept: ORTHOPEDIC SURGERY | Facility: HOSPITAL | Age: 79
End: 2024-04-15

## 2024-04-15 ENCOUNTER — EDUCATION (OUTPATIENT)
Dept: ORTHOPEDIC SURGERY | Facility: HOSPITAL | Age: 79
End: 2024-04-15
Payer: MEDICARE

## 2024-04-15 ENCOUNTER — APPOINTMENT (OUTPATIENT)
Dept: ORTHOPEDIC SURGERY | Facility: CLINIC | Age: 79
End: 2024-04-15
Payer: MEDICARE

## 2024-04-15 NOTE — TELEPHONE ENCOUNTER
Thank you for taking my call today.  All questions were answered at the time of the call, but please feel free to reach out to me via MyChart or phone, [phone], with any new questions or concerns.       We confirmed that you opted to enroll in our [id number] program so your discharge prescriptions will be available to take home at the time of discharge.  Please bring any prescription insurance coverage with you on the morning of surgery so that we can enter the information into our system.     We confirmed that your plan would be to Stay Overnight.    We confirmed that you have DME needed for recovery.     Use the provided body wash for 4 days before surgery and complete a 5th shower on the morning of surgery, this includes your body and hair.  Follow the directions as provided during preadmission testing.  The mouth wash will be used the night before and the morning of surgery.       As a reminder, if you do not hear from our team, please call [phone] between 2pm and 3pm the business day before your surgery to confirm your arrival time and details.    Please don't hesitate to reach out with additional questions or concerns.    Mary Beth Vinson MBA, BSN, RN-BC  STEPHEN SchusterN, RN  Orthopedic Program Navigators  [facility]  [phone]

## 2024-04-22 ENCOUNTER — HOME HEALTH ADMISSION (OUTPATIENT)
Dept: HOME HEALTH SERVICES | Facility: HOME HEALTH | Age: 79
End: 2024-04-22
Payer: MEDICARE

## 2024-04-22 ENCOUNTER — HOSPITAL ENCOUNTER (OUTPATIENT)
Facility: HOSPITAL | Age: 79
Discharge: HOME HEALTH | End: 2024-04-23
Attending: ORTHOPAEDIC SURGERY | Admitting: ORTHOPAEDIC SURGERY
Payer: MEDICARE

## 2024-04-22 ENCOUNTER — ANESTHESIA EVENT (OUTPATIENT)
Dept: OPERATING ROOM | Facility: HOSPITAL | Age: 79
End: 2024-04-22
Payer: MEDICARE

## 2024-04-22 ENCOUNTER — PHARMACY VISIT (OUTPATIENT)
Dept: PHARMACY | Facility: CLINIC | Age: 79
End: 2024-04-22
Payer: COMMERCIAL

## 2024-04-22 ENCOUNTER — ANESTHESIA (OUTPATIENT)
Dept: OPERATING ROOM | Facility: HOSPITAL | Age: 79
End: 2024-04-22
Payer: MEDICARE

## 2024-04-22 DIAGNOSIS — M62.838 MUSCLE SPASM OF LEFT LOWER EXTREMITY: ICD-10-CM

## 2024-04-22 DIAGNOSIS — M17.12 UNILATERAL PRIMARY OSTEOARTHRITIS, LEFT KNEE: Primary | ICD-10-CM

## 2024-04-22 PROCEDURE — 2500000005 HC RX 250 GENERAL PHARMACY W/O HCPCS: Performed by: NURSE ANESTHETIST, CERTIFIED REGISTERED

## 2024-04-22 PROCEDURE — 3600000005 HC OR TIME - INITIAL BASE CHARGE - PROCEDURE LEVEL FIVE: Performed by: ORTHOPAEDIC SURGERY

## 2024-04-22 PROCEDURE — 97116 GAIT TRAINING THERAPY: CPT | Mod: GP

## 2024-04-22 PROCEDURE — A4649 SURGICAL SUPPLIES: HCPCS | Performed by: ORTHOPAEDIC SURGERY

## 2024-04-22 PROCEDURE — 2500000001 HC RX 250 WO HCPCS SELF ADMINISTERED DRUGS (ALT 637 FOR MEDICARE OP): Performed by: PHYSICIAN ASSISTANT

## 2024-04-22 PROCEDURE — 2500000005 HC RX 250 GENERAL PHARMACY W/O HCPCS: Performed by: ORTHOPAEDIC SURGERY

## 2024-04-22 PROCEDURE — RXMED WILLOW AMBULATORY MEDICATION CHARGE

## 2024-04-22 PROCEDURE — A27447 PR TOTAL KNEE ARTHROPLASTY: Performed by: NURSE ANESTHETIST, CERTIFIED REGISTERED

## 2024-04-22 PROCEDURE — 97530 THERAPEUTIC ACTIVITIES: CPT | Mod: GP

## 2024-04-22 PROCEDURE — 2780000003 HC OR 278 NO HCPCS: Performed by: ORTHOPAEDIC SURGERY

## 2024-04-22 PROCEDURE — G0378 HOSPITAL OBSERVATION PER HR: HCPCS

## 2024-04-22 PROCEDURE — 2500000004 HC RX 250 GENERAL PHARMACY W/ HCPCS (ALT 636 FOR OP/ED): Performed by: STUDENT IN AN ORGANIZED HEALTH CARE EDUCATION/TRAINING PROGRAM

## 2024-04-22 PROCEDURE — 7100000002 HC RECOVERY ROOM TIME - EACH INCREMENTAL 1 MINUTE: Performed by: ORTHOPAEDIC SURGERY

## 2024-04-22 PROCEDURE — 2500000004 HC RX 250 GENERAL PHARMACY W/ HCPCS (ALT 636 FOR OP/ED): Performed by: PHYSICIAN ASSISTANT

## 2024-04-22 PROCEDURE — 2500000001 HC RX 250 WO HCPCS SELF ADMINISTERED DRUGS (ALT 637 FOR MEDICARE OP): Performed by: STUDENT IN AN ORGANIZED HEALTH CARE EDUCATION/TRAINING PROGRAM

## 2024-04-22 PROCEDURE — 3700000001 HC GENERAL ANESTHESIA TIME - INITIAL BASE CHARGE: Performed by: ORTHOPAEDIC SURGERY

## 2024-04-22 PROCEDURE — A27447 PR TOTAL KNEE ARTHROPLASTY: Performed by: STUDENT IN AN ORGANIZED HEALTH CARE EDUCATION/TRAINING PROGRAM

## 2024-04-22 PROCEDURE — C1776 JOINT DEVICE (IMPLANTABLE): HCPCS | Performed by: ORTHOPAEDIC SURGERY

## 2024-04-22 PROCEDURE — 64447 NJX AA&/STRD FEMORAL NRV IMG: CPT | Performed by: STUDENT IN AN ORGANIZED HEALTH CARE EDUCATION/TRAINING PROGRAM

## 2024-04-22 PROCEDURE — 2500000004 HC RX 250 GENERAL PHARMACY W/ HCPCS (ALT 636 FOR OP/ED): Performed by: NURSE ANESTHETIST, CERTIFIED REGISTERED

## 2024-04-22 PROCEDURE — 3600000010 HC OR TIME - EACH INCREMENTAL 1 MINUTE - PROCEDURE LEVEL FIVE: Performed by: ORTHOPAEDIC SURGERY

## 2024-04-22 PROCEDURE — 7100000001 HC RECOVERY ROOM TIME - INITIAL BASE CHARGE: Performed by: ORTHOPAEDIC SURGERY

## 2024-04-22 PROCEDURE — 2720000007 HC OR 272 NO HCPCS: Performed by: ORTHOPAEDIC SURGERY

## 2024-04-22 PROCEDURE — 2500000005 HC RX 250 GENERAL PHARMACY W/O HCPCS: Performed by: PHYSICIAN ASSISTANT

## 2024-04-22 PROCEDURE — 2500000006 HC RX 250 W HCPCS SELF ADMINISTERED DRUGS (ALT 637 FOR ALL PAYERS): Performed by: STUDENT IN AN ORGANIZED HEALTH CARE EDUCATION/TRAINING PROGRAM

## 2024-04-22 PROCEDURE — 97161 PT EVAL LOW COMPLEX 20 MIN: CPT | Mod: GP

## 2024-04-22 PROCEDURE — 3700000002 HC GENERAL ANESTHESIA TIME - EACH INCREMENTAL 1 MINUTE: Performed by: ORTHOPAEDIC SURGERY

## 2024-04-22 PROCEDURE — 99100 ANES PT EXTEME AGE<1 YR&>70: CPT | Performed by: STUDENT IN AN ORGANIZED HEALTH CARE EDUCATION/TRAINING PROGRAM

## 2024-04-22 PROCEDURE — C1713 ANCHOR/SCREW BN/BN,TIS/BN: HCPCS | Performed by: ORTHOPAEDIC SURGERY

## 2024-04-22 PROCEDURE — 97110 THERAPEUTIC EXERCISES: CPT | Mod: GP

## 2024-04-22 PROCEDURE — 27447 TOTAL KNEE ARTHROPLASTY: CPT | Performed by: ORTHOPAEDIC SURGERY

## 2024-04-22 PROCEDURE — 2500000004 HC RX 250 GENERAL PHARMACY W/ HCPCS (ALT 636 FOR OP/ED): Mod: JZ | Performed by: PHYSICIAN ASSISTANT

## 2024-04-22 DEVICE — IMPLANTABLE DEVICE: Type: IMPLANTABLE DEVICE | Site: KNEE | Status: FUNCTIONAL

## 2024-04-22 DEVICE — BONE CEMENT, SMART SET, HIGH VISCOSITY, 40GM: Type: IMPLANTABLE DEVICE | Site: KNEE | Status: FUNCTIONAL

## 2024-04-22 DEVICE — DOME, PATELLA, MEDIALIZED, 35MM: Type: IMPLANTABLE DEVICE | Site: KNEE | Status: FUNCTIONAL

## 2024-04-22 DEVICE — TIBAL BASE ATTUNE FB, SZ 4 CEM: Type: IMPLANTABLE DEVICE | Site: KNEE | Status: FUNCTIONAL

## 2024-04-22 RX ORDER — ACETAMINOPHEN 500 MG
1000 TABLET ORAL EVERY 6 HOURS PRN
Qty: 240 TABLET | Refills: 0 | Status: SHIPPED | OUTPATIENT
Start: 2024-04-22 | End: 2024-05-22

## 2024-04-22 RX ORDER — CYCLOBENZAPRINE HCL 10 MG
5 TABLET ORAL 3 TIMES DAILY PRN
Status: DISCONTINUED | OUTPATIENT
Start: 2024-04-22 | End: 2024-04-23 | Stop reason: HOSPADM

## 2024-04-22 RX ORDER — ASPIRIN 81 MG/1
81 TABLET ORAL 2 TIMES DAILY
Qty: 60 TABLET | Refills: 0 | Status: SHIPPED | OUTPATIENT
Start: 2024-04-22 | End: 2024-05-22

## 2024-04-22 RX ORDER — ACETAMINOPHEN 325 MG/1
650 TABLET ORAL EVERY 4 HOURS PRN
Status: DISCONTINUED | OUTPATIENT
Start: 2024-04-22 | End: 2024-04-22 | Stop reason: HOSPADM

## 2024-04-22 RX ORDER — OXYCODONE HCL 10 MG/1
10 TABLET, FILM COATED, EXTENDED RELEASE ORAL ONCE
Status: COMPLETED | OUTPATIENT
Start: 2024-04-22 | End: 2024-04-22

## 2024-04-22 RX ORDER — OXYCODONE HYDROCHLORIDE 5 MG/1
5 TABLET ORAL EVERY 4 HOURS PRN
Status: DISCONTINUED | OUTPATIENT
Start: 2024-04-22 | End: 2024-04-22 | Stop reason: HOSPADM

## 2024-04-22 RX ORDER — PANTOPRAZOLE SODIUM 40 MG/1
40 TABLET, DELAYED RELEASE ORAL DAILY
Qty: 30 TABLET | Refills: 0 | Status: SHIPPED | OUTPATIENT
Start: 2024-04-22 | End: 2024-05-22

## 2024-04-22 RX ORDER — METHENAMINE HIPPURATE 1000 MG/1
1 TABLET ORAL 2 TIMES DAILY
Status: DISCONTINUED | OUTPATIENT
Start: 2024-04-23 | End: 2024-04-23 | Stop reason: HOSPADM

## 2024-04-22 RX ORDER — ALBUTEROL SULFATE 0.83 MG/ML
2.5 SOLUTION RESPIRATORY (INHALATION) ONCE AS NEEDED
Status: DISCONTINUED | OUTPATIENT
Start: 2024-04-22 | End: 2024-04-22 | Stop reason: HOSPADM

## 2024-04-22 RX ORDER — TRANEXAMIC ACID 650 MG/1
1950 TABLET ORAL ONCE
Qty: 3 TABLET | Refills: 0 | Status: COMPLETED | OUTPATIENT
Start: 2024-04-22 | End: 2024-04-22

## 2024-04-22 RX ORDER — SCOLOPAMINE TRANSDERMAL SYSTEM 1 MG/1
1 PATCH, EXTENDED RELEASE TRANSDERMAL
Status: DISCONTINUED | OUTPATIENT
Start: 2024-04-22 | End: 2024-04-22

## 2024-04-22 RX ORDER — DOCUSATE SODIUM 100 MG/1
100 CAPSULE, LIQUID FILLED ORAL 2 TIMES DAILY
Qty: 60 CAPSULE | Refills: 0 | Status: SHIPPED | OUTPATIENT
Start: 2024-04-22 | End: 2024-05-22

## 2024-04-22 RX ORDER — MELOXICAM 15 MG/1
15 TABLET ORAL DAILY
Qty: 30 TABLET | Refills: 0 | Status: SHIPPED | OUTPATIENT
Start: 2024-04-22 | End: 2024-05-22

## 2024-04-22 RX ORDER — CEFAZOLIN SODIUM 2 G/100ML
2 INJECTION, SOLUTION INTRAVENOUS ONCE
Status: COMPLETED | OUTPATIENT
Start: 2024-04-22 | End: 2024-04-22

## 2024-04-22 RX ORDER — METOCLOPRAMIDE 10 MG/1
10 TABLET ORAL EVERY 6 HOURS PRN
Status: DISCONTINUED | OUTPATIENT
Start: 2024-04-22 | End: 2024-04-23 | Stop reason: HOSPADM

## 2024-04-22 RX ORDER — PANTOPRAZOLE SODIUM 40 MG/1
40 TABLET, DELAYED RELEASE ORAL
Status: DISCONTINUED | OUTPATIENT
Start: 2024-04-23 | End: 2024-04-23 | Stop reason: HOSPADM

## 2024-04-22 RX ORDER — OXYCODONE HYDROCHLORIDE 5 MG/1
5 TABLET ORAL EVERY 6 HOURS PRN
Qty: 28 TABLET | Refills: 0 | Status: SHIPPED | OUTPATIENT
Start: 2024-04-22 | End: 2024-04-29

## 2024-04-22 RX ORDER — CEFAZOLIN SODIUM 2 G/100ML
2 INJECTION, SOLUTION INTRAVENOUS EVERY 8 HOURS
Qty: 200 ML | Refills: 0 | Status: COMPLETED | OUTPATIENT
Start: 2024-04-22 | End: 2024-04-23

## 2024-04-22 RX ORDER — ONDANSETRON HYDROCHLORIDE 2 MG/ML
4 INJECTION, SOLUTION INTRAVENOUS EVERY 8 HOURS PRN
Status: DISCONTINUED | OUTPATIENT
Start: 2024-04-22 | End: 2024-04-23 | Stop reason: HOSPADM

## 2024-04-22 RX ORDER — ACETAMINOPHEN 325 MG/1
650 TABLET ORAL EVERY 6 HOURS PRN
Status: DISCONTINUED | OUTPATIENT
Start: 2024-04-22 | End: 2024-04-23 | Stop reason: HOSPADM

## 2024-04-22 RX ORDER — PHENYLEPHRINE HCL IN 0.9% NACL 1 MG/10 ML
SYRINGE (ML) INTRAVENOUS AS NEEDED
Status: DISCONTINUED | OUTPATIENT
Start: 2024-04-22 | End: 2024-04-22

## 2024-04-22 RX ORDER — MELOXICAM 7.5 MG/1
7.5 TABLET ORAL ONCE
Status: COMPLETED | OUTPATIENT
Start: 2024-04-22 | End: 2024-04-22

## 2024-04-22 RX ORDER — MIDAZOLAM HYDROCHLORIDE 1 MG/ML
2 INJECTION, SOLUTION INTRAMUSCULAR; INTRAVENOUS ONCE
Status: COMPLETED | OUTPATIENT
Start: 2024-04-22 | End: 2024-04-22

## 2024-04-22 RX ORDER — BISACODYL 10 MG/1
10 SUPPOSITORY RECTAL DAILY PRN
Status: DISCONTINUED | OUTPATIENT
Start: 2024-04-22 | End: 2024-04-23 | Stop reason: HOSPADM

## 2024-04-22 RX ORDER — ACETAMINOPHEN 325 MG/1
975 TABLET ORAL ONCE
Status: COMPLETED | OUTPATIENT
Start: 2024-04-22 | End: 2024-04-22

## 2024-04-22 RX ORDER — ONDANSETRON HYDROCHLORIDE 2 MG/ML
4 INJECTION, SOLUTION INTRAVENOUS ONCE AS NEEDED
Status: DISCONTINUED | OUTPATIENT
Start: 2024-04-22 | End: 2024-04-22 | Stop reason: HOSPADM

## 2024-04-22 RX ORDER — DIPHENHYDRAMINE HYDROCHLORIDE 50 MG/ML
12.5 INJECTION INTRAMUSCULAR; INTRAVENOUS EVERY 6 HOURS PRN
Status: DISCONTINUED | OUTPATIENT
Start: 2024-04-22 | End: 2024-04-23 | Stop reason: HOSPADM

## 2024-04-22 RX ORDER — DOCUSATE SODIUM 100 MG/1
100 CAPSULE, LIQUID FILLED ORAL 2 TIMES DAILY
Status: DISCONTINUED | OUTPATIENT
Start: 2024-04-22 | End: 2024-04-23 | Stop reason: HOSPADM

## 2024-04-22 RX ORDER — BUPIVACAINE HYDROCHLORIDE 7.5 MG/ML
INJECTION, SOLUTION INTRASPINAL AS NEEDED
Status: DISCONTINUED | OUTPATIENT
Start: 2024-04-22 | End: 2024-04-22

## 2024-04-22 RX ORDER — METOCLOPRAMIDE HYDROCHLORIDE 5 MG/ML
10 INJECTION INTRAMUSCULAR; INTRAVENOUS EVERY 6 HOURS PRN
Status: DISCONTINUED | OUTPATIENT
Start: 2024-04-22 | End: 2024-04-23 | Stop reason: HOSPADM

## 2024-04-22 RX ORDER — KETOROLAC TROMETHAMINE 15 MG/ML
15 INJECTION, SOLUTION INTRAMUSCULAR; INTRAVENOUS EVERY 6 HOURS
Qty: 4 ML | Refills: 0 | Status: COMPLETED | OUTPATIENT
Start: 2024-04-22 | End: 2024-04-23

## 2024-04-22 RX ORDER — MIRABEGRON 50 MG/1
50 TABLET, EXTENDED RELEASE ORAL DAILY
Status: DISCONTINUED | OUTPATIENT
Start: 2024-04-23 | End: 2024-04-23 | Stop reason: HOSPADM

## 2024-04-22 RX ORDER — SODIUM CHLORIDE, SODIUM LACTATE, POTASSIUM CHLORIDE, CALCIUM CHLORIDE 600; 310; 30; 20 MG/100ML; MG/100ML; MG/100ML; MG/100ML
100 INJECTION, SOLUTION INTRAVENOUS CONTINUOUS
Status: DISCONTINUED | OUTPATIENT
Start: 2024-04-22 | End: 2024-04-22 | Stop reason: HOSPADM

## 2024-04-22 RX ORDER — BISACODYL 5 MG
10 TABLET, DELAYED RELEASE (ENTERIC COATED) ORAL DAILY PRN
Status: DISCONTINUED | OUTPATIENT
Start: 2024-04-22 | End: 2024-04-23 | Stop reason: HOSPADM

## 2024-04-22 RX ORDER — TRANEXAMIC ACID 100 MG/ML
INJECTION, SOLUTION INTRAVENOUS AS NEEDED
Status: DISCONTINUED | OUTPATIENT
Start: 2024-04-22 | End: 2024-04-22

## 2024-04-22 RX ORDER — LORATADINE 10 MG/1
10 TABLET ORAL DAILY PRN
Status: DISCONTINUED | OUTPATIENT
Start: 2024-04-22 | End: 2024-04-23 | Stop reason: HOSPADM

## 2024-04-22 RX ORDER — ONDANSETRON 4 MG/1
4 TABLET, ORALLY DISINTEGRATING ORAL EVERY 8 HOURS PRN
Status: DISCONTINUED | OUTPATIENT
Start: 2024-04-22 | End: 2024-04-23 | Stop reason: HOSPADM

## 2024-04-22 RX ORDER — ROPIVACAINE/EPI/CLONIDINE/KET 2.46-0.005
SYRINGE (ML) INJECTION AS NEEDED
Status: DISCONTINUED | OUTPATIENT
Start: 2024-04-22 | End: 2024-04-22 | Stop reason: HOSPADM

## 2024-04-22 RX ORDER — OXYCODONE HYDROCHLORIDE 5 MG/1
10 TABLET ORAL EVERY 4 HOURS PRN
Status: DISCONTINUED | OUTPATIENT
Start: 2024-04-22 | End: 2024-04-22 | Stop reason: HOSPADM

## 2024-04-22 RX ORDER — OXYCODONE HYDROCHLORIDE 5 MG/1
10 TABLET ORAL EVERY 4 HOURS PRN
Status: DISCONTINUED | OUTPATIENT
Start: 2024-04-22 | End: 2024-04-23 | Stop reason: HOSPADM

## 2024-04-22 RX ORDER — FENTANYL CITRATE 50 UG/ML
50 INJECTION, SOLUTION INTRAMUSCULAR; INTRAVENOUS ONCE
Status: COMPLETED | OUTPATIENT
Start: 2024-04-22 | End: 2024-04-22

## 2024-04-22 RX ORDER — SODIUM CHLORIDE, SODIUM LACTATE, POTASSIUM CHLORIDE, CALCIUM CHLORIDE 600; 310; 30; 20 MG/100ML; MG/100ML; MG/100ML; MG/100ML
75 INJECTION, SOLUTION INTRAVENOUS CONTINUOUS
Status: DISCONTINUED | OUTPATIENT
Start: 2024-04-22 | End: 2024-04-22

## 2024-04-22 RX ORDER — OXYCODONE HYDROCHLORIDE 5 MG/1
5 TABLET ORAL EVERY 4 HOURS PRN
Status: DISCONTINUED | OUTPATIENT
Start: 2024-04-22 | End: 2024-04-23 | Stop reason: HOSPADM

## 2024-04-22 RX ORDER — POLYETHYLENE GLYCOL 3350 17 G/17G
17 POWDER, FOR SOLUTION ORAL DAILY
Status: DISCONTINUED | OUTPATIENT
Start: 2024-04-22 | End: 2024-04-23 | Stop reason: HOSPADM

## 2024-04-22 RX ORDER — TRAMADOL HYDROCHLORIDE 50 MG/1
50 TABLET ORAL EVERY 6 HOURS PRN
Qty: 28 TABLET | Refills: 0 | Status: SHIPPED | OUTPATIENT
Start: 2024-04-22 | End: 2024-04-29

## 2024-04-22 RX ORDER — ONDANSETRON HYDROCHLORIDE 2 MG/ML
INJECTION, SOLUTION INTRAVENOUS AS NEEDED
Status: DISCONTINUED | OUTPATIENT
Start: 2024-04-22 | End: 2024-04-22

## 2024-04-22 RX ORDER — PROPOFOL 10 MG/ML
INJECTION, EMULSION INTRAVENOUS AS NEEDED
Status: DISCONTINUED | OUTPATIENT
Start: 2024-04-22 | End: 2024-04-22

## 2024-04-22 RX ORDER — HYDROMORPHONE HYDROCHLORIDE 1 MG/ML
1 INJECTION, SOLUTION INTRAMUSCULAR; INTRAVENOUS; SUBCUTANEOUS EVERY 2 HOUR PRN
Status: DISCONTINUED | OUTPATIENT
Start: 2024-04-22 | End: 2024-04-23 | Stop reason: HOSPADM

## 2024-04-22 RX ORDER — ASPIRIN 81 MG/1
81 TABLET ORAL 2 TIMES DAILY
Status: DISCONTINUED | OUTPATIENT
Start: 2024-04-23 | End: 2024-04-23 | Stop reason: HOSPADM

## 2024-04-22 RX ORDER — SODIUM CHLORIDE, SODIUM LACTATE, POTASSIUM CHLORIDE, CALCIUM CHLORIDE 600; 310; 30; 20 MG/100ML; MG/100ML; MG/100ML; MG/100ML
100 INJECTION, SOLUTION INTRAVENOUS CONTINUOUS
Status: ACTIVE | OUTPATIENT
Start: 2024-04-22 | End: 2024-04-23

## 2024-04-22 RX ORDER — POLYETHYLENE GLYCOL 3350 17 G/17G
17 POWDER, FOR SOLUTION ORAL DAILY
Qty: 238 G | Refills: 0 | Status: SHIPPED | OUTPATIENT
Start: 2024-04-22

## 2024-04-22 RX ORDER — PREGABALIN 75 MG/1
75 CAPSULE ORAL ONCE
Status: COMPLETED | OUTPATIENT
Start: 2024-04-22 | End: 2024-04-22

## 2024-04-22 RX ORDER — NALOXONE HYDROCHLORIDE 0.4 MG/ML
0.2 INJECTION, SOLUTION INTRAMUSCULAR; INTRAVENOUS; SUBCUTANEOUS EVERY 5 MIN PRN
Status: DISCONTINUED | OUTPATIENT
Start: 2024-04-22 | End: 2024-04-23 | Stop reason: HOSPADM

## 2024-04-22 RX ADMIN — KETOROLAC TROMETHAMINE 15 MG: 15 INJECTION, SOLUTION INTRAMUSCULAR; INTRAVENOUS at 18:21

## 2024-04-22 RX ADMIN — SODIUM CHLORIDE, SODIUM LACTATE, POTASSIUM CHLORIDE, AND CALCIUM CHLORIDE 50 ML/HR: 600; 310; 30; 20 INJECTION, SOLUTION INTRAVENOUS at 12:11

## 2024-04-22 RX ADMIN — MIDAZOLAM HYDROCHLORIDE 2 MG: 1 INJECTION, SOLUTION INTRAMUSCULAR; INTRAVENOUS at 08:02

## 2024-04-22 RX ADMIN — ACETAMINOPHEN 975 MG: 325 TABLET ORAL at 07:09

## 2024-04-22 RX ADMIN — Medication 100 MCG: at 09:42

## 2024-04-22 RX ADMIN — BUPIVACAINE HYDROCHLORIDE IN DEXTROSE 1.2 ML: 7.5 INJECTION, SOLUTION SUBARACHNOID at 08:33

## 2024-04-22 RX ADMIN — MELOXICAM 7.5 MG: 7.5 TABLET ORAL at 07:09

## 2024-04-22 RX ADMIN — DOCUSATE SODIUM 100 MG: 100 CAPSULE, LIQUID FILLED ORAL at 21:42

## 2024-04-22 RX ADMIN — Medication 200 MCG: at 10:01

## 2024-04-22 RX ADMIN — SCOPALAMINE 1 PATCH: 1 PATCH, EXTENDED RELEASE TRANSDERMAL at 07:09

## 2024-04-22 RX ADMIN — TRANEXAMIC ACID 1950 MG: 650 TABLET ORAL at 15:52

## 2024-04-22 RX ADMIN — POVIDONE-IODINE 1 APPLICATION: 5 SOLUTION TOPICAL at 07:10

## 2024-04-22 RX ADMIN — ONDANSETRON 4 MG: 2 INJECTION INTRAMUSCULAR; INTRAVENOUS at 10:07

## 2024-04-22 RX ADMIN — OXYCODONE HYDROCHLORIDE 10 MG: 10 TABLET, FILM COATED, EXTENDED RELEASE ORAL at 07:52

## 2024-04-22 RX ADMIN — SODIUM CHLORIDE, SODIUM LACTATE, POTASSIUM CHLORIDE, AND CALCIUM CHLORIDE 75 ML/HR: 600; 310; 30; 20 INJECTION, SOLUTION INTRAVENOUS at 07:08

## 2024-04-22 RX ADMIN — OXYCODONE 5 MG: 5 TABLET ORAL at 15:52

## 2024-04-22 RX ADMIN — CEFAZOLIN SODIUM 2 G: 2 INJECTION, SOLUTION INTRAVENOUS at 15:53

## 2024-04-22 RX ADMIN — Medication 100 MCG: at 09:09

## 2024-04-22 RX ADMIN — PROPOFOL 360 MG: 10 INJECTION, EMULSION INTRAVENOUS at 08:35

## 2024-04-22 RX ADMIN — TRANEXAMIC ACID 1000 MG: 1 INJECTION, SOLUTION INTRAVENOUS at 10:03

## 2024-04-22 RX ADMIN — Medication 100 MCG: at 09:26

## 2024-04-22 RX ADMIN — FENTANYL CITRATE 50 MCG: 50 INJECTION INTRAMUSCULAR; INTRAVENOUS at 08:02

## 2024-04-22 RX ADMIN — Medication 100 MCG: at 09:01

## 2024-04-22 RX ADMIN — Medication 100 MCG: at 09:48

## 2024-04-22 RX ADMIN — TRANEXAMIC ACID 1000 MG: 1 INJECTION, SOLUTION INTRAVENOUS at 08:37

## 2024-04-22 RX ADMIN — KETOROLAC TROMETHAMINE 15 MG: 15 INJECTION, SOLUTION INTRAMUSCULAR; INTRAVENOUS at 23:59

## 2024-04-22 RX ADMIN — Medication 100 MCG: at 09:16

## 2024-04-22 RX ADMIN — KETOROLAC TROMETHAMINE 15 MG: 15 INJECTION, SOLUTION INTRAMUSCULAR; INTRAVENOUS at 12:11

## 2024-04-22 RX ADMIN — Medication 200 MCG: at 09:31

## 2024-04-22 RX ADMIN — PREGABALIN 75 MG: 75 CAPSULE ORAL at 07:52

## 2024-04-22 RX ADMIN — POLYETHYLENE GLYCOL 3350 17 G: 17 POWDER, FOR SOLUTION ORAL at 18:22

## 2024-04-22 RX ADMIN — CEFAZOLIN SODIUM 2 G: 2 INJECTION, SOLUTION INTRAVENOUS at 23:57

## 2024-04-22 RX ADMIN — CEFAZOLIN SODIUM 2 G: 2 INJECTION, SOLUTION INTRAVENOUS at 08:24

## 2024-04-22 SDOH — ECONOMIC STABILITY: FOOD INSECURITY: WITHIN THE PAST 12 MONTHS, THE FOOD YOU BOUGHT JUST DIDN'T LAST AND YOU DIDN'T HAVE MONEY TO GET MORE.: NEVER TRUE

## 2024-04-22 SDOH — SOCIAL STABILITY: SOCIAL NETWORK: ARE YOU MARRIED, WIDOWED, DIVORCED, SEPARATED, NEVER MARRIED, OR LIVING WITH A PARTNER?: MARRIED

## 2024-04-22 SDOH — HEALTH STABILITY: MENTAL HEALTH
HOW OFTEN DO YOU NEED TO HAVE SOMEONE HELP YOU WHEN YOU READ INSTRUCTIONS, PAMPHLETS, OR OTHER WRITTEN MATERIAL FROM YOUR DOCTOR OR PHARMACY?: NEVER

## 2024-04-22 SDOH — SOCIAL STABILITY: SOCIAL NETWORK
DO YOU BELONG TO ANY CLUBS OR ORGANIZATIONS SUCH AS CHURCH GROUPS UNIONS, FRATERNAL OR ATHLETIC GROUPS, OR SCHOOL GROUPS?: YES

## 2024-04-22 SDOH — ECONOMIC STABILITY: FOOD INSECURITY: WITHIN THE PAST 12 MONTHS, YOU WORRIED THAT YOUR FOOD WOULD RUN OUT BEFORE YOU GOT MONEY TO BUY MORE.: NEVER TRUE

## 2024-04-22 SDOH — HEALTH STABILITY: MENTAL HEALTH: HOW OFTEN DO YOU HAVE A DRINK CONTAINING ALCOHOL?: MONTHLY OR LESS

## 2024-04-22 SDOH — SOCIAL STABILITY: SOCIAL INSECURITY
WITHIN THE LAST YEAR, HAVE YOU BEEN KICKED, HIT, SLAPPED, OR OTHERWISE PHYSICALLY HURT BY YOUR PARTNER OR EX-PARTNER?: NO

## 2024-04-22 SDOH — SOCIAL STABILITY: SOCIAL INSECURITY: ARE THERE ANY APPARENT SIGNS OF INJURIES/BEHAVIORS THAT COULD BE RELATED TO ABUSE/NEGLECT?: NO

## 2024-04-22 SDOH — ECONOMIC STABILITY: HOUSING INSECURITY
IN THE LAST 12 MONTHS, WAS THERE A TIME WHEN YOU DID NOT HAVE A STEADY PLACE TO SLEEP OR SLEPT IN A SHELTER (INCLUDING NOW)?: NO

## 2024-04-22 SDOH — HEALTH STABILITY: MENTAL HEALTH: HOW MANY STANDARD DRINKS CONTAINING ALCOHOL DO YOU HAVE ON A TYPICAL DAY?: 1 OR 2

## 2024-04-22 SDOH — SOCIAL STABILITY: SOCIAL INSECURITY
WITHIN THE LAST YEAR, HAVE TO BEEN RAPED OR FORCED TO HAVE ANY KIND OF SEXUAL ACTIVITY BY YOUR PARTNER OR EX-PARTNER?: NO

## 2024-04-22 SDOH — SOCIAL STABILITY: SOCIAL INSECURITY: WITHIN THE LAST YEAR, HAVE YOU BEEN AFRAID OF YOUR PARTNER OR EX-PARTNER?: NO

## 2024-04-22 SDOH — SOCIAL STABILITY: SOCIAL INSECURITY: WERE YOU ABLE TO COMPLETE ALL THE BEHAVIORAL HEALTH SCREENINGS?: YES

## 2024-04-22 SDOH — SOCIAL STABILITY: SOCIAL INSECURITY: WITHIN THE LAST YEAR, HAVE YOU BEEN HUMILIATED OR EMOTIONALLY ABUSED IN OTHER WAYS BY YOUR PARTNER OR EX-PARTNER?: NO

## 2024-04-22 SDOH — HEALTH STABILITY: MENTAL HEALTH: HOW OFTEN DO YOU HAVE 6 OR MORE DRINKS ON ONE OCCASION?: NEVER

## 2024-04-22 SDOH — HEALTH STABILITY: MENTAL HEALTH
STRESS IS WHEN SOMEONE FEELS TENSE, NERVOUS, ANXIOUS, OR CAN'T SLEEP AT NIGHT BECAUSE THEIR MIND IS TROUBLED. HOW STRESSED ARE YOU?: NOT AT ALL

## 2024-04-22 SDOH — SOCIAL STABILITY: SOCIAL INSECURITY: DOES ANYONE TRY TO KEEP YOU FROM HAVING/CONTACTING OTHER FRIENDS OR DOING THINGS OUTSIDE YOUR HOME?: NO

## 2024-04-22 SDOH — HEALTH STABILITY: PHYSICAL HEALTH: ON AVERAGE, HOW MANY DAYS PER WEEK DO YOU ENGAGE IN MODERATE TO STRENUOUS EXERCISE (LIKE A BRISK WALK)?: 5 DAYS

## 2024-04-22 SDOH — ECONOMIC STABILITY: INCOME INSECURITY: IN THE PAST 12 MONTHS, HAS THE ELECTRIC, GAS, OIL, OR WATER COMPANY THREATENED TO SHUT OFF SERVICE IN YOUR HOME?: NO

## 2024-04-22 SDOH — SOCIAL STABILITY: SOCIAL INSECURITY: HAVE YOU HAD ANY THOUGHTS OF HARMING ANYONE ELSE?: NO

## 2024-04-22 SDOH — ECONOMIC STABILITY: TRANSPORTATION INSECURITY
IN THE PAST 12 MONTHS, HAS THE LACK OF TRANSPORTATION KEPT YOU FROM MEDICAL APPOINTMENTS OR FROM GETTING MEDICATIONS?: NO

## 2024-04-22 SDOH — ECONOMIC STABILITY: INCOME INSECURITY: IN THE LAST 12 MONTHS, WAS THERE A TIME WHEN YOU WERE NOT ABLE TO PAY THE MORTGAGE OR RENT ON TIME?: NO

## 2024-04-22 SDOH — SOCIAL STABILITY: SOCIAL INSECURITY: DO YOU FEEL ANYONE HAS EXPLOITED OR TAKEN ADVANTAGE OF YOU FINANCIALLY OR OF YOUR PERSONAL PROPERTY?: NO

## 2024-04-22 SDOH — ECONOMIC STABILITY: HOUSING INSECURITY: IN THE LAST 12 MONTHS, HOW MANY PLACES HAVE YOU LIVED?: 1

## 2024-04-22 SDOH — ECONOMIC STABILITY: TRANSPORTATION INSECURITY
IN THE PAST 12 MONTHS, HAS LACK OF TRANSPORTATION KEPT YOU FROM MEETINGS, WORK, OR FROM GETTING THINGS NEEDED FOR DAILY LIVING?: NO

## 2024-04-22 SDOH — SOCIAL STABILITY: SOCIAL INSECURITY: ARE YOU OR HAVE YOU BEEN THREATENED OR ABUSED PHYSICALLY, EMOTIONALLY, OR SEXUALLY BY ANYONE?: NO

## 2024-04-22 SDOH — ECONOMIC STABILITY: INCOME INSECURITY: HOW HARD IS IT FOR YOU TO PAY FOR THE VERY BASICS LIKE FOOD, HOUSING, MEDICAL CARE, AND HEATING?: NOT HARD AT ALL

## 2024-04-22 SDOH — SOCIAL STABILITY: SOCIAL NETWORK: HOW OFTEN DO YOU ATTEND CHURCH OR RELIGIOUS SERVICES?: MORE THAN 4 TIMES PER YEAR

## 2024-04-22 SDOH — SOCIAL STABILITY: SOCIAL NETWORK: HOW OFTEN DO YOU ATTENT MEETINGS OF THE CLUB OR ORGANIZATION YOU BELONG TO?: MORE THAN 4 TIMES PER YEAR

## 2024-04-22 SDOH — HEALTH STABILITY: PHYSICAL HEALTH: ON AVERAGE, HOW MANY MINUTES DO YOU ENGAGE IN EXERCISE AT THIS LEVEL?: 30 MIN

## 2024-04-22 SDOH — SOCIAL STABILITY: SOCIAL NETWORK: HOW OFTEN DO YOU GET TOGETHER WITH FRIENDS OR RELATIVES?: ONCE A WEEK

## 2024-04-22 SDOH — SOCIAL STABILITY: SOCIAL INSECURITY: DO YOU FEEL UNSAFE GOING BACK TO THE PLACE WHERE YOU ARE LIVING?: NO

## 2024-04-22 SDOH — SOCIAL STABILITY: SOCIAL INSECURITY: HAS ANYONE EVER THREATENED TO HURT YOUR FAMILY OR YOUR PETS?: NO

## 2024-04-22 SDOH — SOCIAL STABILITY: SOCIAL INSECURITY: ABUSE: ADULT

## 2024-04-22 SDOH — SOCIAL STABILITY: SOCIAL NETWORK: IN A TYPICAL WEEK, HOW MANY TIMES DO YOU TALK ON THE PHONE WITH FAMILY, FRIENDS, OR NEIGHBORS?: ONCE A WEEK

## 2024-04-22 SDOH — SOCIAL STABILITY: SOCIAL INSECURITY: HAVE YOU HAD THOUGHTS OF HARMING ANYONE ELSE?: NO

## 2024-04-22 ASSESSMENT — COGNITIVE AND FUNCTIONAL STATUS - GENERAL
DAILY ACTIVITIY SCORE: 22
CLIMB 3 TO 5 STEPS WITH RAILING: A LITTLE
STANDING UP FROM CHAIR USING ARMS: A LITTLE
WALKING IN HOSPITAL ROOM: A LITTLE
MOBILITY SCORE: 24
TOILETING: A LITTLE
MOVING TO AND FROM BED TO CHAIR: A LITTLE
WALKING IN HOSPITAL ROOM: A LITTLE
DRESSING REGULAR LOWER BODY CLOTHING: A LITTLE
MOVING TO AND FROM BED TO CHAIR: A LITTLE
MOBILITY SCORE: 20
CLIMB 3 TO 5 STEPS WITH RAILING: A LITTLE
DAILY ACTIVITIY SCORE: 24
MOBILITY SCORE: 20
STANDING UP FROM CHAIR USING ARMS: A LITTLE
PATIENT BASELINE BEDBOUND: NO

## 2024-04-22 ASSESSMENT — LIFESTYLE VARIABLES
AUDIT-C TOTAL SCORE: 0
HOW OFTEN DO YOU HAVE A DRINK CONTAINING ALCOHOL: NEVER
SUBSTANCE_ABUSE_PAST_12_MONTHS: NO
SKIP TO QUESTIONS 9-10: 1
HOW MANY STANDARD DRINKS CONTAINING ALCOHOL DO YOU HAVE ON A TYPICAL DAY: PATIENT DOES NOT DRINK
AUDIT-C TOTAL SCORE: 0
PRESCIPTION_ABUSE_PAST_12_MONTHS: NO
HOW OFTEN DO YOU HAVE 6 OR MORE DRINKS ON ONE OCCASION: NEVER
AUDIT-C TOTAL SCORE: 1
SKIP TO QUESTIONS 9-10: 1

## 2024-04-22 ASSESSMENT — PAIN SCALES - GENERAL
PAINLEVEL_OUTOF10: 0 - NO PAIN
PAINLEVEL_OUTOF10: 4
PAINLEVEL_OUTOF10: 5 - MODERATE PAIN
PAINLEVEL_OUTOF10: 0 - NO PAIN
PAINLEVEL_OUTOF10: 3
PAINLEVEL_OUTOF10: 0 - NO PAIN
PAINLEVEL_OUTOF10: 8
PAINLEVEL_OUTOF10: 2
PAINLEVEL_OUTOF10: 0 - NO PAIN

## 2024-04-22 ASSESSMENT — ACTIVITIES OF DAILY LIVING (ADL)
ASSISTIVE_DEVICE: WALKER
ADEQUATE_TO_COMPLETE_ADL: YES
HEARING - RIGHT EAR: FUNCTIONAL
WALKS IN HOME: INDEPENDENT
LACK_OF_TRANSPORTATION: NO
TOILETING: INDEPENDENT
DRESSING YOURSELF: INDEPENDENT
GROOMING: INDEPENDENT
JUDGMENT_ADEQUATE_SAFELY_COMPLETE_DAILY_ACTIVITIES: YES
PATIENT'S MEMORY ADEQUATE TO SAFELY COMPLETE DAILY ACTIVITIES?: YES
ADL_ASSISTANCE: INDEPENDENT
ADL_ASSISTANCE: INDEPENDENT
BATHING: INDEPENDENT
FEEDING YOURSELF: INDEPENDENT
ADLS_ADDRESSED: YES
HEARING - LEFT EAR: FUNCTIONAL

## 2024-04-22 ASSESSMENT — PAIN DESCRIPTION - ORIENTATION
ORIENTATION: LEFT
ORIENTATION: LEFT

## 2024-04-22 ASSESSMENT — COLUMBIA-SUICIDE SEVERITY RATING SCALE - C-SSRS
1. IN THE PAST MONTH, HAVE YOU WISHED YOU WERE DEAD OR WISHED YOU COULD GO TO SLEEP AND NOT WAKE UP?: NO
2. HAVE YOU ACTUALLY HAD ANY THOUGHTS OF KILLING YOURSELF?: NO
1. IN THE PAST MONTH, HAVE YOU WISHED YOU WERE DEAD OR WISHED YOU COULD GO TO SLEEP AND NOT WAKE UP?: NO
2. HAVE YOU ACTUALLY HAD ANY THOUGHTS OF KILLING YOURSELF?: NO
6. HAVE YOU EVER DONE ANYTHING, STARTED TO DO ANYTHING, OR PREPARED TO DO ANYTHING TO END YOUR LIFE?: NO

## 2024-04-22 ASSESSMENT — PATIENT HEALTH QUESTIONNAIRE - PHQ9
SUM OF ALL RESPONSES TO PHQ9 QUESTIONS 1 & 2: 0
2. FEELING DOWN, DEPRESSED OR HOPELESS: NOT AT ALL
1. LITTLE INTEREST OR PLEASURE IN DOING THINGS: NOT AT ALL

## 2024-04-22 ASSESSMENT — PAIN DESCRIPTION - LOCATION
LOCATION: KNEE
LOCATION: LEG

## 2024-04-22 NOTE — CARE PLAN
The patient's goals for the shift include Ambulation without pain    The clinical goals for the shift include No nausea

## 2024-04-22 NOTE — PROGRESS NOTES
04/22/24 1126   Discharge Planning   Living Arrangements Spouse/significant other   Support Systems Spouse/significant other   Assistance Needed HOME with [facility] and 24/7 PD care   Type of Residence Private residence   Number of Stairs to Enter Residence 2   Number of Stairs Within Residence 0   Do you have animals or pets at home? No   Who is requesting discharge planning? Provider   Home or Post Acute Services In home services   Type of Home Care Services Home PT   Patient expects to be discharged to: HOME with [facility] and 24/7 PD care   Does the patient need discharge transport arranged? No   Financial Resource Strain   How hard is it for you to pay for the very basics like food, housing, medical care, and heating? Not hard   Housing Stability   In the last 12 months, was there a time when you were not able to pay the mortgage or rent on time? N   In the last 12 months, how many places have you lived? 1   In the last 12 months, was there a time when you did not have a steady place to sleep or slept in a shelter (including now)? N   Transportation Needs   In the past 12 months, has lack of transportation kept you from medical appointments or from getting medications? no   In the past 12 months, has lack of transportation kept you from meetings, work, or from getting things needed for daily living? No   Patient Choice   Patient / Family choosing to utilize agency / facility established prior to hospitalization Yes

## 2024-04-22 NOTE — OP NOTE
Knee Replacement Total Cement Unilat ( DePuy Attune Knee, Pineapple [place] ) (L) Operative Note     Date: 2024  OR Location: [facility]    Name: Zarina Holliday, : 1945, Age: 78 y.o., MRN: [id number], Sex: female    Diagnosis  Pre-op Diagnosis     * Unilateral primary osteoarthritis, left knee [M17.12] Post-op Diagnosis     * Unilateral primary osteoarthritis, left knee [M17.12]     Procedures  Knee Replacement Total Cement Unilat ( DePuy Attune Knee, Pineapple [place] )  96791 - [place] ARTHRP KNE CONDYLE&PLATU MEDIAL&LAT COMPARTMENTS      Surgeons      * Moses Schneider - Primary    Resident/Fellow/Other Assistant:  Surgeons and Role:  * No surgeons found with a matching role *    Procedure Summary  Anesthesia: Consult  ASA: III  Anesthesia Staff: Anesthesiologist: Jimena Salvador MD  CRNA: MEGHNA Walker-CRNA  Estimated Blood Loss: 25mL  Intra-op Medications:   Administrations occurring from 0830 to 1030 on 24:   Medication Name Total Dose   ropivacaine-epinephrine-clonidine-ketorolac 2.46-0.005- 0.0008-0.3mg/mL periarticular syringe 50 mL              Anesthesia Record               Intraprocedure I/O Totals          Intake    Tranexamic Acid 0.00 mL    The total shown is the total volume documented since Anesthesia Start was filed.    Total Intake 0 mL       Output    Est. Blood Loss 25 mL    Total Output 25 mL       Net    Net Volume -25 mL          Specimen: No specimens collected     Staff:   Circulator: Milagros Calhoun RN; Kyara Fallon RN  Relief Scrub: Corrine Olmedo  Scrub Person: Ashley Amaya         Drains and/or Catheters: * None in log *    Tourniquet Times:   * Missing tourniquet times found for documented tourniquets in lo *     Implants:  Implants       Type Name Action Serial No.      Joint Knee BONE CEMENT, SMART SET, HIGH VISCOSITY, 40GM - [id number] Implanted      Joint Knee DOME, PATELLA, MEDIALIZED, 35MM - [id number] Implanted      Joint Knee  TIBAL BASE ATTUNE FB, SZ 4 CAMELIA - [id number] Implanted      Joint Knee INSERT, ATTUNE PS FB, SZ 5, 5MM - [id number] Implanted      Joint Knee FEMORAL, ATTUNE PS, CAMELIA, NRW, SZ 5, LT - [id number] Implanted               Findings: advanced OA    Indications: Zarina Holliday is an 78 y.o. female who is having surgery for Unilateral primary osteoarthritis, left knee [M17.12].     The patient was seen in the preoperative area. The risks, benefits, complications, treatment options, non-operative alternatives, expected recovery and outcomes were discussed with the patient. The possibilities of reaction to medication, pulmonary aspiration, injury to surrounding structures, bleeding, recurrent infection, the need for additional procedures, failure to diagnose a condition, and creating a complication requiring transfusion or operation were discussed with the patient. The patient concurred with the proposed plan, giving informed consent.  The site of surgery was properly noted/marked if necessary per policy. The patient has been actively warmed in preoperative area. Preoperative antibiotics have been ordered and given within 1 hours of incision. Venous thrombosis prophylaxis have been ordered including bilateral sequential compression devices    Procedure Details: L TKA  Complications:  None; patient tolerated the procedure well.    Disposition: PACU - hemodynamically stable.  Condition: stable     PREOPERATIVE DIAGNOSIS:  left knee osteoarthritis     POSTOPERATIVE DIAGNOSIS:  left knee osteoarthritis     OPERATION/PROCEDURE:  left total knee arthroplasty     SURGEON:  Moses Schneider MD     ASSISTANT(S):  Eliseo PGY4     ANESTHESIA:  spinal     LOCATION:  [facility]     ESTIMATED BLOOD LOSS AND INTRAVENOUS FLUIDS:  Please see Anesthesia record.     COMPONENTS USED:  DePuy Attune knee system  1. 5N femur  2. 4 tibia  3. 35 patella  4. 5mm insert     BRIEF CLINICAL NOTE:  The patient is a 79 yo female with advanced osteoarthritis  of  their left knee.  They failed conservative treatment and wished to  proceed with total knee arthroplasty which is indicated at this time.  We discussed the risks, benefits, and alternatives of surgery  including but not limited to infection, damage to vessel or nerve,  bleeding, soft tissue pain, DVT, PE, problems with anesthesia, lack  of range of motion, continued soft tissue pain, need for further  surgery, etc.  Consent was obtained.  They were taken to the operating  room in order to undergo the procedure.    PRE-OP ROM:      OPERATIVE REPORT:  The patient was transferred to the operating room table.  Time-out  was performed confirming patient name, medical record number,  surgical site, and adequate and appropriate imaging.  The patient  received appropriate IV antibiotics as well as tranexamic acid.  Once we were prepped and draped,midline skin incision was performed.  Hemostasis was obtained using electrocautery.  Underlying extensor mechanism was easily identified and entered using a standard medial parapatellar arthrotomy performedsharply.  The infrapatellar and suprapatellar fat pads were debrided.Initial medial release was performed.  The patella was subluxed laterally.  Distal femur was entered using a step drill.  Distal  femoral cut was performed, and the femur was sized and prepared.  The tibia was subluxed forward using a double-prong PCL retractor.  The proximal tibia was cut in neutral mechanical axis using an  extramedullary tibial guide.  We then used the lamina  to clear out the posterior osteophytes and clear the meniscal remnants. We then sized the tibia and prepared it. We cut the patella freehand using a caliper to restore the native patellar height. We then trialed with multiple polyethylene inserts.  Once I was happy with the position of components and stability of the knee, all trial components were removed.  The  cut bony surfaces were thoroughly irrigated and dried.   We then cemented into place the real components.  The knee was held in extension until all cement was hardened.  All extraneous cement was  removed.  We then closed the extensor mechanism over a drain using interrupted #2 Ethibond as well as interrupted 0 Vicryl.  The subcu was closed with interrupted 2-0 Vicryl.  The skin was closed with  running 3-0 Biosyn followed by Dermabond and Steri-Strips.  Dry sterile dressing was placed.  The patient was transferred back to the hospital bed without incident or complication.  She will be  weightbearing as tolerated.  They will be on ASA and SCDs for DVT  prophylaxis.     Additional Details: WBAT, ASA    Attending Attestation: I was present and scrubbed for the key portions of the procedure.

## 2024-04-22 NOTE — DISCHARGE INSTRUCTIONS
MD Criss Sims MPAS, SHIRAZ, ATC  Adult Reconstruction and Joint Replacement Surgery  Phone: [phone]     Fax: [phone]        DISCHARGE INSTRUCTIONS      PLEASE READ CAREFULLY BEFORE CONTACTING YOUR PROVIDER.    WE WORK COLLABORATIVELY AS A TEAM. CALLING MULTIPLE STAFF MEMBERS REGARDING THE SAME ISSUE WILL DELAY YOUR CARE.    [employer] IS THE PREFERRED COMMUNICATION FOR ALL TEAM MEMBERS.    POSTOPERATIVE INSTRUCTIONS: TOTAL HIP & TOTAL KNEE ARTHROPLASTY    JOINT CARE TEAM  Please use the information below to contact your care team following surgery.  If you are leaving a message or using the [employer] Chart portal, please include your full name, date of birth and date of surgery so that we can correctly identify you.  Your call will be returned within 1-2 business days, please do not leave multiple messages with other staff regarding a single issue while you are awaiting a return call.     Who to call Contact Information Matters needing handled   Criss Rod PA-C  Physician Assistant [employer]   Medical questions/concerns       Fabi Bain-    [email or web address]           [phone]  opt. 2    [phone] fax  [phone]         Scheduling office Visits  Medical questions/concerns  Leave of Absence or other paperwork  Any concerns more than 6 weeks from surgery - an appointment will need to be made   Mary Beth Vinson MBA, BSN, RN-BC  Ortho Nurse Navigator [facility]    Alycia Davidson RN, BSN  Ortho Nurse Navigator [facility]        __________________________________    Yfn Allan RN, BSN  Ortho Coordinator [facility] MITCHELLN-BC  Ortho Nurse Navigator [facility]       [phone] Please call staff at the institution in which you had surgery for prescription refills        Prescription Refills  Nursing, medical question related questions or concerns within 6 weeks of surgery   Orders  for Outpatient Physical Therapy             MEDICATION REFILLS - MyChart or Nurse Navigator (Above) at the institution in which you had surgery. [facility] or [facility].    -You will NOT receive a call indicating that your prescription has been filled.  Please contact your pharmacy with any questions.    Medication refills will be filled Monday-Friday 7am to 1pm ONLY. Please call the nurse navigator office or send a [employer] message for a refill request.  Any requests received outside of this timeframe will be handled on the next business day.  Please do not call multiple times or call other members of the care team for medication needs, this will cause the refill to take longer.    Per State and Institutional policy, pain medications can only be refilled every 7 days for up to six weeks following surgery.    My Chart Portal: If you are using the My Chart portal and are requesting a medication refill, please list what type of surgery you had and left or right side, medication that needs refilled, and pharmacy you would like your medication sent.     WEIGHT BEARING- weight bearing as tolerated to operative extremity     ACTIVITY-As Tolerated    DRIVING & TRAVEL AFTER SURGERY   Patients should anticipate waiting at least 4-6 weeks before traveling long distances after surgery.  You will need to stop to walk around ever 1 hour during your travel to help with blood clot prevention.    Patients may not drive until cleared by the joint nurse or the office and you are off of all narcotics.    DENTAL PROCEDURES & CLEANINGS  You must wait a minimum of 3 months for elective dental appointments after a total joint replacement, including routine cleanings or dental work including bridges, crowns, extractions, etc.. Unless, it is an emergency. You will need a prophylactic antibiotic lifelong prior to any dental visit, cleaning or procedure. Your surgeon's office or your dentist may provide a prescription antibiotic. Antibiotics are  a lifelong need before dental appointments.      You do not need antibiotics for endoscopic procedures such as colonoscopy or EGD, dermatologic biopsies or eye surgeries.    WOUND CARE  If you experience continued drainage or bleeding, you may cover with abdominal/Maxi pads (purchase at local drug store).  Knee replacements should wrap with an ace wrap.  You may shower with waterproof dressing on. Your surgical bandage will be removed by your home therapist 1 week after surgery. If you have staples intact, home care will remove in 2 weeks. If you have sutures intact, you will need to return to the office in 2 weeks for suture removal. Once the dressing is removed by home care, you may continue to shower. Let soap and water wash over the wound. DO NOT SCRUB.  Steri-strips under the bandage will remain in place until they fall off on their own.  If they are loose, you may gently remove.  If they have not fallen off in two weeks, gently peel them off. Do not remove if pulling causes resistance against the incision.  You will see suture tails sticking out of the ends of the incision.  DO NOT CUT THEM.  They will fall off when the sutures dissolve.  If they are bothersome, cover with a band aid.  Do not soak in a bath tub, hot tub, pool or lake until you are 8 weeks out from surgery.  Do not apply lotions, creams or ointments until you are 6 weeks out from surgery,    PAIN, SWELLING, BRUISING & CLICKING  Pain and swelling are a natural part of your recovery which is considered normal for up to a year after surgery.  Symptoms may be treated with movement, ice, compression stockings, elevating your leg, and by following the pain medication regimen as prescribed.  Bruising is normal for several weeks after surgery. You may also have leg swelling and pain in your shin.  You may ice areas that are tender to help with discomfort.  You are required to wear the provided compression stockings, every day, for 4 weeks following  surgery.  Remove the stockings at night and place them back on in the morning.  Pain and swelling may temporarily increase with an increase in activity or exercise.  Use ice after activity.  Audible clicking with movement or exercises is considered normal following joint replacement.  If this persists at 6 months or 1 year, please notify your surgeon.  You may also feel decreased sensation or numbness near the incision site.  This is normal and sensation may or may not return.    PERSONAL HYGIENE  You may shower upon discharging from the hospital.  Soap and water is permitted to run over the surgical dressing, steri-strips and incision.  Do not scrub directly over these items.  DO NOT soak your incision in a bath, hot tub, pool or pond/lake for a minimum of 8 weeks following your surgery.  DO NOT use lotions, creams, ointments on your wound for a minimum of 6 weeks following your surgery. At that time you may use vitamin E to assist with softening of your incision.      RESTARTING HOME ROUTINE - DIET & MEDICATIONS  Post-operative constipation can result due to a combination of inactivity, anesthesia and pain medication. To help prevent this, you should increase your water and fiber intake. Physical activity such as walking will also help stimulate the bowels.   You may resume your normal diet when you discharge home.    To avoid constipation, choose foods that help promote good bowel habits, such as foods high in fiber.  You may restart your home medications the following day after your surgery UNLESS you have been given alternate instructions.  Follow the instructions given to you on your hospital discharge instructions for more information regarding your home medications.  IF YOU EXPERIENCE NAUSEA OR DIARRHEA, FOLLOW THE B.R.A.T. DIET UNTIL SYMPTOMS RESOLVE.  If you are experiencing vomiting that lasts more than 24 hours, please contact your joint nurse.      IN-HOME PHYSICAL THERAPY & OUTPATIENT PHYSICAL  THERAPY  In-home physical therapy will start 1-2 days after you get home from the hospital.    The home care agency will call within the first 24-48 hours to set up their first visit.  Please do not call your care team to inquire during this timeframe.  Continue the exercises you were given in the hospital until you have been seen by in-home therapy.  Make sure to provide a phone number with the ability for the home care staff to leave a message if you do not answer your phone.    Outpatient physical therapy following knee replacement surgery should begin 2-3 weeks after surgery.  You will be given physical therapy order prior to discharge from the hospital. You should call to schedule this appointment ASAP if not already scheduled before surgery.  Waiting until you are ready for outpatient physical therapy will cause a delay in your care.  You may choose any outpatient physical therapy location.      EMERGENCIES - WHEN TO CONTACT THE SURGEON'S OFFICE IMMEDIATELY  Fever >101 with chills that has been present for at least 48 hours.   Excessive bleeding from incision that will not slow down. A small amount of drainage is normal and expected.  Once pressure is applied and the area is covered, do not continue to check the area regularly.  This will remove pressure and bleeding will continue.  Leave in place for 4-6 hours.  Signs of infection of incision-excessive drainage that is soaking through your dressing (especially if it is pus-like), redness that is spreading out from the edges of your incision, or increased warmth around the area.  Excruciating pain for which the pain medication, taken as instructed, is not helping.  Severe calf pain.  Go directly to the emergency room or call 911, if you are experiencing chest pain or difficulty breathing.    ICE & COLD THERAPY INSTRUCTIONS    To assist with pain control and post-op swelling, you should be using ice regularly throughout recovery, especially for the first 6  weeks, regardless of the cold therapy method you use.      Always make sure there is a layer of protection between the cold pad and your skin.    If you are using ICE PACKS or GEL PACKS, you will need to alternate 20 minutes on, 20 minutes off twice per hour.    If you are using an ICE MACHINE, please follow the provided ice machine instructions.  These devices differ from ice or ice packs whereas the mechanism circulates water through tubing and a pad to provide longer periods of cold therapy to the desired site.  You can use your cold devices around the clock for optimal comfort.  We recommend using cold therapy after working with therapy or completing exercises on your own.  There is no set schedule in which you must follow while using cold therapy.  Below are a few points to remember when using a cold therapy device:    You do not need to need to use the 20 on, 20 off method.  Detach the pad from the cooler and ambulate at least once every hour.  You can check your skin under the pad at this time.  You may wear the cold therapy device during periods of sleep including overnight.  If you wake up during the night, you can check the skin at this time.  You do not need to wake up specifically to perform skin checks.  Empty the cooler and pad when device is not in use.  Follow 's instructions for cleaning your cold therapy device.    DISCHARGE MEDICATIONS - Please reference the sample schedule on the reverse side for instructions on how to best schedule medications.    PAIN MEDICATION    ___X_ Tramadol / Oxycodone  Tramadol and Oxycodone have been prescribed for post-operative pain control.    These medications will only be refilled ONCE every 7 days for a period of up to 6 weeks following surgery.  After 6 weeks, you will transition to acetaminophen and over -the- counter anti-inflammatories such as Ibuprofen, Advil or Aleve in conjunction with ICE/COLD THERAPY.   Side effects may be constipation and  nausea, vomiting, sleepiness, dizziness, lightheadedness, headache, blurred vision, dry mouth sweating, itching (if you have itching, over-the -counter Benadryl can be used as needed).  You may NOT operate a motor vehicle while taking these medications or have been cleared by your care team.     ___X_ Acetaminophen (Tylenol)  Acetaminophen has been prescribed as an adjunct for pain control. Take two 500 mg tablets every 6 hours for 4 weeks. You will not receive a refill on this medication.  Do not exceed 4000mg of acetaminophen within a 24 hour period.  Side effects may include nausea, heartburn, drowsiness, and headache.    ___X_ Meloxicam (Mobic)-Meloxicam has been prescribed as an adjunct anti-inflammatory to assist in pain control.    Take one 15mg tablet once daily for 4 weeks.  You will not receive refills on this medication.   Side effects may include nausea.  May not be prescribed if you are on a more potent blood thinner than aspirin or have chronic kidney disease.    BLOOD THINNER    ___X_ Blood Thinner   A blood thinner has been prescribed to prevent blood clots in your leg or lungs. Take as prescribed on the bottle for 4 weeks. You will not receive a refill on this medication.    ANTI NAUSEA    ___X_ Proton Pump Inhibitor (PPI)-Stomach Acid Reduction Medication  If you are already on a PPI, you will continue your regular medication. If you are not, you will be prescribed Pantoprazole to help with nausea and protect your stomach while taking pain medication.  You will not receive a refill on this medication.    STOOL SOFTENERS    ___X_ Colace (Docusate Sodium) & Miralax (polyethylene glycol)  Take both medications to help with constipation while using the Oxycodone and Tramadol for pain control.  You will not receive a refill on this medication.    Continued Constipation  If you continue to be constipated despite daily use of Miralax and Colace, you try an over-the-counter Dulcolax Suppository and use per  instructions on the package.       SPECIAL INSTRUCTIONS   None    You will not receive refills on the following medications.   Acetaminophen (Tylenol  Meloxicam  Miralax  Colace  Proton Pump Inhibitor (PPI)  Blood Thinner    Pain Medication Refills - Ortho Nurse Navigator or [facility]- Monday through Friday 7am-1pm    FOLLOW-UP- You should have an appointment with either Dr. Schneider or DEREK Menard in 6 weeks.         SAMPLE              The times below are an example of how to organize medications to optimize pain control  Your actual medication schedule may vary based on your last dose taken IN THE HOSPITAL      Time 3:00 am 6:00 am 9:00 am 12:00 pm 3:00 pm 6:00 pm 9:00 pm 12:00 am   Medications Tramadol Tylenol  Oxycodone  Miralax   Blood Thinner  Colace  Pantoprazole or other PPI  Tramadol  Meloxicam Tylenol  Oxycodone Tramadol Tylenol  Oxycodone  Miralax Blood Thinner  Colace  Tramadol   Tylenol  Oxycodone            You may begin to wean off the pain medication as your pain remains controlled with increased activity.  The schedules provided are meant to serve as an example.  You may wean off based on your pain control.  Please note that pain medications are not filled beyond 6 weeks after surgery.              The times below are an example of how to WEAN OFF medications WHILE CONTINUING TO OPTIMIZE PAIN CONTROL.  Your actual medication schedule may vary based on your last dose taken.    Time 12:00am 4:00am 8:00am 12:00pm 4:00pm 8:00pm   Med Tramadol Oxycodone   Tramadol Oxycodone Tramadol Oxycodone     Time 12:00am 6:00am 12:00pm 6:00pm   Med Tramadol Oxycodone   Tramadol Oxycodone     Time 12:00am 8:00am 4:00pm   Med Tramadol Oxycodone   Tramadol     Time 12:00am 12:00pm   Med Tramadol Tramadol         TOTAL KNEE REPLACEMENT PATIENTS SHOULD TRANSITION TO OUTPATIENT PHYSICAL THERAPY NO MORE THAN 3 WEEKS FOLLOWING SURGERY.  PLEASE SEE THE LIST OF  FACILITIES BELOW.  CALL TO SCHEDULE YOUR FIRST  APPOINTMENT BEFORE YOU HAVE YOUR SURGERY.

## 2024-04-22 NOTE — ANESTHESIA PROCEDURE NOTES
Spinal Block    Patient location during procedure: OR  Start time: 4/22/2024 8:30 AM  End time: 4/22/2024 8:33 AM  Reason for block: primary anesthetic and at surgeon's request  Staffing  Performed: CRNA   Authorized by: Jimena Salvador MD    Performed by: MEGHNA Walker-CRNA    Preanesthetic Checklist  Completed: patient identified, IV checked, site marked, risks and benefits discussed, surgical consent, monitors and equipment checked, pre-op evaluation, timeout performed and sterile techniques followed  Block Timeout  RN/Licensed healthcare professional reads aloud to the Anesthesia provider and entire team: Patient identity, procedure with side and site, patient position, and as applicable the availability of implants/special equipment/special requirements.  Patient on coagulant treatment: no  Timeout performed at: 4/22/2024 8:29 AM  Spinal Block  Patient position: sitting  Prep: Betadine  Sterility prep: cap, drape, gloves, hand hygiene and mask  Sedation level: light sedation  Patient monitoring: blood pressure, continuous pulse oximetry and heart rate  Approach: midline  Vertebral space: L3-4  Injection technique: single-shot  Needle  Needle type: pencil-point   Needle gauge: 25 G  Needle length: 3.5 in  Free flowing CSF: yes    Assessment  Sensory level: T10  Block outcome: patient comfortable  Procedure assessment: patient sedated but conversant throughout procedure and patient tolerated procedure well with no immediate complications  Additional Notes  Introducer needle used   Injected 9 mg of spinal bupivacaine  Spinal kit lot # [id number]  Exp date 1/31/26

## 2024-04-22 NOTE — ANESTHESIA PROCEDURE NOTES
Peripheral Block    Patient location during procedure: pre-op  Start time: 4/22/2024 8:06 AM  End time: 4/22/2024 8:07 AM  Reason for block: at surgeon's request and post-op pain management  Staffing  Performed: attending   Authorized by: Jimena Salvador MD    Performed by: Jimena Salvador MD  Preanesthetic Checklist  Completed: patient identified, IV checked, site marked, risks and benefits discussed, surgical consent, monitors and equipment checked, pre-op evaluation and timeout performed   Timeout performed at: 4/22/2024 8:02 AM  Peripheral Block  Patient position: laying flat  Prep: ChloraPrep  Patient monitoring: heart rate, continuous pulse ox and cardiac monitor  Block type: adductor canal  Laterality: left  Injection technique: single-shot  Guidance: ultrasound guided  Local infiltration: ropivacaine  Infiltration strength: 0.5 %  Dose: 20 mL  Needle  Needle gauge: 21 G  Needle length: 10 cm  Needle localization: ultrasound guidance     image stored in chart  Assessment  Injection assessment: incremental injection, negative aspiration for heme, local visualized surrounding nerve on ultrasound and no paresthesia on injection  Paresthesia pain: none  Heart rate change: no  Slow fractionated injection: yes

## 2024-04-22 NOTE — DISCHARGE SUMMARY
MD Criss Sims, MPAS, PASofiaC, ATC  Adult Reconstruction and Joint Replacement Surgery  Phone: [phone]     Fax:[phone]             Discharge Summary    Discharge Diagnosis  Left Total Knee Arthroplasty     Issues Requiring Follow-Up  Home care services to start within 48 hours. Outpatient PT to start 2 weeks  S/P total Joint for Knees only. Hips optional.    Test Results Pending At Discharge  Pending Labs       No current pending labs.          Hospital Course  Patient underwent Left Total Knee Arthroplasty  on 4/22/24 without complications. The patient was then taken to the PACU in stable condition. Patient was then transferred to the or.  Pain was appropriately controlled. Diet was advanced as tolerated. Patient progressed adequately through their recovery during hospital stay including PT/ OT and were recommended for discharge. Patient was then discharged on  to home in stable condition. Patient had uneventful hospital course. Patient was instructed on the use of pain medications as needed for pain. The signs and symptoms of infection were discussed and the patient was given our number to call should they have any questions or concerns following discharge.    Based on my clinical judgment, the patient was provided with a 7-day prescription for opioid medication at 30 MED, indicated for treatment of acute pain in the setting of recent Total Joint Arthroplasty. OARRS report was run and has demonstrated an appropriate time course.  The patient has been provided with counseling pertaining to safe use of opioid medication.    Patient may use operative extremity WBAT with use of walker for assistance with ambulation .  Mepilex dressing to be removed POD # 7 by home care and incision left open to air  OAC for DVT prophylaxis started on POD #1 and to be taken for 30 days    Patient is to follow-up in 6 weeks at scheduled post-op visit.     Face-to Face after surgery progress  note  Pertinent Physical Exam At Time of Discharge  Review of Systems   Constitutional: Negative.  Negative for activity change, chills, fatigue and fever.   HENT: Negative.     Eyes: Negative.    Respiratory: Negative.  Negative for cough, chest tightness, shortness of breath and wheezing.    Cardiovascular: Negative.  Negative for palpitations.   Gastrointestinal:  Negative for abdominal pain, blood in stool, nausea and vomiting.   Endocrine: Negative.  Negative for cold intolerance and polyuria.   Genitourinary: Negative.  Negative for difficulty urinating, dysuria, frequency, hematuria and urgency.   Musculoskeletal:  Positive for gait problem and joint swelling. Negative for arthralgias and back pain.   Skin: Negative.  Negative for color change, pallor, rash and wound.   Allergic/Immunologic: Negative.  Negative for environmental allergies.   Neurological:  Negative for dizziness, weakness and light-headedness.   Hematological: Negative.    Psychiatric/Behavioral:  Negative for agitation, confusion and suicidal ideas. The patient is not nervous/anxious.    All other systems reviewed and are negative    Physical Exam  side: left side  Vitals and nursing note reviewed. VSS, Afebrile  Constitutional:       Appearance: Normal appearance, awake and alert.  HENT:      Head: Normocephalic and atraumatic.       Pupils: Pupils are equal, round, and reactive to light.   Cardiovascular:      Rate and Rhythm: Normal rate and regular rhythm.   Pulmonary:      Effort: Pulmonary effort is normal.     Abdominal:         Palpations: Abdomen is soft.   Musculoskeletal:   Sensation intact bilaterally, sural/saph/sp/tibal n.  Motor intact flexion/extension/DF/PF/EHL/FHL bilaterally. Palpable symmetric DP/PT pulse bilaterally. Spinal wearing off.    Skin:      Bulky Dressing intact to the surgical extremity. No signs of gross bloody or purulent drainage.     General: Skin is warm and dry.      Capillary Refill: Capillary refill  takes less than 2 seconds.   Neurological:      General: No focal deficit present.      Mental Status: She is alert and oriented to person, place, and time. Mental status is at baseline.   Psychiatric:         Mood and Affect: Mood normal.        Home Medications  Scheduled medications    Current Facility-Administered Medications:     acetaminophen (Tylenol) tablet 975 mg, 975 mg, oral, Once, Criss Rod PA-C    ceFAZolin in dextrose (iso-os) (Ancef) IVPB 2 g, 2 g, intravenous, Once, Criss Rod PA-C    fentaNYL PF (Sublimaze) injection 50 mcg, 50 mcg, intravenous, Once, Jimena Salvador MD    lactated Ringer's infusion, 75 mL/hr, intravenous, Continuous, Criss Rod PA-C    meloxicam (Mobic) tablet 7.5 mg, 7.5 mg, oral, Once, Criss Rod PA-C    midazolam (Versed) injection 2 mg, 2 mg, intravenous, Once, Jimena Salvador MD    oxyCODONE ER (OxyCONTIN) 12 hr tablet 10 mg, 10 mg, oral, Once, Criss Rod PA-C    povidone-iodine 5 % kit kit, , Topical, Once, Criss Rod PA-C    pregabalin (Lyrica) capsule 75 mg, 75 mg, oral, Once, Criss Rod PA-C    scopolamine (Transderm-Scop) patch 1 patch, 1 patch, transdermal, q72h, Criss Rod PA-C     PRN medications      Discharge medications     Your medication list        START taking these medications        Instructions Last Dose Given Next Dose Due   docusate sodium 100 mg capsule  Commonly known as: Colace      Take 1 capsule (100 mg) by mouth 2 times a day.       meloxicam 15 mg tablet  Commonly known as: Mobic      Take 1 tablet (15 mg) by mouth once daily.       oxyCODONE 5 mg immediate release tablet  Commonly known as: Roxicodone      Take 1 tablet (5 mg) by mouth every 6 hours if needed for severe pain (7 - 10) for up to 7 days.       pantoprazole 40 mg EC tablet  Commonly known as: ProtoNix      Take 1 tablet (40 mg) by mouth once daily. Do not crush, chew, or split.       polyethylene glycol 17 gram packet  Commonly known as:  Glycolax, Miralax      Take 17 g by mouth once daily. Mix 1 cap (17g) into 8 ounces of fluid.       traMADol 50 mg tablet  Commonly known as: Ultram      Take 1 tablet (50 mg) by mouth every 6 hours if needed for severe pain (7 - 10) for up to 7 days.              CHANGE how you take these medications        Instructions Last Dose Given Next Dose Due   aspirin 81 mg EC tablet  What changed: when to take this      Take 1 tablet (81 mg) by mouth 2 times a day.              CONTINUE taking these medications        Instructions Last Dose Given Next Dose Due   acetaminophen 500 mg tablet  Commonly known as: Tylenol Extra Strength      Take 2 tablets (1,000 mg) by mouth every 6 hours if needed for mild pain (1 - 3).       azelastine 137 mcg (0.1 %) nasal spray  Commonly known as: Astelin      USE 2 SPRAYS IN EACH NOSTRIL IN THE MORNING AND 2 SPRAYS BEFORE BEDTIME       calcium citrate-vitamin D3 250 mg-5 mcg (200 unit) tablet           cholecalciferol 25 MCG (1000 UT) capsule  Commonly known as: Vitamin D-3           Claritin 10 mg tablet  Generic drug: loratadine           diclofenac sodium 1 % kit           fish oil concentrate 120-180 mg capsule  Commonly known as: Omega-3           fluticasone 50 mcg/actuation nasal spray  Commonly known as: Flonase      Administer 2 sprays into each nostril once daily.       methenamine hippurate 1 gram tablet  Commonly known as: Hiprex      TAKE 1 TABLET TWICE A DAY       mirabegron 50 mg tablet extended release 24 hr 24 hr tablet  Commonly known as: Myrbetriq      Take 1 tablet (50 mg) by mouth once daily.       VITAMIN B COMPLEX ORAL                  ASK your doctor about these medications        Instructions Last Dose Given Next Dose Due   betamethasone (augmented) 0.05 % cream  Commonly known as: Diprolene AF      Apply topically 2 times a day. Apply sparingly to affected areas                 Where to Get Your Medications        These medications were sent to [facility]  [address] [facility] [address] [address]      Hours: 8 AM to 6 PM Mon-Fri, 9 AM to 1 PM Saturday Phone: [phone]   acetaminophen 500 mg tablet  aspirin 81 mg EC tablet  docusate sodium 100 mg capsule  meloxicam 15 mg tablet  oxyCODONE 5 mg immediate release tablet  pantoprazole 40 mg EC tablet  polyethylene glycol 17 gram packet  traMADol 50 mg tablet         You have not been prescribed any medications.     Outpatient Follow-Up  Patient to follow-up with /Criss Rod PA-C.  Thank you for trusting us with your care. You should be scheduled for a follow-up post-surgical visit in 6 weeks.    Special Instructions  None    Please read discharge instructions provided by your surgeon before calling with questions as this will delay care.    Medication refills-Oxycodone and Tramadol will be refilled every 7 days per state law. Request refills through [facility] at the institution in which you had surgery or MyChart. All medication requests may take up to 72 hours to refill and refills after Friday 1pm will be refilled on the next business day.      No future appointments.    SYLVIA Patton, PA-C ATC  Orthopedic Physician Assisant  Adult Reconstruction and Total Joint Replacement  General Orthopedics  Department of Orthopaedic Surgery  [place] [address]  [employer] messaging preferred

## 2024-04-22 NOTE — ANESTHESIA POSTPROCEDURE EVALUATION
Patient: Zarina Holliday    Procedure Summary       Date: 04/22/24 Room / Location: [facility] / [facility]    Anesthesia Start: 0826 Anesthesia Stop: 1047    Procedure: Knee Replacement Total Cement Unilat ( DePuy Attune Knee, Pineapple Carlos ) (Left: Knee) Diagnosis:       Unilateral primary osteoarthritis, left knee      (Unilateral primary osteoarthritis, left knee [M17.12])    Surgeons: Moses Schneider MD Responsible Provider: Jimena Salvador MD    Anesthesia Type: spinal, regional ASA Status: 3            Anesthesia Type: spinal, regional    Vitals Value Taken Time   /57 04/22/24 1105   Temp 36.3 °C (97.3 °F) 04/22/24 1105   Pulse 67 04/22/24 1105   Resp 14 04/22/24 1105   SpO2 98 % 04/22/24 1105       Anesthesia Post Evaluation    Patient location during evaluation: bedside  Patient participation: complete - patient participated  Level of consciousness: awake and alert  Pain management: adequate  Multimodal analgesia pain management approach  Airway patency: patent  Cardiovascular status: acceptable  Respiratory status: acceptable  Hydration status: acceptable  Postoperative Nausea and Vomiting: none        No notable events documented.

## 2024-04-22 NOTE — CONSULTS
Inpatient consult to Medicine  Consult performed by: Sharmila Parry PA-C  Consult ordered by: Moses Schneider MD  Reason for consult: questionable TIA in , OAB, leg edema        History and Physical    Zarina Holliday is a 78 y.o. female on day 0 of admission presenting with Unilateral primary osteoarthritis, left knee.  Room: 221    Subjective   78 year old female with pmhx questionable TIA in  (maintained on 81mg aspirin, intolerant to statin), OAB, R leg edema presents s/p L TKR.  She is hemodynamically stable although last BP measurement was hypertensive.  We will continue to monitor.       PMHx:  Past Medical History:   Diagnosis Date    GERD (gastroesophageal reflux disease)     History of recurrent UTIs     Hyperlipidemia     Leg edema     OAB (overactive bladder)     Osteopenia after menopause     PONV (postoperative nausea and vomiting)     TIA (transient ischemic attack) 10/2022    Questionable: while on vacation in late 2022, she had new onset R eye blurriness and fatigue that was persistent on and off for 1-2 wks; Brain scans were negative. On Aspirin and Statin.        Past Surgical Hx:  Past Surgical History:  No date: CATARACT EXTRACTION  No date:  SECTION, CLASSIC  No date: COLONOSCOPY  No date: DENTAL SURGERY  No date: FACIAL COSMETIC SURGERY      Comment:  face lift  2022: MR HEAD ANGIO WO IV CONTRAST      Comment:  MR HEAD ANGIO WO IV CONTRAST 2022 [facility]                [facility]  2022: MR NECK ANGIO WO IV CONTRAST      Comment:  MR NECK ANGIO WO IV CONTRAST 2022 [facility]                [facility]  2023: SALIVARY GLAND SURGERY      Comment:  Left lower lip excision:Left lower lip, excision: -                Mucocele and minor salivary glands exhibiting chronic                sialadenitis.  No date: TONSILLECTOMY  No date: TOTAL ABDOMINAL HYSTERECTOMY W/ BILATERAL SALPINGOOPHORECTOMY  2024: TOTAL KNEE ARTHROPLASTY;  Left    Social history:   reports that she has never smoked. She has never used smokeless tobacco. She reports that she does not currently use alcohol. She reports that she does not use drugs.    Family history: COPD, ETOH abuse, cancer    Allergies   Allergen Reactions    Adhesive Tape-Silicones Other     Blisters, chin itching       Home Medication:   Medication Details   acetaminophen (Tylenol) 500 mg tablet Take 2 tablets (1,000 mg) by mouth every 6 hours if needed for mild pain (1 - 3).   aspirin 81 mg EC tablet Take 1 tablet (81 mg) by mouth once daily.   azelastine (Astelin) 137 mcg (0.1 %) nasal spray USE 2 SPRAYS IN EACH NOSTRIL IN THE MORNING AND 2 SPRAYS BEFORE BEDTIME    Patient taking differently: Administer 2 sprays into each nostril 2 times a day as needed.   betamethasone, augmented, (Diprolene AF) 0.05 % cream Apply topically 2 times a day. Apply sparingly to affected areas    Patient not taking: Reported on 4/8/2024   calcium citrate-vitamin D3 250 mg-5 mcg (200 unit) tablet Take 1 tablet by mouth once daily.   cholecalciferol (Vitamin D-3) 25 MCG (1000 UT) capsule Take 1 capsule (25 mcg) by mouth once daily.   diclofenac sodium 1 % kit Apply 4 g topically 4 times a day as needed. APPLY TO AFFECTED AREA OF LOWER EXTREMITIES. DO NOT APPLY MORE THAN 16 GRAMS DAILY TO ANY ONE AFFECTED JOINT   fish oil concentrate (Omega-3) 120-180 mg capsule Take 1 capsule (1 g) by mouth once daily.   fluticasone (Flonase) 50 mcg/actuation nasal spray Administer 2 sprays into each nostril once daily.    Patient taking differently: Administer 2 sprays into each nostril once daily as needed.   loratadine (Claritin) 10 mg tablet Take 1 tablet (10 mg) by mouth once daily as needed.   methenamine hippurate (Hiprex) 1 gram tablet TAKE 1 TABLET TWICE A DAY   mirabegron (Myrbetriq) 50 mg tablet extended release 24 hr 24 hr tablet Take 1 tablet (50 mg) by mouth once daily.   VITAMIN B COMPLEX ORAL Take 1 tablet by mouth once  "daily.       Last Recorded Vitals  Blood pressure 154/69, pulse 71, temperature 36.3 °C (97.3 °F), temperature source Temporal, resp. rate 16, height 1.651 m (5' 5\"), weight 65.2 kg (143 lb 11.8 oz), SpO2 97%.    Physical Exam  General: Patient in no acute distress, lying in bed  HEENT: EOMI, moist mucous membranes, anicteric  Neck: No JVD, no cervical lymphadenopathy  Heart: RRR, no murmurs, rubs, or gallops  Lungs: Clear to auscultation bilaterally, no wheezing, rhonchi, or rales  Abdomen: Soft, nontender, nondistended, no organomegaly  Extremities: No peripheral edema, able to pump bilateral ankles, Hemovac drain draining blood from the left knee skin: No rash or cyanosis  Neurological: AOx3, non focal  Psychiatric: Cooperative and pleasant    Recent Results:   Latest Reference Range & Units 04/08/24 14:19   GLUCOSE 74 - 99 mg/dL 89   SODIUM 136 - 145 mmol/L 137   POTASSIUM 3.5 - 5.3 mmol/L 4.4   CHLORIDE 98 - 107 mmol/L 101   Bicarbonate 21 - 32 mmol/L 27   Anion Gap 10 - 20 mmol/L 13   Blood Urea Nitrogen 6 - 23 mg/dL 13   Creatinine 0.50 - 1.05 mg/dL 0.98   EGFR >60 mL/min/1.73m*2 59 (L)   Calcium 8.6 - 10.3 mg/dL 9.2   Hemoglobin A1C See below % 5.4   Estimated Average Glucose Not Established mg/dL 108   LEUKOCYTES (10*3/UL) IN BLOOD BY AUTOMATED COUNT, [place] 4.4 - 11.3 x10*3/uL 6.8   nRBC  COMMENT ONLY   ERYTHROCYTES (10*6/UL) IN BLOOD BY AUTOMATED COUNT, [place] 4.00 - 5.20 x10*6/uL 4.12   HEMOGLOBIN 12.0 - 16.0 g/dL 12.3   HEMATOCRIT 36.0 - 46.0 % 38.9   MCV 80 - 100 fL 94   MCH 26.0 - 34.0 pg 29.9   MCHC 32.0 - 36.0 g/dL 31.6 (L)   RED CELL DISTRIBUTION WIDTH 11.5 - 14.5 % 11.7   PLATELETS (10*3/UL) IN BLOOD AUTOMATED COUNT, [place] 150 - 450 x10*3/uL 271     Assessment/Plan   1) status post left total knee replacement   Ortho management   PT eval/treat  Incentive spirometry  BM management  Supportive Care  Antibiotic prophylaxis per ortho  DVT prophylaxis per ortho  2) DVT prophylaxis   Per Ortho " management  81 mg ASA BID  SCDs  Ambulate as tolerated  3) Questionable history of TIA in 2022.  Maintained on aspirin (tolerant to statin)   Clinically stable.  Continue aspirin   4) OAB   Continue Myrbetriq       I spent 35 minutes in the professional and overall care of this patient.      Sharmila Parry PA-C

## 2024-04-22 NOTE — ANESTHESIA PREPROCEDURE EVALUATION
Patient: Zarina Holliday    Procedure Information       Anesthesia Start Date/Time: 04/22/24 0830    Procedure: Knee Replacement Total Cement Unilat ( DePuy Attune Knee, Pineapple Carlos ) (Left: Knee) - DePuy Attune Knee, Pineapple Carlos    Location: [facility] / [facility]    Surgeons: Moses Schneider MD            Relevant Problems   Anesthesia (within normal limits)      Cardiac (within normal limits)      Pulmonary (within normal limits)      Neuro   (+) Peripheral neuropathy      GI (within normal limits)      /Renal   (+) UTI (urinary tract infection)      Liver (within normal limits)      Endocrine   (+) Adult myxedema      Hematology   (+) Anemia      Musculoskeletal   (+) Osteoarthritis   (+) Primary osteoarthritis of both knees   (+) Unilateral primary osteoarthritis, left knee      HEENT (within normal limits)      ID   (+) UTI (urinary tract infection)      Skin (within normal limits)      GYN (within normal limits)     Past Medical History:   Diagnosis Date    Acute upper respiratory infection, unspecified 12/04/2017    Viral upper respiratory infection    Anesthesia of skin 12/11/2018    Numbness and tingling of right leg    Dizziness     Encounter for other screening for malignant neoplasm of breast 11/01/2017    Breast cancer screening, high risk patient    Encounter for screening for malignant neoplasm of colon 12/02/2017    Colon cancer screening    Encounter for screening mammogram for malignant neoplasm of breast 10/31/2016    Other screening mammogram    GERD (gastroesophageal reflux disease)     Hyperlipidemia     Hypertension     Other conditions influencing health status     History of vaginal delivery    Other conditions influencing health status 01/22/2016    History of cough    Pain in right foot 12/11/2018    Right foot pain    Pain in right hip 12/11/2018    Right hip pain    Pain in right leg 12/11/2018    Right leg pain    Personal history of colonic polyps 07/06/2017     History of adenomatous polyp of colon    Personal history of diseases of the skin and subcutaneous tissue 2013    History of dermatitis    Personal history of other diseases of the nervous system and sense organs     History of migraine headaches    Personal history of other diseases of the respiratory system 2017    History of sore throat    Personal history of other diseases of the respiratory system 2017    History of sinusitis    Personal history of other diseases of the respiratory system 2016    History of acute bronchitis    Personal history of other specified conditions 2017    History of edema    Personal history of transient ischemic attack (TIA), and cerebral infarction without residual deficits     History of transient cerebral ischemia    PONV (postoperative nausea and vomiting)     Postpartum depression     Postpartum depression    Reaction to severe stress, unspecified 2016    Stress    Strain of muscle, fascia and tendon of lower back, initial encounter 2015    Strain of coccyx    TIA (transient ischemic attack)     Unspecified abnormal findings in urine     Abnormal finding in urine    Urinary tract infection      Past Surgical History:   Procedure Laterality Date    CATARACT EXTRACTION  2018    Cataract Surgery     SECTION, CLASSIC  2018     Section    COLONOSCOPY      HYSTERECTOMY  2018    Hysterectomy    MR HEAD ANGIO WO IV CONTRAST  2022    MR HEAD ANGIO WO IV CONTRAST 2022 [facility]    MR NECK ANGIO WO IV CONTRAST  2022    MR NECK ANGIO WO IV CONTRAST 2022 [facility]    OTHER SURGICAL HISTORY  2018    Dental Surgery    OTHER SURGICAL HISTORY  2018    Reported Hx Of 'Facelift'    TONSILLECTOMY  2018    Tonsillectomy    TOTAL ABDOMINAL HYSTERECTOMY W/ BILATERAL SALPINGOOPHORECTOMY  2018    Salpingo-oophorectomy Bilateral       Allergies   Allergen  Reactions    Adhesive Tape-Silicones Other     Blisters, chin itching         Clinical information reviewed:   Tobacco  Allergies  Meds   Med Hx  Surg Hx   Fam Hx  Soc Hx        NPO Detail:  NPO/Void Status  Date of Last Liquid: 04/22/24  Time of Last Liquid: 0330  Date of Last Solid: 04/21/24  Time of Last Solid: 1900  Last Intake Type: Clear fluids  Time of Last Void: 0620         Physical Exam    Airway  Mallampati: I  TM distance: >3 FB  Neck ROM: full     Cardiovascular   Rhythm: regular  Rate: normal     Dental    Pulmonary   Breath sounds clear to auscultation     Abdominal            Anesthesia Plan    History of general anesthesia?: yes  History of complications of general anesthesia?: no    ASA 3     spinal and regional     intravenous induction   Postoperative administration of opioids is intended.  Anesthetic plan and risks discussed with patient.  Use of blood products discussed with patient who.

## 2024-04-22 NOTE — PROGRESS NOTES
Physical Therapy Evaluation & Treatment    Patient Name: Zarina Holliday  MRN: [id number]  Today's Date: 2024   Time Calculation  Start Time: 1508  Stop Time: 1632  Time Calculation (min): 84 min    Assessment/Plan   PT Assessment  PT Assessment Results: Decreased strength, Decreased endurance, Impaired balance, Decreased range of motion, Decreased mobility, Orthopedic restrictions, Pain  Rehab Prognosis: Excellent  Evaluation/Treatment Tolerance: Patient tolerated treatment well  End of Session Communication: Bedside nurse  Assessment Comment: Pt presents with impaired functional mobility s/p L TKR. Recommend discharge home with intermittent assistance and home health PT. Pt was issued HEP handout. Pt verbalized and demonstrated understanding.   End of Session Patient Position: Up in chair, BLE elevated, ice to surgical site, call light in reach, needs met, dtr and spouse present, RN aware.  IP OR SWING BED PT PLAN  Inpatient or Swing Bed: Inpatient  PT Plan  Treatment/Interventions: Bed mobility, Transfer training, Gait training, Stair training, Balance training, Strengthening, Endurance training, Range of motion, Therapeutic exercise, Therapeutic activity, Home exercise program, Positioning  PT Plan: Skilled PT  PT Frequency: BID  PT Discharge Recommendations: Low intensity level of continued care  Equipment Recommended upon Discharge: Wheeled walker  PT Recommended Transfer Status: Assistive device, Contact guard      Subjective     General Visit Information:  General  Reason for Referral: L TKR  Referred By: Dr. Schneider  Past Medical History Relevant to Rehab: OA, UTI, GERD, HLD, HTN, TIA, , hysterectomy, B salpingo oophorectomy, overactive bladder  Family/Caregiver Present: Yes (spouse and dtr)  Prior to Session Communication: Bedside nurse  General Comment: L knee post-op dressing dry and intact.     Home Living:  Home Living  Type of Home: House  Lives With: Spouse  Home Adaptive Equipment:  Walker rolling or standard  Home Layout: One level  Home Access: Stairs to enter without rails  Entrance Stairs-Rails: None  Entrance Stairs-Number of Steps: 2  Bathroom Shower/Tub: Tub/shower unit  Prior Level of Function:  Prior Function Per Pt/Caregiver Report  Level of [place]: Independent with ADLs and functional transfers, Independent with homemaking with ambulation  ADL Assistance: Independent  Homemaking Assistance: Independent  Ambulatory Assistance: Independent  Vocational: Retired  Prior Function Comments: Pt denies falls prior to admission.  Precautions:  Precautions  LE Weight Bearing Status: Weight Bearing as Tolerated  Medical Precautions: Fall precautions  Post-Surgical Precautions: Left total knee precautions      Objective   Pain:  Pain Assessment  Pain Assessment: 0-10  Pain Score: 8  Pain Type: Surgical pain  Pain Location: Knee  Pain Orientation: Left  Pain Interventions: Medication (See MAR), Cold applied, Repositioned  Cognition:  Cognition  Overall Cognitive Status: Within Functional Limits  Attention: Within Functional Limits  Memory: Within Funtional Limits  Problem Solving: Within Functional Limits  Safety/Judgement: Within Functional Limits  Insight: Within function limits    General Assessments:  Activity Tolerance  Endurance: Decreased tolerance for upright activites (pt mildly dizzy at end of PT session.)    Sensation  Light Touch: No apparent deficits    Coordination  Movements are Fluid and Coordinated: Yes    Postural Control  Postural Control: Within Functional Limits    Static Sitting Balance  Static Sitting-Balance Support: Feet supported  Static Sitting-Level of Assistance: Distant supervision  Dynamic Sitting Balance  Dynamic Sitting-Balance Support: Feet supported  Dynamic Sitting-Comments: Distant supervision    Static Standing Balance  Static Standing-Balance Support: Bilateral upper extremity supported (with RW)  Static Standing-Level of Assistance: Close  supervision  Dynamic Standing Balance  Dynamic Standing-Balance Support: Bilateral upper extremity supported (with RW)  Dynamic Standing-Comments: Contact guard  Functional Assessments:  ADL  ADL's Addressed: Yes  Toileting Assistance with Device: Stand by  Toileting Deficit: Verbal cueing, Grab bar use, Supervison/safety  Functional Assistance: Minimal  Functional Deficit: Verbal cueing, Toilet transfer    Bed Mobility  Bed Mobility: Yes  Bed Mobility 1  Bed Mobility 1: Supine to sitting  Level of Assistance 1: Close supervision    Transfers  Transfer: Yes  Transfer 1  Transfer From 1: Bed to, Toilet to  Transfer to 1: Toilet, Chair with arms  Technique 1: Sit to stand, Stand to sit  Transfer Device 1: Walker  Transfer Level of Assistance 1: Contact guard, Minimal verbal cues (cues for hand placement)    Ambulation/Gait Training  Ambulation/Gait Training Performed: Yes  Ambulation/Gait Training 1  Surface 1: Level tile  Device 1: Rolling walker  Assistance 1: Contact guard, Minimal verbal cues (cues for sequencing)  Quality of Gait 1: Decreased step length (decreased anamaria, no LOB noted.)  Comments/Distance (ft) 1: 30 feet x 2  Extremity/Trunk Assessments:  RUE   RUE : Within Functional Limits  LUE   LUE: Within Functional Limits  RLE   RLE : Within Functional Limits  LLE   LLE : Exceptions to WFL, knee ROM/strength limited due to post-op pain and surgeon restrictions.     Treatments:  Therapeutic Exercise  Therapeutic Exercise Performed: Yes  B ankle pumps, L quad sets, L gluteal sets, L heel slides, L SAQ, L hip abduction, and L SLR x 10 reps each.    Outcome Measures:  [facility] Basic Mobility  Turning from your back to your side while in a flat bed without using bedrails: None  Moving from lying on your back to sitting on the side of a flat bed without using bedrails: None  Moving to and from bed to chair (including a wheelchair): A little  Standing up from a chair using your arms (e.g. wheelchair or bedside  chair): A little  To walk in hospital room: A little  Climbing 3-5 steps with railing: A little  Basic Mobility - Total Score: 20    Encounter Problems       Encounter Problems (Active)       Balance       LTG - Patient will demonstrate Intervention to enhance balance for safe completion of daily activities (Progressing)       Start:  04/22/24            LTG - Patient will maintain balance to allow for safe mobility (Progressing)       Start:  04/22/24               Compromised Skin Integrity       LTG - Patient will be free from infection (Progressing)       Start:  04/22/24               Mobility       LTG - Patient will ambulate household distance with RW at a modified independent level.   (Progressing)       Start:  04/22/24            LTG - Patient will navigate 2 steps with RW at a modified independent level.   (Progressing)       Start:  04/22/24               PT Transfers       LTG - Patient will demonstrate safe transfer techniques (Progressing)       Start:  04/22/24               Pain          Pain - Adult          Safety       LTG - Patient will demonstrate safety requirements appropriate to situation/environment (Progressing)       Start:  04/22/24                   Education Documentation  Handouts, taught by Brandi Crenshaw PT at 4/22/2024  4:48 PM.  Learner: Significant Other, Family, Patient  Readiness: Acceptance  Method: Explanation, Demonstration, Handout  Response: Verbalizes Understanding, Demonstrated Understanding    Precautions, taught by Brandi Crenshaw PT at 4/22/2024  4:48 PM.  Learner: Significant Other, Family, Patient  Readiness: Acceptance  Method: Explanation, Demonstration, Handout  Response: Verbalizes Understanding, Demonstrated Understanding    Body Mechanics, taught by Brandi Crenshaw PT at 4/22/2024  4:48 PM.  Learner: Significant Other, Family, Patient  Readiness: Acceptance  Method: Explanation, Demonstration, Handout  Response: Verbalizes Understanding,  Demonstrated Understanding    Home Exercise Program, taught by Brandi Crenshaw, LILIANE at 4/22/2024  4:48 PM.  Learner: Significant Other, Family, Patient  Readiness: Acceptance  Method: Explanation, Demonstration, Handout  Response: Verbalizes Understanding, Demonstrated Understanding    Mobility Training, taught by Brandi Crenshaw PT at 4/22/2024  4:48 PM.  Learner: Significant Other, Family, Patient  Readiness: Acceptance  Method: Explanation, Demonstration, Handout  Response: Verbalizes Understanding, Demonstrated Understanding    Education Comments  No comments found.    Brandi Crenshaw, LILIANE, DPT

## 2024-04-22 NOTE — ADDENDUM NOTE
Addendum  created 04/22/24 1144 by MEGHNA Walker-CRNA    Flowsheet accepted, Intraprocedure Meds edited, Orders acknowledged in Narrator

## 2024-04-22 NOTE — NURSING NOTE
Assumed care of patient from PACU, report taken from Mariella MILLER.  Patient arrived to room 221 awake and alert X 4.  No c/o of pain voiced.  Patient did become dizzy once she was repositioned in bed.  It subsided quickly.   and daughter at bedside.  Patient underwent a left total knee today by Dr. Schneider.  Hemovac drain to site open to gravity, serous drainage and blood in tubing.  Iceman to site along with silver meplix clean with no drainage covered with ace wrap.  Oriented to room, menu and call light.

## 2024-04-22 NOTE — NURSING NOTE
Admission vital signs obtained.  Pt. Oriented to the room.  Encouraged pt. To order food.  Call light within reach.  Bed alarm on.  Scd's on.

## 2024-04-22 NOTE — PROGRESS NOTES
04/22/24 1155   Discharge Planning   Assistance Needed HOME with [facility] 24/7 assist   Patient expects to be discharged to: HOME with [facility] and 24/7 supervision     Pt will also have private duty care through 4/30 from -.      Secure chat sent to [facility] with referral information. SOC to be confirmed on DOD.

## 2024-04-22 NOTE — PROGRESS NOTES
Message received from [facility], patient's insurance is out of contract, total cost of prescriptions will be 41.97.  Patient/spouse in agreement to have meds filled my [facility] despike cost.

## 2024-04-22 NOTE — PERIOPERATIVE NURSING NOTE
Pt did well in recovery. Pt tolerates po well. No c/o ponv. Attempt to call report, RN will call back.

## 2024-04-23 ENCOUNTER — DOCUMENTATION (OUTPATIENT)
Dept: HOME HEALTH SERVICES | Facility: HOME HEALTH | Age: 79
End: 2024-04-23
Payer: MEDICARE

## 2024-04-23 VITALS
SYSTOLIC BLOOD PRESSURE: 114 MMHG | DIASTOLIC BLOOD PRESSURE: 47 MMHG | TEMPERATURE: 98.6 F | HEIGHT: 65 IN | RESPIRATION RATE: 18 BRPM | WEIGHT: 143.74 LBS | BODY MASS INDEX: 23.95 KG/M2 | HEART RATE: 72 BPM | OXYGEN SATURATION: 96 %

## 2024-04-23 PROBLEM — M17.12 PRIMARY OSTEOARTHRITIS OF LEFT KNEE: Status: ACTIVE | Noted: 2024-04-23

## 2024-04-23 LAB
ANION GAP SERPL CALC-SCNC: 14 MMOL/L (ref 10–20)
BUN SERPL-MCNC: 14 MG/DL (ref 6–23)
CALCIUM SERPL-MCNC: 9 MG/DL (ref 8.6–10.3)
CHLORIDE SERPL-SCNC: 102 MMOL/L (ref 98–107)
CO2 SERPL-SCNC: 25 MMOL/L (ref 21–32)
CREAT SERPL-MCNC: 0.82 MG/DL (ref 0.5–1.05)
EGFRCR SERPLBLD CKD-EPI 2021: 73 ML/MIN/1.73M*2
ERYTHROCYTE [DISTWIDTH] IN BLOOD BY AUTOMATED COUNT: 12.1 % (ref 11.5–14.5)
GLUCOSE SERPL-MCNC: 105 MG/DL (ref 74–99)
HCT VFR BLD AUTO: 33.4 % (ref 36–46)
HGB BLD-MCNC: 10.7 G/DL (ref 12–16)
MCH RBC QN AUTO: 29.9 PG (ref 26–34)
MCHC RBC AUTO-ENTMCNC: 32 G/DL (ref 32–36)
MCV RBC AUTO: 93 FL (ref 80–100)
NRBC BLD-RTO: ABNORMAL /100{WBCS}
PLATELET # BLD AUTO: 225 X10*3/UL (ref 150–450)
POTASSIUM SERPL-SCNC: 4 MMOL/L (ref 3.5–5.3)
RBC # BLD AUTO: 3.58 X10*6/UL (ref 4–5.2)
SODIUM SERPL-SCNC: 137 MMOL/L (ref 136–145)
WBC # BLD AUTO: 11 X10*3/UL (ref 4.4–11.3)

## 2024-04-23 PROCEDURE — 97110 THERAPEUTIC EXERCISES: CPT | Mod: GP

## 2024-04-23 PROCEDURE — 36415 COLL VENOUS BLD VENIPUNCTURE: CPT | Performed by: STUDENT IN AN ORGANIZED HEALTH CARE EDUCATION/TRAINING PROGRAM

## 2024-04-23 PROCEDURE — 2500000004 HC RX 250 GENERAL PHARMACY W/ HCPCS (ALT 636 FOR OP/ED): Performed by: STUDENT IN AN ORGANIZED HEALTH CARE EDUCATION/TRAINING PROGRAM

## 2024-04-23 PROCEDURE — 2500000001 HC RX 250 WO HCPCS SELF ADMINISTERED DRUGS (ALT 637 FOR MEDICARE OP): Performed by: PHYSICIAN ASSISTANT

## 2024-04-23 PROCEDURE — 97116 GAIT TRAINING THERAPY: CPT | Mod: GP

## 2024-04-23 PROCEDURE — G0378 HOSPITAL OBSERVATION PER HR: HCPCS

## 2024-04-23 PROCEDURE — 85027 COMPLETE CBC AUTOMATED: CPT | Performed by: STUDENT IN AN ORGANIZED HEALTH CARE EDUCATION/TRAINING PROGRAM

## 2024-04-23 PROCEDURE — 97530 THERAPEUTIC ACTIVITIES: CPT | Mod: GP

## 2024-04-23 PROCEDURE — 7100000011 HC EXTENDED STAY RECOVERY HOURLY - NURSING UNIT

## 2024-04-23 PROCEDURE — 2500000006 HC RX 250 W HCPCS SELF ADMINISTERED DRUGS (ALT 637 FOR ALL PAYERS): Performed by: PHYSICIAN ASSISTANT

## 2024-04-23 PROCEDURE — 2500000001 HC RX 250 WO HCPCS SELF ADMINISTERED DRUGS (ALT 637 FOR MEDICARE OP): Performed by: STUDENT IN AN ORGANIZED HEALTH CARE EDUCATION/TRAINING PROGRAM

## 2024-04-23 PROCEDURE — 80048 BASIC METABOLIC PNL TOTAL CA: CPT | Performed by: STUDENT IN AN ORGANIZED HEALTH CARE EDUCATION/TRAINING PROGRAM

## 2024-04-23 RX ORDER — CYCLOBENZAPRINE HCL 5 MG
5 TABLET ORAL 2 TIMES DAILY PRN
Qty: 10 TABLET | Refills: 0 | Status: SHIPPED | OUTPATIENT
Start: 2024-04-23

## 2024-04-23 RX ADMIN — CYCLOBENZAPRINE 5 MG: 10 TABLET, FILM COATED ORAL at 08:10

## 2024-04-23 RX ADMIN — KETOROLAC TROMETHAMINE 15 MG: 15 INJECTION, SOLUTION INTRAMUSCULAR; INTRAVENOUS at 06:04

## 2024-04-23 RX ADMIN — METHENAMINE HIPPURATE 1 G: 1 TABLET ORAL at 08:11

## 2024-04-23 RX ADMIN — OXYCODONE 5 MG: 5 TABLET ORAL at 11:18

## 2024-04-23 RX ADMIN — POLYETHYLENE GLYCOL 3350 17 G: 17 POWDER, FOR SOLUTION ORAL at 08:11

## 2024-04-23 RX ADMIN — PANTOPRAZOLE SODIUM 40 MG: 40 TABLET, DELAYED RELEASE ORAL at 06:04

## 2024-04-23 RX ADMIN — ASPIRIN 81 MG: 81 TABLET, COATED ORAL at 08:10

## 2024-04-23 RX ADMIN — ACETAMINOPHEN 650 MG: 325 TABLET ORAL at 12:56

## 2024-04-23 RX ADMIN — MIRABEGRON 50 MG: 50 TABLET, FILM COATED, EXTENDED RELEASE ORAL at 08:11

## 2024-04-23 RX ADMIN — DOCUSATE SODIUM 100 MG: 100 CAPSULE, LIQUID FILLED ORAL at 08:11

## 2024-04-23 ASSESSMENT — PAIN - FUNCTIONAL ASSESSMENT
PAIN_FUNCTIONAL_ASSESSMENT: 0-10

## 2024-04-23 ASSESSMENT — COGNITIVE AND FUNCTIONAL STATUS - GENERAL
STANDING UP FROM CHAIR USING ARMS: A LITTLE
MOVING TO AND FROM BED TO CHAIR: A LITTLE
CLIMB 3 TO 5 STEPS WITH RAILING: A LITTLE
CLIMB 3 TO 5 STEPS WITH RAILING: A LITTLE
MOVING FROM LYING ON BACK TO SITTING ON SIDE OF FLAT BED WITH BEDRAILS: A LITTLE
TOILETING: A LITTLE
WALKING IN HOSPITAL ROOM: A LITTLE
MOBILITY SCORE: 22
WALKING IN HOSPITAL ROOM: A LITTLE
MOBILITY SCORE: 18
DAILY ACTIVITIY SCORE: 22
DRESSING REGULAR LOWER BODY CLOTHING: A LITTLE
TURNING FROM BACK TO SIDE WHILE IN FLAT BAD: A LITTLE

## 2024-04-23 ASSESSMENT — PAIN SCALES - GENERAL
PAINLEVEL_OUTOF10: 0 - NO PAIN
PAINLEVEL_OUTOF10: 5 - MODERATE PAIN
PAINLEVEL_OUTOF10: 6
PAINLEVEL_OUTOF10: 3
PAINLEVEL_OUTOF10: 3
PAINLEVEL_OUTOF10: 4
PAINLEVEL_OUTOF10: 0 - NO PAIN
PAINLEVEL_OUTOF10: 4
PAINLEVEL_OUTOF10: 3

## 2024-04-23 ASSESSMENT — PAIN DESCRIPTION - ORIENTATION: ORIENTATION: LEFT

## 2024-04-23 ASSESSMENT — PAIN DESCRIPTION - LOCATION: LOCATION: KNEE

## 2024-04-23 NOTE — NURSING NOTE
Assumed care of pt from night shift, RN. Pt resting in bed with call light in reach. Lung sounds clear bilaterally and denies SOB. SCD's and iceman in place. Surgical dressing dry and intact. Hemovac in place without kinks. Bed alarm activated. Pt states pain is 4/10 and c/o muscle spasms. Will medicate appropriately.  No other stated needs.

## 2024-04-23 NOTE — NURSING NOTE
Reviewed medications and discharge instructions with patient at the bedside. Pt and family verbalized understanding. IV discontinued by ANGELA Aj without incidence. Surgical dressing dry and intact. Pt wheeled down via wheelchair and assisted into vehicle.

## 2024-04-23 NOTE — CARE PLAN
TCC met with patient at the bedside during IDR to discuss care/discharge plan.   Pt POD #1 left knee replacement.  Plan is home today with [facility] PT.  Confirmed SOC on 4/24/24.  Family to transport home.

## 2024-04-23 NOTE — PROGRESS NOTES
Interdisciplinary Rounds were completed at the bedside with Patient, Care Partner, and Family.  Staff participating in rounds included: Clinical Nurse Orthopedic Coordinator Transitional Care Coordinator Nursing Leadership Hospitalist MD/PA.  Topics discussed included: Today's Plan of Care Discharge Plan and Accommodations Physical Therapy Medications/Preferred Pharmacy and the Patient, Care Partner, and Family was given the opportunity to ask additional questions or bring up any concerns at that time.  During the final discharge discussion and review of instructions, they will have another opportunity to review questions or concerns prior to leaving our care.  Patient, Care Partner, and Family was given information on who to call post-discharge should new questions or concerns arise.      The patient's plan includes:    Discharge Date/Disposition:  Home, Today with, Home Care Services  Discharge Needs: No Equipment Needs Identified  Medications/Pharmacy: [id number] service utilized for discharge prescriptions through [facility]

## 2024-04-23 NOTE — PROGRESS NOTES
Medication Education     Medication education for Zarina Holliday was provided to the patient and family for the following medication(s):  Aspirin  Docusate  Meloxicam  Oxycodone  Tylenol  Pantoprazole  Miralax  Tramadol    Medication education provided by a Pharmacist:  -Proper dose, indication, possible ADRs   -How the medication works and benefits of taking it  -Importance of compliance   -Potential duration of therapy    Identified potential barriers to education:  None    Method(s) of Education:  Verbal Written materials provided and reviewed    An opportunity to ask questions and receive answers was provided.     Assessment of understanding the patient and family:  2= meets goals/outcomes    Additional Notes (if applicable): Meds to beds given to patient and family.    Ashtyn Huitron, AshwiniD

## 2024-04-23 NOTE — NURSING NOTE
Resting in bed with call light within reach and bed in lowest/locked position.  Bed alarm on.  Respirations even and unlabored.  Dressing to left knee incision is clean, dry, and intact.  Ice man cooler to left knee for comfort.

## 2024-04-23 NOTE — CARE PLAN
The patient's goals for the shift include Ambulation without pain    The clinical goals for the shift include pain control

## 2024-04-23 NOTE — PROGRESS NOTES
"  Progress Note:    Zarina Holliday is a 78 y.o. female on day 2 of admission presenting with Unilateral primary osteoarthritis, left knee.    Subjective   Knee pain 5-6/10 and \"tolerable.\" Slept \"ok.\"     ROS  Constitutional:  Alert, oriented  HEENT: Denies headache, vision changes, hearing change, loss of taste or smell. Does complain of sore throat (post op)   Neck: Denies swallowing difficulty, swelling, new stiffness/pain  CV: Denies CP, Palpitations, chest wall pain  Resp: No cough, wheeze, dyspnea  Abdomen: Denies abdominal pain, cramping, constipation, diarrhea  : No dysuria, hematuria, discharge  Musculoskeletal: Aches over all joints (not new). Generalized weakness 2/2 pain  Extremities: Swelling of RLE  Neuro: No focal deficits other than mild [place]    Scheduled medications  aspirin, 81 mg, oral, BID  docusate sodium, 100 mg, oral, BID  methenamine hippurate, 1 g, oral, BID  mirabegron, 50 mg, oral, Daily  pantoprazole, 40 mg, oral, Daily before breakfast  polyethylene glycol, 17 g, oral, Daily    Continuous medications  lactated Ringer's, 100 mL/hr, Last Rate: Stopped (04/23/24 0030)      PRN medications  PRN medications: acetaminophen, benzocaine-menthol, bisacodyl, bisacodyl, cyclobenzaprine, diphenhydrAMINE, HYDROmorphone, loratadine, metoclopramide **OR** metoclopramide, naloxone, ondansetron ODT **OR** ondansetron, oxyCODONE, oxyCODONE, oxygen      Physical Exam  General: No acute distress, alert & oriented  Cardiac: RRR, NL S1 and S2, no murmurs, rubs or gallops  Pulmonary: Lungs clear to auscultation bilaterally, no wheezes, rhales or rhonchi  Abdomen: Soft, non-tender, non-distended  Extremities: No clubbing , cyanosis or edema. Left knee w/ iceman.     Last Recorded Vitals  Blood pressure 97/79, pulse 70, temperature 36.2 °C (97.2 °F), temperature source Temporal, resp. rate 16, height 1.651 m (5' 5\"), weight 65.2 kg (143 lb 11.8 oz), SpO2 97%.    Scheduled medications   Medication Dose Route " Frequency    aspirin  81 mg oral BID    docusate sodium  100 mg oral BID    methenamine hippurate  1 g oral BID    mirabegron  50 mg oral Daily    pantoprazole  40 mg oral Daily before breakfast    polyethylene glycol  17 g oral Daily       Relevant Results  Results from last 7 days   Lab Units 04/23/24  0322   WBC AUTO x10*3/uL 11.0   HEMOGLOBIN g/dL 10.7*   HEMATOCRIT % 33.4*   PLATELETS AUTO x10*3/uL 225      Results from last 7 days   Lab Units 04/23/24  0322   SODIUM mmol/L 137   POTASSIUM mmol/L 4.0   CHLORIDE mmol/L 102   CO2 mmol/L 25   BUN mg/dL 14   CREATININE mg/dL 0.82   GLUCOSE mg/dL 105*   CALCIUM mg/dL 9.0      Results for orders placed or performed during the hospital encounter of 04/22/24 (from the past 24 hour(s))   CBC   Result Value Ref Range    WBC 11.0 4.4 - 11.3 x10*3/uL    nRBC      RBC 3.58 (L) 4.00 - 5.20 x10*6/uL    Hemoglobin 10.7 (L) 12.0 - 16.0 g/dL    Hematocrit 33.4 (L) 36.0 - 46.0 %    MCV 93 80 - 100 fL    MCH 29.9 26.0 - 34.0 pg    MCHC 32.0 32.0 - 36.0 g/dL    RDW 12.1 11.5 - 14.5 %    Platelets 225 150 - 450 x10*3/uL   Basic metabolic panel   Result Value Ref Range    Glucose 105 (H) 74 - 99 mg/dL    Sodium 137 136 - 145 mmol/L    Potassium 4.0 3.5 - 5.3 mmol/L    Chloride 102 98 - 107 mmol/L    Bicarbonate 25 21 - 32 mmol/L    Anion Gap 14 10 - 20 mmol/L    Urea Nitrogen 14 6 - 23 mg/dL    Creatinine 0.82 0.50 - 1.05 mg/dL    eGFR 73 >60 mL/min/1.73m*2    Calcium 9.0 8.6 - 10.3 mg/dL      Assessment/Plan   1) Osteoarthritis Left Knee: POD#1 Left TKR   Perioperative antibiotics, dressing care, pain meds, PT/OT per Orthopaedics   Patient seen, examined. Notes, labs, vitals reviewed.   The patient is medically cleared for discharge, pending PT clearance &   Ortho OK     2) DVT Prophylaxis:   Rx; Aspirin 81 mgq 12 hr x 30 days. Stop date May 22, 2024    3) Hypotension:   BP this morning 97/79. Recheck      4) Disposition:   Medications reviewed.  Medication Reconciliation completed.  Orders placed   Plan discharge later today if passes PT kwame BATISTA spent 35 minutes in the professional and overall care of this patient.      Chato Kaminski MD

## 2024-04-23 NOTE — NURSING NOTE
Resting in bed with call light within reach and bed alarm on.  Voices no c/o pain at this time. Iceman cooler to left knee for comfort.

## 2024-04-23 NOTE — HH CARE COORDINATION
Home Care received a Referral for Physical Therapy. We have processed the referral for a Start of Care on 4/24/2024.     If you have any questions or concerns, please feel free to contact us at [phone]. Follow the prompts, enter your five digit zip code, and you will be directed to your care team on [facility].

## 2024-04-24 ENCOUNTER — HOME CARE VISIT (OUTPATIENT)
Dept: HOME HEALTH SERVICES | Facility: HOME HEALTH | Age: 79
End: 2024-04-24
Payer: MEDICARE

## 2024-04-24 VITALS
DIASTOLIC BLOOD PRESSURE: 75 MMHG | OXYGEN SATURATION: 95 % | SYSTOLIC BLOOD PRESSURE: 131 MMHG | TEMPERATURE: 97.8 F | RESPIRATION RATE: 18 BRPM | HEART RATE: 72 BPM

## 2024-04-24 PROCEDURE — G0151 HHCP-SERV OF PT,EA 15 MIN: HCPCS | Mod: HHH

## 2024-04-24 PROCEDURE — 0023 HH SOC

## 2024-04-24 PROCEDURE — 1090000001 HH PPS REVENUE CREDIT

## 2024-04-24 PROCEDURE — 1090000002 HH PPS REVENUE DEBIT

## 2024-04-24 PROCEDURE — 169592 NO-PAY CLAIM PROCEDURE

## 2024-04-24 SDOH — HEALTH STABILITY: PHYSICAL HEALTH: RESISTANCE: AS TOLERATED

## 2024-04-24 SDOH — HEALTH STABILITY: PHYSICAL HEALTH: EXERCISE TYPE: TKA PROTOCOL EXERCISES

## 2024-04-24 SDOH — HEALTH STABILITY: PHYSICAL HEALTH: EXERCISE ACTIVITY: TKA PROTOCOL EXERCISES

## 2024-04-24 SDOH — HEALTH STABILITY: PHYSICAL HEALTH: PHYSICAL EXERCISE: SUPINE, SITTING, STANDING

## 2024-04-24 ASSESSMENT — ENCOUNTER SYMPTOMS
LIMITED RANGE OF MOTION: 1
LOWEST PAIN SEVERITY IN PAST 24 HOURS: 6/10
HIGHEST PAIN SEVERITY IN PAST 24 HOURS: 8/10
ARTHRALGIAS: 1
PAIN SEVERITY GOAL: 1/10
PAIN: 1
PAIN LOCATION: LEFT KNEE
MUSCLE WEAKNESS: 1
PERSON REPORTING PAIN: PATIENT
SUBJECTIVE PAIN PROGRESSION: GRADUALLY IMPROVING

## 2024-04-24 ASSESSMENT — ACTIVITIES OF DAILY LIVING (ADL)
ENTERING_EXITING_HOME: MAXIMUM ASSIST
AMBULATION ASSISTANCE: 1
OASIS_M1830: 03
PHYSICAL TRANSFERS ASSESSED: 1
CURRENT_FUNCTION: MAXIMUM ASSIST
AMBULATION ASSISTANCE: MAXIMUM ASSIST
PHYSICAL_TRANSFERS_DEVICES: BODY SUPPORT

## 2024-04-24 NOTE — Clinical Note
Patient has been admitted for home health therapy today. Plan to progress with 2 x 3 weeks of therapy.

## 2024-04-24 NOTE — HOME HEALTH
CURRENT CONDITION: STATUS POST LEFT TKA    GOALS: PATIENT WILL DEMONSTRATE ABILITY TO WALK WITHOUT LEFT KNEE PAIN.    SUBJECTIVE: THE PATIENT REPORTS 2/10 PAIN TO HER LEFT KNEE WHILE LYING DOWN AND 8/10 WHILE WALKING.    LIVING CONDITION: PATIENT IS CURRENTLY LIVING IN AN APT BUILDING. PATIENT IS CURRENTLY LIVING WITH HER FAMILY.    OBJECTIVE: PATIENT DEMONSTRATED 45 DEGRESS OF LEFT KNEE FLEXION AND 56 TO NEUTRAL. THERE IS TENDERNESS TO PALPATION AROUND THE LEFT KNEE ALONG WITH SWELLING. MANUAL MUSCLE TESTING WAS PERFORMED TO DETERMINE LE STRENGTH. PATIENT DEMONSTRATED 1+ MUSCLE STRENGTH TO LEFT KNEE FLEXION AND EXTENSION. BALANCE TESTING WAS PERFOMED WHICH SHOWED PATIENT TO BE A HIGH FALL RISK. PATIENT WAS NOT ABLE TO WALK TODAY DUE TO PAIN AND GUARDING.    THERAPEUTIC EXERCISES: MMT AND ROM ASSESSMENT COMPLETED.    ASSESSMENT: DEMONSTRATED REDUCED ROM TO LEFT KNEE ROM SECONDARY TO POST-SURGICAL STATUS. GROSSLY REDUCED STRENGTH AND ENDURANCE TO LEFT LE SECONDARY TO THE KNEE SURGERY. PATIENT, PRESENTLY, IS A FALL RISK, CURRENTLY AMBULATING WITH A WALKER. NO SIGNS OF INFECTION NOTED AROUND THE WOUND SITE.    PLAN: CONSIDER COMORBID FACTORS WHEN IMPLEMENTING POC. CONTINUE WITH ROM TRAINING LEFT KNEE, GENERAL ENDURANCE AND STRENGTH TRAINING LE'S, GAIT TRAINING ACTIVITIES, BALANCE AND PROPRIOCEPTIVE TRAINING, AND FALL PREVENTION STRATEGIES AS PER PT POC. PROGRESS AS TOLERATED.

## 2024-04-24 NOTE — PROGRESS NOTES
Physical Therapy Treatment    Patient Name: Zarina Holliday  MRN: [id number]  Today's Date: 4/23/2024  Time Calculation  Start Time: 1048  Stop Time: 1200  Time Calculation (min): 72 min       Assessment/Plan   PT Assessment  PT Assessment Results: Decreased strength, Decreased endurance, Impaired balance, Decreased range of motion, Decreased mobility, Orthopedic restrictions, Pain  Rehab Prognosis: Excellent  Evaluation/Treatment Tolerance: Patient tolerated treatment well  End of Session Communication: Bedside nurse  Assessment Comment: Pt ambulated increased distance, completed stair negotiation, and was able to dress herself. PT spent time answering all patient and her family's questions within PT scope of practice. Pt is prepared for discharge home with assist from spouse and HHPT.  End of Session Patient Position: Up in chair, BLE elevated, ice to surgical site, call light in reach, needs met, spouse and dtr present, RN aware.  PT Plan  Inpatient/Swing Bed or Outpatient: Inpatient  PT Plan  Treatment/Interventions: Bed mobility, Transfer training, Gait training, Stair training, Balance training, Strengthening, Endurance training, Range of motion, Therapeutic exercise, Therapeutic activity, Home exercise program, Positioning  PT Plan: Skilled PT  PT Frequency: BID  PT Discharge Recommendations: Low intensity level of continued care  Equipment Recommended upon Discharge: Wheeled walker  PT Recommended Transfer Status: Stand by assist, Assistive device  PT - OK to Discharge: Yes      General Visit Information:   PT  Visit  PT Received On: 04/23/24  General  Family/Caregiver Present: Yes (spouse and dtr)  Prior to Session Communication: Bedside nurse  Patient Position Received: Up in chair  General Comment: hemovac to L knee removed by PT, pressure held, clean ABDs and ace wrap applied, long leg ace wrap reapplied, RN aware.      Subjective   Precautions:  Precautions  LE Weight Bearing Status: Weight Bearing as  Tolerated  Medical Precautions: Fall precautions  Post-Surgical Precautions: Left total knee precautions      Objective   Pain:  Pain Assessment  Pain Assessment: 0-10  Pain Score: 5 - Moderate pain  Pain Type: Surgical pain  Pain Location: Knee  Pain Orientation: Left  Pain Interventions: Cold applied, Repositioned, Medication (See MAR) (RN admin pain meds during PT session.)    Activity Tolerance:  Activity Tolerance  Endurance: Endurance does not limit participation in activity  Treatments:  Therapeutic Exercise  Therapeutic Exercise Performed: Yes  B ankle pumps, L quad sets, L gluteal sets, L heel slides, L SAQ, L hip abduction, and L SLR x 10 reps each.    Therapeutic Activity  Therapeutic Activity Performed: Yes  Therapeutic Activity 1: PT instructed patient in donning pants onto surigical LE first. Pt was able to complete with increased time, but no hands on assist.    Bed Mobility  Bed Mobility: Yes  Bed Mobility 1  Bed Mobility 1: Supine to sitting (from elevated EOB surface and use of stool to simulate home set up)  Level of Assistance 1: Close supervision  Bed Mobility Comments 1: cues for sequencing and safety.    Ambulation/Gait Training  Ambulation/Gait Training Performed: Yes  Ambulation/Gait Training 1  Surface 1: Level tile  Device 1: Rolling walker  Assistance 1: Close supervision, Minimal verbal cues (cues for sequencing)  Quality of Gait 1:  (decreased anamaria, step to progressing to reciprocal pattern, no LOB noted.)  Comments/Distance (ft) 1: 150 feet x 2    Transfers  Transfer: Yes  Transfer 1  Transfer From 1: Chair with arms to  Transfer to 1: Chair with arms  Technique 1: Sit to stand, Stand to sit  Transfer Device 1: Walker  Transfer Level of Assistance 1: Close supervision, Minimal verbal cues (cues for hand placement)  Trials/Comments 1: 5 trials    Stairs  Stairs: Yes  Stairs  Rails 1: Right  Device 1: Railing  Assistance 1: Close supervision, Minimal verbal cues (cues for  sequencing)  Comment/Number of Steps 1: 3  Pt demonstrated step to pattern, decreased anamaria, no LOB noted.     Outcome Measures:  [facility] Basic Mobility  Turning from your back to your side while in a flat bed without using bedrails: None  Moving from lying on your back to sitting on the side of a flat bed without using bedrails: None  Moving to and from bed to chair (including a wheelchair): None  Standing up from a chair using your arms (e.g. wheelchair or bedside chair): None  To walk in hospital room: A little  Climbing 3-5 steps with railing: A little  Basic Mobility - Total Score: 22      Encounter Problems       Encounter Problems (Resolved)       Balance       LTG - Patient will demonstrate Intervention to enhance balance for safe completion of daily activities (Adequate for Discharge)       Start:  04/22/24    Resolved:  04/23/24         LTG - Patient will maintain balance to allow for safe mobility (Adequate for Discharge)       Start:  04/22/24    Resolved:  04/23/24            Compromised Skin Integrity       LTG - Patient will be free from infection (Adequate for Discharge)       Start:  04/22/24    Resolved:  04/23/24            Mobility       LTG - Patient will ambulate household distance with RW at a modified independent level.   (Adequate for Discharge)       Start:  04/22/24    Resolved:  04/23/24         LTG - Patient will navigate 2 steps with RW at a modified independent level.   (Adequate for Discharge)       Start:  04/22/24    Resolved:  04/23/24            PT Transfers       LTG - Patient will demonstrate safe transfer techniques (Adequate for Discharge)       Start:  04/22/24    Resolved:  04/23/24            Pain          Pain - Adult          Safety       LTG - Patient will demonstrate safety requirements appropriate to situation/environment (Adequate for Discharge)       Start:  04/22/24    Resolved:  04/23/24                Brandi Crenshaw, PT, DPT

## 2024-04-25 ENCOUNTER — HOME CARE VISIT (OUTPATIENT)
Dept: HOME HEALTH SERVICES | Facility: HOME HEALTH | Age: 79
End: 2024-04-25
Payer: MEDICARE

## 2024-04-25 PROCEDURE — 1090000002 HH PPS REVENUE DEBIT

## 2024-04-25 PROCEDURE — 1090000001 HH PPS REVENUE CREDIT

## 2024-04-26 ENCOUNTER — HOME CARE VISIT (OUTPATIENT)
Dept: HOME HEALTH SERVICES | Facility: HOME HEALTH | Age: 79
End: 2024-04-26
Payer: MEDICARE

## 2024-04-26 PROCEDURE — 1090000002 HH PPS REVENUE DEBIT

## 2024-04-26 PROCEDURE — 1090000001 HH PPS REVENUE CREDIT

## 2024-04-26 PROCEDURE — G0151 HHCP-SERV OF PT,EA 15 MIN: HCPCS | Mod: HHH

## 2024-04-27 VITALS — HEART RATE: 74 BPM | DIASTOLIC BLOOD PRESSURE: 82 MMHG | RESPIRATION RATE: 18 BRPM | SYSTOLIC BLOOD PRESSURE: 134 MMHG

## 2024-04-27 PROCEDURE — 1090000002 HH PPS REVENUE DEBIT

## 2024-04-27 PROCEDURE — 1090000001 HH PPS REVENUE CREDIT

## 2024-04-27 SDOH — HEALTH STABILITY: PHYSICAL HEALTH: PHYSICAL EXERCISE: SUPINE, SITTING, STANDING

## 2024-04-27 SDOH — HEALTH STABILITY: PHYSICAL HEALTH: PHYSICAL EXERCISE: 10

## 2024-04-27 SDOH — HEALTH STABILITY: PHYSICAL HEALTH: EXERCISE ACTIVITY: TKA PROTOCOL EXERCISES

## 2024-04-27 SDOH — HEALTH STABILITY: PHYSICAL HEALTH: RESISTANCE: AS TOLERATED

## 2024-04-27 SDOH — HEALTH STABILITY: PHYSICAL HEALTH: EXERCISE ACTIVITIES SETS: 2

## 2024-04-27 SDOH — HEALTH STABILITY: PHYSICAL HEALTH: EXERCISE ACTIVITY: GAIT TRAINING

## 2024-04-27 SDOH — HEALTH STABILITY: PHYSICAL HEALTH: EXERCISE TYPE: TKA PROTOCOL EXERCISES

## 2024-04-27 ASSESSMENT — ACTIVITIES OF DAILY LIVING (ADL)
AMBULATION_DISTANCE/DURATION_TOLERATED: 10 FT
AMBULATION ASSISTANCE ON FLAT SURFACES: 1

## 2024-04-27 ASSESSMENT — ENCOUNTER SYMPTOMS
PAIN: 1
PAIN LOCATION - EXACERBATING FACTORS: WALKING
PAIN LOCATION: LEFT KNEE
PERSON REPORTING PAIN: PATIENT
PAIN LOCATION - PAIN SEVERITY: 6/10
PAIN LOCATION - RELIEVING FACTORS: PAIN MEDS, ICING

## 2024-04-28 PROCEDURE — 1090000001 HH PPS REVENUE CREDIT

## 2024-04-28 PROCEDURE — 1090000002 HH PPS REVENUE DEBIT

## 2024-04-28 NOTE — HOME HEALTH
S: Patient seen for routine follow-up PT visit today. Patient reported 6/10 left knee pain. Patient reported pain of /10 to his . Patient reported he/she has been performing the home exercises as planned twice daily.    O: Patient ambulated in the home using walker. Performed skilled therapeutic exercises 2 sets x 10 reps; gait training with walker x 10 FT.    A: Patient tolerated the treatment well today and was able to complete the exercise regimen with pain, moderate difficulty, and intermittent dizziness. Patient felt lightheaded during the exercise session and needed to take a 10 min sitting rest interval before resuming walking. Blood pressure WNL, taken post exercise session. Reported no lightheadedness post lying down. Patient demonstrated ability to perform safe STS transfer from chair with hand support and initiate gait with hestiancy. Patient can expect consistent increase in strength and mobility by continuing with current exercise regimen.    P: Consider comorbid conditions when implementing and progressing POC. Continue current exercise regimen including skilled LE therapeutic exercises to improve gross strength and endurance, 2 x 10 reps; gait training with walker/cane in the home environment; balance and proprioceptive training emphasizing focus towards narrow base of support and vesticular challenges.

## 2024-04-29 PROCEDURE — 1090000002 HH PPS REVENUE DEBIT

## 2024-04-29 PROCEDURE — 1090000001 HH PPS REVENUE CREDIT

## 2024-04-30 ENCOUNTER — HOME CARE VISIT (OUTPATIENT)
Dept: HOME HEALTH SERVICES | Facility: HOME HEALTH | Age: 79
End: 2024-04-30
Payer: MEDICARE

## 2024-04-30 VITALS — DIASTOLIC BLOOD PRESSURE: 74 MMHG | SYSTOLIC BLOOD PRESSURE: 131 MMHG | HEART RATE: 78 BPM | RESPIRATION RATE: 18 BRPM

## 2024-04-30 DIAGNOSIS — Z96.652 S/P TKR (TOTAL KNEE REPLACEMENT) USING CEMENT, LEFT: Primary | ICD-10-CM

## 2024-04-30 PROCEDURE — G0151 HHCP-SERV OF PT,EA 15 MIN: HCPCS | Mod: HHH

## 2024-04-30 PROCEDURE — 1090000001 HH PPS REVENUE CREDIT

## 2024-04-30 PROCEDURE — 1090000002 HH PPS REVENUE DEBIT

## 2024-04-30 RX ORDER — ONDANSETRON 4 MG/1
4 TABLET, ORALLY DISINTEGRATING ORAL EVERY 8 HOURS PRN
Qty: 20 TABLET | Refills: 0 | Status: SHIPPED | OUTPATIENT
Start: 2024-04-30

## 2024-04-30 SDOH — HEALTH STABILITY: PHYSICAL HEALTH: EXERCISE ACTIVITY: TKA PROTOCOL EXERCISES

## 2024-04-30 SDOH — HEALTH STABILITY: PHYSICAL HEALTH

## 2024-04-30 SDOH — HEALTH STABILITY: PHYSICAL HEALTH: EXERCISE ACTIVITIES SETS: 2

## 2024-04-30 SDOH — HEALTH STABILITY: PHYSICAL HEALTH: PHYSICAL EXERCISE: 10

## 2024-04-30 SDOH — HEALTH STABILITY: PHYSICAL HEALTH: EXERCISE TYPE: TKA PROTOCOL EXERCISES

## 2024-04-30 SDOH — HEALTH STABILITY: PHYSICAL HEALTH: RESISTANCE: AS TOLERATED

## 2024-04-30 SDOH — HEALTH STABILITY: PHYSICAL HEALTH: EXERCISE ACTIVITY: ACTIVE-ASSISTED LEFT KNEE ROM

## 2024-04-30 SDOH — HEALTH STABILITY: PHYSICAL HEALTH: EXERCISE ACTIVITIES SETS: 1

## 2024-04-30 SDOH — HEALTH STABILITY: PHYSICAL HEALTH: PHYSICAL EXERCISE: SITTING, STANDING, SUPINE

## 2024-04-30 SDOH — HEALTH STABILITY: PHYSICAL HEALTH: EXERCISE ACTIVITY: GAIT TRAINING

## 2024-04-30 ASSESSMENT — ENCOUNTER SYMPTOMS
PAIN LOCATION: LEFT KNEE
PAIN LOCATION - PAIN SEVERITY: 4/10
PAIN LOCATION - RELIEVING FACTORS: PAIN MEDS, ICING
PAIN: 1
PERSON REPORTING PAIN: PATIENT

## 2024-04-30 ASSESSMENT — ACTIVITIES OF DAILY LIVING (ADL)
AMBULATION_DISTANCE/DURATION_TOLERATED: 30 FT
AMBULATION ASSISTANCE ON FLAT SURFACES: 1

## 2024-05-01 PROCEDURE — 1090000002 HH PPS REVENUE DEBIT

## 2024-05-01 PROCEDURE — 1090000001 HH PPS REVENUE CREDIT

## 2024-05-01 NOTE — HOME HEALTH
S: Patient seen for routine follow-up PT visit today. Patient reported 4/10 pain to her left knee. Patient reported she has been performing the home exercises as planned twice daily.   O: Patient ambulated in the home using walker. Performed skilled therapeutic exercises 2 sets x 10 reps; gait training with walker x 30 FT.   A: Patient tolerated the treatment well today and was able to complete the exercise regimen with pain and moderate difficulty. Patient demonstrated ability to perform safe STS transfer from chair with hand support and initiate gait with hestiancy. Patient can expect consistent increase in strength and mobility by continuing with current exercise regimen. Removed dressing today. No signs of infection noted around the wound site.  P: Consider comorbid conditions when implementing and progressing POC. Continue current exercise regimen including skilled LE therapeutic exercises to improve gross strength and endurance, 2 x 10 reps; gait training with walker/cane in the home environment; balance and proprioceptive training emphasizing focus towards narrow base of support and vesticular challenges.

## 2024-05-02 ENCOUNTER — HOME CARE VISIT (OUTPATIENT)
Dept: HOME HEALTH SERVICES | Facility: HOME HEALTH | Age: 79
End: 2024-05-02
Payer: MEDICARE

## 2024-05-02 VITALS — SYSTOLIC BLOOD PRESSURE: 142 MMHG | RESPIRATION RATE: 18 BRPM | DIASTOLIC BLOOD PRESSURE: 80 MMHG | HEART RATE: 74 BPM

## 2024-05-02 PROCEDURE — 1090000001 HH PPS REVENUE CREDIT

## 2024-05-02 PROCEDURE — G0151 HHCP-SERV OF PT,EA 15 MIN: HCPCS | Mod: HHH

## 2024-05-02 PROCEDURE — 1090000002 HH PPS REVENUE DEBIT

## 2024-05-03 PROCEDURE — 1090000002 HH PPS REVENUE DEBIT

## 2024-05-03 PROCEDURE — 1090000001 HH PPS REVENUE CREDIT

## 2024-05-04 PROCEDURE — 1090000002 HH PPS REVENUE DEBIT

## 2024-05-04 PROCEDURE — 1090000001 HH PPS REVENUE CREDIT

## 2024-05-04 SDOH — HEALTH STABILITY: PHYSICAL HEALTH: EXERCISE TYPE: TKA PROTOCOL EXERCISES

## 2024-05-04 SDOH — HEALTH STABILITY: PHYSICAL HEALTH

## 2024-05-04 SDOH — HEALTH STABILITY: PHYSICAL HEALTH: EXERCISE ACTIVITY: TKA PROTOCOL EXERCISES

## 2024-05-04 SDOH — HEALTH STABILITY: PHYSICAL HEALTH: EXERCISE ACTIVITIES SETS: 2

## 2024-05-04 SDOH — HEALTH STABILITY: PHYSICAL HEALTH: EXERCISE ACTIVITIES SETS: 1

## 2024-05-04 SDOH — HEALTH STABILITY: PHYSICAL HEALTH: PHYSICAL EXERCISE: 10

## 2024-05-04 SDOH — HEALTH STABILITY: PHYSICAL HEALTH: EXERCISE ACTIVITY: STAIR TRAINING

## 2024-05-04 SDOH — HEALTH STABILITY: PHYSICAL HEALTH: RESISTANCE: AS TOLERATED

## 2024-05-04 SDOH — HEALTH STABILITY: PHYSICAL HEALTH: EXERCISE ACTIVITY: ACTIVE-ASSISTED LEFT KNEE ROM

## 2024-05-04 SDOH — HEALTH STABILITY: PHYSICAL HEALTH: PHYSICAL EXERCISE: 8

## 2024-05-04 SDOH — HEALTH STABILITY: PHYSICAL HEALTH: EXERCISE ACTIVITY: GAIT TRAINING

## 2024-05-04 SDOH — HEALTH STABILITY: PHYSICAL HEALTH: PHYSICAL EXERCISE: SUPINE, SITTING, STANDING

## 2024-05-04 ASSESSMENT — ENCOUNTER SYMPTOMS
PERSON REPORTING PAIN: PATIENT
PAIN LOCATION - PAIN SEVERITY: 3/10
PAIN LOCATION: LEFT KNEE
PAIN: 1

## 2024-05-04 ASSESSMENT — ACTIVITIES OF DAILY LIVING (ADL)
AMBULATION_DISTANCE/DURATION_TOLERATED: 40 FT
AMBULATION ASSISTANCE ON FLAT SURFACES: 1

## 2024-05-04 NOTE — HOME HEALTH
S: Patient seen for routine follow-up PT visit today. Patient reported 3/10 pain to her left knee. Patient reported she has been performing the home exercises as planned twice daily.   O: Patient ambulated in the home using walker. Performed skilled therapeutic exercises 2 sets x 10 reps; gait training with walker x 40 FT. Stair training 1 x 8 steps with support.  A: Patient tolerated the treatment well today and was able to complete the exercise regimen with pain and mild difficulty. Patient demonstrated ability to perform safe STS transfer from chair with hand support and initiate gait with hestiancy. Patient can expect consistent increase in strength and mobility by continuing with current exercise regimen.  P: Consider comorbid conditions when implementing and progressing POC. Continue current exercise regimen including skilled LE therapeutic exercises to improve gross strength and endurance, 2 x 10 reps; gait training with walker/cane in the home environment; balance and proprioceptive training emphasizing focus towards narrow base of support and vesticular challenges.

## 2024-05-05 PROCEDURE — 1090000001 HH PPS REVENUE CREDIT

## 2024-05-05 PROCEDURE — 1090000002 HH PPS REVENUE DEBIT

## 2024-05-06 ENCOUNTER — HOME CARE VISIT (OUTPATIENT)
Dept: HOME HEALTH SERVICES | Facility: HOME HEALTH | Age: 79
End: 2024-05-06
Payer: MEDICARE

## 2024-05-06 VITALS — DIASTOLIC BLOOD PRESSURE: 70 MMHG | RESPIRATION RATE: 18 BRPM | HEART RATE: 78 BPM | SYSTOLIC BLOOD PRESSURE: 138 MMHG

## 2024-05-06 PROCEDURE — 1090000002 HH PPS REVENUE DEBIT

## 2024-05-06 PROCEDURE — G0151 HHCP-SERV OF PT,EA 15 MIN: HCPCS | Mod: HHH

## 2024-05-06 PROCEDURE — 1090000001 HH PPS REVENUE CREDIT

## 2024-05-06 SDOH — HEALTH STABILITY: PHYSICAL HEALTH: EXERCISE ACTIVITY: GAIT TRAINING

## 2024-05-06 SDOH — HEALTH STABILITY: PHYSICAL HEALTH: EXERCISE ACTIVITY: STAIR TRAINING

## 2024-05-06 SDOH — HEALTH STABILITY: PHYSICAL HEALTH: EXERCISE ACTIVITIES SETS: 2

## 2024-05-06 SDOH — HEALTH STABILITY: PHYSICAL HEALTH: PHYSICAL EXERCISE: 10

## 2024-05-06 SDOH — HEALTH STABILITY: PHYSICAL HEALTH: RESISTANCE: AS TOLERATED

## 2024-05-06 SDOH — HEALTH STABILITY: PHYSICAL HEALTH

## 2024-05-06 SDOH — HEALTH STABILITY: PHYSICAL HEALTH: PHYSICAL EXERCISE: 8

## 2024-05-06 SDOH — HEALTH STABILITY: PHYSICAL HEALTH: EXERCISE ACTIVITY: TKA PROTOCOL EXERCISES

## 2024-05-06 SDOH — HEALTH STABILITY: PHYSICAL HEALTH: PHYSICAL EXERCISE: SUPINE, SITTING, STANDING

## 2024-05-06 SDOH — HEALTH STABILITY: PHYSICAL HEALTH: EXERCISE TYPE: TKA PROTOCOL EXERCISES

## 2024-05-06 SDOH — HEALTH STABILITY: PHYSICAL HEALTH: EXERCISE ACTIVITY: ACTIVE-ASSISTED ROM LEFT KNEE

## 2024-05-06 ASSESSMENT — ENCOUNTER SYMPTOMS
PAIN: 1
PAIN LOCATION: LEFT KNEE
PERSON REPORTING PAIN: PATIENT
PAIN LOCATION - PAIN SEVERITY: 2/10

## 2024-05-06 ASSESSMENT — ACTIVITIES OF DAILY LIVING (ADL)
AMBULATION ASSISTANCE ON FLAT SURFACES: 1
AMBULATION_DISTANCE/DURATION_TOLERATED: 40 FT

## 2024-05-06 NOTE — HOME HEALTH
S: Patient seen for routine follow-up PT visit today. Patient reported 2/10 pain to her left knee. Patient reported she has been performing the home exercises as planned twice daily.   O: Patient ambulated in the home using walker. Performed skilled therapeutic exercises 2 sets x 10 reps; gait training with walker x 40 FT. Stair training 2 x 8 steps with support.   A: Patient tolerated the treatment well today and was able to complete the exercise regimen with pain and mild difficulty. Patient demonstrated ability to perform safe STS transfer from chair with hand support and initiate gait with hestiancy. Patient can expect consistent increase in strength and mobility by continuing with current exercise regimen.   P: Consider comorbid conditions when implementing and progressing POC. Continue current exercise regimen including skilled LE therapeutic exercises to improve gross strength and endurance, 2 x 10 reps; gait training with walker/cane in the home environment; balance and proprioceptive training emphasizing focus towards narrow base of support and vesticular challenges.

## 2024-05-07 ENCOUNTER — HOME CARE VISIT (OUTPATIENT)
Dept: HOME HEALTH SERVICES | Facility: HOME HEALTH | Age: 79
End: 2024-05-07
Payer: MEDICARE

## 2024-05-07 VITALS — RESPIRATION RATE: 18 BRPM

## 2024-05-07 PROCEDURE — G0151 HHCP-SERV OF PT,EA 15 MIN: HCPCS | Mod: HHH

## 2024-05-07 PROCEDURE — 1090000001 HH PPS REVENUE CREDIT

## 2024-05-07 PROCEDURE — 1090000002 HH PPS REVENUE DEBIT

## 2024-05-07 SDOH — HEALTH STABILITY: PHYSICAL HEALTH: EXERCISE TYPE: TKA HEP

## 2024-05-07 ASSESSMENT — ACTIVITIES OF DAILY LIVING (ADL)
AMBULATION ASSISTANCE ON FLAT SURFACES: 1
OASIS_M1830: 00
AMBULATION_DISTANCE/DURATION_TOLERATED: 150 FT
HOME_HEALTH_OASIS: 01

## 2024-05-07 ASSESSMENT — ENCOUNTER SYMPTOMS
LOWEST PAIN SEVERITY IN PAST 24 HOURS: 4/10
PAIN LOCATION - PAIN SEVERITY: 4/10
PAIN: 1
PAIN LOCATION: LEFT KNEE
PAIN SEVERITY GOAL: 0/10
PERSON REPORTING PAIN: PATIENT
SUBJECTIVE PAIN PROGRESSION: GRADUALLY IMPROVING
HIGHEST PAIN SEVERITY IN PAST 24 HOURS: 6/10
PAIN LOCATION - RELIEVING FACTORS: PAIN MEDS, ICING

## 2024-05-07 NOTE — Clinical Note
Patient has been discharged from home health therapy today. Patient will be continuing PT services in outpatient PT setting.

## 2024-05-08 ENCOUNTER — EVALUATION (OUTPATIENT)
Dept: PHYSICAL THERAPY | Facility: CLINIC | Age: 79
End: 2024-05-08
Payer: MEDICARE

## 2024-05-08 DIAGNOSIS — M17.12 UNILATERAL PRIMARY OSTEOARTHRITIS, LEFT KNEE: ICD-10-CM

## 2024-05-08 PROCEDURE — 97110 THERAPEUTIC EXERCISES: CPT | Mod: GP

## 2024-05-08 PROCEDURE — G0180 MD CERTIFICATION HHA PATIENT: HCPCS | Performed by: ORTHOPAEDIC SURGERY

## 2024-05-08 PROCEDURE — 97161 PT EVAL LOW COMPLEX 20 MIN: CPT | Mod: GP

## 2024-05-08 PROCEDURE — 97535 SELF CARE MNGMENT TRAINING: CPT | Mod: GP

## 2024-05-08 ASSESSMENT — ENCOUNTER SYMPTOMS
OCCASIONAL FEELINGS OF UNSTEADINESS: 0
LOSS OF SENSATION IN FEET: 0
DEPRESSION: 0

## 2024-05-08 ASSESSMENT — PAIN SCALES - GENERAL: PAINLEVEL_OUTOF10: 3

## 2024-05-08 NOTE — PROGRESS NOTES
Physical Therapy  Physical Therapy Orthopedic Evaluation    Patient Name: Zarina Holliday  MRN: [id number]  Today's Date: 2024  Time Calculation  Start Time: 815  Stop Time: 915  Time Calculation (min): 60 min    Insurance:  Visit number: 1 of [place]  Authorization info: No Auth  Insurance Type: Medicare and  for Life  Medicare Certification Period: Beginnin24 Endin24  Onset date: 24    General:  Reason for visit: S/P L TKA  Referred by: Moses Schneider MD    Current Problem:  1. Unilateral primary osteoarthritis, left knee  Referral to Physical Therapy    Follow Up In Physical Therapy    Follow Up In Physical Therapy          Precautions:   Precautions  STEADI Fall Risk Score (The score of 4 or more indicates an increased risk of falling): 5      Medical History Form: Reviewed (scanned into chart)    Subjective:   Subjective   Chief Complaint: Patient presents to clinic following L knee TKA 24.  Had knee pain x 4 years prior to surgery. Proceeded to surgery when pain persisted despite previous PT and injections.  Since surgery, she has made gradual gains in motion.  She has progressed from a walker to a cane for ambulation.  She has been diligent with home PT exercises and use of her ice and compression machine.  She denies any calf pain or warmth.  She has been wearing compression socks, diligently performing ankle pumps and taking 2 baby aspirin daily.    Onset Date: 24    Current Condition:   Better    Pain:  Pain level currently: Pain Score: 3/10  Pain level at worst: 8-9/10  Pain level at best: 2-3/10  Location: Left Knee   Description: tingling near incision, tightness in knee, cramps/era horses in quadriceps, generalized tiredness  Aggravating Factors: Unsure  Relieving Factors: Ice machine, Tylenol    Relevant Information:  PMH:   Previous Tests/Imaging: x-rays  Previous Interventions/Treatments: Home PT x 6 sessions.  Completed yesterday    Prior Level of  "Function (PLOF)  Patient previously independent with all ADLs. Currently at 10% of PLOF.  Functional limitations: Ambulates with cane.   Exercise/Physical Activity: Previously very active.  Yoga 3-5x/week, Mat Pilates 3x/week, swimming 3x/week, regular walking  Work/School: Retired CPA    Patients Living Environment: Condo.  3 steps to enter with handrail.  Elevator. Lives with spouse. Walk in shower. Raises toilet seat.     Primary Language: English    Patient's Goal(s) for Therapy: Restore ROM, Return to PLOF    Red Flags: Do you have any of the following?   No: Fever/chills, unexplained weight changes, dizziness/fainting, unexplained change in bowel or bladder functions, unexplained malaise or muscle weakness, night pain/sweats,   Yes: numbness or tingling (near incisional area)      Objective:  Objective     Posture: WNL    Gait: Ambulates slowly with use of standard cane     Observation: Incisional scar covered with steri-strips.  No drainage, johanny-incisional redness or signs of infection    Palpation: (-) tenderness or warmth to L calf        GIRTH (cm):       (R)  (L)  Midpatellar:  40.5  43.5       ROM    Knee AROM (Degrees)       (R)  (L)  Flexion:  142  100      Extension:  0  0         Knee PROM (Degrees)       (R)  (L)  Flexion:  142  100      Extension:  0  0         Ankle AROM (Degrees)       (R)  (L)  Plantarflexion:  WNL  WNL      Dorsiflexion:  WNL  WNL       Inversion:  WNL  WNL        Eversion:  WNL  WNL               Strength Testing    Hip     (R)  (L)  Flexion:  4+  3              Knee     (R)  (L)  Flexion:  5  4      Extension:  5  3; SLR without quadriceps lag           Flexibility       (R)  (L)  Hamstrings:  long  long  Hip Flexors:  WNL  WNL  Quadriceps:  NT  NT       Functional Movement  Sit to stand: UE assist, unweights LLE moderately  Squat: Reduced depth  Bed Mobility: Independent       Balance    Feet Together, Eyes Closed: WNL  Tandem Balance: (R post): >10\" (L post): 5\", mild " sway         Patella Mobility: good med-lat mobility L knee        Outcome Measures:  Other Measures  Lower Extremity Funtional Score (LEFS): 38/80       Goals: Set and discussed today  Active       PT Problem       PT Goal 1       Start:  05/08/24    Expected End:  06/19/24       Left knee ROM 0-110 and without pain > 2/10 by week 6.  LE strength measures improved to at least 4/5 by week 6.  Stable gait without assistive device by week 6.  L knee girth within 1 cm or R, indicative of reduced swelling by week 6.          PT Goal 2       Start:  05/08/24    Expected End:  08/06/24       Left knee ROM 0-120 and without pain > 2/10 by week 12.  LE strength measures improved to at least 5/5 by week 12.  Walk 30 minutes with normalized gait mechanics and without assistive device by week 12.  Reciprocal ascent/descent of stairs with use of railing by week 12.  Squat with good form and without pain > 2/10 by week 12.   LEFS improved by at least 18 points by week 12.   Patient will rate pain < 3/10 at worst to improve ADL tolerance by week 12.  Independent with home exercise program for symptom reduction and functional gains by week 12.              Plan of care was developed with input and agreement by the patient    Treatments:     Self Care instruction:   10 min  Education: home exercise program, plan of care, edema management, signs and symptoms of inflammation, signs and symptoms of DVT (patient denies experiencing these), falls risk, use of cane, proper cane height  (recommend purchase of adjustable cane as her current cane is too high)       Therapeutic Exercise:    20 min  Instructed in initial home exercise program (Handout provided)    Personalized Home Exercise Program:   Access Code: [email or web address]  URL: [email or web address]  Date: 05/08/2024  Prepared by: Shira Wallace    Exercises  - Supine Active Ankle Pumps  - 4-5 x daily - 7 x weekly - 20 reps  - Long Sitting Quad Set  - 3 x daily - 7  x weekly - 20 reps - 5 seconds hold  - Supine Heel Slide (Mirrored)  - 3 x daily - 7 x weekly - 20 reps  - Small Range Straight Leg Raise  - 2 x daily - 7 x weekly - 3 sets - 10 reps  - Standing Heel Raise with Support  - 1-2 x daily - 7 x weekly - 3 sets - 10 reps  - Standing Knee Flexion (Mirrored)  - 1-2 x daily - 7 x weekly - 3 sets - 10 reps  - Standing Hip Abduction with Counter Support  - 1-2 x daily - 7 x weekly - 3 sets - 10 reps  - Mini Squat with Counter Support  - 1-2 x daily - 7 x weekly - 3 sets - 10 reps      Assessment: Patient presents to PT 2 weeks s/p L cemented TKA.  Upon examination, patient demonstrates reduced knee flexion ROM, reduced LLE strength, and swelling.  These impairments are resulting in difficulty with gait, stairs, sit-stand, squatting, and performance of ADLs and recreational activities.  Patient would benefit from course of PT to address these impairments, reduce pain, and assist patient in returning to OF.           Plan:     Planned Interventions include: therapeutic exercise, self-care home management, manual therapy, therapeutic activities, gait training, neuromuscular coordination, vasopneumatic, aquatic therapy (prn following full closure of incision)  Frequency: 1-2 x Week  Duration: 12 Weeks      Shira Wallace, PT

## 2024-05-08 NOTE — HOME HEALTH
S: Patient seen for discharge visit today. Patient reported left knee pain of 4/10. Patient reported she has been performing the home exercises as planned twice daily.    O: Patient ambulated in the home using walker. Performed skilled therapeutic exercises 2 sets x 10 reps; gait training with cane x 150 FT.    A: Patient tolerated the treatment well today and was able to complete the exercise regimen, but with mild pain and difficulty. Patient demonstrated ability to perform safe STS transfer from chair with hand support and initiate gait with hestiancy.    P: Patient has been discharged from home health therapy today. Patient will be continuing PT services in outpatient PT setting.

## 2024-05-10 ENCOUNTER — TREATMENT (OUTPATIENT)
Dept: PHYSICAL THERAPY | Facility: CLINIC | Age: 79
End: 2024-05-10
Payer: MEDICARE

## 2024-05-10 DIAGNOSIS — M17.12 UNILATERAL PRIMARY OSTEOARTHRITIS, LEFT KNEE: ICD-10-CM

## 2024-05-10 PROCEDURE — 97112 NEUROMUSCULAR REEDUCATION: CPT | Mod: GP,CQ

## 2024-05-10 PROCEDURE — 97016 VASOPNEUMATIC DEVICE THERAPY: CPT | Mod: GP,CQ

## 2024-05-10 PROCEDURE — 97110 THERAPEUTIC EXERCISES: CPT | Mod: GP,CQ

## 2024-05-10 ASSESSMENT — PAIN - FUNCTIONAL ASSESSMENT: PAIN_FUNCTIONAL_ASSESSMENT: 0-10

## 2024-05-10 ASSESSMENT — PAIN SCALES - GENERAL: PAINLEVEL_OUTOF10: 2

## 2024-05-10 NOTE — PROGRESS NOTES
Physical Therapy  Physical Therapy  Physical Therapy Treatment    Patient Name: Zarina Holliday  MRN: [id number]  Today's Date: 5/10/2024  Time Calculation  Start Time: 0702  Stop Time: 0823  Time Calculation (min): 81 min    Insurance:  Visit number:  2 of [place]  Authorization info: no auth   Insurance Type:  Medicare and  for Life   Cert date start:  5/8/24   Cert date end:  8/6/24                General   Reason for visit: S/P L TKA  Referred by: Moses Schneider MD    Current Problem  1. Unilateral primary osteoarthritis, left knee  Follow Up In Physical Therapy          Precautions  STEADI Fall Risk Score (The score of 4 or more indicates an increased risk of falling): 5    Pain Assessment: 0-10  Pain Score: 2    Subjective:   Patient reports doing well exercise wise and states that they want to get on the floor as soon as possible and that they are having some trouble sleeping.     HEP Performed:  Yes    Objective:   Knee flexion  degrees (POST EXERCISE)  Knee extension LLE 0 degrees (POST EXERCISE)  Girth CM 44 (POST EXERCISE)     Treatments:   Recumbent bike 5'  HEP REVIEW  Calve stretch 1 ' x2   Squats 2x10   Heel raises 2x10   Seated cybex hip abduction 3x10 17.5#  Seated leg curl machine 3x8  Hamstring stretch on steps 1' x2  Hurdles tall hurdles  4x laps   Supine slides 2x10  Quad sets 2x10  Vaso 15'  Sleeping education (sleeping with thick pillow between the knees)   ROM education     Charges: vaso x1 TE x3 NM x1    Assessment:   Pt tolerated longer treatment session well today with no increases in pain. Measurements were performed post exercise and girth measurement was 0.5 CM more than evaluation but expected due to exercise.     Plan:   Continue to progress knee flexion and extension to tolerance of swelling.     Nilo Rg, PTA

## 2024-05-14 ENCOUNTER — TREATMENT (OUTPATIENT)
Dept: PHYSICAL THERAPY | Facility: CLINIC | Age: 79
End: 2024-05-14
Payer: MEDICARE

## 2024-05-14 DIAGNOSIS — M17.12 UNILATERAL PRIMARY OSTEOARTHRITIS, LEFT KNEE: ICD-10-CM

## 2024-05-14 PROCEDURE — 97110 THERAPEUTIC EXERCISES: CPT | Mod: GP,CQ

## 2024-05-14 ASSESSMENT — PAIN SCALES - GENERAL: PAINLEVEL_OUTOF10: 1

## 2024-05-14 ASSESSMENT — PAIN - FUNCTIONAL ASSESSMENT: PAIN_FUNCTIONAL_ASSESSMENT: 0-10

## 2024-05-14 NOTE — PROGRESS NOTES
Physical Therapy  Physical Therapy  Physical Therapy Treatment    Patient Name: Zarina Holliday  MRN: [id number]  Today's Date: 5/14/2024  Time Calculation  Start Time: 1030  Stop Time: 1113  Time Calculation (min): 43 min    Insurance:  Visit number:  3 of [place]  Authorization info: no auth   Insurance Type:  Medicare and  for Life   Cert date start:  5/8/24   Cert date end:  8/6/24                General   Reason for visit: S/P L TKA  Referred by: Moses Schneider MD    Current Problem  1. Unilateral primary osteoarthritis, left knee  Follow Up In Physical Therapy        Precautions  STEADI Fall Risk Score (The score of 4 or more indicates an increased risk of falling): 5  Pain Assessment: 0-10  Pain Score: 1    Subjective:   Patient reports doing well exercise wise and states that they want to get on the floor as soon as possible and that they are having some trouble sleeping.     HEP Performed:  Yes    Objective:   Knee flexion LLE pre 96 degrees  LLE post (POST EXERCISE) 114 degrees   Knee extension LLE pre -1 degrees    (POST EXERCISE) -1 degrees   LLE Girth pre CM 44.5  (POST EXERCISE) 44.5 CM  RLE Girth pre CM 41.5   (POST EXERCISE) 41.5 CM     Treatments:   Recumbent bike 5'  Calve stretch 1'x2  Seated cybex hip abduction 3x10 25#  Standing knee flexion ROM bending 1'   Seated leg curl machine 3x10 15# one set 10# 2 sets   SLR 10x2  Hamstring stretch on steps 1' x2  Heel slides x10     Charges: TE x3     Assessment:   Pt tolerated treatment session well with no increase pain in knee but some minor increases in hip and ankle pain during ambulation around the clinic.     Plan:   Continue to progress ROM knee flexion and monitor swelling bilaterally.     Nilo Rg, PTA

## 2024-05-16 ENCOUNTER — TREATMENT (OUTPATIENT)
Dept: PHYSICAL THERAPY | Facility: CLINIC | Age: 79
End: 2024-05-16
Payer: MEDICARE

## 2024-05-16 DIAGNOSIS — M17.12 UNILATERAL PRIMARY OSTEOARTHRITIS, LEFT KNEE: ICD-10-CM

## 2024-05-16 PROCEDURE — 97110 THERAPEUTIC EXERCISES: CPT | Mod: GP,CQ

## 2024-05-16 ASSESSMENT — PAIN SCALES - GENERAL: PAINLEVEL_OUTOF10: 2

## 2024-05-16 ASSESSMENT — PAIN - FUNCTIONAL ASSESSMENT: PAIN_FUNCTIONAL_ASSESSMENT: 0-10

## 2024-05-16 NOTE — PROGRESS NOTES
"Physical Therapy  Physical Therapy  Physical Therapy Treatment    Patient Name: Zarina Holliday  MRN: [id number]  Today's Date: 5/16/2024  Time Calculation  Start Time: 1115  Stop Time: 1157  Time Calculation (min): 42 min    Insurance:  [place]:  [place]  Authorization info: no auth   Insurance Type:  Medicare and  for Life   Cert date start:  5/8/24   Cert date end:  8/6/24                General   Reason for visit: S/P L TKA  Referred by: Moses Schneider MD    Current Problem  1. Unilateral primary osteoarthritis, left knee  Follow Up In Physical Therapy        Precautions  STEADI Fall Risk Score (The score of 4 or more indicates an increased risk of falling): 5  Pain Assessment: 0-10  Pain Score: 2    Subjective:   Patient reports sleeping better last night than in the past. Pt reports going on a 3 hour drive a week from today and asked for tips to be able to better tolerate that duration.     HEP Performed:  Yes    Objective:   Knee flexion LLE pre 104 degrees  LLE post (POST EXERCISE) 112 degrees   Knee extension LLE pre -1 degrees    (POST EXERCISE) -1 degrees   LLE Girth pre CM 43.5  (POST EXERCISE)  43.5 CM  RLE Girth pre CM 40.5   (POST EXERCISE) 40.5  CM     Treatments:   Heel slides 2x10  Calve stretch 1'  Soleus stretch 1'  Recumbent bike 5'  Hamstring stretch 1'   Knee flexion stretch on step 1'   Seated cybex hip abduction 3x8 32.5#  Standing TKE blue band 5\"x10  Seated leg curl machine 3x10 15#   Heel slides x5 supine  Charges: TE x3     Assessment:   Pt was able to tolerate 3x10 leg curl machine with 15# whereas in last appointment the last set could not be completed. Pt showed decrease in knee swelling by 1 CM in visit today compared to previous day.     Plan:   Continue to progress ROM knee flexion and monitor swelling bilaterally.     Nilo Rg, PTA  "

## 2024-05-20 ENCOUNTER — TREATMENT (OUTPATIENT)
Dept: PHYSICAL THERAPY | Facility: CLINIC | Age: 79
End: 2024-05-20
Payer: MEDICARE

## 2024-05-20 DIAGNOSIS — M17.12 UNILATERAL PRIMARY OSTEOARTHRITIS, LEFT KNEE: ICD-10-CM

## 2024-05-20 PROCEDURE — 97110 THERAPEUTIC EXERCISES: CPT | Mod: GP,KX

## 2024-05-20 ASSESSMENT — PAIN SCALES - GENERAL: PAINLEVEL_OUTOF10: 2

## 2024-05-20 NOTE — PROGRESS NOTES
"  Physical Therapy  Physical Therapy Treatment Note    Patient Name: Zarina Holliday  MRN: [id number]  Today's Date: 2024  Time Calculation  Start Time: 1453  Stop Time: 1535  Time Calculation (min): 42 min    Insurance:  [place]:  [place]  Authorization info: no auth   Insurance Type:  Medicare and  for Life   Medicare Certification Period: Beginnin24 Endin24  Onset date: 24     General:  Reason for visit: S/P L TKA  Referred by: Moses Schneider MD    Current Problem  1. Unilateral primary osteoarthritis, left knee  Follow Up In Physical Therapy          Precautions: ERICHADI Fall Risk Score (The score of 4 or more indicates an increased risk of falling): 5       Subjective:     Patient reports that she is doing well. Starting to feel more like her self.  Takes Tylenol to manage pain.  Has continued with Aspirin. Has been consistently icing. Pain typically about 2-3/10.  Some stiffness. Doing stairs daily to get in and out of home.  6 stairs with railing.     Seeing PCP and cardiologist tomorrow.     Pain  Pain Score: 2    Performing HEP?: Yes,        Objective:   Posture: WNL     Gait: Ambulates slowly with use of standard cane      Observation: Incisional scar covered with steri-strips.  No drainage, johanny-incisional redness or signs of infection    Palpation: (-) calf tenderness or warmth.          ROM     Knee AROM (Degrees)                                         (R)                    (L)  Flexion:                        142                  122                                           Extension:                    0                      0                Treatment Performed:    Therapeutic Exercise:    42 min  R. Bike L3 6'  Incline calf stretch 1'  Step-ups L 6\"x 20  Step-downs L 6\" x20   Leg Press 60# bilaterally 3x10  HS curls 15# 3x10  Cybex seated hip abduction 32.5# 3x10  Heel raises 3x10 bilaterally  Heel slides with strap x 2'         Personalized Home Exercise " Program:  Access Code: [email or web address]  URL: [email or web address]  Date: 05/08/2024  Prepared by: Shira Wallace     Exercises  - Supine Active Ankle Pumps  - 4-5 x daily - 7 x weekly - 20 reps  - Long Sitting Quad Set  - 3 x daily - 7 x weekly - 20 reps - 5 seconds hold  - Supine Heel Slide (Mirrored)  - 3 x daily - 7 x weekly - 20 reps  - Small Range Straight Leg Raise  - 2 x daily - 7 x weekly - 3 sets - 10 reps  - Standing Heel Raise with Support  - 1-2 x daily - 7 x weekly - 3 sets - 10 reps  - Standing Knee Flexion (Mirrored)  - 1-2 x daily - 7 x weekly - 3 sets - 10 reps  - Standing Hip Abduction with Counter Support  - 1-2 x daily - 7 x weekly - 3 sets - 10 reps  - Mini Squat with Counter Support  - 1-2 x daily - 7 x weekly - 3 sets - 10 reps         Assessment:   4 weeks s/p L TKA.  Progressing very nicely with gains in ROM, improvements in gait and overall function and reduction in swelling.  Still has moderate swelling into the lower leg, but no signs or symptoms concerning for DVT. Obtained 122 degrees of flexion with ease today.  Also performed step-up and step downs with ease.  No difficulty with addition of new weight exercises.      Plan:  Continue to progress per plan with ROM, strength, and functional recovery. Incorporate additional balance and gait activities next session.       Shira Wallace, PT

## 2024-05-21 ENCOUNTER — OFFICE VISIT (OUTPATIENT)
Dept: CARDIOLOGY | Facility: HOSPITAL | Age: 79
End: 2024-05-21
Payer: MEDICARE

## 2024-05-21 VITALS — SYSTOLIC BLOOD PRESSURE: 130 MMHG | HEART RATE: 72 BPM | DIASTOLIC BLOOD PRESSURE: 75 MMHG

## 2024-05-21 DIAGNOSIS — E78.5 HYPERLIPIDEMIA, UNSPECIFIED HYPERLIPIDEMIA TYPE: Primary | ICD-10-CM

## 2024-05-21 PROCEDURE — 1159F MED LIST DOCD IN RCRD: CPT | Performed by: INTERNAL MEDICINE

## 2024-05-21 PROCEDURE — 1160F RVW MEDS BY RX/DR IN RCRD: CPT | Performed by: INTERNAL MEDICINE

## 2024-05-21 PROCEDURE — 99214 OFFICE O/P EST MOD 30 MIN: CPT | Performed by: INTERNAL MEDICINE

## 2024-05-21 PROCEDURE — 1036F TOBACCO NON-USER: CPT | Performed by: INTERNAL MEDICINE

## 2024-05-21 NOTE — PROGRESS NOTES
Subjective:  Patient returns for a postop follow-up.  She is a pleasant elderly female we follow for some hyperlipidemia.  She did have a minimally elevated coronary calcium score of 11.88.  She did have carotid ultrasounds for a possible remote TIA.  This revealed some limited plaque but no significant stenoses.    She got through her knee surgery uneventfully and is recovering quite well at this time.  She denies any chest pain or dyspnea and is starting to escalate her activity level.  She denies any palpitations or syncope.  She denies any stroke or TIA symptomatology.  She remains on her aspirin therapy.    Review of her history does reveal that she appears to have had some trouble tolerating a statin in the past.  She will get me the name of the statin and the dose that she took previously.  It appears this may have caused from GI upset rather than myalgias.    Objective:  General: Alert, usual delightful self.  HEENT: Unchanged.  Lungs: Clear with good air exchange no crackles or wheezing.  Cardiac: Normal S1 and S2 with unchanged 1/6 systolic murmur.  Abdomen: Nontender with normal bowel sounds.  Extremities: No edema.  Skin: No acute rash.  Neuro: Grossly intact.    Impression/plan:  Zarina is making a very good recovery early after left knee replacement surgery.  Her blood pressure remains in good range, so we do not need to initiate any antihypertensive therapy.  Given her difficulty tolerating statins in the past, I will hold on initiating therapy at this time.  Of note, her LDL was moderately elevated and she did have a minimally elevated coronary calcium score so I do suspect we may want to consider this in the not-too-distant future.  I would do this particularly given some documented carotid plaque.    I elected to embark on no repeat testing at this time, and I will see her back in 4 months.  I will reassess her lipid panel at that time.  Should her LDL remain significantly elevated, I will plan on  probably initiating rosuvastatin at a very low dose.    She knows to call for any intercurrent concerns before next visit.    Patient instructions:    Continue current medications unchanged.    Return to clinic in 4 months.    Obtain repeat lipid panel just before your next visit.

## 2024-05-22 ENCOUNTER — TREATMENT (OUTPATIENT)
Dept: PHYSICAL THERAPY | Facility: CLINIC | Age: 79
End: 2024-05-22
Payer: MEDICARE

## 2024-05-22 DIAGNOSIS — M17.12 UNILATERAL PRIMARY OSTEOARTHRITIS, LEFT KNEE: ICD-10-CM

## 2024-05-22 PROCEDURE — 97110 THERAPEUTIC EXERCISES: CPT | Mod: GP,KX

## 2024-05-22 ASSESSMENT — PAIN SCALES - GENERAL: PAINLEVEL_OUTOF10: 0 - NO PAIN

## 2024-05-22 NOTE — PROGRESS NOTES
"  Physical Therapy  Physical Therapy Treatment Note    Patient Name: Zarina Holliday  MRN: [id number]  Today's Date: 2024  Time Calculation  Start Time: 1315  Stop Time: 1402  Time Calculation (min): 47 min    Insurance:  [place]:  6 of [place]  Authorization info: no auth   Insurance Type:  Medicare and  for Life   Medicare Certification Period: Beginnin24 Endin24  Onset date: 24     General:  Reason for visit: S/P L TKA  Referred by: Moses Schneider MD    Current Problem  1. Unilateral primary osteoarthritis, left knee  Follow Up In Physical Therapy            Precautions: ERICHADI Fall Risk Score (The score of 4 or more indicates an increased risk of falling): 5       Subjective:     Patient reports that she is doing well.  Notes that she gets jittery legs which makes it hard to fall asleep. Dr. Putnam prescribed low dose Gabapentin.  Not having knee pain.  Some tightness.      Now using a reciprocal pattern consistently on stairs.     Saw cardiologist, and was not concerned about level of swelling.    Pain  Pain Score: 0 - No pain    Performing HEP?: Yes,        Objective:   Posture: WNL     Gait: Ambulates slowly with use of standard cane      Observation: Incisional scar covered with steri-strips.  No drainage, johanny-incisional redness or signs of infection    Palpation: (-) calf tenderness or warmth.          ROM     Knee AROM (Degrees)                                         (R)                    (L)  Flexion:                        142                  125                                           Extension:                    0                      0                Treatment Performed:    Therapeutic Exercise:    37 min  Upright Bike L3 5'  Incline calf stretch 1'  Step-downs L 6\" x20   Leg Press 70# bilaterally 3x10  HS curls 15# 3x10  Cybex seated hip abduction 32.5# 3x10  Heel raises 3x10 bilaterally  Tball bridge 5\" hold 1'x2  Heel slides with strap x 2'     Gait " Trainin min  Low cecil fwd step-overs 2'  Low hurdles lateral step-overs 1'     Neuromuscular Re-education:  5 min  SLS on LLE 1'  Air-ex MIP 2'      Personalized Home Exercise Program:  Access Code: [email or web address]  URL: [email or web address]  Date: 2024  Prepared by: Shira Wallace     Exercises  - Supine Active Ankle Pumps  - 4-5 x daily - 7 x weekly - 20 reps  - Long Sitting Quad Set  - 3 x daily - 7 x weekly - 20 reps - 5 seconds hold  - Supine Heel Slide (Mirrored)  - 3 x daily - 7 x weekly - 20 reps  - Small Range Straight Leg Raise  - 2 x daily - 7 x weekly - 3 sets - 10 reps  - Standing Heel Raise with Support  - 1-2 x daily - 7 x weekly - 3 sets - 10 reps  - Standing Knee Flexion (Mirrored)  - 1-2 x daily - 7 x weekly - 3 sets - 10 reps  - Standing Hip Abduction with Counter Support  - 1-2 x daily - 7 x weekly - 3 sets - 10 reps  - Mini Squat with Counter Support  - 1-2 x daily - 7 x weekly - 3 sets - 10 reps         Assessment:   4 1/2 weeks s/p L TKA.  Progressing very nicely with gains in ROM and improvements in function.  Continues to demonstrate improvements in gait, and is safe to wean from use of her cane.      Plan:  Continue to progress per plan with ROM, strength, and functional recovery.     Shira Wallace, PT

## 2024-05-31 ENCOUNTER — TREATMENT (OUTPATIENT)
Dept: PHYSICAL THERAPY | Facility: CLINIC | Age: 79
End: 2024-05-31
Payer: MEDICARE

## 2024-05-31 DIAGNOSIS — M17.12 UNILATERAL PRIMARY OSTEOARTHRITIS, LEFT KNEE: ICD-10-CM

## 2024-05-31 PROCEDURE — 97110 THERAPEUTIC EXERCISES: CPT | Mod: GP,CQ

## 2024-05-31 PROCEDURE — 97140 MANUAL THERAPY 1/> REGIONS: CPT | Mod: GP,CQ

## 2024-05-31 ASSESSMENT — PAIN - FUNCTIONAL ASSESSMENT: PAIN_FUNCTIONAL_ASSESSMENT: 0-10

## 2024-05-31 ASSESSMENT — PAIN SCALES - GENERAL: PAINLEVEL_OUTOF10: 4

## 2024-05-31 NOTE — PROGRESS NOTES
Physical Therapy  Physical Therapy Treatment Note    Patient Name: Zarina Holliday  MRN: [id number]  Today's Date: 2024  Time Calculation  Start Time: 744  Stop Time: 828  Time Calculation (min): 44 min    Insurance:  [place]:  [place] of [place]  Authorization info: no auth   Insurance Type:  Medicare and  for Life   Medicare Certification Period: Beginnin24 Endin24  Onset date: 24     General:  Reason for visit: S/P L TKA  Referred by: Moses Schneider MD    Current Problem  1. Unilateral primary osteoarthritis, left knee  Follow Up In Physical Therapy            Precautions: ERICHADI Fall Risk Score (The score of 4 or more indicates an increased risk of falling): 5       Subjective:   Patient reports going on trip and having a lot of stiffness due to long commutes to the location  being a 2.5 hour car ride but stopping multiple times to get out and stretch.     Pain  Pain Assessment: 0-10  Pain Score: 4    Performing HEP?: Yes,        Objective:        ROM     Knee AROM (Degrees)                                         (R)                    (L)  Flexion:                        140                  120                                           Extension:                    0                      0                Treatment Performed:    Therapeutic Exercise:    30 min  Upright Bike L3 5'  Calve stretch on rocker board 1'x2   Hamstring stretch supine with strap 1'  Quad sets x10  HS curls standing with UE support 2x10   Hip abduction standing x10   Heel slides x10 on glass board with stretch strap    Manual :     10 min  PA tibial mobilizations (gentle) 5'   STM to medial and lateral portions of posterior knee on hamstring attachment sites 5'     Neuromuscular Re-education:  4 min  SLS on LLE 2'  Air-ex MIP 2'      Personalized Home Exercise Program:    Access Code: [email or web address]  URL: [email or web address]  Date: 2024  Prepared by: Shira Wallace      Exercises  - Supine Active Ankle Pumps  - 4-5 x daily - 7 x weekly - 20 reps  - Long Sitting Quad Set  - 3 x daily - 7 x weekly - 20 reps - 5 seconds hold  - Supine Heel Slide (Mirrored)  - 3 x daily - 7 x weekly - 20 reps  - Small Range Straight Leg Raise  - 2 x daily - 7 x weekly - 3 sets - 10 reps  - Standing Heel Raise with Support  - 1-2 x daily - 7 x weekly - 3 sets - 10 reps  - Standing Knee Flexion (Mirrored)  - 1-2 x daily - 7 x weekly - 3 sets - 10 reps  - Standing Hip Abduction with Counter Support  - 1-2 x daily - 7 x weekly - 3 sets - 10 reps  - Mini Squat with Counter Support  - 1-2 x daily - 7 x weekly - 3 sets - 10 reps         Assessment:   Pt tolerated treatment session well today with minor increases in pain in the back of the knee during at the knee flexor attachment points laterally and medially. Focus of today's treatment was pain relief and strengthening.     Plan:  Attempt side stepping with yellow band in next visit.     Nilo Rg, PTA

## 2024-06-03 ENCOUNTER — TREATMENT (OUTPATIENT)
Dept: PHYSICAL THERAPY | Facility: CLINIC | Age: 79
End: 2024-06-03
Payer: MEDICARE

## 2024-06-03 DIAGNOSIS — M17.12 UNILATERAL PRIMARY OSTEOARTHRITIS, LEFT KNEE: ICD-10-CM

## 2024-06-03 PROCEDURE — 97110 THERAPEUTIC EXERCISES: CPT | Mod: GP

## 2024-06-03 PROCEDURE — 97140 MANUAL THERAPY 1/> REGIONS: CPT | Mod: GP

## 2024-06-03 ASSESSMENT — PAIN SCALES - GENERAL: PAINLEVEL_OUTOF10: 4

## 2024-06-03 NOTE — PROGRESS NOTES
Physical Therapy  Physical Therapy Treatment Note    Patient Name: Zarina Holliday  MRN: [id number]  Today's Date: 6/3/2024  Time Calculation  Start Time: 831  Stop Time: 916  Time Calculation (min): 45 min    Insurance:  [place]:  [place] of [place]  Authorization info: no auth   Insurance Type:  Medicare and  for Life   Medicare Certification Period: Beginnin24 Endin24  Onset date: 24     General:  Reason for visit: S/P L TKA  Referred by: Moses Schneider MD    Current Problem  1. Unilateral primary osteoarthritis, left knee  Follow Up In Physical Therapy            Precautions: ERICHADI Fall Risk Score (The score of 4 or more indicates an increased risk of falling): 5       Subjective:     Patient reports that she has had more pain this past week.  Not sure if it is related to the long car ride (2.5 hours).  It has felt stiff.  She has continued to do her exercises with diligence.  She notes the most discomfort at night.  She does ice her knee before bedtime.     Has her 6 week follow-up with Dr. Schneider on Thursday.     Pain  Pain Score: 4    Performing HEP?: Yes,        Objective:   Posture: WNL     Gait: Ambulates with stable gait without an assistive device.        Observation: Incisional scar covered with steri-strips.  No drainage, johanny-incisional redness or signs of infection    Palpation: (-) calf tenderness or warmth.          ROM     Knee AROM (Degrees)                                         (R)                    (L)  Flexion:                        142                  120 pre-treatment; 126 post treatment                                           Extension:                    0                      0                Treatment Performed:    Therapeutic Exercise:    30 min  Upright Bike L3 5'  Incline calf stretch 1'  Leg Press 70# bilaterally 3x10  HS curls 30# 3x10  Cybex seated hip abduction 32.5# 3x10  Heel raises 3x10 bilaterally  Heel slides with strap x 2'     Gait  Trainin min  Low cecil fwd step-overs 2'  Low hurdles lateral step-overs 1'     Manual :                                             5 min  STM to medial and lateral portions of knee      Neuromuscular Re-education:  2 min  Air-ex MIP 2'      Personalized Home Exercise Program:  Access Code: [email or web address]  URL: [email or web address]  Date: 2024  Prepared by: Shira Wallace     Exercises  - Supine Active Ankle Pumps  - 4-5 x daily - 7 x weekly - 20 reps  - Long Sitting Quad Set  - 3 x daily - 7 x weekly - 20 reps - 5 seconds hold  - Supine Heel Slide (Mirrored)  - 3 x daily - 7 x weekly - 20 reps  - Small Range Straight Leg Raise  - 2 x daily - 7 x weekly - 3 sets - 10 reps  - Standing Heel Raise with Support  - 1-2 x daily - 7 x weekly - 3 sets - 10 reps  - Standing Knee Flexion (Mirrored)  - 1-2 x daily - 7 x weekly - 3 sets - 10 reps  - Standing Hip Abduction with Counter Support  - 1-2 x daily - 7 x weekly - 3 sets - 10 reps  - Mini Squat with Counter Support  - 1-2 x daily - 7 x weekly - 3 sets - 10 reps         Assessment:   6 weeks s/p L TKA.  Presented to PT today discouraged re: the increased stiffness and pain that she has felt this past week.  Discussed normal post-operative course and provided reassurance.  Encouraged her to elevate her leg for reductions in swelling, which can contribute to stiffness.  Discussed gradual activity progression while monitoring signs of inflammation.  Patient felt better as session progressed, reported decreased stiffness, and demonstrated excellent ROM.     Plan:  Continue to progress per plan with ROM, strength, and functional recovery. Follow-up with Dr. Schneider on 24.    Shira Wallace, PT

## 2024-06-06 ENCOUNTER — HOSPITAL ENCOUNTER (OUTPATIENT)
Dept: RADIOLOGY | Facility: CLINIC | Age: 79
Discharge: HOME | End: 2024-06-06
Payer: MEDICARE

## 2024-06-06 ENCOUNTER — OFFICE VISIT (OUTPATIENT)
Dept: ORTHOPEDIC SURGERY | Facility: CLINIC | Age: 79
End: 2024-06-06
Payer: MEDICARE

## 2024-06-06 ENCOUNTER — APPOINTMENT (OUTPATIENT)
Dept: ORTHOPEDIC SURGERY | Facility: CLINIC | Age: 79
End: 2024-06-06
Payer: MEDICARE

## 2024-06-06 VITALS — WEIGHT: 143 LBS | BODY MASS INDEX: 23.82 KG/M2 | HEIGHT: 65 IN

## 2024-06-06 DIAGNOSIS — Z47.1 AFTERCARE FOLLOWING LEFT KNEE JOINT REPLACEMENT SURGERY: ICD-10-CM

## 2024-06-06 DIAGNOSIS — Z96.652 AFTERCARE FOLLOWING LEFT KNEE JOINT REPLACEMENT SURGERY: ICD-10-CM

## 2024-06-06 PROCEDURE — 1125F AMNT PAIN NOTED PAIN PRSNT: CPT | Performed by: ORTHOPAEDIC SURGERY

## 2024-06-06 PROCEDURE — 1036F TOBACCO NON-USER: CPT | Performed by: ORTHOPAEDIC SURGERY

## 2024-06-06 PROCEDURE — 1159F MED LIST DOCD IN RCRD: CPT | Performed by: ORTHOPAEDIC SURGERY

## 2024-06-06 PROCEDURE — 99024 POSTOP FOLLOW-UP VISIT: CPT | Performed by: ORTHOPAEDIC SURGERY

## 2024-06-06 PROCEDURE — 73562 X-RAY EXAM OF KNEE 3: CPT | Mod: LEFT SIDE | Performed by: STUDENT IN AN ORGANIZED HEALTH CARE EDUCATION/TRAINING PROGRAM

## 2024-06-06 PROCEDURE — 73562 X-RAY EXAM OF KNEE 3: CPT | Mod: LT

## 2024-06-06 ASSESSMENT — PAIN DESCRIPTION - DESCRIPTORS: DESCRIPTORS: OTHER (COMMENT)

## 2024-06-06 ASSESSMENT — PAIN SCALES - GENERAL: PAINLEVEL_OUTOF10: 2

## 2024-06-06 ASSESSMENT — PAIN - FUNCTIONAL ASSESSMENT: PAIN_FUNCTIONAL_ASSESSMENT: 0-10

## 2024-06-06 NOTE — PROGRESS NOTES
Physical Therapy  Physical Therapy Treatment Note    Patient Name: Zarina Holliday  MRN: [id number]  Today's Date: 2024  Time Calculation  Start Time: 48  Stop Time: 844  Time Calculation (min): 56 min    Insurance:  Visit [place]:  [place] of [place]  Authorization info: no auth   Insurance Type:  Medicare and  for Life   Medicare Certification Period: Beginnin24 Endin24  Onset date: 24     General:  Reason for visit: S/P L TKA  Referred by: Moses Schneider MD    Current Problem  1. Unilateral primary osteoarthritis, left knee  Follow Up In Physical Therapy        Precautions: RUFINO Fall Risk Score (The score of 4 or more indicates an increased risk of falling): 5     Subjective:   Patient reports having their follow up with MD and states that they will not be seeing them for a whole year. Pt reports being happy with where they are at in therapy currently.     Pain  Pain Score: 2    Performing HEP?: Yes,        Objective:        ROM     Knee AROM (Degrees)                                         (R)                    (L)  Flexion:                        142                  116 pre-treatment;  120 post treatment                                           Extension:                    0                      -1                    Treatment Performed:    Therapeutic Exercise:    30 min  Upright Bike L3 5'  Incline calf stretch 1'  Leg Press 90# bilaterally 3x10  Resisted side stepping 3 laps RTB   Soleous stretch 1'   SLS stance 30 seconds RLE  Tandem stance walking 3 laps   Long sitting 2x15 BTB plantar flexion  Heel slides   TKE BTB 2x10   Manual   PA tibial mobilizations 5'  STM hamstring posterior knee 5'      Personalized Home Exercise Program:  Access Code: [email or web address]  URL: [email or web address]  Date: 2024  Prepared by: Shira Wallace     Exercises  - Supine Active Ankle Pumps  - 4-5 x daily - 7 x weekly - 20 reps  - Long Sitting Quad Set  - 3 x daily -  7 x weekly - 20 reps - 5 seconds hold  - Supine Heel Slide (Mirrored)  - 3 x daily - 7 x weekly - 20 reps  - Small Range Straight Leg Raise  - 2 x daily - 7 x weekly - 3 sets - 10 reps  - Standing Heel Raise with Support  - 1-2 x daily - 7 x weekly - 3 sets - 10 reps  - Standing Knee Flexion (Mirrored)  - 1-2 x daily - 7 x weekly - 3 sets - 10 reps  - Standing Hip Abduction with Counter Support  - 1-2 x daily - 7 x weekly - 3 sets - 10 reps  - Mini Squat with Counter Support  - 1-2 x daily - 7 x weekly - 3 sets - 10 reps    Charges: TE x3 Manual x1     Assessment:   Pt tolerated leg press poorly with increase in resistance from 70# to 90# having increase pain post. Manual was performed to decrease pain levels post leg press. Pt required cueing to decrease posterior hip sway compensation for TKE.     Plan:  Continue to progress LE strengthening with machines and standing exercises.     Nilo Rg, PTA

## 2024-06-06 NOTE — PROGRESS NOTES
6-week status post left total knee arthroplasty, doing excellent, happy with results the operation, reports no complications    Pain free range of motion of left knee from 0-110, stable to varus/valgus/ant/post stress, alignment is neutral, incision is well healed with no signs of surrounding infection. Neurovascular exam is at baseline.    X-rays reviewed by myself today in clinic reveal excellent alignment of the total knee arthroplasty components with no signs of early loosening or failure.    S/P left total knee arthroplasty doing well, F/U in 1 year, continue activity as tolerated, new x-rays at next visit    This note was created using voice recognition software and was not corrected for typographical or grammatical errors.

## 2024-06-07 ENCOUNTER — TREATMENT (OUTPATIENT)
Dept: PHYSICAL THERAPY | Facility: CLINIC | Age: 79
End: 2024-06-07
Payer: MEDICARE

## 2024-06-07 DIAGNOSIS — M17.12 UNILATERAL PRIMARY OSTEOARTHRITIS, LEFT KNEE: Primary | ICD-10-CM

## 2024-06-07 PROCEDURE — 97110 THERAPEUTIC EXERCISES: CPT | Mod: GP,CQ

## 2024-06-07 PROCEDURE — 97140 MANUAL THERAPY 1/> REGIONS: CPT | Mod: GP,CQ

## 2024-06-07 ASSESSMENT — PAIN SCALES - GENERAL: PAINLEVEL_OUTOF10: 2

## 2024-06-11 ENCOUNTER — LAB (OUTPATIENT)
Dept: LAB | Facility: LAB | Age: 79
End: 2024-06-11
Payer: MEDICARE

## 2024-06-11 ENCOUNTER — TREATMENT (OUTPATIENT)
Dept: PHYSICAL THERAPY | Facility: CLINIC | Age: 79
End: 2024-06-11
Payer: MEDICARE

## 2024-06-11 DIAGNOSIS — M17.12 UNILATERAL PRIMARY OSTEOARTHRITIS, LEFT KNEE: ICD-10-CM

## 2024-06-11 DIAGNOSIS — R53.81 MALAISE AND FATIGUE: ICD-10-CM

## 2024-06-11 DIAGNOSIS — E55.9 VITAMIN D DEFICIENCY: ICD-10-CM

## 2024-06-11 DIAGNOSIS — R53.83 MALAISE AND FATIGUE: ICD-10-CM

## 2024-06-11 LAB
25(OH)D3 SERPL-MCNC: 57 NG/ML (ref 30–100)
ALBUMIN SERPL BCP-MCNC: 3.9 G/DL (ref 3.4–5)
ALP SERPL-CCNC: 54 U/L (ref 33–136)
ALT SERPL W P-5'-P-CCNC: 10 U/L (ref 7–45)
ANION GAP SERPL CALC-SCNC: 13 MMOL/L (ref 10–20)
AST SERPL W P-5'-P-CCNC: 12 U/L (ref 9–39)
BASOPHILS # BLD AUTO: 0.08 X10*3/UL (ref 0–0.1)
BASOPHILS NFR BLD AUTO: 1.5 %
BILIRUB SERPL-MCNC: 0.5 MG/DL (ref 0–1.2)
BUN SERPL-MCNC: 13 MG/DL (ref 6–23)
CALCIUM SERPL-MCNC: 9.1 MG/DL (ref 8.6–10.6)
CHLORIDE SERPL-SCNC: 106 MMOL/L (ref 98–107)
CO2 SERPL-SCNC: 28 MMOL/L (ref 21–32)
CREAT SERPL-MCNC: 0.74 MG/DL (ref 0.5–1.05)
CRP SERPL-MCNC: 0.2 MG/DL
EGFRCR SERPLBLD CKD-EPI 2021: 82 ML/MIN/1.73M*2
EOSINOPHIL # BLD AUTO: 0.05 X10*3/UL (ref 0–0.4)
EOSINOPHIL NFR BLD AUTO: 1 %
ERYTHROCYTE [DISTWIDTH] IN BLOOD BY AUTOMATED COUNT: 12.2 % (ref 11.5–14.5)
ERYTHROCYTE [SEDIMENTATION RATE] IN BLOOD BY WESTERGREN METHOD: 9 MM/H (ref 0–30)
GLUCOSE SERPL-MCNC: 78 MG/DL (ref 74–99)
HCT VFR BLD AUTO: 35.8 % (ref 36–46)
HGB BLD-MCNC: 11.7 G/DL (ref 12–16)
IMM GRANULOCYTES # BLD AUTO: 0.01 X10*3/UL (ref 0–0.5)
IMM GRANULOCYTES NFR BLD AUTO: 0.2 % (ref 0–0.9)
LYMPHOCYTES # BLD AUTO: 1.32 X10*3/UL (ref 0.8–3)
LYMPHOCYTES NFR BLD AUTO: 25.5 %
MCH RBC QN AUTO: 31.1 PG (ref 26–34)
MCHC RBC AUTO-ENTMCNC: 32.7 G/DL (ref 32–36)
MCV RBC AUTO: 95 FL (ref 80–100)
MONOCYTES # BLD AUTO: 0.47 X10*3/UL (ref 0.05–0.8)
MONOCYTES NFR BLD AUTO: 9.1 %
NEUTROPHILS # BLD AUTO: 3.24 X10*3/UL (ref 1.6–5.5)
NEUTROPHILS NFR BLD AUTO: 62.7 %
NRBC BLD-RTO: 0 /100 WBCS (ref 0–0)
PLATELET # BLD AUTO: 218 X10*3/UL (ref 150–450)
POTASSIUM SERPL-SCNC: 4.6 MMOL/L (ref 3.5–5.3)
PROT SERPL-MCNC: 6.3 G/DL (ref 6.4–8.2)
RBC # BLD AUTO: 3.76 X10*6/UL (ref 4–5.2)
SODIUM SERPL-SCNC: 142 MMOL/L (ref 136–145)
TSH SERPL-ACNC: 2.39 MIU/L (ref 0.44–3.98)
VIT B12 SERPL-MCNC: 677 PG/ML (ref 211–911)
WBC # BLD AUTO: 5.2 X10*3/UL (ref 4.4–11.3)

## 2024-06-11 PROCEDURE — 82607 VITAMIN B-12: CPT

## 2024-06-11 PROCEDURE — 82306 VITAMIN D 25 HYDROXY: CPT

## 2024-06-11 PROCEDURE — 97140 MANUAL THERAPY 1/> REGIONS: CPT | Mod: GP,CQ

## 2024-06-11 PROCEDURE — 36415 COLL VENOUS BLD VENIPUNCTURE: CPT

## 2024-06-11 PROCEDURE — 87086 URINE CULTURE/COLONY COUNT: CPT

## 2024-06-11 PROCEDURE — 80053 COMPREHEN METABOLIC PANEL: CPT

## 2024-06-11 PROCEDURE — 85025 COMPLETE CBC W/AUTO DIFF WBC: CPT

## 2024-06-11 PROCEDURE — 85652 RBC SED RATE AUTOMATED: CPT

## 2024-06-11 PROCEDURE — 84443 ASSAY THYROID STIM HORMONE: CPT

## 2024-06-11 PROCEDURE — 86140 C-REACTIVE PROTEIN: CPT

## 2024-06-11 PROCEDURE — 97110 THERAPEUTIC EXERCISES: CPT | Mod: GP,CQ

## 2024-06-11 ASSESSMENT — PAIN - FUNCTIONAL ASSESSMENT: PAIN_FUNCTIONAL_ASSESSMENT: 0-10

## 2024-06-11 ASSESSMENT — PAIN SCALES - GENERAL: PAINLEVEL_OUTOF10: 2

## 2024-06-11 NOTE — PROGRESS NOTES
"  Physical Therapy  Physical Therapy Treatment Note    Patient Name: Zarina Holliday  MRN: [id number]  Today's Date: 2024  Time Calculation  Start Time: 815  Stop Time: 55  Time Calculation (min): 40 min    Insurance:  Visit [place]:  [place]  Authorization info: no auth   Insurance Type:  Medicare and  for Life   Medicare Certification Period: Beginnin24 Endin24  Onset date: 24     General:  Reason for visit: S/P L TKA  Referred by: Moses Schneider MD    Current Problem  1. Unilateral primary osteoarthritis, left knee  Follow Up In Physical Therapy          Precautions: STEADI Fall Risk Score (The score of 4 or more indicates an increased risk of falling): 5     Subjective:   Patient reports having a rough weekend due to last weeks treatment session pushing themselves harder than they are used to.     Pain  Pain Assessment: 0-10  Pain Score: 2    Performing HEP?: Yes,        Objective:        ROM     Knee AROM (Degrees)                                         (R)                    (L)  Flexion:                        142                 120 post treatment                                           Extension:                    0                      -1                    Treatment Performed:    Bike 5' lvl 2.2  Calf stretch rocker board 1'x2  Hamstring stretch on stairs 1'x2  STM hamstring and calf muscular attachment site 10'  PA mobilizations of tibia 10'   Knee flexion bending 5'   Heel slides 2x10  Quad sets 5\" x10  Standing hamstring curls SL 2x10    Charges: TE x1 Manual x2     Assessment:   Pt had mild to moderate increases in pain today during a manual focused treatment session. Pt reported an increase in pain during activate hamstring use and was unable to perform hip flexor stretch in supine. Pt was advised to only perform stretching and ROM exercises at home for the next two days to allow tissues to heal.     Plan:  Continue to progress standing strengthening " exercises per pt tolerance to exercise at that time.     Nilo Rg, PTA

## 2024-06-12 LAB — BACTERIA UR CULT: NORMAL

## 2024-06-14 ENCOUNTER — TREATMENT (OUTPATIENT)
Dept: PHYSICAL THERAPY | Facility: CLINIC | Age: 79
End: 2024-06-14
Payer: MEDICARE

## 2024-06-14 DIAGNOSIS — M17.12 UNILATERAL PRIMARY OSTEOARTHRITIS, LEFT KNEE: ICD-10-CM

## 2024-06-14 PROCEDURE — 97110 THERAPEUTIC EXERCISES: CPT | Mod: GP,CQ

## 2024-06-14 PROCEDURE — 97140 MANUAL THERAPY 1/> REGIONS: CPT | Mod: GP,CQ

## 2024-06-14 PROCEDURE — 97112 NEUROMUSCULAR REEDUCATION: CPT | Mod: GP,CQ

## 2024-06-14 ASSESSMENT — PAIN SCALES - GENERAL: PAINLEVEL_OUTOF10: 2

## 2024-06-14 ASSESSMENT — PAIN - FUNCTIONAL ASSESSMENT: PAIN_FUNCTIONAL_ASSESSMENT: 0-10

## 2024-06-19 ENCOUNTER — TREATMENT (OUTPATIENT)
Dept: PHYSICAL THERAPY | Facility: CLINIC | Age: 79
End: 2024-06-19
Payer: MEDICARE

## 2024-06-19 DIAGNOSIS — M17.12 UNILATERAL PRIMARY OSTEOARTHRITIS, LEFT KNEE: ICD-10-CM

## 2024-06-19 PROCEDURE — 97112 NEUROMUSCULAR REEDUCATION: CPT | Mod: GP

## 2024-06-19 PROCEDURE — 97110 THERAPEUTIC EXERCISES: CPT | Mod: GP

## 2024-06-19 ASSESSMENT — PAIN SCALES - GENERAL: PAINLEVEL_OUTOF10: 1

## 2024-06-19 NOTE — PROGRESS NOTES
Physical Therapy  Physical Therapy Treatment Note    Patient Name: Zarina Holliday  MRN: [id number]  Today's Date: 2024  Time Calculation  Start Time: 1050  Stop Time: 1131  Time Calculation (min): 41 min    Insurance:  [place]:  [place]  Authorization info: no auth   Insurance Type:  Medicare and  for Life   Medicare Certification Period: Beginnin24 Endin24  Onset date: 24     General:  Reason for visit: S/P L TKA  Referred by: Moses Schneider MD    Current Problem  1. Unilateral primary osteoarthritis, left knee  Follow Up In Physical Therapy              Precautions: ERICHADI Fall Risk Score (The score of 4 or more indicates an increased risk of falling): 5       Subjective:     Patient reports that she has gone to the pool a few times starting last Saturday.  Also, initiated yoga yesterday without issues.  It has been encouraging to resume these activities.    Saw Dr. Schneider early .  Does not need to follow-up until 1 year post-op.        Pain  0-10 (Numeric) Pain Score: 1-2/10    Performing HEP?: Yes,        Objective:   Posture: WNL     Gait: Ambulates with stable gait without an assistive device.        Observation: Incisional scar closed.  No drainage, johanny-incisional redness or signs of infection    Palpation: (-) calf tenderness or warmth.          ROM     Knee AROM (Degrees)                                         (R)                    (L)  Flexion:                        142                  120                                           Extension:                    0                      0                Treatment Performed:    Therapeutic Exercise:    33 min  Upright Bike L3 5'  Incline calf stretch 1'  Leg Press 70# bilaterally 3x10  HS curls 30# 3x10  Cybex seated hip abduction 32.5# 3x10  Heel raises 2x20 bilaterally  Heel slides with strap x 2'      Neuromuscular Re-education:  8 min  Air-ex MIP 2'  SLS 1'x2 R/L       Personalized Home Exercise  Program:  Access Code: [email or web address]  URL: [email or web address]  Date: 05/08/2024  Prepared by: Shira Wallace     Exercises  - Supine Active Ankle Pumps  - 4-5 x daily - 7 x weekly - 20 reps  - Long Sitting Quad Set  - 3 x daily - 7 x weekly - 20 reps - 5 seconds hold  - Supine Heel Slide (Mirrored)  - 3 x daily - 7 x weekly - 20 reps  - Small Range Straight Leg Raise  - 2 x daily - 7 x weekly - 3 sets - 10 reps  - Standing Heel Raise with Support  - 1-2 x daily - 7 x weekly - 3 sets - 10 reps  - Standing Knee Flexion (Mirrored)  - 1-2 x daily - 7 x weekly - 3 sets - 10 reps  - Standing Hip Abduction with Counter Support  - 1-2 x daily - 7 x weekly - 3 sets - 10 reps  - Mini Squat with Counter Support  - 1-2 x daily - 7 x weekly - 3 sets - 10 reps         Assessment:   Mrs. Holliday is doing well with recovery of ROM, balance, strength and function.  Gait stability has improved, and she is safely ambulating without an assistive device.  Her risk of falls is low, and she is able to maintain SLS for a full minute.  The pain that she experienced last week has tapered down, and she proceeded with exercises this date without difficulty.    Plan:  Continue to progress per plan with ROM, strength, and functional recovery. Continue 2x/week through next week, then taper to every other week to increase independence with HEP and recovery.     Shira Wallace, PT

## 2024-06-21 ENCOUNTER — TREATMENT (OUTPATIENT)
Dept: PHYSICAL THERAPY | Facility: CLINIC | Age: 79
End: 2024-06-21
Payer: MEDICARE

## 2024-06-21 DIAGNOSIS — M17.12 UNILATERAL PRIMARY OSTEOARTHRITIS, LEFT KNEE: ICD-10-CM

## 2024-06-21 PROCEDURE — 97140 MANUAL THERAPY 1/> REGIONS: CPT | Mod: GP,CQ

## 2024-06-21 PROCEDURE — 97110 THERAPEUTIC EXERCISES: CPT | Mod: GP,CQ

## 2024-06-21 NOTE — PROGRESS NOTES
"  Physical Therapy  Physical Therapy Treatment Note    Patient Name: Zarina Holliday  MRN: [id number]  Today's Date: 2024  Time Calculation  Start Time: 0830  Stop Time: 0910  Time Calculation (min): 40 min    Insurance:  Visit number:  13 of [place]  Authorization info: no auth   Insurance Type:  Medicare and  for Life   Medicare Certification Period: Beginnin24 Endin24  Onset date: 24     General:  Reason for visit: S/P L TKA  Referred by: Moses Schneider MD    Current Problem  1. Unilateral primary osteoarthritis, left knee  Follow Up In Physical Therapy              Precautions: STEADI Fall Risk Score (The score of 4 or more indicates an increased risk of falling): 5       Subjective:     Patient reports that they are doing well in general but is having a \"grabbing sensation\"  during lateral movements today and getting in and out of chairs and car seats.        Pain   1/10    Performing HEP?: Yes,        Objective:   Knee valgus observed while performing STS in hamstring curl machine seat.     Treatment Performed:    Therapeutic Exercise:    33 min  Upright Bike L2 5' seat height 2   Incline calf stretch 1'  Standing hamstring curls with 2# 2x10  Leg Press 50# bilaterally 3x10 seat 4 (more focused on knee flexion rather than resistance)   Standing hamstring stretch 2x1' each leg    Manual :  10 min  STM bilateral hamstring insertion points 5'  PA tibial mobilizations 5'       Personalized Home Exercise Program:  Access Code: [email or web address]  URL: [email or web address]  Date: 2024  Prepared by: Shira Wallace     Exercises  - Supine Active Ankle Pumps  - 4-5 x daily - 7 x weekly - 20 reps  - Long Sitting Quad Set  - 3 x daily - 7 x weekly - 20 reps - 5 seconds hold  - Supine Heel Slide (Mirrored)  - 3 x daily - 7 x weekly - 20 reps  - Small Range Straight Leg Raise  - 2 x daily - 7 x weekly - 3 sets - 10 reps  - Standing Heel Raise with Support  - 1-2 x " daily - 7 x weekly - 3 sets - 10 reps  - Standing Knee Flexion (Mirrored)  - 1-2 x daily - 7 x weekly - 3 sets - 10 reps  - Standing Hip Abduction with Counter Support  - 1-2 x daily - 7 x weekly - 3 sets - 10 reps  - Mini Squat with Counter Support  - 1-2 x daily - 7 x weekly - 3 sets - 10 reps         Assessment:   Pt tolerated treatment session well and reports doing plenty of other exercise outside of therapy. Pt continues to report pain in posterior knee which was relieved with modififed hamstring stretch standing on stairs with cueing minor increase in knee flexion to increase stretch on insertion point as opposed to origin.     Plan:  Continue to progress or maintain strength with weighted machines and attempt RDL's and squats in next visit. Squats with band around knees as well.     Nilo Rg, PTA

## 2024-06-24 ENCOUNTER — TREATMENT (OUTPATIENT)
Dept: PHYSICAL THERAPY | Facility: CLINIC | Age: 79
End: 2024-06-24
Payer: MEDICARE

## 2024-06-24 DIAGNOSIS — M17.12 UNILATERAL PRIMARY OSTEOARTHRITIS, LEFT KNEE: Primary | ICD-10-CM

## 2024-06-24 PROCEDURE — 97110 THERAPEUTIC EXERCISES: CPT | Mod: GP

## 2024-06-24 PROCEDURE — 97112 NEUROMUSCULAR REEDUCATION: CPT | Mod: GP

## 2024-06-24 NOTE — PROGRESS NOTES
Physical Therapy  Physical Therapy Orthopedic Progress Note    Patient Name: Zarina Holliday  MRN: [id number]  Today's Date: 2024  Time Calculation  Start Time: 830  Stop Time: 916  Time Calculation (min): 46 min    Insurance:  Visit number:  [place] of [place]  Authorization info: no auth   Insurance Type:  Medicare and  for Life   Medicare Certification Period: Beginnin24 Endin24  Onset date: 24     General:  Reason for visit: S/P L TKA  Referred by: Moses Schneider MD    Current Problem  1. Unilateral primary osteoarthritis, left knee  Follow Up In Physical Therapy          Precautions: ERICHADI Fall Risk Score (The score of 4 or more indicates an increased risk of falling): 5       Subjective:     Patient reports that she is progressing.  Has resumed 6 yoga poses and some swimming.  Walking 5,000 steps on an average day.  Walking is slower than PLOF. Not using cane much at all.  Using a reciprocal pattern on steps.     Current Condition:   better     Pain  Current:  2/10    Location: Posterior knee, distal hamstring  Description: Grabbing    Exacerbating Factors: Intermittently with car transfers    Functional Limitations: Difficulty with getting leg in-out of car. Moving slower with yoga poses    Self Reported Function (0-100%) = 70%  - 100% being back to PLOF    Performing HEP?: Yes      Objective:   Posture: WNL     Gait: Stable pattern without use of assistive device.  Good stride length and gait mechanics.       Observation: Incisional scar closed.  No drainage, johanny-incisional redness or signs of infection     Palpation: (-) tenderness or warmth to L calf                                           GIRTH (cm):                                         (R)                    (L)  Midpatellar:                 40.5                  41.2                                         ROM     Knee AROM (Degrees)                                         (R)                    (L)  Flexion:                         142                  121                                             Extension:                    0                      0                                          Knee PROM (Degrees)                                         (R)                    (L)  Flexion:                        142                  122                                             Extension:                    0                      0                                          Ankle AROM (Degrees)                                         (R)                    (L)  Plantarflexion:              WNL                 WNL                                           Dorsiflexion:                 WNL                 WNL                                                       Inversion:                     WNL                 WNL                                                                   Eversion:                      WNL                 WNL                                                                                                             Strength Testing     Hip                                      (R)                    (L)  Flexion:                        4+                    4                                                                                                     Knee                                      (R)                    (L)  Flexion:                        5                      4+                                                Extension:                    5                      4+                                  Flexibility                          (R)                    (L)  Hamstrings:                 long                 long  Hip Flexors:                  WNL                 WNL  Quadriceps:                 NT                    NT                                         Functional Movement  Sit to stand: without UE assist  Squat: Reduced depth  Bed Mobility: Independent                                          Balance     Feet Together, Eyes Closed: WNL  SLS: R 1 minute, L 1 minute                                       Patella Mobility: good        Outcome Measures: Updated 6/24/2024  Other Measures  Lower Extremity Funtional Score (LEFS): 46/80      Goals: Updated 6/24/2024  Active       PT Problem       PT Goal 1 (Met)       Start:  05/08/24    Expected End:  06/19/24    Resolved:  06/24/24    Left knee ROM 0-110 and without pain > 2/10 by week 6.  LE strength measures improved to at least 4/5 by week 6.  Stable gait without assistive device by week 6.  L knee girth within 1 cm or R, indicative of reduced swelling by week 6.          PT Goal 2 (Progressing)       Start:  05/08/24    Expected End:  08/06/24       Left knee ROM 0-120 and without pain > 2/10 by week 12.  LE strength measures improved to at least 5/5 by week 12.  Walk 30 minutes with normalized gait mechanics and without assistive device by week 12.  Reciprocal ascent/descent of stairs with use of railing by week 12.  Squat with good form and without pain > 2/10 by week 12.   LEFS improved by at least 18 points by week 12.   Patient will rate pain < 3/10 at worst to improve ADL tolerance by week 12.  Independent with home exercise program for symptom reduction and functional gains by week 12.                  Treatment Performed:    Therapeutic Exercise:                          38 min  Upright Bike L3 5'  Performed recheck  Leg Press 70# bilaterally 3x10  HS curls 30# 2x10  Cybex seated hip abduction 32.5# 3x10  VR1 Standing hip flexion 22# 2x10 R/L  Heel raises 2x20 bilaterally  Heel slides with strap x 2'       Neuromuscular Re-education:            8 min  Air-ex MIP 2'  SLS 1'x2 R/L      Other:       Educated re: normal post-operative progression and POC      Personalized Home Exercise Program:  Access Code: [email or web address]  URL: [email or web address]  Date: 05/08/2024  Prepared by: Shira Wallace     Exercises  - Supine  Active Ankle Pumps  - 4-5 x daily - 7 x weekly - 20 reps  - Long Sitting Quad Set  - 3 x daily - 7 x weekly - 20 reps - 5 seconds hold  - Supine Heel Slide (Mirrored)  - 3 x daily - 7 x weekly - 20 reps  - Small Range Straight Leg Raise  - 2 x daily - 7 x weekly - 3 sets - 10 reps  - Standing Heel Raise with Support  - 1-2 x daily - 7 x weekly - 3 sets - 10 reps  - Standing Knee Flexion (Mirrored)  - 1-2 x daily - 7 x weekly - 3 sets - 10 reps  - Standing Hip Abduction with Counter Support  - 1-2 x daily - 7 x weekly - 3 sets - 10 reps  - Mini Squat with Counter Support  - 1-2 x daily - 7 x weekly - 3 sets - 10 reps    EDUCATION: home exercise program, plan of care, activity modifications, pain management, and injury pathology       Assessment:  Mrs. Holliday is 2 months s/p L TKA and doing very well.  She is making consistent gains in ROM, balance, strength and function. Swelling has improved.  She demonstrates good single leg balance control.  She is walking well, and gaining confidence for increased duration and speed without use of an assistive device.  She is very diligent with performance of her rehab exercises and has been incorporating yoga and swimming in to her routine.     Plan of Care: Updated 6/24/2024     1 more session this week, then taper visits to every other week for 2-3 sessions.         Shira Wallace, PT

## 2024-06-26 ENCOUNTER — TREATMENT (OUTPATIENT)
Dept: PHYSICAL THERAPY | Facility: CLINIC | Age: 79
End: 2024-06-26
Payer: MEDICARE

## 2024-06-26 DIAGNOSIS — M17.12 UNILATERAL PRIMARY OSTEOARTHRITIS, LEFT KNEE: ICD-10-CM

## 2024-06-26 PROCEDURE — 97112 NEUROMUSCULAR REEDUCATION: CPT | Mod: GP

## 2024-06-26 PROCEDURE — 97110 THERAPEUTIC EXERCISES: CPT | Mod: GP

## 2024-06-26 ASSESSMENT — PAIN SCALES - GENERAL: PAINLEVEL_OUTOF10: 2

## 2024-06-26 NOTE — PROGRESS NOTES
"  Physical Therapy  Physical Therapy Treatment Note    Patient Name: Zarina Holliday  MRN: [id number]  Today's Date: 2024  Time Calculation  Start Time: 745  Stop Time: 830  Time Calculation (min): 45 min    Insurance:  Visit number:  15 of [place]  Authorization info: no auth   Insurance Type:  Medicare and  for Life   Medicare Certification Period: Beginnin24 Endin24  Onset date: 24     General:  Reason for visit: S/P L TKA  Referred by: Moses Schneider MD    Current Problem  1. Unilateral primary osteoarthritis, left knee  Follow Up In Physical Therapy              Precautions: ERICHADI Fall Risk Score (The score of 4 or more indicates an increased risk of falling): 5       Subjective:     Patient reports that she walked a half of a mile yesterday and felt ok.  Has continued to be very consistent with home exercises and also performing pool exercises and some yoga poses.  Notes that she still gets some grabbing pain in the back of the knee (distal hamstring region).     Pain  0-10 (Numeric) Pain Score: 22/10    Performing HEP?: Yes        Objective:   Posture: WNL     Gait: Ambulates with stable gait without an assistive device.        Observation: Incisional scar closed.  No drainage, johanny-incisional redness or signs of infection    Palpation: (-) calf tenderness or warmth.          ROM     Knee AROM (Degrees)                                         (R)                    (L)  Flexion:                        142                  125                                          Extension:                    0                      0                Treatment Performed:    Therapeutic Exercise:    33 min  Upright Bike L3 5'  Incline calf stretch 1'  Leg Press 70# bilaterally 3x10  HS curls 30# 3x10--not performed this date  Cybex seated hip abduction 32.5# 3x10  Mini Squats 3x10  Step-downs 6\" 2x10 L   Heel raises 2x20 bilaterally  Heel slides with strap x 2'      Neuromuscular " Re-education:  12 min  Air-ex MIP 2'  SLS 1'x2 R/L  Alternating BOSU lunges 2'   Alternating hip abduction 1'x2      Personalized Home Exercise Program:  Access Code: [email or web address]  URL: [email or web address]  Date: 06/26/2024  Prepared by: Shira Wallace    Exercises  - Supine Active Ankle Pumps  - 4-5 x daily - 7 x weekly - 20 reps  - Long Sitting Quad Set  - 3 x daily - 7 x weekly - 20 reps - 5 seconds hold  - Supine Heel Slide (Mirrored)  - 3 x daily - 7 x weekly - 20 reps  - Small Range Straight Leg Raise  - 2 x daily - 7 x weekly - 3 sets - 10 reps  - Standing Heel Raise with Support  - 1 x daily - 7 x weekly - 3 sets - 10 reps  - Standing Knee Flexion (Mirrored)  - 1 x daily - 7 x weekly - 3 sets - 10 reps  - Standing Hip Abduction with Counter Support  - 1 x daily - 7 x weekly - 3 sets - 10 reps  - Mini Squat with Counter Support  - 1 x daily - 7 x weekly - 3 sets - 10 reps  - Standing Soleus Stretch on Foam 1/2 Roll  - 1 x daily - 7 x weekly - 1 sets - 3 reps - 30 hold  - Standing Hamstring Stretch on Chair  - 1 x daily - 7 x weekly - 1 sets - 3 reps - 30 hold         Assessment:   Mrs. Holliday is doing very well with recovery of ROM, balance, strength and function.  She has an expected level of soreness/stiffnes for post-operative time frame.  Some intermittent pain at her distal hamstring, for which she was advised to take some time off from her hamstring strengthening and proceed very gently with her hamstring stretching.  She was also briefly advised on cross friction massage.  Moderate challenge with L SLS balance control with alternating hip abduction.  She will work on this further at home.     Plan:  Continue to progress per plan with ROM, strength, and functional recovery. Taper to every other week for 2-3 sessions to increase independence with HEP and recovery.     Shira Wallace, PT

## 2024-07-12 ENCOUNTER — OFFICE VISIT (OUTPATIENT)
Dept: ORTHOPEDIC SURGERY | Facility: CLINIC | Age: 79
End: 2024-07-12
Payer: MEDICARE

## 2024-07-12 VITALS — BODY MASS INDEX: 23.8 KG/M2 | WEIGHT: 143 LBS

## 2024-07-12 DIAGNOSIS — M17.11 LOCALIZED OSTEOARTHRITIS OF RIGHT KNEE: Primary | ICD-10-CM

## 2024-07-12 PROCEDURE — 20610 DRAIN/INJ JOINT/BURSA W/O US: CPT | Mod: RT | Performed by: PHYSICIAN ASSISTANT

## 2024-07-12 PROCEDURE — 2500000004 HC RX 250 GENERAL PHARMACY W/ HCPCS (ALT 636 FOR OP/ED): Performed by: PHYSICIAN ASSISTANT

## 2024-07-12 PROCEDURE — 2500000005 HC RX 250 GENERAL PHARMACY W/O HCPCS: Performed by: PHYSICIAN ASSISTANT

## 2024-07-12 PROCEDURE — 99214 OFFICE O/P EST MOD 30 MIN: CPT | Performed by: PHYSICIAN ASSISTANT

## 2024-07-12 RX ORDER — LIDOCAINE HYDROCHLORIDE 10 MG/ML
4 INJECTION INFILTRATION; PERINEURAL
Status: COMPLETED | OUTPATIENT
Start: 2024-07-12 | End: 2024-07-12

## 2024-07-12 RX ORDER — TRIAMCINOLONE ACETONIDE 40 MG/ML
1 INJECTION, SUSPENSION INTRA-ARTICULAR; INTRAMUSCULAR
Status: COMPLETED | OUTPATIENT
Start: 2024-07-12 | End: 2024-07-12

## 2024-07-12 ASSESSMENT — PAIN SCALES - GENERAL: PAINLEVEL: 3

## 2024-07-12 NOTE — PROGRESS NOTES
SYLVIA Patton, PASofiaC, ATC  Adult Reconstruction and Joint Replacement Surgery  Phone: [phone]     Fax:[phone]            Chief Complaint   Patient presents with    Right Knee - Pain       HPI  Zarina Holliday is a pleasant 78 y.o. year-old female here for follow-up of known underlying side: right knee osteoarthritis. He/She presents for repeat evaluation.    Pain level: 5.  She had a left total knee arthroplasty 11 weeks ago and is doing very well.  She is here today for an injection in her right knee.  She points the medial aspect of the knee as the main source of her pain.  She rates her pain as a 5 out of 10.  She has some mechanical symptoms but no instability.  Past Medical History:   Diagnosis Date    GERD (gastroesophageal reflux disease)     History of recurrent UTIs     Hyperlipidemia     Leg edema     OAB (overactive bladder)     Osteopenia after menopause     PONV (postoperative nausea and vomiting)     TIA (transient ischemic attack) 10/2022    Questionable: while on vacation in late October 2022, she had new onset R eye blurriness and fatigue that was persistent on and off for 1-2 wks; Brain scans were negative. On Aspirin and Statin.     Patient Active Problem List   Diagnosis    Effusion of left knee    Arthritis of knee, left    Edema of both legs    Episodic lightheadedness    Dizziness    Cerumen impaction    Borderline hyperglycemia    Bilateral knee pain    Arthralgia of left hip    Anemia    Allergic rhinitis    Adult myxedema    Abnormal mammogram    Abnormal finding in urine    Shoulder pain, right    Segmental and somatic dysfunction of pelvic region    Primary osteoarthritis of both knees    Peripheral neuropathy    Osteopenia    Osteoarthritis    OAB (overactive bladder)    Lower extremity edema    Lesion of bladder    Imbalance    Venous insufficiency    Varicose veins with pain    UTI (urinary tract infection)    Urge incontinence of urine    Unilateral primary  osteoarthritis, left knee    Arthritis of left knee    Primary osteoarthritis of left knee    Aftercare following left knee joint replacement surgery       Medication Documentation Review Audit       Reviewed by CEDRIC Ryan (Patient Care Technician) on 07/12/24 at 0845      Medication Order Taking? Sig Documenting Provider Last Dose Status   azelastine (Astelin) 137 mcg (0.1 %) nasal spray [id number] No USE 2 SPRAYS IN EACH NOSTRIL IN THE MORNING AND 2 SPRAYS BEFORE BEDTIME   Patient taking differently: Administer 2 sprays into each nostril 2 times a day as needed for rhinitis.    Josefina Hatfield MD Taking Active   betamethasone, augmented, (Diprolene AF) 0.05 % cream [id number] No Apply topically 2 times a day. Apply sparingly to affected areas Maria Espinoza MD MPH Taking Active   calcium citrate-vitamin D3 250 mg-5 mcg (200 unit) tablet [id number] No Take 1 tablet by mouth once daily. Historical Provider, MD Taking Active   cholecalciferol (Vitamin D-3) 25 MCG (1000 UT) capsule [id number] No Take 1 capsule (25 mcg) by mouth once daily. Historical Provider, MD Taking Active   cyclobenzaprine (Flexeril) 5 mg tablet [id number] No Take 1 tablet (5 mg) by mouth 2 times a day as needed for muscle spasms. Sharmila Parry PA-C Taking Active   diclofenac sodium 1 % kit [id number] No Apply 4 g topically 4 times a day as needed. APPLY TO AFFECTED AREA OF LOWER EXTREMITIES. DO NOT APPLY MORE THAN 16 GRAMS DAILY TO ANY ONE AFFECTED JOINT Historical Provider, MD Taking Active   fish oil concentrate (Omega-3) 120-180 mg capsule [id number] No Take 1 capsule (1 g) by mouth once daily. Historical Provider, MD Taking Active   fluticasone (Flonase) 50 mcg/actuation nasal spray [id number] No Administer 2 sprays into each nostril once daily.   Patient taking differently: Administer 2 sprays into each nostril once daily as needed for rhinitis.    Nneka Albarran MD Taking Active   loratadine (Claritin) 10 mg tablet [id number] No  Take 1 tablet (10 mg) by mouth once daily as needed for allergies. Historical Provider, MD Taking Active   methenamine hippurate (Hiprex) 1 gram tablet [id number] No TAKE 1 TABLET TWICE A DAY   Patient taking differently: Take 1 tablet (1 g) by mouth 2 times a day. Take one tablet 2x daily by mouth    Sylvie Key MD MPH Taking Active   mirabegron (Myrbetriq) 50 mg tablet extended release 24 hr 24 hr tablet [id number] No Take 1 tablet (50 mg) by mouth once daily. Maria Espinoza MD MPH Taking Active   ondansetron ODT (Zofran-ODT) 4 mg disintegrating tablet [id number] No Take 1 tablet (4 mg) by mouth every 8 hours if needed for nausea or vomiting. Take one Tablet dissolved under tongue three times per day for nausea. Criss Rod PA-C Taking Active   pantoprazole (ProtoNix) 40 mg EC tablet [id number] No Take 1 tablet (40 mg) by mouth once daily. Do not crush, chew, or split. Juan Gomes MD Taking  24 5234   polyethylene glycol (Glycolax, Miralax) 17 gram/dose powder [id number] No Mix 1 capful (17g) into 8 ounces of fluid then take by mouth once daily. Juan Gomes MD Taking Active   VITAMIN B COMPLEX ORAL [id number] No Take 1 tablet by mouth once daily. Historical Provider, MD Taking Active                    Allergies   Allergen Reactions    Adhesive Tape-Silicones Other     Blisters, chin itching       Social History     Socioeconomic History    Marital status:      Spouse name: Not on file    Number of children: Not on file    Years of education: Not on file    Highest education level: Not on file   Occupational History    Not on file   Tobacco Use    Smoking status: Never    Smokeless tobacco: Never   Substance and Sexual Activity    Alcohol use: Not Currently    Drug use: Never    Sexual activity: Not on file   Other Topics Concern    Not on file   Social History Narrative    Not on file     Social Determinants of Health     Financial Resource Strain: Low Risk  (2024)    Overall  Financial Resource Strain (CARDIA)     Difficulty of Paying Living Expenses: Not hard at all   Food Insecurity: No Food Insecurity (2024)    Hunger Vital Sign     Worried About Running Out of Food in the Last Year: Never true     Ran Out of Food in the Last Year: Never true   Transportation Needs: No Transportation Needs (2024)    OASIS : Transportation     Lack of Transportation (Medical): No     Lack of Transportation (Non-Medical): No     Patient Unable or Declines to Respond: No   Physical Activity: Sufficiently Active (2024)    Exercise Vital Sign     Days of Exercise per Week: 5 days     Minutes of Exercise per Session: 30 min   Stress: No Stress Concern Present (2024)    [place] [employer] - Occupational Stress Questionnaire     Feeling of Stress : Not at all   Social Connections: Feeling Socially Integrated (2024)    OASIS : Social Isolation     Frequency of experiencing loneliness or isolation: Never   Intimate Partner Violence: Not At Risk (2024)    Humiliation, Afraid, Rape, and Kick questionnaire     Fear of Current or Ex-Partner: No     Emotionally Abused: No     Physically Abused: No     Sexually Abused: No   Housing Stability: Low Risk  (2024)    Housing Stability Vital Sign     Unable to Pay for Housing in the Last Year: No     Number of Places Lived in the Last Year: 1     Unstable Housing in the Last Year: No       Past Surgical History:   Procedure Laterality Date    CATARACT EXTRACTION       SECTION, CLASSIC      COLONOSCOPY      DENTAL SURGERY      FACIAL COSMETIC SURGERY      face lift    MR HEAD ANGIO WO IV CONTRAST  2022    MR HEAD ANGIO WO IV CONTRAST 2022 [facility]    MR NECK ANGIO WO IV CONTRAST  2022    MR NECK ANGIO WO IV CONTRAST 2022 [facility]    SALIVARY GLAND SURGERY  2023    Left lower lip excision:Left lower lip, excision: - Mucocele and minor salivary glands  exhibiting chronic sialadenitis.    TONSILLECTOMY      TOTAL ABDOMINAL HYSTERECTOMY W/ BILATERAL SALPINGOOPHORECTOMY      TOTAL KNEE ARTHROPLASTY Left 04/22/2024       Review of Systems    Physical Exam:  side: right Knee:  General: Well-appearing female in no acute distress.  Awake, alert and oriented.  Pleasant and cooperative.  Respiratory: Non-labored breathing  Mood: Euthymic   Gait: Normal  Assistive Device: cane  Skin: warm, dry and intact without lesions  Tenderness to palpation over anterior and medial, Joint line.  Effusion: mild  ROM Flexion/Extension: Unchanged  No instability varus or valgus stressing the knee at 0, 30 or 60 degrees.  No instability in the AP plane at 90 degrees.  5/5 hip flexion/knee extension/DF/PF/EHL  SILT in a ibjal/saph/per/tib distribution  Neuro L5-S1 sensation intact bilaterally, No clonus. 2+ symmetric patella and achilles reflexes bilateral.  2+ Femoral/DP/PT pulses bilaterally  Compartments supple and non-tender.  Extremities warm and well perfused.    There were no vitals filed for this visit.  Body mass index is 23.8 kg/m².    Impression/Plan  Zarina Holliday is getting adequate relief from injection therapy.     I discussed non-operative treatments for the patients' arthritis in detail and briefly discussed indications for surgery vs conservative treatment. The patient has elected to undergo knee side: right injections today.  He understands he can repeat the injections every 3 to 4 months.  Hopefully gets the relief he is looking for.    Primary Osteoarthritis side: right Knee    Procedure  L Inj/Asp: R knee on 7/12/2024 8:56 AM  Indications: pain and joint swelling  Details: 22 G needle, anterolateral approach  Medications: 1 mL triamcinolone acetonide 40 mg/mL; 4 mL lidocaine 10 mg/mL (1 %)  Outcome: tolerated well, no immediate complications    Discussion:  I discussed the conservative treatment options for knee osteoarthritis including but not limited to physical  therapy, oral NSAIDS, activity and lifestyle modification, and corticosteroid injections. Pt has elected to undergo a cortisone injection today. I have explained the risk and benefits of an injection including the possibility of joint infection, bleeding, damage to cartilage, allergic reaction. Patient verbalized understanding and gave verbal consent wishes to proceed with a intra-articular cortisone injection for their knee.    Procedure:  After discussing the risk and benefits of the procedure, we proceeded with an intra-articular right knee injection.    With the patient's informed verbal consent, the right knee was prepped in standard sterile fashion with Chlorhexidine. The skin was then anesthetized with ethyl chloride spray and cleaned again with Chlorhexidine. The knee was then apirated/injected with a prefilled 20-gauge syringe of 40 mg Kenalog + 4 ml Lidocaine using the lateral approach without complications.  The patient tolerated this well and felt immediate initial relief of symptoms. A bandaid was applied and the patient ambulated out of the clinic on ther own accord without difficulty. Patient was instructed to avoid physical activity for 24-48 hours to prevent the knees from swelling and may ice the knees as tolerated. Patient should contact the office if any signs of of infection appear: redness, fever, chills, drainage, swelling or warmth to the knees.  Pt understands that the injections can be repeated no sooner than 3 months.    Procedure, treatment alternatives, risks and benefits explained, specific risks discussed. Consent was given by the patient. Immediately prior to procedure a time out was called to verify the correct patient, procedure, equipment, support staff and site/side marked as required. Patient was prepped and draped in the usual sterile fashion.           All questions were answered.    Follow-up 3-4 months/PRN    SYLVIA Patton, PA-C, ATC  Orthopedic Physician  Assisant  Adult Reconstruction and Total Joint Replacement  General Orthopedics  Department of Orthopaedic Surgery  [place] [address]  Chet messaging preferred

## 2024-07-13 ENCOUNTER — LAB (OUTPATIENT)
Dept: LAB | Facility: LAB | Age: 79
End: 2024-07-13
Payer: MEDICARE

## 2024-07-13 DIAGNOSIS — E78.5 HYPERLIPIDEMIA, UNSPECIFIED HYPERLIPIDEMIA TYPE: ICD-10-CM

## 2024-07-13 LAB
CHOLEST SERPL-MCNC: 236 MG/DL (ref 0–199)
CHOLESTEROL/HDL RATIO: 3.2
HDLC SERPL-MCNC: 72.9 MG/DL
LDLC SERPL CALC-MCNC: 144 MG/DL
NON HDL CHOLESTEROL: 163 MG/DL (ref 0–149)
TRIGL SERPL-MCNC: 97 MG/DL (ref 0–149)
VLDL: 19 MG/DL (ref 0–40)

## 2024-07-13 PROCEDURE — 36415 COLL VENOUS BLD VENIPUNCTURE: CPT

## 2024-07-13 PROCEDURE — 80061 LIPID PANEL: CPT

## 2024-07-17 ENCOUNTER — TREATMENT (OUTPATIENT)
Dept: PHYSICAL THERAPY | Facility: CLINIC | Age: 79
End: 2024-07-17
Payer: MEDICARE

## 2024-07-17 DIAGNOSIS — M17.12 UNILATERAL PRIMARY OSTEOARTHRITIS, LEFT KNEE: ICD-10-CM

## 2024-07-17 PROCEDURE — 97112 NEUROMUSCULAR REEDUCATION: CPT | Mod: GP,CQ

## 2024-07-17 PROCEDURE — 97110 THERAPEUTIC EXERCISES: CPT | Mod: GP,CQ

## 2024-07-17 NOTE — PROGRESS NOTES
Physical Therapy  Physical Therapy Treatment Note    Patient Name: Zarina Holliday  MRN: [id number]  Today's Date: 2024  Time Calculation  Start Time:   Stop Time:   Time Calculation (min): 50 min    Insurance:  Visit number:  [place] of [place]  Authorization info: no auth   Insurance Type:  Medicare and  for Life   Medicare Certification Period: Beginnin24 Endin24  Onset date: 24     General:  Reason for visit: S/P L TKA  Referred by: Moses Schneider MD    Current Problem  1. Unilateral primary osteoarthritis, left knee  Follow Up In Physical Therapy          Precautions: ERICHADI Fall Risk Score (The score of 4 or more indicates an increased risk of falling): 5       Subjective:     Patient reports being extremely happy with the progress they have made thus far and is back to performing all of their ADL's and performing multiple different types of exercise throughout the week.     Pain   0/10    Performing HEP?: Yes        Objective:   Posture: WNL     Gait: Ambulates with stable gait without an assistive device.        Observation: Incisional scar closed.  No drainage, johanny-incisional redness or signs of infection    Palpation: (-) calf tenderness or warmth.          ROM     Knee AROM (Degrees)                                         (R)                    (L)  Flexion:                        140                  126                                          Extension:                    0                      -1              Treatment Performed:    Therapeutic Exercise:    20 min  Upright Bike L3 5'  Incline calf stretch 1'  Hamstring stretch 1' stairs  Standing to ground transfer on yoga mat x2   Fwd and bwd walking with resistance band (2nd heaviest band x5 each)   Neuromusculuar re-education:   30 min  SL ball tosses 2x10 each leg  SL cone taps x20 each leg   Tandem ball tosses into rebounder 2x20  Standing on airex pad SL 5' alternating legs each minute   Personalized Home  Exercise Program:  Access Code: [email or web address]  URL: [email or web address]  Date: 06/26/2024  Prepared by: Shira Wallace    Exercises  - Supine Active Ankle Pumps  - 4-5 x daily - 7 x weekly - 20 reps  - Long Sitting Quad Set  - 3 x daily - 7 x weekly - 20 reps - 5 seconds hold  - Supine Heel Slide (Mirrored)  - 3 x daily - 7 x weekly - 20 reps  - Small Range Straight Leg Raise  - 2 x daily - 7 x weekly - 3 sets - 10 reps  - Standing Heel Raise with Support  - 1 x daily - 7 x weekly - 3 sets - 10 reps  - Standing Knee Flexion (Mirrored)  - 1 x daily - 7 x weekly - 3 sets - 10 reps  - Standing Hip Abduction with Counter Support  - 1 x daily - 7 x weekly - 3 sets - 10 reps  - Mini Squat with Counter Support  - 1 x daily - 7 x weekly - 3 sets - 10 reps  - Standing Soleus Stretch on Foam 1/2 Roll  - 1 x daily - 7 x weekly - 1 sets - 3 reps - 30 hold  - Standing Hamstring Stretch on Chair  - 1 x daily - 7 x weekly - 1 sets - 3 reps - 30 hold       Assessment:   Pt tolerated treatment session well having reported getting a little hot during treatment session and requesting to end it when this was reported. Pt tolerated every exercise today well with no increase in pain reported just having some minor difficulty with SL activities.     Plan:  Continue to progress standing balance exercises in next session.     Nilo Rg, PTA

## 2024-07-19 ENCOUNTER — APPOINTMENT (OUTPATIENT)
Dept: OBSTETRICS AND GYNECOLOGY | Facility: CLINIC | Age: 79
End: 2024-07-19
Payer: MEDICARE

## 2024-07-19 VITALS
HEART RATE: 80 BPM | SYSTOLIC BLOOD PRESSURE: 121 MMHG | HEIGHT: 65 IN | BODY MASS INDEX: 22.99 KG/M2 | DIASTOLIC BLOOD PRESSURE: 72 MMHG | WEIGHT: 138 LBS

## 2024-07-19 DIAGNOSIS — N39.0 RECURRENT UTI: ICD-10-CM

## 2024-07-19 DIAGNOSIS — N95.2 VAGINAL ATROPHY: Primary | ICD-10-CM

## 2024-07-19 LAB
POC APPEARANCE, URINE: CLEAR
POC BILIRUBIN, URINE: NEGATIVE
POC BLOOD, URINE: ABNORMAL
POC COLOR, URINE: ABNORMAL
POC GLUCOSE, URINE: NEGATIVE MG/DL
POC KETONES, URINE: NEGATIVE MG/DL
POC LEUKOCYTES, URINE: NEGATIVE
POC NITRITE,URINE: NEGATIVE
POC PH, URINE: 7 PH
POC PROTEIN, URINE: NEGATIVE MG/DL
POC SPECIFIC GRAVITY, URINE: 1.02
POC UROBILINOGEN, URINE: 0.2 EU/DL

## 2024-07-19 PROCEDURE — 99214 OFFICE O/P EST MOD 30 MIN: CPT | Performed by: OBSTETRICS & GYNECOLOGY

## 2024-07-19 PROCEDURE — 1036F TOBACCO NON-USER: CPT | Performed by: OBSTETRICS & GYNECOLOGY

## 2024-07-19 PROCEDURE — 1159F MED LIST DOCD IN RCRD: CPT | Performed by: OBSTETRICS & GYNECOLOGY

## 2024-07-19 PROCEDURE — 1126F AMNT PAIN NOTED NONE PRSNT: CPT | Performed by: OBSTETRICS & GYNECOLOGY

## 2024-07-19 PROCEDURE — 81003 URINALYSIS AUTO W/O SCOPE: CPT | Performed by: OBSTETRICS & GYNECOLOGY

## 2024-07-19 ASSESSMENT — ENCOUNTER SYMPTOMS
CARDIOVASCULAR NEGATIVE: 1
EYES NEGATIVE: 1
ENDOCRINE NEGATIVE: 1
RESPIRATORY NEGATIVE: 1
PSYCHIATRIC NEGATIVE: 1
UNEXPECTED WEIGHT CHANGE: 1
NEUROLOGICAL NEGATIVE: 1
GASTROINTESTINAL NEGATIVE: 1

## 2024-07-19 ASSESSMENT — PAIN SCALES - GENERAL: PAINLEVEL: 0-NO PAIN

## 2024-07-19 NOTE — PROGRESS NOTES
[facility] Department of Urogynecology   Sylvie Key MD, MPH   [phone]    ASSESSMENT AND PLAN:     78-year-old female being assessed for recurrent UTI. Comorbidities include: venous insufficiency, borderline hyperglycemia, and peripheral neuropathy.    Diagnoses:  #1 Recurrent UTI  #2 Vaginal atrophy    Plan:  Recurrent UTI, vaginal atrophy  - Continue current regimen of Hiprex to prevent UTIs.  - Continue using vaginal estrogen cream.  - Discussed possibility of coming off of Hiprex in 6 months.    Follow-up with Dr. Key in 6 months to reassess and consider discontinuing Hiprex if appropriate.    Scribe Attestation  By signing my name below, ILauro, Celopatra, attest that this documentation has been prepared under the direction and in the presence of Sylvie Key MD MPH on 07/19/2024 at 10:37 AM.      Problem List Items Addressed This Visit    None  Visit Diagnoses       Recurrent UTI        Relevant Orders    POCT UA Automated manually resulted    Urinalysis with Reflex Microscopic           I spent a total of 30 minutes in face to face and non face to face time.     I Dr. Key, personally performed the services described in the documentation as scribed in my presence and confirm it is both complete and accurate.  Sylvie Key MD, MPH, FACOG       Sylvie Key MD, MPH, FACOG     Established    HISTORY OF PRESENT ILLNESS:     78-year-old female being assessed for recurrent UTI.     Record Review:   - 4/5/2024 Dr. Key note RE: Recurrent UTIs - Last positive urine culture was on 4/19/2023 with Proteus mirabilis. She has concerns today of acute UTI. Urine sent for culture. Rx for Bactrim -160 mg tablet to be taken once by mouth 2x/day for 5 days. She is allowed to take Azo but instructed not to take Hiprex again until she finishes antibiotics. GSM - Continue vaginal estrogen cream.  - 4/4/24 Ortho note (Dr. Schneider) - Discussed upcoming left total knee  arthroplasty.   - 1/3/24 note: Malinda - Last positive urine cx was on 04/19/23 with Proteus mirabilis. Uses vaginal estrogen 2x per week and takes Hiprex 1x daily.   - 12/18/23 Creatinine was 0.82   - 11/1/23 Dr. Sylvie Key note reviewed:  Last positive urine cx was on 04/19/23 with Proteus mirabilis. Still on Hiprex, she was told she can discontinue Hiprex and continue with tv estrogen cream. If she did have a UTI at this visit, she should take Hiprex BID after finishing the antibiotics (she had been taking Hiprex 1x daily).    - 5/3/23 Dr. Sylvie Key note reviewed: Malinda - She was on Hiprex and after 6 months we would consider discontinuing. Last positive urine cx was on 04/19/23 with Proteus mirabilis. Using tv estrogen cream 2x per week. She was to call if she had UTI sxs.   - 4/20/23 UA Micro: 47 RBC/HPF, but she had a UTI.     Urine Culture Hx:    - 11/21/23 NO SIGNIFICANT GROWTH.   - 11/1/23 NO SIGNIFICANT GROWTH.   - 7/3/23 NO SIGNIFICANT GROWTH.   - 6/28/23 MULTIPLE ORGANISMS PRESENT, PROBABLE CONTAMINATION  - 05/03/23 NO SIGNIFICANT GROWTH.   - 04/19/23 Proteus mirabilis >100,000 CFU/ML     Urinary Symptoms:   - She reports no recent bladder infections.  - She has not had any problems urinating post-knee surgery.  - She is regularly taking Hiprex and vaginal estrogen cream as prescribed.  - She does not currently need refills on either of her medications.        Past Medical History:     Past Medical History:   Diagnosis Date    GERD (gastroesophageal reflux disease)     History of recurrent UTIs     Hyperlipidemia     Leg edema     OAB (overactive bladder)     Osteopenia after menopause     PONV (postoperative nausea and vomiting)     TIA (transient ischemic attack) 10/2022    Questionable: while on vacation in late October 2022, she had new onset R eye blurriness and fatigue that was persistent on and off for 1-2 wks; Brain scans were negative. On Aspirin and Statin.          Past Surgical History:      Past Surgical History:   Procedure Laterality Date    CATARACT EXTRACTION       SECTION, CLASSIC      COLONOSCOPY      DENTAL SURGERY      FACIAL COSMETIC SURGERY      face lift    MR HEAD ANGIO WO IV CONTRAST  2022    MR HEAD ANGIO WO IV CONTRAST 2022 [facility]    MR NECK ANGIO WO IV CONTRAST  2022    MR NECK ANGIO WO IV CONTRAST 2022 [facility]    SALIVARY GLAND SURGERY  2023    Left lower lip excision:Left lower lip, excision: - Mucocele and minor salivary glands exhibiting chronic sialadenitis.    TONSILLECTOMY      TOTAL ABDOMINAL HYSTERECTOMY W/ BILATERAL SALPINGOOPHORECTOMY      TOTAL KNEE ARTHROPLASTY Left 2024         Medications:     Prior to Admission medications    Medication Sig Start Date End Date Taking? Authorizing Provider   azelastine (Astelin) 137 mcg (0.1 %) nasal spray USE 2 SPRAYS IN EACH NOSTRIL IN THE MORNING AND 2 SPRAYS BEFORE BEDTIME  Patient taking differently: Administer 2 sprays into each nostril 2 times a day as needed for rhinitis. 23  Yes Josefina Hatfield MD   betamethasone, augmented, (Diprolene AF) 0.05 % cream Apply topically 2 times a day. Apply sparingly to affected areas 1/4/24 1/3/25 Yes Maria Espinoza MD MPH   calcium citrate-vitamin D3 250 mg-5 mcg (200 unit) tablet Take 1 tablet by mouth once daily. 13  Yes Historical Provider, MD   cholecalciferol (Vitamin D-3) 25 MCG (1000 UT) capsule Take 1 capsule (25 mcg) by mouth once daily. 13  Yes Historical Provider, MD   cyclobenzaprine (Flexeril) 5 mg tablet Take 1 tablet (5 mg) by mouth 2 times a day as needed for muscle spasms. 24  Yes Sharmila Parry PA-C   fish oil concentrate (Omega-3) 120-180 mg capsule Take 1 capsule (1 g) by mouth once daily.   Yes Historical Provider, MD   fluticasone (Flonase) 50 mcg/actuation nasal spray Administer 2 sprays into each nostril once daily.  Patient taking differently: Administer 2 sprays into each nostril  once daily as needed for rhinitis. 4/17/23  Yes Nneka Albarran MD   loratadine (Claritin) 10 mg tablet Take 1 tablet (10 mg) by mouth once daily as needed for allergies.   Yes Historical Provider, MD   methenamine hippurate (Hiprex) 1 gram tablet TAKE 1 TABLET TWICE A DAY  Patient taking differently: Take 1 tablet (1 g) by mouth 2 times a day. Take one tablet 2x daily by mouth 10/6/23  Yes Sylvie Key MD MPH   mirabegron (Myrbetriq) 50 mg tablet extended release 24 hr 24 hr tablet Take 1 tablet (50 mg) by mouth once daily. 2/29/24  Yes Maria Espinoza MD MPH   ondansetron ODT (Zofran-ODT) 4 mg disintegrating tablet Take 1 tablet (4 mg) by mouth every 8 hours if needed for nausea or vomiting. Take one Tablet dissolved under tongue three times per day for nausea. 4/30/24  Yes Criss Rod PA-C   polyethylene glycol (Glycolax, Miralax) 17 gram/dose powder Mix 1 capful (17g) into 8 ounces of fluid then take by mouth once daily. 4/22/24  Yes Juan Gomes MD   VITAMIN B COMPLEX ORAL Take 1 tablet by mouth once daily.   Yes Historical Provider, MD   diclofenac sodium 1 % kit Apply 4 g topically 4 times a day as needed. APPLY TO AFFECTED AREA OF LOWER EXTREMITIES. DO NOT APPLY MORE THAN 16 GRAMS DAILY TO ANY ONE AFFECTED JOINT 11/29/22   Historical Provider, MD   pantoprazole (ProtoNix) 40 mg EC tablet Take 1 tablet (40 mg) by mouth once daily. Do not crush, chew, or split. 4/22/24 5/22/24  Juan Gomes MD        ROS  Review of Systems   Constitutional:  Positive for unexpected weight change (weight loss from 143 lbs to 136 lbs).   HENT: Negative.     Eyes: Negative.    Respiratory: Negative.     Cardiovascular: Negative.    Gastrointestinal: Negative.    Endocrine: Negative.    Genitourinary: Negative.         No recent bladder infections; no issues with urination   Musculoskeletal:         ROM is 126 degrees; received a cortisone shot in the knee; residual puffiness in the ankle   Neurological:  "Negative.    Psychiatric/Behavioral: Negative.            PHYSICAL EXAM:    /72   Pulse 80   Ht 1.651 m (5' 5\")   Wt 62.6 kg (138 lb)   BMI 22.96 kg/m²   No LMP recorded. Patient is postmenopausal.    Declines chaperone for physical exam.      Well developed, well nourished, in no apparent distress.   Neurologic/Psychiatric:  Awake, Alert and Oriented times 3.  Affect normal.           Data and DIAGNOSTIC STUDIES REVIEWED   No results found.   Lab Results   Component Value Date    URINECULTURE Normal genitourinary last 06/11/2024      Lab Results   Component Value Date    GLUCOSE 78 06/11/2024    CALCIUM 9.1 06/11/2024     06/11/2024    K 4.6 06/11/2024    CO2 28 06/11/2024     06/11/2024    BUN 13 06/11/2024    CREATININE 0.74 06/11/2024     Lab Results   Component Value Date    WBC 5.2 06/11/2024    HGB 11.7 (L) 06/11/2024    HCT 35.8 (L) 06/11/2024    MCV 95 06/11/2024     06/11/2024        "

## 2024-07-20 LAB
APPEARANCE UR: CLEAR
BILIRUB UR STRIP.AUTO-MCNC: NEGATIVE MG/DL
COLOR UR: NORMAL
GLUCOSE UR STRIP.AUTO-MCNC: NORMAL MG/DL
KETONES UR STRIP.AUTO-MCNC: NEGATIVE MG/DL
LEUKOCYTE ESTERASE UR QL STRIP.AUTO: NEGATIVE
NITRITE UR QL STRIP.AUTO: NEGATIVE
PH UR STRIP.AUTO: 6.5 [PH]
PROT UR STRIP.AUTO-MCNC: NEGATIVE MG/DL
RBC # UR STRIP.AUTO: NEGATIVE /UL
SP GR UR STRIP.AUTO: 1.02
UROBILINOGEN UR STRIP.AUTO-MCNC: NORMAL MG/DL

## 2024-07-22 ENCOUNTER — APPOINTMENT (OUTPATIENT)
Dept: PHYSICAL THERAPY | Facility: CLINIC | Age: 79
End: 2024-07-22
Payer: MEDICARE

## 2024-07-26 ENCOUNTER — TELEPHONE (OUTPATIENT)
Dept: ORTHOPEDIC SURGERY | Facility: HOSPITAL | Age: 79
End: 2024-07-26

## 2024-07-26 DIAGNOSIS — Z96.652 S/P TKR (TOTAL KNEE REPLACEMENT) USING CEMENT, LEFT: Primary | ICD-10-CM

## 2024-07-26 RX ORDER — AMOXICILLIN 500 MG/1
CAPSULE ORAL
Qty: 4 CAPSULE | Refills: 6 | Status: SHIPPED | OUTPATIENT
Start: 2024-07-26

## 2024-07-26 NOTE — PROGRESS NOTES
Dental antibiotic was sent to pharmacy today for prophylactic dental cleanings or procedures per protocol after total joint arthroplasty.

## 2024-07-26 NOTE — TELEPHONE ENCOUNTER
Patient is requesting medication refills for DENTAL ANTIBIOTICS.  Patient had surgery L TKA on 4/22/2024.   Patient Allergy list is up to date.  Patient pharmacy is up to date.    Thanks,  Fabi Bain

## 2024-07-31 ENCOUNTER — TREATMENT (OUTPATIENT)
Dept: PHYSICAL THERAPY | Facility: CLINIC | Age: 79
End: 2024-07-31
Payer: MEDICARE

## 2024-07-31 DIAGNOSIS — M17.12 UNILATERAL PRIMARY OSTEOARTHRITIS, LEFT KNEE: ICD-10-CM

## 2024-07-31 PROCEDURE — 97110 THERAPEUTIC EXERCISES: CPT | Mod: GP,KX

## 2024-07-31 ASSESSMENT — PAIN SCALES - GENERAL: PAINLEVEL_OUTOF10: 0 - NO PAIN

## 2024-07-31 NOTE — PROGRESS NOTES
Physical Therapy  Physical Therapy Orthopedic Progress and Discharge Note    Patient Name: Zarina Holliday  MRN: [id number]  Today's Date: 2024  Time Calculation  Start Time: 1006  Stop Time: 1040  Time Calculation (min): 34 min    Insurance:  Visit number:  [place] of [place]  Authorization info: no auth   Insurance Type:  Medicare and  for Life   Medicare Certification Period: Beginnin24 Endin24  Onset date: 24     General:  Reason for visit: S/P L TKA  Referred by: Moses Schneider MD    Current Problem  1. Unilateral primary osteoarthritis, left knee  Follow Up In Physical Therapy            Precautions: STEADI Fall Risk Score (The score of 4 or more indicates an increased risk of falling): 5       Subjective:     Patient reports that she is doing well.  Walking 5,000-7,000 steps on an average day.  No longer using an assistive device. Using a reciprocal pattern on steps.  Doing yoga regularly. Has resumed some swimming.  Very pleased with progress.      Current Condition:   better     Pain  Current: 0-10 (Numeric) Pain Score: 0 - No pain    Functional Limitations: Minimal getting leg in-out of car.    Self Reported Function (0-100%) = 80-85%  - 100% being back to PLOF    Performing HEP?: Yes. Very diligently.      Objective:   Posture: WNL     Gait: Stable pattern without use of assistive device.  Good stride length and gait mechanics.       Observation: Incisional scar closed.  No drainage, johanny-incisional redness or signs of infection. Very minimal swelling     Palpation: (-) tenderness or warmth to L calf                                                                                ROM     Knee AROM (Degrees)                                         (R)                    (L)  Flexion:                        142                  131                                             Extension:                    0                      0                                          Knee PROM  (Degrees)                                         (R)                    (L)  Flexion:                        142                  131                                            Extension:                    0                      0                                          Ankle AROM (Degrees)                                         (R)                    (L)  Plantarflexion:              WNL                 WNL                                           Dorsiflexion:                 WNL                 WNL                                                       Inversion:                     WNL                 WNL                                                                   Eversion:                      WNL                 WNL                                                                                                             Strength Testing     Hip                                      (R)                    (L)  Flexion:                       5                       4+      External Rotation: 5  5  Internal Rotation:  5  4+                                                                                                 Knee                                      (R)                    (L)  Flexion:                        5                      4+                                                Extension:                    5                      5                                 Flexibility                          (R)                    (L)  Hamstrings:                 long                 long  Hip Flexors:                  WNL                 WNL                                         Functional Movement  Sit to stand: without UE assist  Squat: WNL  Bed Mobility: Independent                                         Balance     Feet Together, Eyes Closed: WNL  SLS: R 1 minute, L 1 minute                                       Patella Mobility: good        Outcome Measures: Updated 7/31/2024         Goals:  Updated 7/31/2024  Resolved       PT Problem       PT Goal 1 (Met)       Start:  05/08/24    Expected End:  06/19/24    Resolved:  06/24/24    Left knee ROM 0-110 and without pain > 2/10 by week 6.  LE strength measures improved to at least 4/5 by week 6.  Stable gait without assistive device by week 6.  L knee girth within 1 cm or R, indicative of reduced swelling by week 6.          PT Goal 2 (Met)       Start:  05/08/24    Expected End:  08/06/24    Resolved:  07/31/24    Left knee ROM 0-120 and without pain > 2/10 by week 12. Met  LE strength measures improved to at least 5/5 by week 12. Nearly Met  Walk 30 minutes with normalized gait mechanics and without assistive device by week 12. Met  Reciprocal ascent/descent of stairs with use of railing by week 12. Met  Squat with good form and without pain > 2/10 by week 12. Met  LEFS improved by at least 18 points by week 12. Met  Patient will rate pain < 3/10 at worst to improve ADL tolerance by week 12. Met  Independent with home exercise program for symptom reduction and functional gains by week 12. Met                 Treatment Performed:    Therapeutic Exercise:                          34 min  R. Stepper L3 5'  Performed recheck  Discussed exercise routine and discharge recommendations        Personalized Home Exercise Program:  Access Code: [email or web address]      EDUCATION: home exercise program, plan of care     Assessment:  Mrs. Holliday is 3 months s/p L TKA and doing extremely well.  She demonstrates excellent ROM,balance, and flexibility measures and continues to make very good gains in strength.  Swelling has nearly resolved.  She has nearly regained full function, and is consistently walking, swimming and performing yoga.  She is ready for discharge to her home exercise program at this time.     Plan of Care: Updated 7/31/2024     Discharge to [facility].       Shira Wallace, PT

## 2024-10-08 ENCOUNTER — APPOINTMENT (OUTPATIENT)
Dept: CARDIOLOGY | Facility: HOSPITAL | Age: 79
End: 2024-10-08
Payer: MEDICARE

## 2024-10-16 DIAGNOSIS — N32.81 OVERACTIVE BLADDER: ICD-10-CM

## 2024-10-17 ENCOUNTER — OFFICE VISIT (OUTPATIENT)
Dept: ORTHOPEDIC SURGERY | Facility: CLINIC | Age: 79
End: 2024-10-17
Payer: MEDICARE

## 2024-10-17 ENCOUNTER — LAB (OUTPATIENT)
Dept: LAB | Facility: LAB | Age: 79
End: 2024-10-17
Payer: OTHER GOVERNMENT

## 2024-10-17 DIAGNOSIS — M17.11 LOCALIZED OSTEOARTHRITIS OF RIGHT KNEE: ICD-10-CM

## 2024-10-17 DIAGNOSIS — E78.5 HYPERLIPIDEMIA, UNSPECIFIED HYPERLIPIDEMIA TYPE: ICD-10-CM

## 2024-10-17 LAB
ALBUMIN SERPL BCP-MCNC: 4.1 G/DL (ref 3.4–5)
ALP SERPL-CCNC: 56 U/L (ref 33–136)
ALT SERPL W P-5'-P-CCNC: 13 U/L (ref 7–45)
ANION GAP SERPL CALC-SCNC: 12 MMOL/L (ref 10–20)
AST SERPL W P-5'-P-CCNC: 17 U/L (ref 9–39)
BILIRUB SERPL-MCNC: 0.4 MG/DL (ref 0–1.2)
BUN SERPL-MCNC: 12 MG/DL (ref 6–23)
CALCIUM SERPL-MCNC: 8.9 MG/DL (ref 8.6–10.3)
CHLORIDE SERPL-SCNC: 105 MMOL/L (ref 98–107)
CHOLEST SERPL-MCNC: 151 MG/DL (ref 0–199)
CHOLESTEROL/HDL RATIO: 1.9
CO2 SERPL-SCNC: 27 MMOL/L (ref 21–32)
CREAT SERPL-MCNC: 0.75 MG/DL (ref 0.5–1.05)
EGFRCR SERPLBLD CKD-EPI 2021: 81 ML/MIN/1.73M*2
ERYTHROCYTE [DISTWIDTH] IN BLOOD BY AUTOMATED COUNT: 11.9 % (ref 11.5–14.5)
GLUCOSE SERPL-MCNC: 90 MG/DL (ref 74–99)
HCT VFR BLD AUTO: 38.6 % (ref 36–46)
HDLC SERPL-MCNC: 77.5 MG/DL
HGB BLD-MCNC: 12.7 G/DL (ref 12–16)
LDLC SERPL CALC-MCNC: 62 MG/DL
MCH RBC QN AUTO: 30.8 PG (ref 26–34)
MCHC RBC AUTO-ENTMCNC: 32.9 G/DL (ref 32–36)
MCV RBC AUTO: 94 FL (ref 80–100)
NON HDL CHOLESTEROL: 74 MG/DL (ref 0–149)
NRBC BLD-RTO: 0 /100 WBCS (ref 0–0)
PLATELET # BLD AUTO: 219 X10*3/UL (ref 150–450)
POTASSIUM SERPL-SCNC: 4.1 MMOL/L (ref 3.5–5.3)
PROT SERPL-MCNC: 6.6 G/DL (ref 6.4–8.2)
RBC # BLD AUTO: 4.12 X10*6/UL (ref 4–5.2)
SODIUM SERPL-SCNC: 140 MMOL/L (ref 136–145)
TRIGL SERPL-MCNC: 60 MG/DL (ref 0–149)
VLDL: 12 MG/DL (ref 0–40)
WBC # BLD AUTO: 4.3 X10*3/UL (ref 4.4–11.3)

## 2024-10-17 PROCEDURE — 85027 COMPLETE CBC AUTOMATED: CPT

## 2024-10-17 PROCEDURE — 36415 COLL VENOUS BLD VENIPUNCTURE: CPT

## 2024-10-17 PROCEDURE — 20610 DRAIN/INJ JOINT/BURSA W/O US: CPT | Mod: RT | Performed by: PHYSICIAN ASSISTANT

## 2024-10-17 PROCEDURE — 99213 OFFICE O/P EST LOW 20 MIN: CPT | Performed by: PHYSICIAN ASSISTANT

## 2024-10-17 PROCEDURE — 80061 LIPID PANEL: CPT

## 2024-10-17 PROCEDURE — 80053 COMPREHEN METABOLIC PANEL: CPT

## 2024-10-17 PROCEDURE — 2500000004 HC RX 250 GENERAL PHARMACY W/ HCPCS (ALT 636 FOR OP/ED): Performed by: PHYSICIAN ASSISTANT

## 2024-10-17 PROCEDURE — 1159F MED LIST DOCD IN RCRD: CPT | Performed by: PHYSICIAN ASSISTANT

## 2024-10-17 RX ORDER — ROSUVASTATIN CALCIUM 5 MG/1
1 TABLET, COATED ORAL
COMMUNITY
Start: 2024-08-05

## 2024-10-17 RX ORDER — TRIAMCINOLONE ACETONIDE 40 MG/ML
1 INJECTION, SUSPENSION INTRA-ARTICULAR; INTRAMUSCULAR
Status: COMPLETED | OUTPATIENT
Start: 2024-10-17 | End: 2024-10-17

## 2024-10-17 RX ORDER — ESTRADIOL 0.1 MG/G
CREAM VAGINAL
COMMUNITY
Start: 2024-04-11

## 2024-10-17 RX ORDER — LIDOCAINE HYDROCHLORIDE 10 MG/ML
4 INJECTION, SOLUTION INFILTRATION; PERINEURAL
Status: COMPLETED | OUTPATIENT
Start: 2024-10-17 | End: 2024-10-17

## 2024-10-17 RX ORDER — MIRABEGRON 50 MG/1
50 TABLET, FILM COATED, EXTENDED RELEASE ORAL DAILY
Qty: 90 TABLET | Refills: 3 | Status: SHIPPED | OUTPATIENT
Start: 2024-10-17

## 2024-10-17 RX ORDER — MINOXIDIL 5 G/100G
AEROSOL, FOAM TOPICAL
COMMUNITY
Start: 2024-08-05

## 2024-10-17 ASSESSMENT — PAIN - FUNCTIONAL ASSESSMENT: PAIN_FUNCTIONAL_ASSESSMENT: 0-10

## 2024-10-17 ASSESSMENT — PAIN SCALES - GENERAL: PAINLEVEL_OUTOF10: 5 - MODERATE PAIN

## 2024-10-17 ASSESSMENT — PAIN DESCRIPTION - DESCRIPTORS: DESCRIPTORS: ACHING;THROBBING

## 2024-10-17 NOTE — PROGRESS NOTES
SYLVIA Patton, PASofiaC, ATC  Adult Reconstruction and Joint Replacement Surgery  Phone: [phone]     Fax:[phone]            Chief Complaint   Patient presents with    Right Knee - Pain, Injections       HPI  Zarina Holliday is a pleasant 79 y.o. year-old female here for follow-up of known underlying side: right knee osteoarthritis. He/She presents for repeat evaluation.  The patient notes persistent pain as well as some occasional mechanical symptoms.  Pain level: 5. He/She is getting about 3 monthsof relief out of her injections.   The Pt has elected to undergo injection/s today.    Past Medical History:   Diagnosis Date    GERD (gastroesophageal reflux disease)     History of recurrent UTIs     Hyperlipidemia     Leg edema     OAB (overactive bladder)     Osteopenia after menopause     PONV (postoperative nausea and vomiting)     TIA (transient ischemic attack) 10/2022    Questionable: while on vacation in late October 2022, she had new onset R eye blurriness and fatigue that was persistent on and off for 1-2 wks; Brain scans were negative. On Aspirin and Statin.     Patient Active Problem List   Diagnosis    Effusion of left knee    Arthritis of knee, left    Edema of both legs    Episodic lightheadedness    Dizziness    Cerumen impaction    Borderline hyperglycemia    Bilateral knee pain    Arthralgia of left hip    Anemia    Allergic rhinitis    Adult myxedema    Abnormal mammogram    Abnormal finding in urine    Shoulder pain, right    Segmental and somatic dysfunction of pelvic region    Primary osteoarthritis of both knees    Peripheral neuropathy    Osteopenia    Osteoarthritis    OAB (overactive bladder)    Lower extremity edema    Lesion of bladder    Imbalance    Venous insufficiency    Varicose veins with pain    UTI (urinary tract infection)    Urge incontinence of urine    Unilateral primary osteoarthritis, left knee    Arthritis of left knee    Primary osteoarthritis of left  knee    Aftercare following left knee joint replacement surgery       Medication Documentation Review Audit       Reviewed by Nneka Wood LPN (Licensed Nurse) on 10/17/24 at 0834      Medication Order Taking? Sig Documenting Provider Last Dose Status   amoxicillin (Amoxil) 500 mg capsule [id number] Yes Take 4 Tablets 1 Hour Prior to Dental Procedure or Cleaning. Criss Rod PA-C  Active   azelastine (Astelin) 137 mcg (0.1 %) nasal spray [id number] Yes USE 2 SPRAYS IN EACH NOSTRIL IN THE MORNING AND 2 SPRAYS BEFORE BEDTIME   Patient taking differently: Administer 2 sprays into each nostril 2 times a day as needed for rhinitis.    Josefina Hatfield MD  Active   betamethasone, augmented, (Diprolene AF) 0.05 % cream [id number] Yes Apply topically 2 times a day. Apply sparingly to affected areas Maria Espinoza MD [facility]  Active   calcium citrate-vitamin D3 250 mg-5 mcg (200 unit) tablet [id number] Yes Take 1 tablet by mouth once daily. Historical Provider, MD  Active   cholecalciferol (Vitamin D-3) 25 MCG (1000 UT) capsule [id number] Yes Take 1 capsule (25 mcg) by mouth once daily. Historical Provider, MD  Active   cyclobenzaprine (Flexeril) 5 mg tablet [id number] Yes Take 1 tablet (5 mg) by mouth 2 times a day as needed for muscle spasms. Sharmila Parry PA-C  Active   diclofenac sodium 1 % kit [id number] Yes Apply 4 g topically 4 times a day as needed. APPLY TO AFFECTED AREA OF LOWER EXTREMITIES. DO NOT APPLY MORE THAN 16 GRAMS DAILY TO ANY ONE AFFECTED JOINT Historical Provider, MD  Active   estradiol (Estrace) 0.01 % (0.1 mg/gram) vaginal cream [id number] Yes  Historical Provider, MD  Active   fish oil concentrate (Omega-3) 120-180 mg capsule [id number] Yes Take 1 capsule (1 g) by mouth once daily. Historical Provider, MD  Active   fluticasone (Flonase) 50 mcg/actuation nasal spray [id number] Yes Administer 2 sprays into each nostril once daily.   Patient taking differently: Administer 2 sprays into each nostril once daily as  needed for rhinitis.    Nneka Albarran MD  Active   Hair Regrowth Treatment 5 % foam [id number] Yes APPLY 1 CAPFUL ONCE DAILY AT BEDTIME Historical Provider, MD  Active   loratadine (Claritin) 10 mg tablet [id number] Yes Take 1 tablet (10 mg) by mouth once daily as needed for allergies. Historical Provider, MD  Active   methenamine hippurate (Hiprex) 1 gram tablet [id number] Yes TAKE 1 TABLET TWICE A DAY   Patient taking differently: Take 1 tablet (1 g) by mouth 2 times a day. Take one tablet 2x daily by mouth    Sylvie Key MD MPH  Active   mirabegron (Myrbetriq) 50 mg tablet extended release 24 hr 24 hr tablet [id number] Yes Take 1 tablet (50 mg) by mouth once daily. Maria Espinoza MD MPH  Active   ondansetron ODT (Zofran-ODT) 4 mg disintegrating tablet [id number] Yes Take 1 tablet (4 mg) by mouth every 8 hours if needed for nausea or vomiting. Take one Tablet dissolved under tongue three times per day for nausea. Criss Rod PA-C  Active   pantoprazole (ProtoNix) 40 mg EC tablet [id number]  Take 1 tablet (40 mg) by mouth once daily. Do not crush, chew, or split. Juan Gomes MD   [id number] [phone]   polyethylene glycol (Glycolax, Miralax) 17 gram/dose powder [id number] Yes Mix 1 capful (17g) into 8 ounces of fluid then take by mouth once daily. Juan Gomes MD  Active   rosuvastatin (Crestor) 5 mg tablet [id number] Yes Take 1 tablet (5 mg) by mouth early in the morning.. Historical Provider, MD  Active   VITAMIN B COMPLEX ORAL [id number] Yes Take 1 tablet by mouth once daily. Historical Provider, MD  Active                    Allergies   Allergen Reactions    Adhesive Tape-Silicones Other     Blisters, chin itching       Social History     Socioeconomic History    Marital status:      Spouse name: Not on file    Number of children: Not on file    Years of education: Not on file    Highest education level: Not on file   Occupational History    Not on file   Tobacco Use    Smoking status:  Never    Smokeless tobacco: Never   Substance and Sexual Activity    Alcohol use: Not Currently    Drug use: Never    Sexual activity: Not on file   Other Topics Concern    Not on file   Social History Narrative    Not on file     Social Drivers of Health     Financial Resource Strain: Low Risk  (2024)    Overall Financial Resource Strain (CARDIA)     Difficulty of Paying Living Expenses: Not hard at all   Food Insecurity: No Food Insecurity (2024)    Hunger Vital Sign     Worried About Running Out of Food in the Last Year: Never true     Ran Out of Food in the Last Year: Never true   Transportation Needs: No Transportation Needs (2024)    OASIS : Transportation     Lack of Transportation (Medical): No     Lack of Transportation (Non-Medical): No     Patient Unable or Declines to Respond: No   Physical Activity: Sufficiently Active (2024)    Exercise Vital Sign     Days of Exercise per Week: 5 days     Minutes of Exercise per Session: 30 min   Stress: No Stress Concern Present (2024)    [employer] - Occupational Stress Questionnaire     Feeling of Stress : Not at all   Social Connections: Feeling Socially Integrated (2024)    OASIS : Social Isolation     Frequency of experiencing loneliness or isolation: Never   Intimate Partner Violence: Not At Risk (2024)    Humiliation, Afraid, Rape, and Kick questionnaire     Fear of Current or Ex-Partner: No     Emotionally Abused: No     Physically Abused: No     Sexually Abused: No   Housing Stability: Low Risk  (2024)    Housing Stability Vital Sign     Unable to Pay for Housing in the Last Year: No     Number of Places Lived in the Last Year: 1     Unstable Housing in the Last Year: No       Past Surgical History:   Procedure Laterality Date    CATARACT EXTRACTION       SECTION, CLASSIC      COLONOSCOPY      DENTAL SURGERY      FACIAL COSMETIC SURGERY      face lift    MR HEAD ANGIO WO IV  CONTRAST  11/02/2022    MR HEAD ANGIO WO IV CONTRAST 11/2/2022 [facility] ANCILLARY LEGACY    MR NECK ANGIO WO IV CONTRAST  11/02/2022    MR NECK ANGIO WO IV CONTRAST 11/2/2022 [facility] ANCILLARY LEGACY    SALIVARY GLAND SURGERY  04/17/2023    Left lower lip excision:Left lower lip, excision: - Mucocele and minor salivary glands exhibiting chronic sialadenitis.    TONSILLECTOMY      TOTAL ABDOMINAL HYSTERECTOMY W/ BILATERAL SALPINGOOPHORECTOMY      TOTAL KNEE ARTHROPLASTY Left 04/22/2024       Review of Systems    Physical Exam:  side: right Knee:  General: Well-appearing female in no acute distress.  Awake, alert and oriented.  Pleasant and cooperative.  Respiratory: Non-labored breathing  Mood: Euthymic   Gait: Normal  Assistive Device: no device  Skin: warm, dry and intact without lesions  Tenderness to palpation over anterior and medial, Joint line.  Effusion: mild  ROM Flexion/Extension: Unchanged  No instability varus or valgus stressing the knee at 0, 30 or 60 degrees.  No instability in the AP plane at 90 degrees.  5/5 hip flexion/knee extension/DF/PF/EHL  SILT in a bijal/saph/per/tib distribution  Neuro L5-S1 sensation intact bilaterally, No clonus. 2+ symmetric patella and achilles reflexes bilateral.  2+ Femoral/DP/PT pulses bilaterally  Compartments supple and non-tender.  Extremities warm and well perfused.    There were no vitals filed for this visit.  There is no height or weight on file to calculate BMI.    Impression/Plan  Zarina Holliday is getting adequate relief from injection therapy.     I discussed non-operative treatments for the patients' arthritis in detail and briefly discussed indications for surgery vs conservative treatment. The patient has elected to undergo knee side: right injections today.  He understands he can repeat the injections every 3 to 4 months.  Hopefully gets the relief he is looking for.    Primary Osteoarthritis side: right Knee    Procedure  L Inj/Asp: R knee on 10/17/2024 8:43  AM  Indications: pain and joint swelling  Details: 22 G needle, anterolateral approach  Medications: 1 mL triamcinolone acetonide 40 mg/mL; 4 mL lidocaine 10 mg/mL (1 %)  Outcome: tolerated well, no immediate complications    Discussion:  I discussed the conservative treatment options for knee osteoarthritis including but not limited to physical therapy, oral NSAIDS, activity and lifestyle modification, and corticosteroid injections. Pt has elected to undergo a cortisone injection today. I have explained the risk and benefits of an injection including the possibility of joint infection, bleeding, damage to cartilage, allergic reaction. Patient verbalized understanding and gave verbal consent wishes to proceed with a intra-articular cortisone injection for their knee.    Procedure:  After discussing the risk and benefits of the procedure, we proceeded with an intra-articular right knee injection.    With the patient's informed verbal consent, the right knee was prepped in standard sterile fashion with Chlorhexidine. The skin was then anesthetized with ethyl chloride spray and cleaned again with Chlorhexidine. The knee was then apirated/injected with a prefilled 20-gauge syringe of 40 mg Kenalog + 4 ml Lidocaine using the lateral approach without complications.  The patient tolerated this well and felt immediate initial relief of symptoms. A bandaid was applied and the patient ambulated out of the clinic on ther own accord without difficulty. Patient was instructed to avoid physical activity for 24-48 hours to prevent the knees from swelling and may ice the knees as tolerated. Patient should contact the office if any signs of of infection appear: redness, fever, chills, drainage, swelling or warmth to the knees.  Pt understands that the injections can be repeated no sooner than 3 months.    Procedure, treatment alternatives, risks and benefits explained, specific risks discussed. Consent was given by the patient.  Immediately prior to procedure a time out was called to verify the correct patient, procedure, equipment, support staff and site/side marked as required. Patient was prepped and draped in the usual sterile fashion.         All questions were answered.    Follow-up 3-4 months/PRN    SYLVIA Patton, PASofiaC, ATC  Orthopedic Physician Assisant  Adult Reconstruction and Total Joint Replacement  General Orthopedics  Department of Orthopaedic Surgery  [place] [phone]  Jinnaging preferred

## 2024-10-18 ENCOUNTER — TELEPHONE (OUTPATIENT)
Dept: ORTHOPEDIC SURGERY | Facility: HOSPITAL | Age: 79
End: 2024-10-18

## 2024-10-24 DIAGNOSIS — N39.0 RECURRENT UTI: ICD-10-CM

## 2024-10-24 RX ORDER — METHENAMINE HIPPURATE 1000 MG/1
1 TABLET ORAL 2 TIMES DAILY
Qty: 60 TABLET | Refills: 11 | Status: SHIPPED | OUTPATIENT
Start: 2024-10-24

## 2025-01-06 ENCOUNTER — HOSPITAL ENCOUNTER (OUTPATIENT)
Dept: RADIOLOGY | Facility: CLINIC | Age: 80
Discharge: HOME | End: 2025-01-06
Payer: MEDICARE

## 2025-01-06 VITALS — WEIGHT: 138 LBS | BODY MASS INDEX: 22.99 KG/M2 | HEIGHT: 65 IN

## 2025-01-06 DIAGNOSIS — Z12.31 BREAST CANCER SCREENING BY MAMMOGRAM: ICD-10-CM

## 2025-01-06 PROCEDURE — 77067 SCR MAMMO BI INCL CAD: CPT

## 2025-01-06 PROCEDURE — 77063 BREAST TOMOSYNTHESIS BI: CPT | Performed by: RADIOLOGY

## 2025-01-06 PROCEDURE — 77067 SCR MAMMO BI INCL CAD: CPT | Performed by: RADIOLOGY

## 2025-01-10 ENCOUNTER — LAB (OUTPATIENT)
Dept: LAB | Facility: LAB | Age: 80
End: 2025-01-10
Payer: MEDICARE

## 2025-01-10 ENCOUNTER — HOSPITAL ENCOUNTER (OUTPATIENT)
Dept: RADIOLOGY | Facility: CLINIC | Age: 80
Discharge: HOME | End: 2025-01-10
Payer: MEDICARE

## 2025-01-10 ENCOUNTER — OFFICE VISIT (OUTPATIENT)
Dept: ORTHOPEDIC SURGERY | Facility: CLINIC | Age: 80
End: 2025-01-10
Payer: MEDICARE

## 2025-01-10 DIAGNOSIS — E78.5 DYSLIPIDEMIA: ICD-10-CM

## 2025-01-10 DIAGNOSIS — M17.11 LOCALIZED OSTEOARTHRITIS OF RIGHT KNEE: ICD-10-CM

## 2025-01-10 DIAGNOSIS — Z96.652 S/P TKR (TOTAL KNEE REPLACEMENT) USING CEMENT, LEFT: ICD-10-CM

## 2025-01-10 LAB
CHOLEST SERPL-MCNC: 202 MG/DL (ref 0–199)
CHOLESTEROL/HDL RATIO: 2.6
HDLC SERPL-MCNC: 78.6 MG/DL
LDLC SERPL CALC-MCNC: 110 MG/DL
NON HDL CHOLESTEROL: 123 MG/DL (ref 0–149)
TRIGL SERPL-MCNC: 69 MG/DL (ref 0–149)
VLDL: 14 MG/DL (ref 0–40)

## 2025-01-10 PROCEDURE — 80061 LIPID PANEL: CPT

## 2025-01-10 PROCEDURE — 99213 OFFICE O/P EST LOW 20 MIN: CPT | Performed by: ORTHOPAEDIC SURGERY

## 2025-01-10 PROCEDURE — 73564 X-RAY EXAM KNEE 4 OR MORE: CPT | Mod: RT

## 2025-01-10 RX ORDER — AMOXICILLIN 500 MG/1
CAPSULE ORAL
Qty: 4 CAPSULE | Refills: 6 | Status: SHIPPED | OUTPATIENT
Start: 2025-01-10

## 2025-01-17 ENCOUNTER — OFFICE VISIT (OUTPATIENT)
Dept: OBSTETRICS AND GYNECOLOGY | Facility: CLINIC | Age: 80
End: 2025-01-17
Payer: MEDICARE

## 2025-01-17 VITALS
SYSTOLIC BLOOD PRESSURE: 145 MMHG | BODY MASS INDEX: 24.66 KG/M2 | DIASTOLIC BLOOD PRESSURE: 80 MMHG | HEART RATE: 77 BPM | WEIGHT: 148.2 LBS

## 2025-01-17 DIAGNOSIS — N95.2 VAGINAL ATROPHY: ICD-10-CM

## 2025-01-17 DIAGNOSIS — N39.0 RECURRENT UTI: Primary | ICD-10-CM

## 2025-01-17 PROCEDURE — 99214 OFFICE O/P EST MOD 30 MIN: CPT | Performed by: OBSTETRICS & GYNECOLOGY

## 2025-01-17 PROCEDURE — 1159F MED LIST DOCD IN RCRD: CPT | Performed by: OBSTETRICS & GYNECOLOGY

## 2025-01-17 PROCEDURE — 81003 URINALYSIS AUTO W/O SCOPE: CPT | Performed by: OBSTETRICS & GYNECOLOGY

## 2025-01-17 PROCEDURE — 1126F AMNT PAIN NOTED NONE PRSNT: CPT | Performed by: OBSTETRICS & GYNECOLOGY

## 2025-01-17 PROCEDURE — 87086 URINE CULTURE/COLONY COUNT: CPT | Performed by: OBSTETRICS & GYNECOLOGY

## 2025-01-17 ASSESSMENT — PAIN SCALES - GENERAL: PAINLEVEL_OUTOF10: 0-NO PAIN

## 2025-01-17 NOTE — PROGRESS NOTES
[facility] Department of Urogynecology   Sylvie Key MD, MPH   [phone]    ASSESSMENT AND PLAN:   79 y.o. female being assessed for recurrent UTI. Comorbidities include: venous insufficiency, borderline hyperglycemia, and peripheral neuropathy.     Diagnoses:   #1 Recurrent UTI  #2 Vaginal atrophy    Plan:   1. Recurrent UTI, vaginal atrophy  - Patient declines wanting to stop the Hiprex once daily now as she feels this regimen is working for her. This summer, she may consider stopping the Hiprex. It is safe to take long term.   - Continue tv estrogen cream 2x/week.   - Her UTI ppx regimen is working very well.      Follow-up in 1 year with Dr. Key. Patient does spend time in [place] with her  when not in [place].     Scribe Attestation:   ILeola, am scribing for virtually, and in the presence of Sylvie Key MD MPH on 01/17/2025 at 10:34 AM.    Problem List Items Addressed This Visit    None  Visit Diagnoses       Recurrent UTI    -  Primary    Relevant Orders    POCT UA Automated manually resulted    Measure post void residual (Completed)    Urine Culture           I spent a total of *** minutes in face to face and non face to face time.      Sylvie Key MD, MPH, FACOG     Established    HISTORY OF PRESENT ILLNESS:     Zarina Holliday is a 79 y.o. female who presents for Malinda follow up.      Record Review:   - 7/19/24 Dr. Sylvie Key note reviewed: Malinda - on Hiprex and vaginal estrogen cream.   - 4/5/2024 Dr. Key note RE: Recurrent UTIs - Last positive urine culture was on 4/19/2023 with Proteus mirabilis. She has concerns today of acute UTI. Urine sent for culture. Rx for Bactrim -160 mg tablet to be taken once by mouth 2x/day for 5 days. She is allowed to take Azo but instructed not to take Hiprex again until she finishes antibiotics. GSM - Continue vaginal estrogen cream.  - 4/4/24 Ortho note (Dr. Schneider) - Discussed upcoming left total knee  arthroplasty.   - 1/3/24 note: Malinda - Last positive urine cx was on 23 with Proteus mirabilis. Uses vaginal estrogen 2x per week and takes Hiprex 1x daily.   - 23 Creatinine was 0.82   - 23 Dr. Sylvie Key note reviewed:  Last positive urine cx was on 23 with Proteus mirabilis. Still on Hiprex, she was told she can discontinue Hiprex and continue with tv estrogen cream. If she did have a UTI at this visit, she should take Hiprex BID after finishing the antibiotics (she had been taking Hiprex 1x daily).    - 5/3/23 Dr. Sylvie Key note reviewed: Malinda - She was on Hiprex and after 6 months we would consider discontinuing. Last positive urine cx was on 23 with Proteus mirabilis. Using tv estrogen cream 2x per week. She was to call if she had UTI sxs.   - 23 UA Micro: 47 RBC/HPF, but she had a UTI.     Urine Culture Hx:    - 23 Proteus mirabilis >100,000 CFU/ML (last positive cx per 25)     Urinary Symptoms:   - No dysuria.   - She takes Hiprex once daily and uses tv estrogen cream 1-2x/week.        Past Medical History:     - She reports her knee is healing well. Patient is doing PF muscle exercises, which has been helping with her pain.   - She is active - swims 3x/week, does pilates and yoga   Past Medical History:   Diagnosis Date    GERD (gastroesophageal reflux disease)     History of recurrent UTIs     Hyperlipidemia     Leg edema     OAB (overactive bladder)     Osteopenia after menopause     PONV (postoperative nausea and vomiting)     TIA (transient ischemic attack) 10/2022    Questionable: while on vacation in late 2022, she had new onset R eye blurriness and fatigue that was persistent on and off for 1-2 wks; Brain scans were negative. On Aspirin and Statin.          Past Surgical History:     Past Surgical History:   Procedure Laterality Date    CATARACT EXTRACTION       SECTION, CLASSIC      COLONOSCOPY      DENTAL SURGERY      FACIAL  COSMETIC SURGERY      face lift    MR HEAD ANGIO WO IV CONTRAST  11/02/2022    MR HEAD ANGIO WO IV CONTRAST 11/2/2022 [facility]    MR NECK ANGIO WO IV CONTRAST  11/02/2022    MR NECK ANGIO WO IV CONTRAST 11/2/2022 [facility]    SALIVARY GLAND SURGERY  04/17/2023    Left lower lip excision:Left lower lip, excision: - Mucocele and minor salivary glands exhibiting chronic sialadenitis.    TONSILLECTOMY      TOTAL ABDOMINAL HYSTERECTOMY W/ BILATERAL SALPINGOOPHORECTOMY      TOTAL KNEE ARTHROPLASTY Left 04/22/2024         Medications:     Prior to Admission medications    Medication Sig Start Date End Date Taking? Authorizing Provider   azelastine (Astelin) 137 mcg (0.1 %) nasal spray USE 2 SPRAYS IN EACH NOSTRIL IN THE MORNING AND 2 SPRAYS BEFORE BEDTIME 12/13/23  Yes Josefina Hatfield MD   calcium citrate-vitamin D3 250 mg-5 mcg (200 unit) tablet Take 1 tablet by mouth once daily. 11/1/13  Yes Historical Provider, MD   cholecalciferol (Vitamin D-3) 25 MCG (1000 UT) capsule Take 1 capsule (25 mcg) by mouth once daily. 11/1/13  Yes Historical Provider, MD   estradiol (Estrace) 0.01 % (0.1 mg/gram) vaginal cream  4/11/24  Yes Historical Provider, MD   fish oil concentrate (Omega-3) 120-180 mg capsule Take 1 capsule (1 g) by mouth once daily.   Yes Historical Provider, MD   fluticasone (Flonase) 50 mcg/actuation nasal spray Administer 2 sprays into each nostril once daily. 4/17/23  Yes Nneka Albarran MD   Hair Regrowth Treatment 5 % foam APPLY 1 CAPFUL ONCE DAILY AT BEDTIME 8/5/24  Yes Historical Provider, MD   methenamine hippurate (Hiprex) 1 gram tablet TAKE 1 TABLET TWICE A DAY 10/24/24  Yes Maribel Daigle MD   Myrbetriq 50 mg tablet extended release 24 hr 24 hr tablet TAKE 1 TABLET DAILY 10/17/24  Yes Maria Espinoza MD MPH   rosuvastatin (Crestor) 5 mg tablet Take 1 tablet (5 mg) by mouth early in the morning.. 8/5/24  Yes Historical Provider, MD   VITAMIN B COMPLEX ORAL Take 1 tablet by  mouth once daily.   Yes Historical Provider, MD   amoxicillin (Amoxil) 500 mg capsule Take 4 Tablets 1 Hour Prior to Dental Procedure or Cleaning.  Patient not taking: Reported on 1/17/2025 1/10/25   Criss Rod PA-C   cyclobenzaprine (Flexeril) 5 mg tablet Take 1 tablet (5 mg) by mouth 2 times a day as needed for muscle spasms.  Patient not taking: Reported on 1/17/2025 4/23/24   Sharmila Parry PA-C   diclofenac sodium 1 % kit Apply 4 g topically 4 times a day as needed. APPLY TO AFFECTED AREA OF LOWER EXTREMITIES. DO NOT APPLY MORE THAN 16 GRAMS DAILY TO ANY ONE AFFECTED JOINT  Patient not taking: Reported on 1/17/2025 11/29/22   Historical Provider, MD   loratadine (Claritin) 10 mg tablet Take 1 tablet (10 mg) by mouth once daily as needed for allergies.  Patient not taking: Reported on 1/17/2025    Historical Provider, MD   ondansetron ODT (Zofran-ODT) 4 mg disintegrating tablet Take 1 tablet (4 mg) by mouth every 8 hours if needed for nausea or vomiting. Take one Tablet dissolved under tongue three times per day for nausea.  Patient not taking: Reported on 1/17/2025 4/30/24   Criss Rod PA-C   pantoprazole (ProtoNix) 40 mg EC tablet Take 1 tablet (40 mg) by mouth once daily. Do not crush, chew, or split. 4/22/24 5/22/24  Juan Gomes MD   polyethylene glycol (Glycolax, Miralax) 17 gram/dose powder Mix 1 capful (17g) into 8 ounces of fluid then take by mouth once daily.  Patient not taking: Mix of powder and drink. Reported on 1/17/2025 4/22/24   Juan Gomes MD        ROS  Review of Systems       PHYSICAL EXAM:    /80 (BP Location: Left arm, Patient Position: Sitting)   Pulse 77   Wt 67.2 kg (148 lb 3.2 oz)   BMI 24.66 kg/m²   No LMP recorded. Patient has had a hysterectomy.    Declines chaperone for physical exam.      Well developed, well nourished, in no apparent distress.   Neurologic/Psychiatric:  Awake, Alert and Oriented times 3.  Affect normal.       Data and DIAGNOSTIC STUDIES  REVIEWED   XR knee right 4+ views    Result Date: 1/12/2025  Interpreted By:  Macario Vazquez, STUDY: XR KNEE RIGHT 4+ VIEWS   INDICATION: Signs/Symptoms:pain.   COMPARISON: November 22, 2022   ACCESSION NUMBER(S): [id number]   ORDERING CLINICIAN: CONRADO SHAFFER   FINDINGS: Advanced osteoarthritis right knee worst medially. No evidence of fracture. Small joint effusion.       Advanced osteoarthritis right knee worst in the medial compartment with a joint effusion.   Signed by: Macario Vazquez 1/12/2025 9:59 AM Dictation workstation:   [id number]    BI mammo bilateral screening tomosynthesis    Result Date: 1/7/2025  Interpreted By:  Pema Hines, STUDY: BI MAMMO BILATERAL SCREENING TOMOSYNTHESIS;  1/6/2025 9:33 am   ACCESSION NUMBER(S): [id number]   ORDERING CLINICIAN: ADOLPH SAEED   INDICATION: Screening.   ,Z12.31 Encounter for screening mammogram for malignant neoplasm of breast   COMPARISON: 01/05/2024 01/04/2023   FINDINGS: 2D and tomosynthesis images were reviewed at 1 mm slice thickness.   Density:  The breasts are heterogeneously dense, which may obscure small masses.   No suspicious masses or calcifications are identified.       No mammographic evidence of malignancy.   BI-RADS CATEGORY: BI-RADS Category:  1 Negative. Recommendation:  Annual Screening. Recommended Date:  1 Year. Laterality:  Bilateral.       For any future breast imaging appointments, please call [phone]).     MACRO: None   Signed by: Pema Hines 1/7/2025 11:20 AM Dictation workstation:   [id number]     Lab Results   Component Value Date    URINECULTURE Normal genitourinary last 06/11/2024      Lab Results   Component Value Date    GLUCOSE 90 10/17/2024    CALCIUM 8.9 10/17/2024     10/17/2024    K 4.1 10/17/2024    CO2 27 10/17/2024     10/17/2024    BUN 12 10/17/2024    CREATININE 0.75 10/17/2024     Lab Results   Component Value Date    WBC 4.3 (L) 10/17/2024    HGB 12.7 10/17/2024    HCT 38.6  10/17/2024    MCV 94 10/17/2024     10/17/2024

## 2025-01-19 LAB — BACTERIA UR CULT: NORMAL

## 2025-01-23 ENCOUNTER — OFFICE VISIT (OUTPATIENT)
Dept: ORTHOPEDIC SURGERY | Facility: CLINIC | Age: 80
End: 2025-01-23
Payer: MEDICARE

## 2025-01-23 ENCOUNTER — PREP FOR PROCEDURE (OUTPATIENT)
Dept: ORTHOPEDIC SURGERY | Facility: CLINIC | Age: 80
End: 2025-01-23

## 2025-01-23 DIAGNOSIS — M17.11 LOCALIZED OSTEOARTHRITIS OF RIGHT KNEE: ICD-10-CM

## 2025-01-23 PROCEDURE — 99213 OFFICE O/P EST LOW 20 MIN: CPT | Performed by: PHYSICIAN ASSISTANT

## 2025-01-23 PROCEDURE — 1160F RVW MEDS BY RX/DR IN RCRD: CPT | Performed by: PHYSICIAN ASSISTANT

## 2025-01-23 PROCEDURE — 99213 OFFICE O/P EST LOW 20 MIN: CPT | Mod: 25 | Performed by: PHYSICIAN ASSISTANT

## 2025-01-23 PROCEDURE — 1036F TOBACCO NON-USER: CPT | Performed by: PHYSICIAN ASSISTANT

## 2025-01-23 PROCEDURE — 20610 DRAIN/INJ JOINT/BURSA W/O US: CPT | Mod: RT | Performed by: PHYSICIAN ASSISTANT

## 2025-01-23 PROCEDURE — 1159F MED LIST DOCD IN RCRD: CPT | Performed by: PHYSICIAN ASSISTANT

## 2025-01-23 NOTE — PROGRESS NOTES
SYLVIA Patton, PASofiaC, ATC  Adult Reconstruction and Joint Replacement Surgery  Phone: [phone]     Fax:[phone]            Chief Complaint   Patient presents with    Right Knee - Pain, Injections       HPI  Zarina Holliday is a pleasant 79 y.o. year-old female here for follow-up of known underlying side: right knee osteoarthritis. The patient presents for repeat evaluation.  The patient notes persistent pain as well as some occasional mechanical symptoms.  Pain level: 5. The patient is getting about 1 monthof relief out of her injections.   The Pt has elected to undergo injection/s today.    Past Medical History:   Diagnosis Date    GERD (gastroesophageal reflux disease)     History of recurrent UTIs     Hyperlipidemia     Leg edema     OAB (overactive bladder)     Osteopenia after menopause     PONV (postoperative nausea and vomiting)     TIA (transient ischemic attack) 10/2022    Questionable: while on vacation in late October 2022, she had new onset R eye blurriness and fatigue that was persistent on and off for 1-2 wks; Brain scans were negative. On Aspirin and Statin.     Patient Active Problem List   Diagnosis    Effusion of left knee    Arthritis of knee, left    Edema of both legs    Episodic lightheadedness    Dizziness    Cerumen impaction    Borderline hyperglycemia    Bilateral knee pain    Arthralgia of left hip    Anemia    Allergic rhinitis    Adult myxedema    Abnormal mammogram    Abnormal finding in urine    Shoulder pain, right    Segmental and somatic dysfunction of pelvic region    Primary osteoarthritis of both knees    Peripheral neuropathy    Osteopenia    Osteoarthritis    OAB (overactive bladder)    Lower extremity edema    Lesion of bladder    Imbalance    Venous insufficiency    Varicose veins with pain    UTI (urinary tract infection)    Urge incontinence of urine    Unilateral primary osteoarthritis, left knee    Arthritis of left knee    Primary osteoarthritis  of left knee    Aftercare following left knee joint replacement surgery       Medication Documentation Review Audit       Reviewed by Nneka Wood LPN (Licensed Nurse) on 01/23/25 at 0834      Medication Order Taking? Sig Documenting Provider Last Dose Status   amoxicillin (Amoxil) 500 mg capsule [id number] Yes Take 4 Tablets 1 Hour Prior to Dental Procedure or Cleaning. Criss Rod PA-C  Active   azelastine (Astelin) 137 mcg (0.1 %) nasal spray [id number] Yes USE 2 SPRAYS IN EACH NOSTRIL IN THE MORNING AND 2 SPRAYS BEFORE BEDTIME Josefina Hatfield MD Taking Active   calcium citrate-vitamin D3 250 mg-5 mcg (200 unit) tablet [id number] Yes Take 1 tablet by mouth once daily. Historical Provider, MD Taking Active   cholecalciferol (Vitamin D-3) 25 MCG (1000 UT) capsule [id number] Yes Take 1 capsule (25 mcg) by mouth once daily. Historical Provider, MD Taking Active   cyclobenzaprine (Flexeril) 5 mg tablet [id number] Yes Take 1 tablet (5 mg) by mouth 2 times a day as needed for muscle spasms. Sharmila Parry PA-C Taking Active   diclofenac sodium 1 % kit [id number] Yes Apply 4 g topically 4 times a day as needed. APPLY TO AFFECTED AREA OF LOWER EXTREMITIES. DO NOT APPLY MORE THAN 16 GRAMS DAILY TO ANY ONE AFFECTED JOINT Historical Provider, MD Not Taking Active   estradiol (Estrace) 0.01 % (0.1 mg/gram) vaginal cream [id number] Yes  Historical Provider, MD  Active   fish oil concentrate (Omega-3) 120-180 mg capsule [id number] Yes Take 1 capsule (1 g) by mouth once daily. Historical Provider, MD Taking Active   fluticasone (Flonase) 50 mcg/actuation nasal spray [id number] Yes Administer 2 sprays into each nostril once daily. Nneka Albarran MD Taking Active   Hair Regrowth Treatment 5 % foam [id number] Yes APPLY 1 CAPFUL ONCE DAILY AT BEDTIME Historical Provider, MD  Active   loratadine (Claritin) 10 mg tablet [id number] Yes Take 1 tablet (10 mg) by mouth once daily as needed for allergies. Historical Provider, MD Taking Active    methenamine hippurate (Hiprex) 1 gram tablet [id number] Yes TAKE 1 TABLET TWICE A DAY Maribel Daigle MD  Active   Myrbetriq 50 mg tablet extended release 24 hr 24 hr tablet [id number] Yes TAKE 1 TABLET DAILY Maria Espinoza MD MPH  Active   ondansetron ODT (Zofran-ODT) 4 mg disintegrating tablet [id number] Yes Take 1 tablet (4 mg) by mouth every 8 hours if needed for nausea or vomiting. Take one Tablet dissolved under tongue three times per day for nausea. Criss Rod PA-C Taking Active   pantoprazole (ProtoNix) 40 mg EC tablet [id number] No Take 1 tablet (40 mg) by mouth once daily. Do not crush, chew, or split. Juan Gomes MD Taking Active   polyethylene glycol (Glycolax, Miralax) 17 gram/dose powder [id number] Yes Mix 1 capful (17g) into 8 ounces of fluid then take by mouth once daily. Juan Gomes MD Taking Active   rosuvastatin (Crestor) 5 mg tablet [id number] Yes Take 1 tablet (5 mg) by mouth early in the morning.. Historical Provider, MD  Active   VITAMIN B COMPLEX ORAL [id number] Yes Take 1 tablet by mouth once daily. Historical Provider, MD Taking Active                    Allergies   Allergen Reactions    Adhesive Tape-Silicones Other     Blisters, chin itching       Social History     Socioeconomic History    Marital status:      Spouse name: Not on file    Number of children: Not on file    Years of education: Not on file    Highest education level: Not on file   Occupational History    Not on file   Tobacco Use    Smoking status: Never    Smokeless tobacco: Never   Substance and Sexual Activity    Alcohol use: Not Currently    Drug use: Never    Sexual activity: Not on file   Other Topics Concern    Not on file   Social History Narrative    Not on file     Social Drivers of Health     Financial Resource Strain: Low Risk  (4/22/2024)    Overall Financial Resource Strain (CARDIA)     Difficulty of Paying Living Expenses: Not hard at all   Food Insecurity: No Food Insecurity (4/22/2024)     Hunger Vital Sign     Worried About Running Out of Food in the Last Year: Never true     Ran Out of Food in the Last Year: Never true   Transportation Needs: No Transportation Needs (2024)    OASIS : Transportation     Lack of Transportation (Medical): No     Lack of Transportation (Non-Medical): No     Patient Unable or Declines to Respond: No   Physical Activity: Sufficiently Active (2024)    Exercise Vital Sign     Days of Exercise per Week: 5 days     Minutes of Exercise per Session: 30 min   Stress: No Stress Concern Present (2024)    [employer] [place] - Occupational Stress Questionnaire     Feeling of Stress : Not at all   Social Connections: Feeling Socially Integrated (2024)    OASIS : Social Isolation     Frequency of experiencing loneliness or isolation: Never   Intimate Partner Violence: Not At Risk (2024)    Humiliation, Afraid, Rape, and Kick questionnaire     Fear of Current or Ex-Partner: No     Emotionally Abused: No     Physically Abused: No     Sexually Abused: No   Housing Stability: Low Risk  (2024)    Housing Stability Vital Sign     Unable to Pay for Housing in the Last Year: No     Number of Places Lived in the Last Year: 1     Unstable Housing in the Last Year: No       Past Surgical History:   Procedure Laterality Date    CATARACT EXTRACTION       SECTION, CLASSIC      COLONOSCOPY      DENTAL SURGERY      FACIAL COSMETIC SURGERY      face lift    MR HEAD ANGIO WO IV CONTRAST  2022    MR HEAD ANGIO WO IV CONTRAST 2022 [facility]    MR NECK ANGIO WO IV CONTRAST  2022    MR NECK ANGIO WO IV CONTRAST 2022 [facility]    SALIVARY GLAND SURGERY  2023    Left lower lip excision:Left lower lip, excision: - Mucocele and minor salivary glands exhibiting chronic sialadenitis.    TONSILLECTOMY      TOTAL ABDOMINAL HYSTERECTOMY W/ BILATERAL SALPINGOOPHORECTOMY      TOTAL KNEE ARTHROPLASTY  Left 04/22/2024       Review of Systems    Physical Exam:  side: right Knee:  General: Well-appearing female in no acute distress.  Awake, alert and oriented.  Pleasant and cooperative.  Respiratory: Non-labored breathing  Mood: Euthymic   Gait: Antalgic  Assistive Device: no device  Skin: warm, dry and intact without lesions  Tenderness to palpation over anterior and medial, Joint line.  Effusion: mild  ROM Flexion/Extension: Unchanged  No instability varus or valgus stressing the knee at 0, 30 or 60 degrees.  No instability in the AP plane at 90 degrees.  5/5 hip flexion/knee extension/DF/PF/EHL  SILT in a bijal/saph/per/tib distribution  Neuro L5-S1 sensation intact bilaterally, No clonus. 2+ symmetric patella and achilles reflexes bilateral.  2+ Femoral/DP/PT pulses bilaterally  Compartments supple and non-tender.  Extremities warm and well perfused.    There were no vitals filed for this visit.  There is no height or weight on file to calculate BMI.    Impression/Plan  Zarina Holliday is getting adequate relief from injection therapy.     I discussed non-operative treatments for the patients' arthritis in detail and briefly discussed indications for surgery vs conservative treatment. The patient has elected to undergo knee side: right injections today.  He understands he can repeat the injections every 3 to 4 months.  Hopefully gets the relief he is looking for.    She is scheduled for a right total knee arthroplasty at the end of April.  This will be her last injection.  We will see her in the operating suite then.    Primary Osteoarthritis side: right Knee    Procedure  L Inj/Asp: R knee on 1/23/2025 8:44 AM  Indications: pain and joint swelling  Details: 22 G needle, anterolateral approach  Outcome: tolerated well, no immediate complications    Discussion:  I discussed the conservative treatment options for knee osteoarthritis including but not limited to physical therapy, oral NSAIDS, activity and lifestyle  modification, and corticosteroid injections. Pt has elected to undergo a cortisone injection today. I have explained the risk and benefits of an injection including the possibility of joint infection, bleeding, damage to cartilage, allergic reaction. Patient verbalized understanding and gave verbal consent wishes to proceed with a intra-articular cortisone injection for their knee.    Procedure:  After discussing the risk and benefits of the procedure, we proceeded with an intra-articular right knee injection.    With the patient's informed verbal consent, the right knee was prepped in standard sterile fashion with Chlorhexidine. The skin was then anesthetized with ethyl chloride spray and cleaned again with Chlorhexidine. The knee was then apirated/injected with a prefilled 20-gauge syringe of 40 mg Kenalog + 4 ml Lidocaine using the lateral approach without complications.  The patient tolerated this well and felt immediate initial relief of symptoms. A bandaid was applied and the patient ambulated out of the clinic on ther own accord without difficulty. Patient was instructed to avoid physical activity for 24-48 hours to prevent the knees from swelling and may ice the knees as tolerated. Patient should contact the office if any signs of of infection appear: redness, fever, chills, drainage, swelling or warmth to the knees.  Pt understands that the injections can be repeated no sooner than 3 months.    Procedure, treatment alternatives, risks and benefits explained, specific risks discussed. Consent was given by the patient. Immediately prior to procedure a time out was called to verify the correct patient, procedure, equipment, support staff and site/side marked as required. Patient was prepped and draped in the usual sterile fashion.           All questions were answered.    Follow-up 3-4 months/PRN    SYLVIA Patton, PA-C, ATC  Orthopedic Physician Assisant  Adult Reconstruction and Total Joint  Replacement  General Orthopedics  Department of Orthopaedic Surgery  [place] [phone]  Chet messaging preferred

## 2025-01-28 NOTE — PROGRESS NOTES
Patient is a 28-year-old female presents today for discussion of right knee osteoarthritis.  She previously underwent left total knee arthroplasty and is done well from that standpoint.  She is failed a greater than 3-month trial reasonable conservative treatment including cortisone, Tylenol, ibuprofen and home exercise program.  She rates her pain a 7 out of 10.  She has difficulty walking sort of distance going downstairs.    Right knee:  AAOx3, NAD, walks with a moderate antalgic gait  Varus allignment  Range of motion lacks 10 degrees of full extension and flexes to 110 degrees  Stable to varus/valgus/anterior/posterior stress through out the range of motion  Slight laxity with varus stress  Diffuse medial  joint line tenderness to palpation  Moderate effusion  SILT in a bijal/saph/per/tib distribution  5/5 knee extension/df/pf/ehl  ½ dorsalis pedis and posterior tibial pulse  no popliteal lymphadenopathy  no other overlying lesions  mood: euthymic  Respirations non labored    Plain films were reviewed by myself in clinic today.  She has advanced osteoarthritis of the right knee with complete loss of medial joint space, underlying sclerosis and tricompartmental osteophytic change.    We discussed further conservative treatment versus surgery with her today in clinic.  Ultimately she would benefit from total knee replacement in the future.  She can also continue with further injections.  All of her questions were answered.  She can call the office to schedule if she decides to proceed with surgery.  All of her questions were answered.    M17.11  [id number]  [facility] [place]  Overnight  Attune    This note was created using voice recognition software and was not corrected for typographical or grammatical errors.

## 2025-03-03 PROBLEM — M17.11 UNILATERAL PRIMARY OSTEOARTHRITIS, RIGHT KNEE: Status: ACTIVE | Noted: 2025-03-02

## 2025-03-13 ENCOUNTER — APPOINTMENT (OUTPATIENT)
Dept: ORTHOPEDIC SURGERY | Facility: CLINIC | Age: 80
End: 2025-03-13
Payer: MEDICARE

## 2025-04-07 ENCOUNTER — PROCEDURE VISIT (OUTPATIENT)
Facility: CLINIC | Age: 80
End: 2025-04-07
Payer: MEDICARE

## 2025-04-07 ENCOUNTER — LAB (OUTPATIENT)
Dept: LAB | Facility: HOSPITAL | Age: 80
End: 2025-04-07
Payer: MEDICARE

## 2025-04-07 ENCOUNTER — PRE-ADMISSION TESTING (OUTPATIENT)
Dept: PREADMISSION TESTING | Facility: HOSPITAL | Age: 80
End: 2025-04-07
Payer: MEDICARE

## 2025-04-07 DIAGNOSIS — Z01.818 PREOP TESTING: Primary | ICD-10-CM

## 2025-04-07 DIAGNOSIS — M17.11 UNILATERAL PRIMARY OSTEOARTHRITIS, RIGHT KNEE: ICD-10-CM

## 2025-04-07 DIAGNOSIS — R79.9 ABNORMAL FINDING OF BLOOD CHEMISTRY, UNSPECIFIED: ICD-10-CM

## 2025-04-07 DIAGNOSIS — Z01.818 ENCOUNTER FOR OTHER PREPROCEDURAL EXAMINATION: Primary | ICD-10-CM

## 2025-04-07 LAB
ALBUMIN SERPL BCP-MCNC: 4.3 G/DL (ref 3.4–5)
ALP SERPL-CCNC: 51 U/L (ref 33–136)
ALT SERPL W P-5'-P-CCNC: 11 U/L (ref 7–45)
ANION GAP SERPL CALC-SCNC: 10 MMOL/L (ref 10–20)
AST SERPL W P-5'-P-CCNC: 15 U/L (ref 9–39)
BASOPHILS # BLD AUTO: 0.05 X10*3/UL (ref 0–0.1)
BASOPHILS NFR BLD AUTO: 1.1 %
BILIRUB SERPL-MCNC: 0.5 MG/DL (ref 0–1.2)
BUN SERPL-MCNC: 16 MG/DL (ref 6–23)
CALCIUM SERPL-MCNC: 9.4 MG/DL (ref 8.6–10.3)
CHLORIDE SERPL-SCNC: 103 MMOL/L (ref 98–107)
CO2 SERPL-SCNC: 31 MMOL/L (ref 21–32)
CREAT SERPL-MCNC: 0.74 MG/DL (ref 0.5–1.05)
EGFRCR SERPLBLD CKD-EPI 2021: 82 ML/MIN/1.73M*2
EOSINOPHIL # BLD AUTO: 0.07 X10*3/UL (ref 0–0.4)
EOSINOPHIL NFR BLD AUTO: 1.5 %
ERYTHROCYTE [DISTWIDTH] IN BLOOD BY AUTOMATED COUNT: 12.9 % (ref 11.5–14.5)
GLUCOSE SERPL-MCNC: 105 MG/DL (ref 74–99)
HCT VFR BLD AUTO: 37.5 % (ref 36–46)
HGB BLD-MCNC: 12.2 G/DL (ref 12–16)
IMM GRANULOCYTES # BLD AUTO: 0 X10*3/UL (ref 0–0.5)
IMM GRANULOCYTES NFR BLD AUTO: 0 % (ref 0–0.9)
LYMPHOCYTES # BLD AUTO: 1.21 X10*3/UL (ref 0.8–3)
LYMPHOCYTES NFR BLD AUTO: 25.9 %
MCH RBC QN AUTO: 30.3 PG (ref 26–34)
MCHC RBC AUTO-ENTMCNC: 32.5 G/DL (ref 32–36)
MCV RBC AUTO: 93 FL (ref 80–100)
MONOCYTES # BLD AUTO: 0.34 X10*3/UL (ref 0.05–0.8)
MONOCYTES NFR BLD AUTO: 7.3 %
NEUTROPHILS # BLD AUTO: 3 X10*3/UL (ref 1.6–5.5)
NEUTROPHILS NFR BLD AUTO: 64.2 %
NRBC BLD-RTO: NORMAL /100{WBCS}
PLATELET # BLD AUTO: 221 X10*3/UL (ref 150–450)
POTASSIUM SERPL-SCNC: 4.3 MMOL/L (ref 3.5–5.3)
PROT SERPL-MCNC: 6.6 G/DL (ref 6.4–8.2)
RBC # BLD AUTO: 4.02 X10*6/UL (ref 4–5.2)
SODIUM SERPL-SCNC: 140 MMOL/L (ref 136–145)
WBC # BLD AUTO: 4.7 X10*3/UL (ref 4.4–11.3)

## 2025-04-07 PROCEDURE — 99204 OFFICE O/P NEW MOD 45 MIN: CPT | Performed by: NURSE PRACTITIONER

## 2025-04-07 PROCEDURE — 80053 COMPREHEN METABOLIC PANEL: CPT

## 2025-04-07 PROCEDURE — 85025 COMPLETE CBC W/AUTO DIFF WBC: CPT

## 2025-04-07 PROCEDURE — 93010 ELECTROCARDIOGRAM REPORT: CPT | Performed by: INTERNAL MEDICINE

## 2025-04-07 PROCEDURE — 87081 CULTURE SCREEN ONLY: CPT | Mod: BEALAB

## 2025-04-07 PROCEDURE — 83036 HEMOGLOBIN GLYCOSYLATED A1C: CPT

## 2025-04-07 PROCEDURE — 93005 ELECTROCARDIOGRAM TRACING: CPT

## 2025-04-07 RX ORDER — ASPIRIN 81 MG/1
81 TABLET ORAL DAILY
COMMUNITY

## 2025-04-07 RX ORDER — CHLORHEXIDINE GLUCONATE 40 MG/ML
SOLUTION TOPICAL DAILY
Qty: 470 ML | Refills: 0 | Status: SHIPPED | OUTPATIENT
Start: 2025-04-07 | End: 2025-04-12

## 2025-04-07 RX ORDER — CHLORHEXIDINE GLUCONATE ORAL RINSE 1.2 MG/ML
15 SOLUTION DENTAL DAILY
Qty: 30 ML | Refills: 0 | Status: SHIPPED | OUTPATIENT
Start: 2025-04-07 | End: 2025-04-09

## 2025-04-07 NOTE — PREPROCEDURE INSTRUCTIONS
Medication List            Accurate as of April 7, 2025  3:20 PM. Always use your most recent med list.                amoxicillin 500 mg capsule  Commonly known as: Amoxil  Take 4 Tablets 1 Hour Prior to Dental Procedure or Cleaning.  Medication Adjustments for Surgery: Take/Use as prescribed     aspirin 81 mg EC tablet  Additional Medication Adjustments for Surgery: Other (Comment)  Notes to patient: Will need instructions from your cardiologist      azelastine 137 mcg (0.1 %) nasal spray  Commonly known as: Astelin  USE 2 SPRAYS IN EACH NOSTRIL IN THE MORNING AND 2 SPRAYS BEFORE BEDTIME  Medication Adjustments for Surgery: Take/Use as prescribed     calcium citrate-vitamin D3 250 mg-5 mcg (200 unit) tablet  Additional Medication Adjustments for Surgery: Take last dose 7 days before surgery  Notes to patient: last dose preoperatively on 4/20/25     * chlorhexidine 4 % external liquid  Commonly known as: Hibiclens  Apply topically once daily for 5 days.  Medication Adjustments for Surgery: Take/Use as prescribed     * chlorhexidine 0.12 % solution  Commonly known as: Peridex  Use 15 mL in the mouth or throat once daily for 2 doses. Mouthwash, use 15 ml the night before surgery and the morning of surgery. Swish and spit, do not swallow  Medication Adjustments for Surgery: Take/Use as prescribed     cholecalciferol 25 MCG (1000 UT) capsule  Commonly known as: Vitamin D-3  Additional Medication Adjustments for Surgery: Take last dose 7 days before surgery  Notes to patient: last dose preoperatively on 4/20/25     Claritin 10 mg tablet  Generic drug: loratadine  Medication Adjustments for Surgery: Do Not take on the morning of surgery     cyclobenzaprine 5 mg tablet  Commonly known as: Flexeril  Take 1 tablet (5 mg) by mouth 2 times a day as needed for muscle spasms.  Medication Adjustments for Surgery: Take/Use as prescribed     diclofenac sodium 1 % kit  Additional Medication Adjustments for Surgery: Take last  dose 7 days before surgery  Notes to patient: last dose preoperatively on 4/20/25     estradiol 0.01 % (0.1 mg/gram) vaginal cream  Commonly known as: Estrace  Medication Adjustments for Surgery: Do Not take on the morning of surgery     fish oil concentrate 120-180 mg capsule  Commonly known as: Omega-3  Additional Medication Adjustments for Surgery: Take last dose 7 days before surgery  Notes to patient: last dose preoperatively on 4/20/25     fluticasone 50 mcg/actuation nasal spray  Commonly known as: Flonase  Administer 2 sprays into each nostril once daily.  Medication Adjustments for Surgery: Take/Use as prescribed     Hair Regrowth Treatment 5 % foam  Generic drug: minoxidiL  Additional Medication Adjustments for Surgery: Take last dose 7 days before surgery  Notes to patient: last dose preoperatively on 4/20/25     methenamine hippurate 1 gram tablet  Commonly known as: Hiprex  TAKE 1 TABLET TWICE A DAY  Medication Adjustments for Surgery: Take/Use as prescribed     Myrbetriq 50 mg tablet extended release 24 hr 24 hr tablet  Generic drug: mirabegron  TAKE 1 TABLET DAILY  Medication Adjustments for Surgery: Take/Use as prescribed     ondansetron ODT 4 mg disintegrating tablet  Commonly known as: Zofran-ODT  Take 1 tablet (4 mg) by mouth every 8 hours if needed for nausea or vomiting. Take one Tablet dissolved under tongue three times per day for nausea.  Medication Adjustments for Surgery: Take/Use as prescribed     polyethylene glycol 17 gram/dose powder  Commonly known as: Glycolax, Miralax  Mix 1 capful (17g) into 8 ounces of fluid then take by mouth once daily.  Medication Adjustments for Surgery: Take/Use as prescribed     rosuvastatin 5 mg tablet  Commonly known as: Crestor  Medication Adjustments for Surgery: Take/Use as prescribed     VITAMIN B COMPLEX ORAL  Additional Medication Adjustments for Surgery: Take last dose 7 days before surgery  Notes to patient: last dose preoperatively on 4/20/25            * This list has 2 medication(s) that are the same as other medications prescribed for you. Read the directions carefully, and ask your doctor or other care provider to review them with you.                NPO Instructions:     Do not eat any food after midnight the night before your surgery/procedure.  You may have clear liquids until TWO hours before surgery/procedure. This includes water, black tea/coffee, (no milk or cream) apple juice and electrolyte drinks (Gatorade).  You may chew gum up to TWO hours before your surgery/procedure.     Additional Instructions:      Seven/Six Days before Surgery:  Review your medication instructions, stop indicated medications  Five Days before Surgery:  Review your medication instructions, stop indicated medications  Begin using your Hibiclens  Three Days before Surgery:  Review your medication instructions, stop indicated medications  The Day before Surgery:  Start using 0.12% CHG mouthwash  No smoking or alcohol use 24 hours before surgery  Review your medication instructions, stop indicated medications  You will be contacted regarding the time of your arrival to facility and surgery time  Do not eat any food after Midnight  Day of Surgery:  Review your medication instructions, take indicated medications  If you have diabetes, please check your fasting blood sugar upon awakening.  If fasting blood sugar is <80 mg/dl, drink 100 ml of apple juice, time limit of 2 hours before  You may have clear liquids until TWO hours before surgery/procedure.  This includes water, black tea/coffee, (no milk or cream) apple juice and electrolyte drinks (Gatorade)  You may chew gum up to TWO hours before your surgery/procedure  Wear  comfortable loose fitting clothing  Do not use moisturizers, creams, lotions or perfume  All jewelry and valuables should be left at home     CONTACT SURGEON'S OFFICE IF YOU DEVELOP:  * Fever = 100.4 F   * New respiratory symptoms (e.g. cough, shortness of  breath, respiratory distress, sore throat)  * Recent loss of taste or smell  *Flu like symptoms such as headache, fatigue or gastrointestinal symptoms  * You develop any open sores, shingles, burning or painful urination   AND/OR:  * You no longer wish to have the surgery.  * Any other personal circumstances change that may lead to the need to cancel or defer this surgery.  *You were admitted to any hospital within one week of your planned procedure.     SMOKING:  *Quitting smoking can make a huge difference to your health and recovery from surgery.    *If you need help with quitting, call 6-241-QUIT-NOW.     THE DAY BEFORE SURGERY:  *Do not eat any food after midnight the night before your surgery.   *You may have up to TEN OUNCES of clear liquids until TWO hours before your instructed ARRIVAL TIME to hospital. This includes water, black tea/coffee, (no milk or cream) apple juice, clear broth and electrolyte drinks (Gatorade). Please avoid clear liquids that are red in color.   *You may chew gum/mints up to TWO hours before your surgery/procedure.     SURGICAL TIME:  *You will be contacted between 2 p.m. and 3 p.m. the business day before your surgery with your arrival time.  *If you haven't received a call by 3pm, call [phone]  *Scheduled surgery times may change and you will be notified if this occurs-check your personal voicemail for any updates.     ON THE MORNING OF SURGERY:  *Wear comfortable, loose fitting clothing.   *Do not use moisturizers, creams, lotions or perfume.  *All jewelry and valuables should be left at home.  *Prosthetic devices such as contact lenses, hearing aids, dentures, eyelash extensions, hairpins and body piercing must be removed before surgery.     BRING WITH YOU:  *Photo ID and insurance card  *Current list of medications and allergies  *Pacemaker/Defibrillator/Heart stent cards  *CPAP machine and mask  *Slings/splints/crutches  *Copy of your complete Advanced  Directive/DHPOA-if applicable  *Neurostimulator implant remote     PARKING AND ARRIVAL:  *Check in at the Main Entrance desk and let them know you are here for surgery.     IF YOU ARE HAVING OUTPATIENT/SAME DAY SURGERY:  *A responsible adult MUST accompany you at the time of discharge and stay with you for 24 hours after your surgery.  *You may NOT drive yourself home after surgery.  *You may use a taxi or ride sharing service ([employer], Uber) to return home ONLY if you are accompanied by a friend or family member.  *Instructions for resuming your medications will be provided by your surgeon.     Thank you for coming to Pre Admission testing.      If I have prescribed medication please don't forget to  at your pharmacy.      Any questions about today's visit call [phone] and leave a message in the general mailbox.     Patient instructed to ambulate as soon as possible postoperatively to decrease thromboembolic risk.     Thank you for visiting the [facility].  If you have any changes to your health condition, please call the surgeons office to alert them and give them details of your symptoms.        Preoperative Fasting Guidelines     Why must I stop eating and drinking near surgery time?  With sedation, food or liquid in your stomach can enter your lungs causing serious complications  Increases nausea and vomiting     When do I need to stop eating and drinking before my surgery?  Do not eat any food after midnight the night before your surgery/procedure.  You may have up to TEN ounces of clear liquid until TWO hours before your instructed arrival time to the hospital.  This includes water, black tea/coffee, (no milk or cream) apple juice, and electrolyte drinks (Gatorade)  You may chew gum until TWO hours before your surgery/procedure        Additional Instructions:      The Day before Surgery:  -Review your medication instructions, stop indicated medications  -You will be contacted  in the evening regarding the time of your arrival to facility and surgery time     Day of Surgery:  -Review your medication instructions, take indicated medications  -Wear comfortable loose fitting clothing  -Do not use moisturizers, creams, lotions or perfume  -All jewelry and valuables should be left at home                   Preoperative Brain Exercises     What are brain exercises?  A brain exercise is any activity that engages your thinking (cognitive) skills.     What types of activities are considered brain exercises?  Jigsaw puzzles, crossword puzzles, word jumble, memory games, word search, and many more.  Many can be found free online or on your phone via a mobile oanh.     Why should I do brain exercises before my surgery?  More recent research has shown brain exercise before surgery can lower the risk of postoperative delirium (confusion) which can be especially important for older adults.  Patients who did brain exercises for 5 to 10 minutes/day in the days before surgery, cut their risk of postoperative delirium in half up to 1 week after surgery.                         [facility]     Preoperative Deep Breathing Exercises     Why it is important to do deep breathing exercises before my surgery?  Deep breathing exercises strengthen your breathing muscles.  This helps you to recover after your surgery and decreases the chance of breathing complications.        How are the deep breathing exercises done?  Sit straight with your back supported.  Breathe in deeply and slowly through your nose. Your lower rib cage should expand and your abdomen may move forward.  Hold that breath for 3 to 5 seconds.  Breathe out through pursed lips, slowly and completely.  Rest and repeat 10 times every hour while awake.  Rest longer if you become dizzy or lightheaded.                      [facility]     Preoperative Deep Breathing Exercises     Why it is important to do  deep breathing exercises before my surgery?  Deep breathing exercises strengthen your breathing muscles.  This helps you to recover after your surgery and decreases the chance of breathing complications.        How are the deep breathing exercises done?  Sit straight with your back supported.  Breathe in deeply and slowly through your nose. Your lower rib cage should expand and your abdomen may move forward.  Hold that breath for 3 to 5 seconds.  Breathe out through pursed lips, slowly and completely.  Rest and repeat 10 times every hour while awake.  Rest longer if you become dizzy or lightheaded.        Patient Information: Incentive Spirometer  What is an incentive spirometer?  An incentive spirometer is a device used before and after surgery to “exercise” your lungs.  It helps you to take deeper breaths to expand your lungs.  Below is an example of a basic incentive spirometer.  The device you receive may differ slightly but they all function the same.    Why do I need to use an incentive spirometer?  Using your incentive spirometer prepares your lungs for surgery and helps prevent lung problems after surgery.  How do I use my incentive spirometer?  When you're using your incentive spirometer, make sure to breathe through your mouth. If you breathe through your nose, the incentive spirometer won't work properly. You can hold your nose if you have trouble.  If you feel dizzy at any time, stop and rest. Try again at a later time.  Follow the steps below:  Set up your incentive spirometer, expand the flexible tubing and connect to the outlet.  Sit upright in a chair or bed. Hold the incentive spirometer at eye level.   Put the mouthpiece in your mouth and close your lips tightly around it. Slowly breathe out (exhale) completely.  Breathe in (inhale) slowly through your mouth as deeply as you can. As you take a breath, you will see the piston rise inside the large column. While the piston rises, the indicator should  move upwards. It should stay in between the 2 arrows (see Figure).  Try to get the piston as high as you can, while keeping the indicator between the arrows.   If the indicator doesn't stay between the arrows, you're breathing either too fast or too slow.  When you get it as high as you can, hold your breath for 10 seconds, or as long as possible. While you're holding your breath, the piston will slowly fall to the base of the spirometer.  Once the piston reaches the bottom of the spirometer, breathe out slowly through your mouth. Rest for a few seconds.  Repeat 10 times. Try to get the piston to the same level with each breath.  Repeat every hour while awake  You can carefully clean the outside of the mouthpiece with an alcohol wipe or soap and water.       Patient and Family Education             Ways You Can Help Prevent Blood Clots                    This handout explains some simple things you can do to help prevent blood clots.      Blood clots are blockages that can form in the body's veins. When a blood clot forms in your deep veins, it may be called a deep vein thrombosis, or DVT for short. Blood clots can happen in any part of the body where blood flows, but they are most common in the arms and legs. If a piece of a blood clot breaks free and travels to the lungs, it is called a pulmonary embolus (PE). A PE can be a very serious problem.         Being in the hospital or having surgery can raise your chances of getting a blood clot because you may not be well enough to move around as much as you normally do.         Ways you can help prevent blood clots in the hospital           Wearing SCDs. SCDs stands for Sequential Compression Devices.   SCDs are special sleeves that wrap around your legs  They attach to a pump that fills them with air to gently squeeze your legs every few minutes.   This helps return the blood in your legs to your heart.   SCDs should only be taken off when walking or bathing.   SCDs  may not be comfortable, but they can help save your life.                                            Wearing compression stockings - if your doctor orders them. These special snug fitting stockings gently squeeze your legs to help blood flow.       Walking. Walking helps move the blood in your legs.   If your doctor says it is ok, try walking the halls at least   5 times a day. Ask us to help you get up, so you don't fall.      Taking any blood thinning medicines your doctor orders.        Page 1 of 2            [facility]; 3/23   Ways you can help prevent blood clots at home         Wearing compression stockings - if your doctor orders them. ? Walking - to help move the blood in your legs.       Taking any blood thinning medicines your doctor orders.      Signs of a blood clot or PE        Tell your doctor or nurse know right away if you have of the problems listed below.    If you are at home, seek medical care right away. Call 911 for chest pain or problems breathing.                Signs of a blood clot (DVT) - such as pain,  swelling, redness or warmth in your arm or leg      Signs of a pulmonary embolism (PE) - such as chest pain or feeling short of breath    Patient Information: Pre-Operative Infection Prevention Measures     Why did I have my nose, under my arms, and groin swabbed?  The purpose of the swab is to identify Staphylococcus aureus inside your nose or on your skin.  The swab was sent to the laboratory for culture.  A positive swab/culture for Staphylococcus aureus is called colonization or carriage.      What is Staphylococcus aureus?  Staphylococcus aureus, also known as “staph”, is a germ found on the skin or in the nose of healthy people.  Sometimes Staphylococcus aureus can get into the body and cause an infection.  This can be minor (such as pimples, boils, or other skin problems).  It might also be serious (such as a blood infection, pneumonia, or a surgical site  infection).    What is Staphylococcus aureus colonization or carriage?  Colonization or carriage means that a person has the germ but is not sick from it.  These bacteria can be spread on the hands or when breathing or sneezing.    How is Staphylococcus aureus spread?  It is most often spread by close contact with a person or item that carries it.    What happens if my culture is positive for Staphylococcus aureus?  Your doctor/medical team will use this information to guide any antibiotic treatment which may be necessary.  Regardless of the culture results, we will clean the inside of your nose with a betadine swab just before you have your surgery.      Will I get an infection if I have Staphylococcus aureus in my nose or on my skin?  Anyone can get an infection with Staphylococcus aureus.  However, the best way to reduce your risk of infection is to follow the instructions provided to you for the use of your CHG soap and dental rinse.        Patient Information: Oral/Dental Rinse    What is oral/dental rinse?   It is a mouthwash. It is a way of cleaning the mouth with a germ-killing solution before your surgery.  The solution contains chlorhexidine, commonly known as CHG.   It is used inside the mouth to kill a bacteria known as Staphylococcus aureus.  Let your doctor know if you are allergic to Chlorhexidine.    We have sent a prescription for CHG 0.12% mouthwash to your preferred pharmacy.  If you have not already, Please  your prescription and start using the day before before surgery.  Follow the instruction sheet provided to you at your CPM/PAT appointment. Please contact [facility] if you do not receive your CHG mouthwash prescription.     Why do I need to use CHG oral/dental rinse?  The CHG oral/dental rinse helps to kill a bacteria in your mouth known as Staphylococcus aureus.     This reduces the risk of infection at the surgical site.      Using your CHG oral/dental rinse  STEPS:  Use your CHG  oral/dental rinse after you brush your teeth the night before (at bedtime) and the morning of your surgery.  Follow all directions on your prescription label.    Use the cap on the container to measure 15ml   Swish (gargle if you can) the mouthwash in your mouth for at least 30 seconds, (do not swallow) and spit out  After you use your CHG rinse, do not rinse your mouth with water, drink or eat.  Please refer to the prescription label for the appropriate time to resume oral intake      What side effects might I have using the CHG oral/dental rinse?  CHG rinse will stick to plaque on the teeth.  Brush and floss just before use.  Teeth brushing will help avoid staining of plaque during use.      Patient Information: Home Preoperative Antibacterial Shower      What is a home preoperative antibacterial shower?  This shower is a way of cleaning the skin with a germ-killing solution before surgery.  The solution contains chlorhexidine, commonly known as CHG.  CHG is a skin cleanser with germ-killing ability.  Let your doctor know if you are allergic to chlorhexidine.    Why do I need to take a preoperative antibacterial shower?  Skin is not sterile.  It is best to try to make your skin as free of germs as possible before surgery.  Proper cleansing with a germ-killing soap before surgery can lower the number of germs on your skin.  This helps to reduce the risk of infection at the surgical site.  Following the instructions listed below will help you prepare your skin for surgery.      How do I use the solution?  Steps:  Begin using your CHG soap 5 days before your scheduled surgery on 4/24/25.    First, wash and rinse your hair using the CHG soap. Keep CHG soap away from ear canals and eyes.  Rinse completely, do not condition.  Hair extensions should be removed.  Wash your face with your normal soap and rinse.    Apply the CHG solution to a clean wet washcloth.  Turn the water off or move away from the water spray to  avoid premature rinsing of the CHG soap as you are applying.   Firmly lather your entire body from the neck down.  Do not use on your face.  Pay special attention to the area(s) where your incision(s) will be located unless they are on your face.  Avoid scrubbing your skin too hard.  The important point is to have the CHG soap sit on your skin for 3 minutes.    When the 3 minutes are up, turn on the water and rinse the CHG solution off your body completely.   DO NOT wash with regular soap after you have used the CHG soap solution  Pat yourself dry with a clean, freshly-laundered towel.  DO NOT apply powders, deodorants, or lotions.  Dress in clean, freshly laundered nightclothes.    Be sure to sleep with clean, freshly laundered sheets.  Be aware that CHG will cause stains on fabrics; if you wash them with bleach after use.  Rinse your washcloth and other linens that have contact with CHG completely.  Use only non-chlorine detergents to launder the items used.   The morning of surgery is the fifth day.  Repeat the above steps and dress in clean comfortable clothing     Whom should I contact if I have any questions regarding the use of CHG soap?  Call the [facility] at [phone] if you have any questions.

## 2025-04-07 NOTE — CPM/PAT H&P
CPM/PAT Evaluation       Name: Zarina Holliday (Zarina Holliday)  /Age: 1945/79 y.o.     In-Person       Chief Complaint: Unilateral primary osteoarthritis, right knee     HPI  Patient is an alert and oriented 79 year old female scheduled for a  Knee Replacement Total Cement Unilat on 25 with Dr. Jhaveri for  Unilateral primary osteoarthritis, right knee. PMHX includes GERD, CHAPARRITA, TIA. Presents to [facility] today for perioperative risk stratification and optimization.     Past Medical History:   Diagnosis Date    GERD (gastroesophageal reflux disease)     History of recurrent UTIs     Hyperlipidemia     Leg edema     OAB (overactive bladder)     Osteopenia after menopause     PONV (postoperative nausea and vomiting)     TIA (transient ischemic attack) 10/2022    Questionable: while on vacation in late 2022, she had new onset R eye blurriness and fatigue that was persistent on and off for 1-2 wks; Brain scans were negative. On Aspirin and Statin.       Past Surgical History:   Procedure Laterality Date    CATARACT EXTRACTION       SECTION, CLASSIC      COLONOSCOPY      DENTAL SURGERY      FACIAL COSMETIC SURGERY      face lift    MR HEAD ANGIO WO IV CONTRAST  2022    MR HEAD ANGIO WO IV CONTRAST 2022 [facility]    MR NECK ANGIO WO IV CONTRAST  2022    MR NECK ANGIO WO IV CONTRAST 2022 [facility]    SALIVARY GLAND SURGERY  2023    Left lower lip excision:Left lower lip, excision: - Mucocele and minor salivary glands exhibiting chronic sialadenitis.    TONSILLECTOMY      TOTAL ABDOMINAL HYSTERECTOMY W/ BILATERAL SALPINGOOPHORECTOMY      TOTAL KNEE ARTHROPLASTY Left 2024       Patient  has no history on file for sexual activity.    Family History   Problem Relation Name Age of Onset    Depression Mother      COPD Mother      Alcohol abuse Mother      Asthma Mother      Leukemia Mother      Suicide Attempts Mother      Polycythemia  Mother          VERA    Leukemia Father boubacar     Alcohol abuse Father boubacar     Other (htn) Sister      Lung disease Brother boubacar     Goiter Paternal Grandmother      Depression Other FAMILY HISTORY     COPD Other FAMILY HISTORY     Asthma Other FAMILY HISTORY     Diabetes type II Other FAMILY HISTORY        Allergies   Allergen Reactions    Adhesive Tape-Silicones Other     Blisters, chin itching       Prior to Admission medications    Medication Sig Start Date End Date Taking? Authorizing Provider   amoxicillin (Amoxil) 500 mg capsule Take 4 Tablets 1 Hour Prior to Dental Procedure or Cleaning. 1/10/25   Criss Rod PA-C   azelastine (Astelin) 137 mcg (0.1 %) nasal spray USE 2 SPRAYS IN EACH NOSTRIL IN THE MORNING AND 2 SPRAYS BEFORE BEDTIME 12/13/23   Josefina Hatfield MD   calcium citrate-vitamin D3 250 mg-5 mcg (200 unit) tablet Take 1 tablet by mouth once daily. 11/1/13   Historical Provider, MD   cholecalciferol (Vitamin D-3) 25 MCG (1000 UT) capsule Take 1 capsule (25 mcg) by mouth once daily. 11/1/13   Historical Provider, MD   cyclobenzaprine (Flexeril) 5 mg tablet Take 1 tablet (5 mg) by mouth 2 times a day as needed for muscle spasms. 4/23/24   Sharmila Parry PA-C   diclofenac sodium 1 % kit Apply 4 g topically 4 times a day as needed. APPLY TO AFFECTED AREA OF LOWER EXTREMITIES. DO NOT APPLY MORE THAN 16 GRAMS DAILY TO ANY ONE AFFECTED JOINT 11/29/22   Historical Provider, MD   estradiol (Estrace) 0.01 % (0.1 mg/gram) vaginal cream  4/11/24   Historical Provider, MD   fish oil concentrate (Omega-3) 120-180 mg capsule Take 1 capsule (1 g) by mouth once daily.    Historical Provider, MD   fluticasone (Flonase) 50 mcg/actuation nasal spray Administer 2 sprays into each nostril once daily. 4/17/23   Nneka Albarran MD   Hair Regrowth Treatment 5 % foam APPLY 1 CAPFUL ONCE DAILY AT BEDTIME 8/5/24   Historical Provider, MD   loratadine (Claritin) 10 mg tablet Take 1 tablet (10 mg) by mouth once  daily as needed for allergies.    Historical Provider, MD   methenamine hippurate (Hiprex) 1 gram tablet TAKE 1 TABLET TWICE A DAY 10/24/24   Maribel Daigle MD   Myrbetriq 50 mg tablet extended release 24 hr 24 hr tablet TAKE 1 TABLET DAILY 10/17/24   Maria Espinoza MD MPH   ondansetron ODT (Zofran-ODT) 4 mg disintegrating tablet Take 1 tablet (4 mg) by mouth every 8 hours if needed for nausea or vomiting. Take one Tablet dissolved under tongue three times per day for nausea. 4/30/24   Criss Rod PA-C   polyethylene glycol (Glycolax, Miralax) 17 gram/dose powder Mix 1 capful (17g) into 8 ounces of fluid then take by mouth once daily. 4/22/24   Juan Gomes MD   rosuvastatin (Crestor) 5 mg tablet Take 1 tablet (5 mg) by mouth early in the morning.. 8/5/24   Historical Provider, MD   VITAMIN B COMPLEX ORAL Take 1 tablet by mouth once daily.    Historical Provider, MD         Review of Systems   Constitutional: Negative for chills, decreased appetite, diaphoresis, fever and malaise/fatigue.   Eyes:  Negative for blurred vision and double vision.   Cardiovascular:  Negative for chest pain, claudication, cyanosis, dyspnea on exertion, irregular heartbeat, leg swelling, near-syncope and palpitations.   Respiratory:  Negative for cough, hemoptysis, shortness of breath and wheezing.    Endocrine: Negative for cold intolerance, heat intolerance, polydipsia, polyphagia and polyuria.   Gastrointestinal:  Negative for abdominal pain, constipation, diarrhea, dysphagia, nausea and vomiting.   Genitourinary:  Negative for bladder incontinence, dysuria, hematuria, incomplete emptying, nocturia, frequency, pelvic pain and urgency.   Neurological:  Negative for headaches, light-headedness, paresthesias, sensory change and weakness.   Psychiatric/Behavioral:  Negative for altered mental status.    Musculoskeletal: Positive for myalgias, arthralgias     Vitals and nursing note reviewed.     Physical exam  Constitutional:        Appearance: Normal appearance. She is Normal.   HENT:      Head: Normocephalic and atraumatic.      Mouth/Throat:      Mouth: Mucous membranes are moist.      Pharynx: Oropharynx is clear.   Eyes:      Extraocular Movements: Extraocular movements intact.      Conjunctiva/sclera: Conjunctivae normal.      Pupils: Pupils are equal, round, and reactive to light.   Cardiovascular:      PMI at left midclavicular line. Normal rate. Regular rhythm. Normal S1. Normal S2.       Murmurs: There is no murmur.      No gallop.  No click. No rub.       No audible carotid bruit     No lower extremity edema on exam  Pulmonary:      Effort: Pulmonary effort is normal.      Breath sounds: Normal breath sounds.   Abdominal:      General: Abdomen is flat. Bowel sounds are normal.      Palpations: Abdomen is soft and non-tender  Musculoskeletal:      Cervical back: Normal range of motion and neck supple.   Skin:     General: Skin is warm and dry.      Capillary Refill: Capillary refill takes less than 2 seconds.   Neurological:      General: No focal deficit present.      Mental Status: She is alert and oriented to person, place, and time. Mental status is at baseline.   Psychiatric:         Mood and Affect: Mood normal.         Behavior: Behavior normal.         Thought Content: Thought content normal.         Judgment: Judgment normal.     Vitals and nursing note reviewed. Physical exam within normal limits.       DASI Risk Score      Flowsheet Row Questionnaire Series Submission from 3/17/2025 in Virtual Care with Generic Provider Jeremiaht Questionnaire Series Submission from 4/16/2024 in Virtual Care with Generic Provider [place]   Can you take care of yourself (eat, dress, bathe, or use toilet)?  2.75  filed at 03/17/2025 2337 2.75  filed at 04/16/2024 1517   Can you walk indoors, such as around your house? 1.75  filed at 03/17/2025 2337 1.75  filed at 04/16/2024 1517   Can you walk a block or two on level ground?  2.75  filed  at 03/17/2025 2337 2.75  filed at 04/16/2024 1517   Can you climb a flight of stairs or walk up a hill? 5.5  filed at 03/17/2025 2337 5.5  filed at 04/16/2024 1517   Can you run a short distance? 0  filed at 03/17/2025 2337 0  filed at 04/16/2024 1517   Can you do light work around the house like dusting or washing dishes? 2.7  filed at 03/17/2025 2337 2.7  filed at 04/16/2024 1517   Can you do moderate work around the house like vacuuming, sweeping floors or carrying groceries? 3.5  filed at 03/17/2025 2337 3.5  filed at 04/16/2024 1517   Can you do heavy work around the house like scrubbing floors or lifting and moving heavy furniture?  0  filed at 03/17/2025 2337 0  filed at 04/16/2024 1517   Can you do yard work like raking leaves, weeding or pushing a mower? 0  filed at 03/17/2025 2337 0  filed at 04/16/2024 1517   Can you have sexual relations? 5.25  filed at 03/17/2025 2337 0  filed at 04/16/2024 1517   Can you participate in moderate recreational activities like golf, bowling, dancing, doubles tennis or throwing a baseball or football? 6  filed at 03/17/2025 2337 0  filed at 04/16/2024 1517   Can you participate in strenous sports like swimming, singles tennis, football, basketball, or skiing? 7.5  filed at 03/17/2025 2337 7.5  filed at 04/16/2024 1517   DASI SCORE 37.7  filed at 03/17/2025 2337 26.45 filed at 04/16/2024 1517   METS Score (Will be calculated only when all the questions are answered) 7.4  filed at 03/17/2025 2337 6 filed at 04/16/2024 1517          Capcarlitoi DVT Assessment      Flowsheet Row Pre-Admission Testing from 4/7/2025 in [facility] Admission (Discharged) from 4/22/2024 in [facility] 2 with Moses Schneider MD   DVT Score (IF A SCORE IS NOT CALCULATING, MUST SELECT A BMI TO COMPLETE) 9 filed at 04/07/2025 1704 10 filed at 04/22/2024 0700   Surgical Factors Elective major lower extremity arthroplasty filed at 04/07/2025 1704 --   BMI (BMI MUST BE  CHOSEN) 30 or less filed at 04/07/2025 1704 30 or less filed at 04/22/2024 0700   RETIRED: Current Status -- Elective major lower extremity arthroplasty filed at 04/22/2024 0700   RETIRED: History -- Prior major surgery filed at 04/22/2024 0700   RETIRED: Age -- Over 75 years filed at 04/22/2024 0700          Modified Frailty Index    No data to display       [id number] Stroke Risk Points  Current as of 3 minutes ago        N/A 0 to 9 Points:      Last Change: N/A          The [id number]-[id number] risk score (Evi ORTIZ, et al. 2009. © 2010 American College [employer]) quantifies the risk of stroke for a patient with atrial fibrillation. For patients without atrial fibrillation or under the age of 18 this score appears as N/A. Higher score values generally indicate higher risk of stroke.        This score is not applicable to this patient. Components are not calculated.          Revised Cardiac Risk Index      Flowsheet Row Pre-Admission Testing from 4/7/2025 in [facility]   High-Risk Surgery (Intraperitoneal, Intrathoracic,Suprainguinal vascular) 0 filed at 04/07/2025 1706   History of ischemic heart disease (History of MI, History of positive exercuse test, Current chest paint considered due to myocardial ischemia, Use of nitrate therapy, ECG with pathological Q Waves) 0 filed at 04/07/2025 1706   History of congestive heart failure (pulmonary edemia, bilateral rales or S3 gallop, Paroxysmal nocturnal dyspnea, CXR showing pulmonary vascular redistribution) 0 filed at 04/07/2025 1706   History of cerebrovascular disease (Prior TIA or stroke) 1 filed at 04/07/2025 1706   Pre-operative insulin treatment 0 filed at 04/07/2025 1706   Pre-operative creatinine>2 mg/dl 0 filed at 04/07/2025 1706   Revised Cardiac Risk Calculator 1 filed at 04/07/2025 1706          Apfel Simplified Score    No data to display       Risk Analysis Index Results This Encounter    No data found in the last 10 encounters.        Stop Bang Score      Flowsheet Row Pre-Admission Testing from 4/7/2025 in [facility] Questionnaire Series Submission from 3/17/2025 in [facility] with Generic Provider Antonia   Do you snore loudly? 0 filed at 04/07/2025 1508 0  filed at 03/17/2025 2337   Do you often feel tired or fatigued after your sleep? 0 filed at 04/07/2025 1508 0  filed at 03/17/2025 2337   Has anyone ever observed you stop breathing in your sleep? 0 filed at 04/07/2025 1508 0  filed at 03/17/2025 2337   Do you have or are you being treated for high blood pressure? 0 filed at 04/07/2025 1508 0  filed at 03/17/2025 2337   Recent BMI (Calculated) 23 filed at 04/07/2025 1508 23  filed at 03/17/2025 2337   Is BMI greater than 35 kg/m2? 0=No filed at 04/07/2025 1508 0=No  filed at 03/17/2025 2337   Age older than 50 years old? 1=Yes filed at 04/07/2025 1508 1=Yes  filed at 03/17/2025 2337   Is your neck circumference greater than 17 inches (Male) or 16 inches (Female)? 0 filed at 04/07/2025 1508 --   Gender - Male 0=No filed at 04/07/2025 1508 0=No  filed at 03/17/2025 2337   STOP-BANG Total Score 1 filed at 04/07/2025 1508 --          Prodigy: High Risk  Total Score: 16              Prodigy Age Score           ARISCAT Score for Postoperative Pulmonary Complications    No data to display       Mi Perioperative Risk for Myocardial Infarction or Cardiac Arrest (RHINA)      Flowsheet Row Pre-Admission Testing from 4/7/2025 in [facility]   Calculated Age Score 1.6 filed at 04/07/2025 1706   Functional Status  0 filed at 04/07/2025 1706   ASA Class  -3.29 filed at 04/07/2025 1706   Creatinine 0 filed at 04/07/2025 1706   Type of Procedure  0.80 filed at 04/07/202507/2025 1706   RHINA Total Score  -6.14 filed at 04/07/2025 1706   RHINA % 0.22 filed at 04/07/2025 1706            Assessment & Plan:    Neuro:  TIA (questionable not found on MRI) on ASA 81 mg will need instructions from PCP    HEENT/Airway:  No diagnosis or  significant findings on chart review or clinical presentation and evaluation.   STOP-BANG Score- 1 points low risk for CHAPARRITA    Mallampati::  II    TM distance::  >3 FB    Neck ROM::  Full  Dentures-denies  Crowns-denies  Implants-denies    Cardiovascular:  HLD (atorvastatin)  METS: 7.4  RCRI: 1 point, 6.0% risk for postoperative MACE   RHINA: 0.22% risk for postoperative MACE  EKG -normal sinus rhythm Rate-69 No acute changes.   Echo  CONCLUSIONS:   1. The left ventricular systolic function is normal with a 60-65% estimated ejection fraction.   2. RVSP within normal limits.     Pulmonary:  No diagnosis or significant findings on chart review or clinical presentation and evaluation.   ARISCAT: <26 points, 1.6% risk of in-hospital postoperative pulmonary complication  PRODIGY: Moderate risk for opioid induced respiratory depression  Smoking History-She has never smoked.  Deep breathing handout given    Renal/Urinary:    Frequent UTIs (Hiprex) follows with Urology   OAB   the patient is at increased risk of perioperative renal complications secondary to age>/= 55. Preventative measures include BP monitoring, medication compliance, and hydration management.   CMP  Creatinine-  GFR-    Endocrine:  No diagnosis or significant findings on chart review or clinical presentation and evaluation.   HBA1C-    Hematologic/Immunology:  No diagnosis or significant findings on chart review or clinical presentation and evaluation.  The patient is not a Jehovah’s witness and will accept blood and blood products if medically indicated.   History of previous blood transfusions Yes - 1971  CBC  HGB-  Caprini Score 9, patient at Moderate for postoperative DVT. Pt supplied education/VTE handout  Anticoagulation use: Yes     Gastrointestinal:   No diagnosis or significant findings on chart review or clinical presentation and evaluation.   Recreational drug use: none  Alcohol use none    Infectious disease:   No diagnosis or significant  findings on chart review or clinical presentation and evaluation.   Prescription provided for CHG body wash and dental rinse. CHG use instructions reviewed and provided to patient.  Staph screen collected-Pending    Musculoskeletal:   OA s/p left TKA in 2024  JHFRAT score-5  points. low risk for falls    Anesthesia:  ASA 2 - Patient with mild systemic disease with no functional limitations  Anticipated anesthesia-spinal  History of General anesthesia- yes  Complications- No anesthesia complications  No family history of anesthesia complications    Labs & Imaging ordered:  EKG, MRSA, CBC, CMP, A1C  Nickel/metal allergy-negative  Shellfish allergy-negative    Discussed with patient medication instructions, NPO guidelines, and any questions or concerns.     time spent 45 minutes

## 2025-04-08 LAB
EST. AVERAGE GLUCOSE BLD GHB EST-MCNC: 111 MG/DL
HBA1C MFR BLD: 5.5 %

## 2025-04-09 LAB
ATRIAL RATE: 69 BPM
P AXIS: 81 DEGREES
P OFFSET: 201 MS
P ONSET: 151 MS
PR INTERVAL: 136 MS
Q ONSET: 219 MS
QRS COUNT: 11 BEATS
QRS DURATION: 80 MS
QT INTERVAL: 398 MS
QTC CALCULATION(BAZETT): 426 MS
QTC FREDERICIA: 417 MS
R AXIS: 62 DEGREES
STAPHYLOCOCCUS SPEC CULT: NORMAL
T AXIS: 64 DEGREES
T OFFSET: 418 MS
VENTRICULAR RATE: 69 BPM

## 2025-04-10 ENCOUNTER — OFFICE VISIT (OUTPATIENT)
Dept: ORTHOPEDIC SURGERY | Facility: CLINIC | Age: 80
End: 2025-04-10
Payer: MEDICARE

## 2025-04-10 DIAGNOSIS — M17.11 PRIMARY OSTEOARTHRITIS OF RIGHT KNEE: Primary | ICD-10-CM

## 2025-04-10 PROCEDURE — 99214 OFFICE O/P EST MOD 30 MIN: CPT | Performed by: ORTHOPAEDIC SURGERY

## 2025-04-10 NOTE — PROGRESS NOTES
Patient is a pleasant 79-year-old female presents today for discussion having decided to proceed with right total knee arthroplasty.  She has known advanced underlying osteoarthritis.  She previous underwent left total knee replacement did very well from that standpoint.  She is failed a greater 3-month trial reasonable conservative treatment including using a cane, injections and Tylenol.  She rates her pain at times is 7 out of 10.  She is difficulty walking short distance going downstairs.    Right knee:  AAOx3, NAD, walks with a moderate antalgic gait  Varus allignment  Range of motion lacks 10 degrees of full extension and flexes to 110 degrees  Stable to varus/valgus/anterior/posterior stress through out the range of motion  Slight laxity with varus stress  Diffuse medial  joint line tenderness to palpation  Moderate effusion  SILT in a bijal/saph/per/tib distribution  5/5 knee extension/df/pf/ehl  ½ dorsalis pedis and posterior tibial pulse  no popliteal lymphadenopathy  no other overlying lesions  mood: euthymic  Respirations non labored    Plain films were reviewed by myself in clinic today.  They have advanced osteoarthritis of their knee with significant joint space narrowing, bone on bone changes, underlying sclerosis and tricompartmental osteophytic change.    Patient is now failed a greater than 3-month trial of reasonable conservative treatment and wishes proceed with surgery which is indicated at this time.  Discussed the risk benefits alternatives to surgery.  All of their questions were answered.    Procedure: right total knee  Location: [facility]  ICD10: M17.11  CPT: 80451  Stay: overnight  Equipment: SAVO    I talked with the patient at length about risks, limitations, benefits and alternatives to total knee replacement today. I reviewed concerns about implant wear, loosening, breakage, infection and infection prophylaxis, DVT, PE, death and other medical and anesthetic complications of  surgery. We talked about the potential for persistent pain following surgery since there are many possible causes for knee and leg pain. The patient was advised that knee replacement will only relieve pain that is coming from the knee. We talked about limited range of motion following knee replacement and the importance of physical therapy and their motivation. We talked about improvements in pain management post-operatively and our accelerated rehab program. We talked about wound healing issues and neurovascular complications of surgery. I reviewed functional and activity restrictions in detail. We discussed the fact that many of our patients are able to go home in 1 day or less depending on their health, mobility, pre-op preparation, individual home situation and personal preference.  The patient has identified their personal goals of their joint replacement surgery and recovery and we have discussed them. These are documented in our [employer] patient engagement platform. In addition, we have discussed the advantages and disadvantages of various implant and fixation options, as well as various surgical approaches.  The basic concepts of the joint replacement procedure has been reviewed with the patient and the patient has been provided the opportunity to see an actual implant either in the office or in our pre-op education class.  All of the patients questions were answered. The patient can call my office to schedule surgery and the pre-op teaching class. I told the patient that they should contact their primary care physician to discuss fitness for surgery.    This note was created using voice recognition software and was not corrected for typographical or grammatical errors.

## 2025-04-25 ENCOUNTER — TELEPHONE (OUTPATIENT)
Dept: ORTHOPEDIC SURGERY | Facility: CLINIC | Age: 80
End: 2025-04-25
Payer: MEDICARE

## 2025-04-25 ENCOUNTER — TELEPHONE (OUTPATIENT)
Dept: OBSTETRICS AND GYNECOLOGY | Facility: CLINIC | Age: 80
End: 2025-04-25

## 2025-04-25 NOTE — TELEPHONE ENCOUNTER
Copied from CRM #[id number]. Topic: Appointment Cancelled  >> Apr 25, 2025  8:01 AM Mi DOUGHERTY wrote:  I have the above patient on my back line and she is scheduled for surgery on 4/28 with Dr Schneider, however she has a UTI and will not be able to have it on that day, can someone please give the patient a call to discuss and get her rescheduled, she can be reached at [phone]

## 2025-05-02 ENCOUNTER — TELEMEDICINE (OUTPATIENT)
Dept: OBSTETRICS AND GYNECOLOGY | Facility: CLINIC | Age: 80
End: 2025-05-02
Payer: MEDICARE

## 2025-05-02 DIAGNOSIS — N39.0 RECURRENT UTI: Primary | ICD-10-CM

## 2025-05-02 DIAGNOSIS — N95.2 VAGINAL ATROPHY: ICD-10-CM

## 2025-05-02 DIAGNOSIS — N32.81 OAB (OVERACTIVE BLADDER): ICD-10-CM

## 2025-05-02 PROCEDURE — 99213 OFFICE O/P EST LOW 20 MIN: CPT | Performed by: OBSTETRICS & GYNECOLOGY

## 2025-05-02 RX ORDER — NITROFURANTOIN 25; 75 MG/1; MG/1
100 CAPSULE ORAL DAILY
Qty: 30 CAPSULE | Refills: 1 | Status: SHIPPED | OUTPATIENT
Start: 2025-05-02 | End: 2025-07-01

## 2025-05-02 ASSESSMENT — ENCOUNTER SYMPTOMS
ENDOCRINE NEGATIVE: 1
GASTROINTESTINAL NEGATIVE: 1
RESPIRATORY NEGATIVE: 1
MUSCULOSKELETAL NEGATIVE: 1
EYES NEGATIVE: 1
NEUROLOGICAL NEGATIVE: 1
PSYCHIATRIC NEGATIVE: 1
CARDIOVASCULAR NEGATIVE: 1
CONSTITUTIONAL NEGATIVE: 1

## 2025-05-02 NOTE — PROGRESS NOTES
[facility] Department of Urogynecology   Sylvie Key MD, MPH   [phone]    ASSESSMENT AND PLAN:   79 year old female with OAB, Malinda, and vaginal atrophy. Comorbidities include: Venous insufficiency.    Diagnoses:  #1 Recurrent UTI  #2 Vaginal atrophy   #3 Overactive bladder    Plan:  1. Malinda, vaginal atrophy   - No recent UTI within the past x12 months.   - Her last urine culture on 1/17/2025 was negative with mixed urogenital last. However, her orthopedic surgeon would not proceed with knee replacement surgery as they were concerned about infection.   - Previously was on Hiprex 1g BID and discontinued this after switching to Macrobid daily.   - Sent Rx for Macrobid 100mg with instructions to take 1 pill po daily for UTI suppression.  - The patient will continue using transvaginal estrogen cream 2x/week and plan to continue daily Macrobid for UTI ppx while awaiting for her knee replacement surgery with plan to continue daily antibiotic for x1 month s/p TKR surgery.     2. OAB  - Plan to continue taking Myrbetriq ER 50mg daily to reduce urinary urgency/frequency and we can reassess her bladder control after her knee replacement surgery.     Follow up in 4 months with Dr. Sylvie Key.     Telephone visit time: 9 minutes     Scribe Attestation  By signing my name below, I, Mike Gutierrez, [place], attest that this documentation has been prepared under the direction and in the presence of Sylvie Key MD MPH on 05/02/2025 at 3:03 PM.     Problem List Items Addressed This Visit          Genitourinary and Reproductive    OAB (overactive bladder)     Other Visit Diagnoses         Recurrent UTI    -  Primary    Relevant Medications    nitrofurantoin, macrocrystal-monohydrate, (Macrobid) 100 mg capsule      Vaginal atrophy               I spent a total of *** minutes in face to face and non face to face time.      Sylvie Key MD, MPH, FACOG     Established    HISTORY OF PRESENT ILLNESS:   79 year old  female presenting in virtual follow up for Malinda and is planning for knee replacement surgery.     Virtual or Telephone Consent:   While technically available, the patient was unable or unwilling to consent to connect via audio/video telehealth technology; therefore, I performed this visit using a real-time audio only connection between Zarina Holliday & Sylvie Key MD MPH.  Verbal consent was requested and obtained from Zarina Holliday on this date, 05/02/25 for a telehealth visit and the patient's location was confirmed at the time of the visit.    Record Review:   - 1/17/2025 Dr. Sylvie Key note re: Malinda - No recent UTI within the past x9 months. Her last urine culture on 1/17/2025 was negative with mixed urogenital last. However, her orthopedic surgeon would not proceed with knee replacement surgery as they were concerned about infection and plan to start her on preventative antibiotic and has been on Hiprex previously.   - 7/19/2024 Dr. Sylvie Key note reviewed: Malinda - on Hiprex and vaginal estrogen cream.   - 4/5/2024 Dr. Key note RE: Recurrent UTIs - Last positive urine culture was on 4/19/2023 with Proteus mirabilis. She has concerns today of acute UTI. Urine sent for culture. Rx for Bactrim -160 mg tablet to be taken once by mouth 2x/day for 5 days. She is allowed to take Azo but instructed not to take Hiprex again until she finishes antibiotics. GSM - Continue vaginal estrogen cream.  - 4/4/24 Ortho note (Dr. Schneider) - Discussed upcoming left total knee arthroplasty.   - 1/3/24 note: Malinda - Last positive urine cx was on 04/19/23 with Proteus mirabilis. Uses vaginal estrogen 2x per week and takes Hiprex 1x daily.   - 12/18/2023 Creatinine was 0.82   - 11/1/2023 Dr. Sylvie Key note reviewed:  Last positive urine cx was on 04/19/23 with Proteus mirabilis. Still on Hiprex, she was told she can discontinue Hiprex and continue with tv estrogen cream. If she did have a UTI at this  visit, she should take Hiprex BID after finishing the antibiotics (she had been taking Hiprex 1x daily).    - 5/3/2023 Dr. Sylvie Key note reviewed: Malinda - She was on Hiprex and after 6 months we would consider discontinuing. Last positive urine cx was on 4/19/2023 with Proteus mirabilis. Using tv estrogen cream 2x per week. She was to call if she had UTI sxs.   - 4/20/2023 UA Micro: 47 RBC/HPF, but she had a UTI.     Urine Culture History:   - 1/17/2025: Mixed urogenital last  - 4/19/2023: Proteus mirabilis >100,000 CFU/ML     Urinary Symptoms:   - The patient previously left urine for culture at her PCP's office in January 2025 due to feeling symptomatic of a UTI with burning dysuria and worsened urinary urgency.   - She has been taking Myrbetriq ER 50mg daily but has persistent nocturia 3x/night and urinary frequency after swimming in the pool for exercise.   - Patient has been using E2 cream 2x/week for UTI ppx.       Past Medical History:     Medical History[1]       Past Surgical History:     Surgical History[2]      Medications:     Prior to Admission medications    Medication Sig Start Date End Date Taking? Authorizing Provider   amoxicillin (Amoxil) 500 mg capsule Take 4 Tablets 1 Hour Prior to Dental Procedure or Cleaning. 1/10/25   Criss Rod PA-C   aspirin 81 mg EC tablet Take 1 tablet (81 mg) by mouth once daily.    Historical Provider, MD   azelastine (Astelin) 137 mcg (0.1 %) nasal spray USE 2 SPRAYS IN EACH NOSTRIL IN THE MORNING AND 2 SPRAYS BEFORE BEDTIME 12/13/23   Josefina Hatfield MD   calcium citrate-vitamin D3 250 mg-5 mcg (200 unit) tablet Take 1 tablet by mouth once daily. 11/1/13   Historical Provider, MD   cholecalciferol (Vitamin D-3) 25 MCG (1000 UT) capsule Take 1 capsule (25 mcg) by mouth once daily. 11/1/13   Historical Provider, MD   cyclobenzaprine (Flexeril) 5 mg tablet Take 1 tablet (5 mg) by mouth 2 times a day as needed for muscle spasms.  Patient not taking: Reported on  4/7/2025 4/23/24   Sharmila Parry PA-C   diclofenac sodium 1 % kit Apply 4 g topically 4 times a day as needed. APPLY TO AFFECTED AREA OF LOWER EXTREMITIES. DO NOT APPLY MORE THAN 16 GRAMS DAILY TO ANY ONE AFFECTED JOINT 11/29/22   Historical Provider, MD   estradiol (Estrace) 0.01 % (0.1 mg/gram) vaginal cream  4/11/24   Historical Provider, MD   fish oil concentrate (Omega-3) 120-180 mg capsule Take 1 capsule (1 g) by mouth once daily.    Historical Provider, MD   fluticasone (Flonase) 50 mcg/actuation nasal spray Administer 2 sprays into each nostril once daily. 4/17/23   Nneka Albarran MD   Hair Regrowth Treatment 5 % foam APPLY 1 CAPFUL ONCE DAILY AT BEDTIME 8/5/24   Historical Provider, MD   loratadine (Claritin) 10 mg tablet Take 1 tablet (10 mg) by mouth once daily as needed for allergies.    Historical Provider, MD   Myrbetriq 50 mg tablet extended release 24 hr 24 hr tablet TAKE 1 TABLET DAILY 10/17/24   Maria Espinoza MD MPH   nitrofurantoin, macrocrystal-monohydrate, (Macrobid) 100 mg capsule Take 1 capsule (100 mg) by mouth once daily. 5/2/25 7/1/25  Sylvie Key MD MPH   ondansetron ODT (Zofran-ODT) 4 mg disintegrating tablet Take 1 tablet (4 mg) by mouth every 8 hours if needed for nausea or vomiting. Take one Tablet dissolved under tongue three times per day for nausea.  Patient not taking: Reported on 4/7/2025 4/30/24   Criss Rod PA-C   polyethylene glycol (Glycolax, Miralax) 17 gram/dose powder Mix 1 capful (17g) into 8 ounces of fluid then take by mouth once daily.  Patient not taking: Mix of powder and drink. Reported on 4/7/2025 4/22/24   Juan Gomes MD   rosuvastatin (Crestor) 5 mg tablet Take 1 tablet (5 mg) by mouth early in the morning.. 1/2 pill daily per doctors order 8/5/24   Historical Provider, MD   VITAMIN B COMPLEX ORAL Take 1 tablet by mouth once daily.    Historical Provider, MD   methenamine hippurate (Hiprex) 1 gram tablet TAKE 1 TABLET TWICE A DAY  10/24/24 5/2/25  Maribel Daigle MD        ROS  Review of Systems   Constitutional: Negative.    HENT: Negative.     Eyes: Negative.    Respiratory: Negative.     Cardiovascular: Negative.    Gastrointestinal: Negative.    Endocrine: Negative.    Genitourinary: Negative.    Musculoskeletal: Negative.    Neurological: Negative.    Psychiatric/Behavioral: Negative.              Data and DIAGNOSTIC STUDIES REVIEWED   Imaging  No results found.    Cardiology, Vascular, and Other Imaging  No other imaging results found for the past 7 days     Lab Results   Component Value Date    URINECULTURE  2025     Clinically insignificant growth based on current clinical standards.      Lab Results   Component Value Date    GLUCOSE 105 (H) 2025    CALCIUM 9.4 2025     2025    K 4.3 2025    CO2 31 2025     2025    BUN 16 2025    CREATININE 0.74 2025     Lab Results   Component Value Date    WBC 4.7 2025    HGB 12.2 2025    HCT 37.5 2025    MCV 93 2025     2025           [1]   Past Medical History:  Diagnosis Date    GERD (gastroesophageal reflux disease)     History of recurrent UTIs     Hyperlipidemia     Leg edema     OAB (overactive bladder)     Osteopenia after menopause     PONV (postoperative nausea and vomiting)     TIA (transient ischemic attack) 10/2022    Questionable: while on vacation in late 2022, she had new onset R eye blurriness and fatigue that was persistent on and off for 1-2 wks; Brain scans were negative. On Aspirin and Statin.   [2]   Past Surgical History:  Procedure Laterality Date    CATARACT EXTRACTION       SECTION, CLASSIC      COLONOSCOPY      DENTAL SURGERY      FACIAL COSMETIC SURGERY      face lift    MR HEAD ANGIO WO IV CONTRAST  2022    MR HEAD ANGIO WO IV CONTRAST 2022 [facility]CILLARY LEGACY    MR NECK ANGIO WO IV CONTRAST  2022    MR NECK ANGIO WO IV CONTRAST  11/2/2022 [facility] ANCILLARY LEGACY    SALIVARY GLAND SURGERY  04/17/2023    Left lower lip excision:Left lower lip, excision: - Mucocele and minor salivary glands exhibiting chronic sialadenitis.    TONSILLECTOMY      TOTAL ABDOMINAL HYSTERECTOMY W/ BILATERAL SALPINGOOPHORECTOMY      TOTAL KNEE ARTHROPLASTY Left 04/22/2024

## 2025-05-08 ENCOUNTER — HOSPITAL ENCOUNTER (OUTPATIENT)
Dept: RADIOLOGY | Facility: HOSPITAL | Age: 80
Discharge: HOME | End: 2025-05-08
Payer: MEDICARE

## 2025-05-08 ENCOUNTER — TELEPHONE (OUTPATIENT)
Dept: ORTHOPEDIC SURGERY | Facility: HOSPITAL | Age: 80
End: 2025-05-08
Payer: MEDICARE

## 2025-05-08 ENCOUNTER — PRE-ADMISSION TESTING (OUTPATIENT)
Dept: PREADMISSION TESTING | Facility: HOSPITAL | Age: 80
End: 2025-05-08
Payer: MEDICARE

## 2025-05-08 VITALS
OXYGEN SATURATION: 97 % | HEART RATE: 73 BPM | SYSTOLIC BLOOD PRESSURE: 136 MMHG | RESPIRATION RATE: 16 BRPM | HEIGHT: 65 IN | BODY MASS INDEX: 23.16 KG/M2 | WEIGHT: 139 LBS | DIASTOLIC BLOOD PRESSURE: 73 MMHG | TEMPERATURE: 98.4 F

## 2025-05-08 DIAGNOSIS — M17.11 UNILATERAL PRIMARY OSTEOARTHRITIS, RIGHT KNEE: ICD-10-CM

## 2025-05-08 DIAGNOSIS — Z01.818 PREOP TESTING: Primary | ICD-10-CM

## 2025-05-08 PROCEDURE — 77073 BONE LENGTH STUDIES: CPT

## 2025-05-08 PROCEDURE — 73562 X-RAY EXAM OF KNEE 3: CPT | Mod: RT

## 2025-05-08 PROCEDURE — 99213 OFFICE O/P EST LOW 20 MIN: CPT | Performed by: PHYSICIAN ASSISTANT

## 2025-05-08 PROCEDURE — 87081 CULTURE SCREEN ONLY: CPT | Mod: BEALAB

## 2025-05-08 RX ORDER — NITROFURANTOIN (MACROCRYSTALS) 100 MG/1
100 CAPSULE ORAL 4 TIMES DAILY
COMMUNITY

## 2025-05-08 RX ORDER — CHLORHEXIDINE GLUCONATE ORAL RINSE 1.2 MG/ML
SOLUTION DENTAL
Qty: 473 ML | Refills: 0 | Status: SHIPPED | OUTPATIENT
Start: 2025-05-08 | End: 2025-05-15 | Stop reason: HOSPADM

## 2025-05-08 RX ORDER — CHLORHEXIDINE GLUCONATE 40 MG/ML
SOLUTION TOPICAL DAILY
Qty: 470 ML | Refills: 0 | Status: SHIPPED | OUTPATIENT
Start: 2025-05-08 | End: 2025-05-15 | Stop reason: HOSPADM

## 2025-05-08 ASSESSMENT — ENCOUNTER SYMPTOMS
ENDOCRINE NEGATIVE: 1
RESPIRATORY NEGATIVE: 1
NECK NEGATIVE: 1
EYES NEGATIVE: 1
CONSTITUTIONAL NEGATIVE: 1
CARDIOVASCULAR NEGATIVE: 1
NEUROLOGICAL NEGATIVE: 1
GASTROINTESTINAL NEGATIVE: 1

## 2025-05-08 ASSESSMENT — DUKE ACTIVITY SCORE INDEX (DASI)
CAN YOU DO YARD WORK LIKE RAKING LEAVES, WEEDING OR PUSHING A MOWER: NO
TOTAL_SCORE: 37.7
DASI METS SCORE: 7.4
CAN YOU PARTICIPATE IN STRENOUS SPORTS LIKE SWIMMING, SINGLES TENNIS, FOOTBALL, BASKETBALL, OR SKIING: YES
CAN YOU DO MODERATE WORK AROUND THE HOUSE LIKE VACUUMING, SWEEPING FLOORS OR CARRYING GROCERIES: YES
CAN YOU DO HEAVY WORK AROUND THE HOUSE LIKE SCRUBBING FLOORS OR LIFTING AND MOVING HEAVY FURNITURE: NO
CAN YOU WALK A BLOCK OR TWO ON LEVEL GROUND: YES
CAN YOU WALK INDOORS, SUCH AS AROUND YOUR HOUSE: YES
CAN YOU TAKE CARE OF YOURSELF (EAT, DRESS, BATHE, OR USE TOILET): YES
CAN YOU HAVE SEXUAL RELATIONS: YES
CAN YOU DO LIGHT WORK AROUND THE HOUSE LIKE DUSTING OR WASHING DISHES: YES
CAN YOU CLIMB A FLIGHT OF STAIRS OR WALK UP A HILL: YES
CAN YOU RUN A SHORT DISTANCE: NO
CAN YOU PARTICIPATE IN MODERATE RECREATIONAL ACTIVITIES LIKE GOLF, BOWLING, DANCING, DOUBLES TENNIS OR THROWING A BASEBALL OR FOOTBALL: YES

## 2025-05-08 ASSESSMENT — LIFESTYLE VARIABLES: SMOKING_STATUS: NONSMOKER

## 2025-05-08 NOTE — PREPROCEDURE INSTRUCTIONS
Medication List            Accurate as of May 8, 2025  8:50 AM. Always use your most recent med list.                aspirin 81 mg EC tablet  Additional Medication Adjustments for Surgery: Other (Comment)  Notes to patient: Obtain Cardiologist recommendations     azelastine 137 mcg (0.1 %) nasal spray  Commonly known as: Astelin  USE 2 SPRAYS IN EACH NOSTRIL IN THE MORNING AND 2 SPRAYS BEFORE BEDTIME  Medication Adjustments for Surgery: Take/Use as prescribed     calcium citrate-vitamin D3 250 mg-5 mcg (200 unit) tablet  Additional Medication Adjustments for Surgery: Take last dose 7 days before surgery  Notes to patient: Last dose 5/6/25     cholecalciferol 25 mcg (1,000 units) capsule  Commonly known as: Vitamin D-3  Additional Medication Adjustments for Surgery: Take last dose 7 days before surgery  Notes to patient: Last dose 5/6/25     Claritin 10 mg tablet  Generic drug: loratadine  Medication Adjustments for Surgery: Take/Use as prescribed     cyclobenzaprine 5 mg tablet  Commonly known as: Flexeril  Take 1 tablet (5 mg) by mouth 2 times a day as needed for muscle spasms.  Medication Adjustments for Surgery: Take/Use as prescribed     estradiol 0.01 % (0.1 mg/gram) vaginal cream  Commonly known as: Estrace  Medication Adjustments for Surgery: Do Not take on the morning of surgery     fish oil concentrate 120-180 mg capsule  Commonly known as: Omega-3  Additional Medication Adjustments for Surgery: Take last dose 7 days before surgery  Notes to patient: Last dose 5/6/25     fluticasone 50 mcg/actuation nasal spray  Commonly known as: Flonase  Administer 2 sprays into each nostril once daily.  Medication Adjustments for Surgery: Take/Use as prescribed     Myrbetriq 50 mg tablet extended release 24 hr 24 hr tablet  Generic drug: mirabegron  TAKE 1 TABLET DAILY  Medication Adjustments for Surgery: Do Not take on the morning of surgery     nitrofurantoin (macrocrystal-monohydrate) 100 mg capsule  Commonly known  as: Macrobid  Take 1 capsule (100 mg) by mouth once daily.  Medication Adjustments for Surgery: Take/Use as prescribed     nitrofurantoin 100 mg capsule  Commonly known as: Macrodantin  Medication Adjustments for Surgery: Take/Use as prescribed     ondansetron ODT 4 mg disintegrating tablet  Commonly known as: Zofran-ODT  Take 1 tablet (4 mg) by mouth every 8 hours if needed for nausea or vomiting. Take one Tablet dissolved under tongue three times per day for nausea.  Medication Adjustments for Surgery: Take/Use as prescribed     polyethylene glycol 17 gram/dose powder  Commonly known as: Glycolax, Miralax  Mix 1 capful (17g) into 8 ounces of fluid then take by mouth once daily.  Medication Adjustments for Surgery: Do Not take on the morning of surgery     rosuvastatin 5 mg tablet  Commonly known as: Crestor  Medication Adjustments for Surgery: Take/Use as prescribed     VITAMIN B COMPLEX ORAL  Additional Medication Adjustments for Surgery: Take last dose 7 days before surgery  Notes to patient: Last dose 5/6/25                        Thank you for visiting [facility].  If you have any changes to your health condition, please call the surgeons office to alert them and give them details of your symptoms.         CONTACT SURGEON'S OFFICE IF YOU DEVELOP:  * Fever = 100.4 F   * New respiratory symptoms (e.g. cough, shortness of breath, respiratory distress, sore throat)  * Recent loss of taste or smell  *Flu like symptoms such as headache, fatigue or gastrointestinal symptoms  * You develop any open sores, shingles, burning or painful urination   AND/OR:  * You no longer wish to have the surgery.  * Any other personal circumstances change that may lead to the need to cancel or defer this surgery.  *You were admitted to any hospital within one week of your planned procedure.     SMOKING:  *Quitting smoking can make a huge difference to your health and recovery from surgery.    *If you need help with  quitting, call 6-170-QUIT-NOW.     SURGICAL TIME:  *You will be contacted between 2 p.m. and 3 p.m. the business day before your surgery with your arrival time.  *If you haven't received a call by 3pm, call [phone]  *Scheduled surgery times may change and you will be notified if this occurs-check your personal voicemail for any updates.     ON THE MORNING OF SURGERY:  *Wear comfortable, loose fitting clothing.   *Do not use moisturizers, creams, lotions or perfume.  *All jewelry and valuables should be left at home.  *Prosthetic devices such as contact lenses, hearing aids, dentures, eyelash extensions, hairpins and body piercing must be removed before surgery.     BRING WITH YOU:  *Photo ID and insurance card  *Current list of medications and allergies  *Pacemaker/Defibrillator/Heart stent cards  *CPAP machine and mask  *Slings/splints/crutches  *Copy of your complete Advanced Directive/DHPOA-if applicable  *Neurostimulator implant remote     PARKING AND ARRIVAL:  *Check in at the Main Entrance desk and let them know you are here for surgery.     IF YOU ARE HAVING OUTPATIENT/SAME DAY SURGERY:  *A responsible adult MUST accompany you at the time of discharge and stay with you for 24 hours after your surgery.  *You may NOT drive yourself home after surgery.  *You may use a taxi or ride sharing service ([employer], Uber) to return home ONLY if you are accompanied by a friend or family member.  *Instructions for resuming your medications will be provided by your surgeon.        Daisy Villagomez PA-C  P: [phone]  Department of Anesthesiology and Perioperative Medicine  --      Preoperative Fasting Guidelines    Why must I stop eating and drinking near surgery time?  With sedation, food or liquid in your stomach can enter your lungs causing serious complications  Increases nausea and vomiting    When do I need to stop eating and drinking before my surgery?  Do not eat any food after midnight the night before your  surgery/procedure.  You may have up to 13.5 ounces of clear liquid until TWO hours before your instructed arrival time to the hospital.  This includes water, black tea/coffee, (no milk or cream) apple juice, and electrolyte drinks (Gatorade)  You may chew gum until TWO hours before your surgery/procedure              Preoperative Brain Exercises    What are brain exercises?  A brain exercise is any activity that engages your thinking (cognitive) skills.    What types of activities are considered brain exercises?  Jigsaw puzzles, crossword puzzles, word jumble, memory games, word search, and many more.  Many can be found free online or on your phone via a mobile oanh.    Why should I do brain exercises before my surgery?  More recent research has shown brain exercise before surgery can lower the risk of postoperative delirium (confusion) which can be especially important for older adults.  Patients who did brain exercises for 5 to 10 hours the days before surgery, cut their risk of postoperative delirium in half up to 1 week after surgery.                  [facility]    Preoperative Deep Breathing Exercises    Why it is important to do deep breathing exercises before my surgery?  Deep breathing exercises strengthen your breathing muscles.  This helps you to recover after your surgery and decreases the chance of breathing complications.      How are the deep breathing exercises done?  Sit straight with your back supported.  Breathe in deeply and slowly through your nose. Your lower rib cage should expand and your abdomen may move forward.  Hold that breath for 3 to 5 seconds.  Breathe out through pursed lips, slowly and completely.  Rest and repeat 10 times every hour while awake.  Rest longer if you become dizzy or lightheaded.      Patient Information: Incentive Spirometer  What is an incentive spirometer?  An incentive spirometer is a device used before and after surgery to “exercise” your  lungs.  It helps you to take deeper breaths to expand your lungs.  Below is an example of a basic incentive spirometer.  The device you receive may differ slightly but they all function the same.    Why do I need to use an incentive spirometer?  Using your incentive spirometer prepares your lungs for surgery and helps prevent lung problems after surgery.  How do I use my incentive spirometer?  When you're using your incentive spirometer, make sure to breathe through your mouth. If you breathe through your nose, the incentive spirometer won't work properly. You can hold your nose if you have trouble.  If you feel dizzy at any time, stop and rest. Try again at a later time.  Follow the steps below:  Set up your incentive spirometer, expand the flexible tubing and connect to the outlet.  Sit upright in a chair or bed. Hold the incentive spirometer at eye level.   Put the mouthpiece in your mouth and close your lips tightly around it. Slowly breathe out (exhale) completely.  Breathe in (inhale) slowly through your mouth as deeply as you can. As you take a breath, you will see the piston rise inside the large column. While the piston rises, the indicator should move upwards. It should stay in between the 2 arrows (see Figure).  Try to get the piston as high as you can, while keeping the indicator between the arrows.   If the indicator doesn't stay between the arrows, you're breathing either too fast or too slow.  When you get it as high as you can, hold your breath for 10 seconds, or as long as possible. While you're holding your breath, the piston will slowly fall to the base of the spirometer.  Once the piston reaches the bottom of the spirometer, breathe out slowly through your mouth. Rest for a few seconds.  Repeat 10 times. Try to get the piston to the same level with each breath.  Repeat every hour while awake  You can carefully clean the outside of the mouthpiece with an alcohol wipe or soap and  water.            Patient and Family Education             Ways You Can Help Prevent Blood Clots             This handout explains some simple things you can do to help prevent blood clots.      Blood clots are blockages that can form in the body's veins. When a blood clot forms in your deep veins, it may be called a deep vein thrombosis, or DVT for short. Blood clots can happen in any part of the body where blood flows, but they are most common in the arms and legs. If a piece of a blood clot breaks free and travels to the lungs, it is called a pulmonary embolus (PE). A PE can be a very serious problem.         Being in the hospital or having surgery can raise your chances of getting a blood clot because you may not be well enough to move around as much as you normally do.         Ways you can help prevent blood clots in the hospital         Wearing SCDs. SCDs stands for Sequential Compression Devices.   SCDs are special sleeves that wrap around your legs  They attach to a pump that fills them with air to gently squeeze your legs every few minutes.   This helps return the blood in your legs to your heart.   SCDs should only be taken off when walking or bathing.   SCDs may not be comfortable, but they can help save your life.               Wearing compression stockings - if your doctor orders them. These special snug fitting stockings gently squeeze your legs to help blood flow.       Walking. Walking helps move the blood in your legs.   If your doctor says it is ok, try walking the halls at least   5 times a day. Ask us to help you get up, so you don't fall.      Taking any blood thinning medicines your doctor orders.             ©[facility]; 3/23       Ways you can help prevent blood clots at home       Wearing compression stockings - if your doctor orders them. ? Walking - to help move the blood in your legs.       Taking any blood thinning medicines your doctor orders.      Signs of a blood clot or PE       Tell your doctor or nurse know right away if you have of the problems listed below.    If you are at home, seek medical care right away. Call 911 for chest pain or problems breathing.               Signs of a blood clot (DVT) - such as pain,  swelling, redness or warmth in your arm or leg      Signs of a pulmonary embolism (PE) - such as chest     pain or feeling short of breath           The Week before Surgery        Seven days before Surgery  Check your [facility] medication instructions  Do the exercises provided to you by [facility]   Arrange for a responsible, adult licensed  to take you home after surgery and stay with you for 24 hours.  You will not be permitted to drive yourself home if you have received any anesthetic/sedation  Six days before surgery  Check your [facility] medication instructions  Do the exercises provided to you by [facility]   Start using Chlorhexidene (CHG) body wash if prescribed  Five days before surgery  Check your [facility] medication instructions  Do the exercises provided to you by [facility]   Continue to use CHG body wash if prescribed  Three days before surgery  Check your [facility] medication instructions  Do the exercises provided to you by [facility]   Continue to use CHG body wash if prescribed  Two days before surgery  Check your [facility] medication instructions  Do the exercises provided to you by [facility]   Continue to use CHG body wash if prescribed    The Day before Surgery       Check your [facility] medication and all other [facility] instructions including when to stop eating and drinking  You will be called with your arrival time for surgery in the late afternoon.  If you do not receive a call please reach out to your surgeon's office.  Do not smoke or drink 24 hours before surgery  Prepare items to bring with you to the hospital  Shower with your chlorhexidine wash if prescribed  Brush your teeth and use your chlorhexidine dental rinse if prescribed    The Day of Surgery       Check your [facility] medication instructions  Ensure you  follow the instructions for when to stop eating and drinking  Shower, if prescribed use CHG.  Do not apply any lotions, creams, moisturizers, perfume or deodorant  Brush your teeth and use your CHG dental rinse if prescribed  Wear loose comfortable clothing  Avoid make-up  Remove  jewelry and piercings, consider professional piercing removal with a plastic spacer if needed  Bring photo ID and Insurance card  Bring an accurate medication list that includes medication dose, frequency and allergies  Bring a copy of your advanced directives (will, health care power of )  Bring any devices and controllers as well as medical devices you have been provided with for surgery (CPAP, slings, braces, etc.)  Dentures, eyeglasses, and contacts will be removed before surgery, please bring cases for contacts or glasses        Patient Information: Pre-Operative Infection Prevention Measures     Why did I have my nose, under my arms, and groin swabbed?  The purpose of the swab is to identify Staphylococcus aureus inside your nose or on your skin.  The swab was sent to the laboratory for culture.  A positive swab/culture for Staphylococcus aureus is called colonization or carriage.      What is Staphylococcus aureus?  Staphylococcus aureus, also known as “staph”, is a germ found on the skin or in the nose of healthy people.  Sometimes Staphylococcus aureus can get into the body and cause an infection.  This can be minor (such as pimples, boils, or other skin problems).  It might also be serious (such as a blood infection, pneumonia, or a surgical site infection).    What is Staphylococcus aureus colonization or carriage?  Colonization or carriage means that a person has the germ but is not sick from it.  These bacteria can be spread on the hands or when breathing or sneezing.    How is Staphylococcus aureus spread?  It is most often spread by close contact with a person or item that carries it.    What happens if my culture is  positive for Staphylococcus aureus?  Your doctor/medical team will use this information to guide any antibiotic treatment which may be necessary.  Regardless of the culture results, we will clean the inside of your nose with a betadine swab just before you have your surgery.      Will I get an infection if I have Staphylococcus aureus in my nose or on my skin?  Anyone can get an infection with Staphylococcus aureus.  However, the best way to reduce your risk of infection is to follow the instructions provided to you for the use of your CHG soap and dental rinse.        Patient Information: Oral/Dental Rinse    What is oral/dental rinse?   It is a mouthwash. It is a way of cleaning the mouth with a germ-killing solution before your surgery.  The solution contains chlorhexidine, commonly known as CHG.   It is used inside the mouth to kill a bacteria known as Staphylococcus aureus.  Let your doctor know if you are allergic to Chlorhexidine.    Why do I need to use CHG oral/dental rinse?  The CHG oral/dental rinse helps to kill a bacteria in your mouth known as Staphylococcus aureus.     This reduces the risk of infection at the surgical site.      Using your CHG oral/dental rinse  STEPS:  Use your CHG oral/dental rinse after you brush your teeth the night before (at bedtime) and the morning of your surgery.  Follow all directions on your prescription label.    Use the cap on the container to measure 15ml   Swish (gargle if you can) the mouthwash in your mouth for at least 30 seconds, (do not swallow) and spit out  After you use your CHG rinse, do not rinse your mouth with water, drink or eat.  Please refer to the prescription label for the appropriate time to resume oral intake      What side effects might I have using the CHG oral/dental rinse?  CHG rinse will stick to plaque on the teeth.  Brush and floss just before use.  Teeth brushing will help avoid staining of plaque during use.      Patient Information: Home  Preoperative Antibacterial Shower      What is a home preoperative antibacterial shower?  This shower is a way of cleaning the skin with a germ-killing solution before surgery.  The solution contains chlorhexidine, commonly known as CHG.  CHG is a skin cleanser with germ-killing ability.  Let your doctor know if you are allergic to chlorhexidine.    Why do I need to take a preoperative antibacterial shower?  Skin is not sterile.  It is best to try to make your skin as free of germs as possible before surgery.  Proper cleansing with a germ-killing soap before surgery can lower the number of germs on your skin.  This helps to reduce the risk of infection at the surgical site.  Following the instructions listed below will help you prepare your skin for surgery.      How do I use the solution?  Steps:  Begin using your CHG soap 5 days before your scheduled surgery on ____5/10/25________.    First, wash and rinse your hair using the CHG soap. Keep CHG soap away from ear canals and eyes.  Rinse completely, do not condition.  Hair extensions should be removed.  Wash your face with your normal soap and rinse.    Apply the CHG solution to a clean wet washcloth.  Turn the water off or move away from the water spray to avoid premature rinsing of the CHG soap as you are applying.   Firmly lather your entire body from the neck down.  Do not use on your face.  Pay special attention to the area(s) where your incision(s) will be located unless they are on your face.  Avoid scrubbing your skin too hard.  The important point is to have the CHG soap sit on your skin for 3 minutes.    When the 3 minutes are up, turn on the water and rinse the CHG solution off your body completely.   DO NOT wash with regular soap after you have used the CHG soap solution  Pat yourself dry with a clean, freshly-laundered towel.  DO NOT apply powders, deodorants, or lotions.  Dress in clean, freshly laundered nightclothes.    Be sure to sleep with clean,  freshly laundered sheets.  Be aware that CHG will cause stains on fabrics; if you wash them with bleach after use.  Rinse your washcloth and other linens that have contact with CHG completely.  Use only non-chlorine detergents to launder the items used.   The morning of surgery is the fifth day.  Repeat the above steps and dress in clean comfortable clothing     Whom should I contact if I have any questions regarding the use of CHG soap?  Call the [facility]

## 2025-05-08 NOTE — CPM/PAT H&P
CPM/PAT Evaluation       Name: Zarina Holliday (Zarina Holliday)  /Age: 1945/79 y.o.     Visit Type:   In-Person       Chief Complaint: right knee pain    HPI: Patient is a 80 yo F scheduled for right total knee replacement on 25 with Dr. Schneider secondary to right knee osteoarthritis. Patient was referred by Dr. Schneider to CPM today for perioperative risk stratification and optimization. Patient's PMHx is notable for GERD, PONV, OAB, recurrent UTIs.  Prior surgery rescheduled due to concern for UTI, patient now on macrobid daily for suppression. Denies any fevers, dysuria, hematuria.      Medical History[1]    Surgical History[2]    Patient  has no history on file for sexual activity.    Family History[3]    Allergies[4]    Prior to Admission medications    Medication Sig Start Date End Date Taking? Authorizing Provider   aspirin 81 mg EC tablet Take 1 tablet (81 mg) by mouth once daily.   Yes Historical Provider, MD   azelastine (Astelin) 137 mcg (0.1 %) nasal spray USE 2 SPRAYS IN EACH NOSTRIL IN THE MORNING AND 2 SPRAYS BEFORE BEDTIME 23  Yes Josefina Hatfield MD   calcium citrate-vitamin D3 250 mg-5 mcg (200 unit) tablet Take 1 tablet by mouth once daily. 13  Yes Historical Provider, MD   cholecalciferol (Vitamin D-3) 25 MCG (1000 UT) capsule Take 1 capsule (25 mcg) by mouth once daily. 13  Yes Historical Provider, MD   fish oil concentrate (Omega-3) 120-180 mg capsule Take 1 capsule (1 g) by mouth once daily.   Yes Historical Provider, MD   fluticasone (Flonase) 50 mcg/actuation nasal spray Administer 2 sprays into each nostril once daily. 23  Yes Nneka Albarran MD   loratadine (Claritin) 10 mg tablet Take 1 tablet (10 mg) by mouth once daily as needed for allergies.   Yes Historical Provider, MD   Myrbetriq 50 mg tablet extended release 24 hr 24 hr tablet TAKE 1 TABLET DAILY 10/17/24  Yes Maria Espinoza MD MPH   nitrofurantoin, macrocrystal-monohydrate,  (Macrobid) 100 mg capsule Take 1 capsule (100 mg) by mouth once daily. 5/2/25 7/1/25 Yes Sylvie Key MD MPH   rosuvastatin (Crestor) 5 mg tablet Take 1 tablet (5 mg) by mouth early in the morning.. 1/2 pill daily per doctors order  Take half tablet 8/5/24  Yes Historical Provider, MD   VITAMIN B COMPLEX ORAL Take 1 tablet by mouth once daily.   Yes Historical Provider, MD   chlorhexidine (Hibiclens) 4 % external liquid Apply topically once daily for 5 days. 5/8/25 5/13/25  Daisy Villagomez PA-C   chlorhexidine (Peridex) 0.12 % solution Swish and spit 15 mL night before surgery and morning of surgery 5/8/25   Daisy Villagomez PA-C   cyclobenzaprine (Flexeril) 5 mg tablet Take 1 tablet (5 mg) by mouth 2 times a day as needed for muscle spasms.  Patient not taking: Reported on 5/8/2025 4/23/24   Sharmila Parry PA-C   estradiol (Estrace) 0.01 % (0.1 mg/gram) vaginal cream  4/11/24   Historical Provider, MD   nitrofurantoin (Macrodantin) 100 mg capsule Take 1 capsule (100 mg) by mouth 4 times a day. After surgery    Historical Provider, MD   ondansetron ODT (Zofran-ODT) 4 mg disintegrating tablet Take 1 tablet (4 mg) by mouth every 8 hours if needed for nausea or vomiting. Take one Tablet dissolved under tongue three times per day for nausea.  Patient not taking: Reported on 5/8/2025 4/30/24   Criss Rod PA-C   polyethylene glycol (Glycolax, Miralax) 17 gram/dose powder Mix 1 capful (17g) into 8 ounces of fluid then take by mouth once daily.  Patient not taking: Reported on 5/8/2025 4/22/24   Juan Gomes MD   amoxicillin (Amoxil) 500 mg capsule Take 4 Tablets 1 Hour Prior to Dental Procedure or Cleaning. 1/10/25 5/8/25  Criss Rod PA-C   diclofenac sodium 1 % kit Apply 4 g topically 4 times a day as needed. APPLY TO AFFECTED AREA OF LOWER EXTREMITIES. DO NOT APPLY MORE THAN 16 GRAMS DAILY TO ANY ONE AFFECTED JOINT 11/29/22 5/8/25  Historical Provider, MD   Hair Regrowth Treatment 5 % foam APPLY 1 CAPFUL  "ONCE DAILY AT BEDTIME 8/5/24 5/8/25  Historical Provider, MD   methenamine hippurate (Hiprex) 1 gram tablet TAKE 1 TABLET TWICE A DAY 10/24/24 5/2/25  Maribel Daigle MD        PAT ROS:   Constitutional:   neg    Neuro/Psych:   neg    Eyes:   neg    Ears:   neg    Nose:   neg    Mouth:   neg    Throat:   neg    Neck:   neg    Cardio:   neg    Respiratory:   neg    Endocrine:   neg    GI:   neg    :   neg    Musculoskeletal:    +R knee pain  Hematologic:   neg    Skin:  neg        Physical Exam  Vitals and nursing note reviewed.   Constitutional:       General: She is not in acute distress.     Appearance: She is not ill-appearing or toxic-appearing.   HENT:      Head: Normocephalic and atraumatic.      Nose: Nose normal.      Mouth/Throat:      Mouth: Mucous membranes are moist.   Eyes:      Conjunctiva/sclera: Conjunctivae normal.   Neck:      Vascular: No carotid bruit.   Cardiovascular:      Rate and Rhythm: Normal rate and regular rhythm.      Pulses: Normal pulses.      Heart sounds: Normal heart sounds. No murmur heard.  Pulmonary:      Effort: Pulmonary effort is normal.      Breath sounds: Normal breath sounds.   Abdominal:      General: There is no distension.      Palpations: Abdomen is soft.      Tenderness: There is no abdominal tenderness.   Musculoskeletal:         General: Normal range of motion.      Cervical back: Normal range of motion.   Skin:     General: Skin is warm and dry.      Capillary Refill: Capillary refill takes less than 2 seconds.   Neurological:      General: No focal deficit present.      Mental Status: She is alert.   Psychiatric:         Mood and Affect: Mood normal.         Behavior: Behavior normal.        PAT AIRWAY:   Airway:     Mallampati::  II    TM distance::  >3 FB    Neck ROM::  Full          Visit Vitals  /73   Pulse 73   Temp 36.9 °C (98.4 °F)   Resp 16   Ht 1.651 m (5' 5\")   Wt 63 kg (139 lb)   SpO2 97%   BMI 23.13 kg/m²   OB Status Hysterectomy   Smoking " Status Never   BSA 1.7 m²       DASI Risk Score      Flowsheet Row Pre-Admission Testing from 5/8/2025 in [facility] Questionnaire Series Submission from 3/17/2025 in [facility] with Generic Provider Antonia   Can you take care of yourself (eat, dress, bathe, or use toilet)?  2.75 filed at 05/08/2025 0905 2.75  filed at 03/17/2025 2337   Can you walk indoors, such as around your house? 1.75 filed at 05/08/2025 0905 1.75  filed at 03/17/2025 2337   Can you walk a block or two on level ground?  2.75 filed at 05/08/2025 0905 2.75  filed at 03/17/2025 2337   Can you climb a flight of stairs or walk up a hill? 5.5 filed at 05/08/2025 0905 5.5  filed at 03/17/2025 2337   Can you run a short distance? 0 filed at 05/08/2025 0905 0  filed at 03/17/2025 2337   Can you do light work around the house like dusting or washing dishes? 2.7 filed at 05/08/2025 0905 2.7  filed at 03/17/2025 2337   Can you do moderate work around the house like vacuuming, sweeping floors or carrying groceries? 3.5 filed at 05/08/2025 0905 3.5  filed at 03/17/2025 2337   Can you do heavy work around the house like scrubbing floors or lifting and moving heavy furniture?  0 filed at 05/08/2025 0905 0  filed at 03/17/2025 2337   Can you do yard work like raking leaves, weeding or pushing a mower? 0 filed at 05/08/2025 0905 0  filed at 03/17/2025 2337   Can you have sexual relations? 5.25 filed at 05/08/2025 0905 5.25  filed at 03/17/2025 2337   Can you participate in moderate recreational activities like golf, bowling, dancing, doubles tennis or throwing a baseball or football? 6 filed at 05/08/2025 0905 6  filed at 03/17/2025 2337   Can you participate in strenous sports like swimming, singles tennis, football, basketball, or skiing? 7.5 filed at 05/08/2025 0905 7.5  filed at 03/17/2025 2337   DASI SCORE 37.7 filed at 05/08/2025 0905 37.7  filed at 03/17/2025 2337   METS Score (Will be calculated only when all the questions are  answered) 7.4 filed at 05/08/2025 0905 7.4  filed at 03/17/2025 2337          Caprini DVT Assessment      Flowsheet Row Pre-Admission Testing from 5/8/2025 in [facility] Pre-Admission Testing from 4/7/2025 in [facility]   DVT Score (IF A SCORE IS NOT CALCULATING, MUST SELECT A BMI TO COMPLETE) 10 filed at 05/08/2025 0905 9 filed at 04/07/2025 1704   Medical Factors Swollen legs filed at 05/08/2025 0905 --   Surgical Factors Elective major lower extremity arthroplasty filed at 05/08/2025 0905 Elective major lower extremity arthroplasty filed at 04/07/2025 1704   BMI (BMI MUST BE CHOSEN) 30 or less filed at 05/08/2025 0905 30 or less filed at 04/07/2025 1704          Modified Frailty Index      Flowsheet Row Pre-Admission Testing from 5/8/2025 in [facility]   Non-independent functional status (problems with dressing, bathing, personal grooming, or cooking) 0 filed at 05/08/2025 0906   History of diabetes mellitus  0 filed at 05/08/2025 0906   History of COPD 0 filed at 05/08/2025 0906   History of CHF No filed at 05/08/2025 0906   History of MI 0 filed at 05/08/2025 0906   History of Percutaneous Coronary Intervention, Cardiac Surgery, or Angina No filed at 05/08/2025 0906   Hypertension requiring the use of medication  0 filed at 05/08/2025 0906   Peripheral vascular disease 0 filed at 05/08/2025 0906   Impaired sensorium (cognitive impairement or loss, clouding, or delirium) 0 filed at 05/08/2025 0906   TIA or CVA withouy residual deficit 0.0909 filed at 05/08/2025 0906   Cerebrovascular accident with deficit 0 filed at 05/08/2025 0906   Modified Frailty Index Calculator .0909 filed at 05/08/2025 0906          [id number] Stroke Risk Points  Current as of just now        N/A 0 to 9 Points:      Last Change: N/A          The [id number] risk score (Lip DIANA, et al. 2009. © 2010 [employer]) quantifies the risk of stroke for a patient with  atrial fibrillation. For patients without atrial fibrillation or under the age of 18 this score appears as N/A. Higher score values generally indicate higher risk of stroke.        This score is not applicable to this patient. Components are not calculated.          Revised Cardiac Risk Index      Flowsheet Row Pre-Admission Testing from 5/8/2025 in [facility] Pre-Admission Testing from 4/7/2025 in [facility]   High-Risk Surgery (Intraperitoneal, Intrathoracic,Suprainguinal vascular) 0 filed at 05/08/2025 0905 0 filed at 04/07/2025 1706   History of ischemic heart disease (History of MI, History of positive exercuse test, Current chest paint considered due to myocardial ischemia, Use of nitrate therapy, ECG with pathological Q Waves) 0 filed at 05/08/2025 0905 0 filed at 04/07/2025 1706   History of congestive heart failure (pulmonary edemia, bilateral rales or S3 gallop, Paroxysmal nocturnal dyspnea, CXR showing pulmonary vascular redistribution) 0 filed at 05/08/2025 0905 0 filed at 04/07/2025 1706   History of cerebrovascular disease (Prior TIA or stroke) 1 filed at 05/08/2025 0905 1 filed at 04/07/2025 1706   Pre-operative insulin treatment 0 filed at 05/08/2025 0905 0 filed at 04/07/2025 1706   Pre-operative creatinine>2 mg/dl 0 filed at 05/08/2025 0905 0 filed at 04/07/2025 1706   Revised Cardiac Risk Calculator 1 filed at 05/08/2025 0905 1 filed at 04/07/2025 1706          Apfel Simplified Score      Flowsheet Row Pre-Admission Testing from 5/8/2025 in [facility]   Smoking status 1 filed at 05/08/2025 0906   History of motion sickness or PONV  1 filed at 05/08/2025 0906   Use of postoperative opioids 0 filed at 05/08/2025 0906   Gender - Female 1=Yes filed at 05/08/2025 0906   Apfel Simplified Score Calculator 3 filed at 05/08/2025 0906          Risk Analysis Index Results This Encounter    No data found in the last 10 encounters.       Stop Bang Score       Flowsheet Row Pre-Admission Testing from 5/8/2025 in [facility] Pre-Admission Testing from 4/7/2025 in [facility]   Do you snore loudly? 0 filed at 05/08/2025 0832 0 filed at 04/07/2025 1508   Do you often feel tired or fatigued after your sleep? 0 filed at 05/08/2025 0832 0 filed at 04/07/2025 1508   Has anyone ever observed you stop breathing in your sleep? 0 filed at 05/08/2025 0832 0 filed at 04/07/2025 1508   Do you have or are you being treated for high blood pressure? 0 filed at 05/08/2025 0832 0 filed at 04/07/2025 1508   Recent BMI (Calculated) 23.1 filed at 05/08/2025 0832 23 filed at 04/07/2025 1508   Is BMI greater than 35 kg/m2? 0=No filed at 05/08/2025 0832 0=No filed at 04/07/2025 1508   Age older than 50 years old? 1=Yes filed at 05/08/2025 0832 1=Yes filed at 04/07/2025 1508   Is your neck circumference greater than 17 inches (Male) or 16 inches (Female)? 0 filed at 05/08/2025 0832 0 filed at 04/07/2025 1508   Gender - Male 0=No filed at 05/08/2025 0832 0=No filed at 04/07/2025 1508   STOP-BANG Total Score 1 filed at 05/08/2025 0832 1 filed at 04/07/2025 1508          Prodigy: High Risk  Total Score: 16              Prodigy Age Score           ARISCAT Score for Postoperative Pulmonary Complications      Flowsheet Row Pre-Admission Testing from 5/8/2025 in [facility] Pre-Admission Testing from 4/7/2025 in [facility]   Age Calculated Score 3 filed at 05/08/2025 0906 3 filed at 04/07/2025 1706   Preoperative SpO2 0 filed at 05/08/2025 0906 0 filed at 04/07/2025 1706   Respiratory infection in the last month Either upper or lower (i.e., URI, bronchitis, pneumonia), with fever and antibiotic treatment 0 filed at 05/08/2025 0906 0 filed at 04/07/2025 1706   Preoperative anemia (Hgb less than 10 g/dl) 0 filed at 05/08/2025 0906 0 filed at 04/07/2025 1706   Surgical incision  0 filed at 05/08/2025 0906 0 filed at 04/07/2025 1706    Duration of surgery  16 filed at 05/08/2025 0906 16 filed at 04/07/2025 1706   Emergency Procedure  0 filed at 05/08/2025 0906 0 filed at 04/07/2025 1706   ARISCAT Total Score  19 filed at 05/08/2025 0906 19 filed at 04/07/2025 1706          Shmuel Perioperative Risk for Myocardial Infarction or Cardiac Arrest (RHINA)      Flowsheet Row Pre-Admission Testing from 5/8/2025 in [facility] Pre-Admission Testing from 4/7/2025 in [facility]   Calculated Age Score 1.6 filed at 05/08/2025 0906 1.6 filed at 04/07/2025 1706   Functional Status  0 filed at 05/08/2025 0906 0 filed at 04/07/2025 1706   ASA Class  -3.29 filed at 05/08/2025 0906 -3.29 filed at 04/07/2025 1706   Creatinine 0 filed at 05/08/2025 0906 0 filed at 04/07/2025 1706   Type of Procedure  0.80 filed at 05/08/2025 0906 0.80 filed at 04/07/2025 1706   RHINA Total Score  -6.14 filed at 05/08/2025 0906 -6.14 filed at 04/07/2025 1706   RHINA % 0.22 filed at 05/08/2025 0906 0.22 filed at 04/07/2025 1706            Assessment & Plan    Neuro:    -Hx of TIA - questionable per patient it was not confirmed, imaging was negative; will obtain instructions for asa 81mg from PCP  Preoperative brain exercise educational handout provided to patient.    The patient is at an increased risk for perioperative stroke secondary to HLD and female sex .    HEENT/Airway:   No diagnosis or significant findings on chart review or clinical presentation and evaluation.   STOP-BANG Score- 1 points low risk for CHAPARRITA    Mallampati::  II    TM distance::  >3 FB    Neck ROM::  Full  Dentures-denies  Crowns-denies  Implants-denies    Cardiovascular:    -HLD - on atorvastatin  No additional preoperative testing is currently indicated.  METS: 7.4  RCRI: 1 point, 6.0%   30 day risk of MACE (risk for cardiac death, nonfatal myocardial infarction, and nonfactal cardiac arrest  RHINA:  0.22  % risk of intraoperative or 30-day postoperative MACE  EKG -reviewed from  4/7/25 normal sinus rate 69    Pulmonary:     No diagnosis or significant findings on chart review or clinical presentation and evaluation.   ARISCAT: <26 points, 1.6% risk of in-hospital postoperative pulmonary complication  PRODIGY: Moderate risk for opioid induced respiratory depression  Smoking History-She has never smoked.  Preoperative deep breathing educational handout provided to patient.    Renal:  No diagnosis or significant findings on chart review or clinical presentation and evaluation, however, the patient is at increased risk of perioperative renal complications secondary to age>/= 56. Preventative measures include BP monitoring, medication compliance, and hydration management.   CMP- reviewed from 4/7/25 WNL    Uro   -OAB - on myrbetriq (hold DOS)  -recurrent UTIs- on nitrofurantoin for supression    Endocrine:  No diagnosis or significant findings on chart review or clinical presentation and evaluation.   A1c 5.5 on 4/7/25    Hematologic:    No diagnosis or significant findings on chart review or clinical presentation and evaluation.  The patient is not a Jehovah’s witness and will accept blood and blood products if medically indicated.   History of previous blood transfusions No  CBC- reviewed from 4/7/25 WNL    Caprini Score 10, patient at High for postoperative DVT. Pt supplied education/VTE handout  Anticoagulation use: Yes   Preoperative DVT educational handout provided to patient.    Gastrointestinal:     -GERD  Recreational drug use: Drug use No  Alcohol use none    Apfel: 3 points 61% risk for post operative N/V    Infectious disease:    No diagnosis or significant findings on chart review or clinical presentation and evaluation.   Prescription provided for CHG body wash and dental rinse. CHG use instructions reviewed and provided to patient. Patient advised to call [facility] if rx not received.   Staph screen collected-Pending    Musculoskeletal:    -osteoarthritis of right knee-  scheduled for surgery    Anesthesia:  ASA 2 - Patient with mild systemic disease with no functional limitations    History of General anesthesia- yes  Complications- PONV  No family history of anesthesia complications    Nickel/metal allergy-negative  Shellfish allergy-negative    Discussed with patient medication instructions, NPO guidelines, and any questions or concerns. Patient does not need further workup prior to preceding with elective surgery based on based on risk assessment.       Daisy Villagomez PA-C 2025 10:38 AM      Pending labs ordered:  MSSA/MRSA culture  Follow up needed: MRSA swab             [1]   Past Medical History:  Diagnosis Date    Anemia     Arthritis     GERD (gastroesophageal reflux disease)     History of recurrent UTIs     on macrobid    Hyperlipidemia     Leg edema     OAB (overactive bladder)     Osteopenia after menopause     PONV (postoperative nausea and vomiting)     TIA (transient ischemic attack) 10/2022    Questionable: while on vacation in late 2022, she had new onset R eye blurriness and fatigue that was persistent on and off for 1-2 wks; Brain scans were negative. On Aspirin and Statin.   [2]   Past Surgical History:  Procedure Laterality Date    CATARACT EXTRACTION       SECTION, CLASSIC      COLONOSCOPY      COSMETIC SURGERY      DENTAL SURGERY      FACIAL COSMETIC SURGERY      face lift    MR HEAD ANGIO WO IV CONTRAST  2022    MR HEAD ANGIO WO IV CONTRAST 2022 [facility]    MR NECK ANGIO WO IV CONTRAST  2022    MR NECK ANGIO WO IV CONTRAST 2022 [facility]    OTHER SURGICAL HISTORY      RHINOPLASTY      SALIVARY GLAND SURGERY  2023    Left lower lip excision:Left lower lip, excision: - Mucocele and minor salivary glands exhibiting chronic sialadenitis.    TONSILLECTOMY      TOTAL ABDOMINAL HYSTERECTOMY W/ BILATERAL SALPINGOOPHORECTOMY      TOTAL KNEE ARTHROPLASTY Left 2024   [3]   Family  History  Problem Relation Name Age of Onset    Other (stroke) Mother Madyson REYES Julius Quiles 72    Leukemia Father Boubacar Fletcher sr     Alcohol abuse Father Boubacar Fletcher sr     Asthma Father Boubacar Fletcher sr     COPD Father Boubacar Fletcher sr     Depression Father Boubacar Fletcher sr     Polycythemia Father Boubacar Fletcher sr     Other (htn) Sister      Lung disease Brother boubacar     [place] Paternal Grandmother      Depression Other FAMILY HISTORY     COPD Other FAMILY HISTORY     Asthma Other FAMILY HISTORY     Diabetes type II Other FAMILY HISTORY    [4]   Allergies  Allergen Reactions    Adhesive Tape-Silicones Other     Blisters, chin itching

## 2025-05-08 NOTE — TELEPHONE ENCOUNTER
[facility] Medicine Services  DISCHARGE SUMMARY    Patient Name: Blane Dietz  : 1938  MRN: [id number]    Date of Admission: 2022  4:27 PM  Date of Discharge:  2022  Primary Care Physician: David Em MD    Consults     Date and Time Order Name Status Description    6/3/2022  1:10 AM Inpatient Cardiology Consult Completed           Hospital Course     Presenting Problem:   Acute on chronic congestive heart failure, unspecified heart failure type (HCC) [I50.9]    Active Hospital Problems    Diagnosis  POA   • **Acute on chronic respiratory failure with hypoxia (HCC) [J96.21]  Unknown   • Elevated troponin [R77.8]  Unknown   • Cirrhosis of liver (HCC) [K74.60]  Unknown   • Elevated transaminase level [R74.01]  Unknown   • Pleural effusion, bilateral [J90]  Unknown   • Normocytic anemia [D64.9]  Unknown   • History of pulmonary embolism [Z86.711]  Unknown   • Acute on chronic congestive heart failure, unspecified heart failure type (HCC) [I50.9]  Yes   • Diffuse large B-cell lymphoma of solid organ excluding spleen (HCC) [C83.39]  Yes   • s/p right nepheroureterectomy and adrenalectomy  [E89.6]  Not Applicable   • Elevated LFTs [R79.89]  Yes   • HLD (hyperlipidemia) [E78.5]  Yes   • S/P AVR [Z95.2]  Not Applicable   • Sleep apnea [G47.30]  Yes   • Aortic stenosis [I35.0]  Yes   • Prostate cancer (HCC) [C61]  Yes   • Essential hypertension [I10]  Yes   • Uncontrolled type 2 diabetes mellitus with hyperglycemia (HCC) [E11.65]  Yes      Resolved Hospital Problems   No resolved problems to display.          Hospital Course:  Blane Dietz is a 83 y.o. 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  All questions were answered at the time of the call, but please feel free to reach out to me via MyChart or phone, [phone], with any new questions or concerns.       We confirmed that you opted to enroll in our [id number] program so your discharge prescriptions will be available to take home at the time of discharge.  Please bring any prescription insurance coverage with you on the morning of surgery so that we can enter the information into our system.     We confirmed that your plan would be to Stay Overnight.    We confirmed that you have DME needed for recovery.     Use the provided body wash for 4 days before surgery and complete a 5th shower on the morning of surgery, this includes your body and hair.  Follow the directions as provided during preadmission testing.  The mouth wash will be used the night before and the morning of surgery.       As a reminder, if you do not hear from our team, please call [phone] between 2pm and 3pm the business day before your surgery to confirm your arrival time and details.        Please don't hesitate to reach out with additional questions or concerns.    Mary Beth Vinson MBA, BSN, RN-BC, ONC  LAURA Thornton, RN  Katie Shah, RN  Orthopedic Program Navigators  [facility]  [phone]    shortness of breath and was admitted for CHF exacerbation.     Acute on chronic hypoxic respiratory failure  Acute exacerbation of diastolic CHF  Bilateral pleural effusions  Aortic stenosis s/p ROSS procedure  Elevated troponin  HUSSAIN on CPAP  --Likely due to medication non-adherence  --S/P IV diuresis  --Cardiology followed, transitioned to PO lasix, started entresto. Held this am due to hypotension  --Continue home jardiance  -- Cr stable on last check, refuses repeat labs today     Elevated transaminase levels  Cirrhosis  Liver lesion  -Imaging concerning for cirrhosis with lesion in left lobe of liver  --Will need additional imaging with MRI or CT liver mass protocol as an outpatient  --Outpatient GI follow up-will place referral at discharge.  Discussed with patient.     History of pulmonary embolism  -Completed 1 year of Eliquis therapy.  Per Dr. Ramon's last note, patient was instructed to discontinue Eliquis in March.    -No PE on CTA  -Venous duplex negative     Diffuse large B-cell lymphoma of the right kidney  Prostate cancer s/p prostatectomy  -S/P right nephrectomy, R-CHOP 3 cycles  -Follows with Dr. Ramon outpatient     Diabetes mellitus type 2  -Has not been taking insulin for over 10 days.  -Contininue Levemir 20 units nightly with moderate SSI, jardiance  -Last A1c was 8.8%      Discharge Follow Up Recommendations for outpatient labs/diagnostics:   Follow up with PCP within one week  Outpatient referral to GI  Follow up with GI/Dr. Toussaint as per his instructions    Day of Discharge     HPI:   Pt doing okay this am, working with PT. Pt hypotensive this am, so cardiac meds were held.    Review of Systems  Gen- No fevers, chills  CV- No chest pain, palpitations  Resp- No cough, dyspnea  GI- No N/V/D, abd pain  Vital Signs:   Temp:  [96.6 °F (35.9 °C)-98.5 °F (36.9 °C)] 96.6 °F (35.9 °C)  Heart Rate:  [60-88] 74  Resp:  [16-22] 18  BP: ()/(50-89) 119/89  Flow (L/min):  [2] 2      Physical  Exam:  Constitutional: No acute distress, awake, alert  HENT: NCAT, mucous membranes moist  Respiratory: Respiratory effort normal on nasal cannula  Cardiovascular: RRR  Musculoskeletal: Trace bilateral ankle edema  Psychiatric: Appropriate affect, cooperative  Neurologic: Speech clear and fluent, confused at times (doesn't remember that we met yesterday)  Skin: No rashes on exposed surfaces    Pertinent  and/or Most Recent Results     LAB RESULTS:      Lab 06/03/22  1510 06/02/22  1703   WBC 11.63* 9.69   HEMOGLOBIN 12.2* 12.9*   HEMATOCRIT 39.8 41.3   PLATELETS 210 188   NEUTROS ABS 8.89* 7.05*   IMMATURE GRANS (ABS) 0.17* 0.22*   LYMPHS ABS 1.22 1.30   MONOS ABS 1.08* 0.70   EOS ABS 0.09 0.21   MCV 87.9 87.3   CRP  --  <0.30   PROCALCITONIN  --  0.11   LACTATE  --  1.6   LDH  --  304*         Lab 06/06/22  0801 06/05/22  2351 06/05/22  0854 06/04/22  0801 06/03/22  1510 06/02/22  1703   SODIUM 142  --  145 142 141 140   POTASSIUM 3.8  --  3.8 4.1 4.2 4.2   CHLORIDE 101  --  101 99 101 102   CO2 31.0*  --  35.0* 31.0* 30.0* 27.0   ANION GAP 10.0  --  9.0 12.0 10.0 11.0   BUN 42*  --  36* 28* 21 14   CREATININE 1.40*  --  1.41* 1.36* 1.28* 1.15   EGFR 49.6*  --  49.1* 51.3* 55.2* 62.8   GLUCOSE 94  --  147* 138* 127* 277*   CALCIUM 9.6  --  9.5 9.1 10.1 9.2   MAGNESIUM  --  2.1  --   --  2.0  --    HEMOGLOBIN A1C  --   --   --   --  9.10*  --    TSH  --   --   --   --  1.580  --          Lab 06/02/22  1703   TOTAL PROTEIN 7.3   ALBUMIN 4.20   GLOBULIN 3.1   ALT (SGPT) 48*   AST (SGOT) 48*   BILIRUBIN 1.2   ALK PHOS 227*         Lab 06/03/22  1510 06/02/22  2131 06/02/22  1703   PROBNP  --   --  5,664.0*   TROPONIN T 0.052* 0.047* 0.041*         Lab 06/03/22  1510   CHOLESTEROL 169   LDL CHOL 78   HDL CHOL 74*   TRIGLYCERIDES 97         Lab 06/02/22  1703   FERRITIN 167.30         Lab 06/02/22  2351   PH, ARTERIAL 7.359   PCO2, ARTERIAL 46.0*   PO2 ART 87.7   FIO2 36   HCO3 ART 25.9   BASE EXCESS ART 0.0    CARBOXYHEMOGLOBIN 0.9     Brief Urine Lab Results  (Last result in the past 365 days)      Color   Clarity   Blood   Leuk Est   Nitrite   Protein   CREAT   Urine HCG        12/06/21 0908 Yellow   Clear   Negative   Negative   Negative   Negative               Microbiology Results (last 10 days)     Procedure Component Value - Date/Time    COVID PRE-OP / PRE-PROCEDURE SCREENING ORDER (NO ISOLATION) - Swab, Nasopharynx [[id number]]  (Normal) Collected: 06/02/22 1650    Lab Status: Final result Specimen: Swab from Nasopharynx Updated: 06/02/22 1732    Narrative:      The following orders were created for panel order COVID PRE-OP / PRE-PROCEDURE SCREENING ORDER (NO ISOLATION) - Swab, Nasopharynx.  Procedure                               Abnormality         Status                     ---------                               -----------         ------                     COVID-19 and FLU A/B PCR...[[id number]]  Normal              Final result                 Please view results for these tests on the individual orders.    COVID-19 and FLU A/B PCR - Swab, Nasopharynx [[id number]]  (Normal) Collected: 06/02/22 1650    Lab Status: Final result Specimen: Swab from Nasopharynx Updated: 06/02/22 1732     COVID19 Not Detected     Influenza A PCR Not Detected     Influenza B PCR Not Detected    Narrative:      Fact sheet for providers: https://www.fda.gov/media/215692/download    Fact sheet for patients: https://www.fda.gov/media/333199/download    Test performed by PCR.          Adult Transthoracic Echo Complete w/ Color, Spectral and Contrast if necessary per protocol    Result Date: 6/3/2022  · There is a left pleural effusion. There is a right pleural effusion. · Left ventricular wall thickness is consistent with hypertrophy. · The right ventricular cavity is mildly dilated. · Left atrial volume is mildly increased. · The right atrial cavity is dilated. · There is a bioprosthetic pulmonic valve present. · There is a  bioprosthetic aortic valve present. · Peak velocity of the flow distal to the aortic valve is 105.1 cm/s. Aortic valve mean pressure gradient is 3 mmHg. · Aortic valve area is 2.9 cm2. · Moderate aortic valve regurgitation is present. · Moderate tricuspid valve regurgitation is present. · Estimated right ventricular systolic pressure from tricuspid regurgitation is moderately elevated (45-55 mmHg). Calculated right ventricular systolic pressure from tricuspid regurgitation is 52 mmHg. · Ejection fraction is low normal      US Liver    Result Date: 6/3/2022  DATE OF EXAM: 6/3/2022 9:58 AM  PROCEDURE: US LIVER-  INDICATIONS: Diabetes mellitus, heart failure, evaluate for hepatic cirrhosis.  COMPARISON: CT of the abdomen performed on 3/29/2021.  TECHNIQUE: Grayscale and color Doppler ultrasound evaluation of the limited abdomen was performed.  FINDINGS: There is increased hepatic echogenicity. The liver has a nodular contour. This is consistent with hepatic cirrhosis. There is a questionable hypoechoic lesion in the posterior aspect of the left lobe of the liver. It measures 1.6 x 1.6 x 1.2 cm. I would recommend further evaluation with a MRI of the abdomen with and without contrast versus a CT the abdomen with and without contrast. We are unable to perform our last the has the patient had labored breathing. There is normal directional flow in the main portal vein. The hepatic veins are patent demonstrating normal hepatofugal flow. The gallbladder is within range of normal. There are no echogenic shadowing stones. The gallbladder wall thickness is normal. The common bile duct measures 4 mm. The right kidney is now surgically absent. The pancreas was not seen well.       1. Hepatic cirrhosis. We were unable to perform a last artery feed due to labored breathing. 2. Questionable hypoechoic lesion in the posterior aspect of the left lobe of the liver measuring 1.6 x 1.6 x 1.2 cm. If the patient can tolerate a breath-hold,  I would recommend an MRI of the abdomen with and without contrast. If the patient cannot tolerate a breath-hold, a CT of the abdomen with and without contrast with delayed imaging through the liver would also be of value. 3. Interval right nephrectomy. 4. The gallbladder and common bile duct are normal.  This report was finalized on 6/3/2022 2:20 PM by Brad Denis MD.      XR Chest 1 View    Result Date: 6/2/2022  XR CHEST 1 VW-  Date of Exam: 6/2/2022 4:44 PM  Indication: SOA triage protocol.  Comparison:?12/6/2021  Technique:?A single view of the chest was obtained.  FINDINGS:  ?Patient status post median sternotomy.  Cardiomegaly is stable. Pulmonary vessels are indistinct consistent with pulmonary vascular congestion.  There is hazy perihilar and bibasilar airspace disease favored to be due to pulmonary edema.  There are new small bilateral pleural effusions.          1.  Cardiomegaly with pulmonary vascular congestion and bibasilar and perihilar airspace disease favored to be due to pulmonary edema. 2.  Small bilateral pleural effusions.   This report was finalized on 6/2/2022 5:05 PM by Long Ayala MD.      Duplex Venous Lower Extremity - Left CAR    Result Date: 6/6/2022  · Normal left lower extremity venous duplex scan.      CT Angiogram Chest    Result Date: 6/2/2022  DATE OF EXAM: 6/2/2022 6:33 PM  PROCEDURE: CT ANGIOGRAM CHEST-  INDICATIONS: SOA w/ hx PE and noncompliant with eliquis  COMPARISON: 03/28/2021  TECHNIQUE: Contiguous axial imaging was obtained from the thoracic inlet through the upper abdomen following the intravenous administration of 80 mL of Isovue 370. Reconstructed coronal and sagittal images were also obtained. Automated exposure control and iterative reconstruction methods were used.  The radiation dose reduction device was turned on for each scan per the ALARA (As Low as Reasonably Achievable) protocol.  FINDINGS: Pulmonary arteries: Adequate opacified. Some distortion of  peripheral pulmonary arteries due to motion. Within that limitation, no acute emboli demonstrated. Mural calcification of the pulmonary trunk  Mediastinum: Aortic valve and coronary calcifications. No pericardial effusion. No mediastinal adenopathy. Thoracic aorta normal in caliber  Lungs/pleura: Moderate bilateral pleural effusions with secondary atelectasis in the lower lobes. Mild interlobular septal thickening and peribronchovascular interstitial thickening. Scattered foci of atelectasis in the remaining lungs  Upper abdomen: Nodular contour of the liver  Bones/soft tissues: No acute bony abnormality        1. No evidence of acute pulmonary embolism. Mural calcination's of the pulmonary trunk may indicate pulmonary arterial hypertension 2. Findings compatible with interstitial edema and moderate bilateral pleural effusions 3. Cirrhotic morphology of the liver  This report was finalized on 6/2/2022 7:01 PM by Francisco Hummel.        Results for orders placed during the hospital encounter of 06/02/22    Duplex Venous Lower Extremity - Left CAR    Interpretation Summary  · Normal left lower extremity venous duplex scan.      Results for orders placed during the hospital encounter of 06/02/22    Duplex Venous Lower Extremity - Left CAR    Interpretation Summary  · Normal left lower extremity venous duplex scan.      Results for orders placed during the hospital encounter of 06/02/22    Adult Transthoracic Echo Complete w/ Color, Spectral and Contrast if necessary per protocol    Interpretation Summary  · There is a left pleural effusion. There is a right pleural effusion.  · Left ventricular wall thickness is consistent with hypertrophy.  · The right ventricular cavity is mildly dilated.  · Left atrial volume is mildly increased.  · The right atrial cavity is dilated.  · There is a bioprosthetic pulmonic valve present.  · There is a bioprosthetic aortic valve present.  · Peak velocity of the flow distal to the aortic  valve is 105.1 cm/s. Aortic valve mean pressure gradient is 3 mmHg.  · Aortic valve area is 2.9 cm2.  · Moderate aortic valve regurgitation is present.  · Moderate tricuspid valve regurgitation is present.  · Estimated right ventricular systolic pressure from tricuspid regurgitation is moderately elevated (45-55 mmHg). Calculated right ventricular systolic pressure from tricuspid regurgitation is 52 mmHg.  · Ejection fraction is low normal      Plan for Follow-up of Pending Labs/Results:     Discharge Details        Discharge Medications      New Medications      Instructions Start Date   furosemide 40 MG tablet  Commonly known as: LASIX   40 mg, Oral, Daily   Start Date: June 8, 2022     sacubitril-valsartan 24-26 MG tablet  Commonly known as: ENTRESTO   1 tablet, Oral, Every 12 Hours Scheduled         Changes to Medications      Instructions Start Date   allopurinol 300 MG tablet  Commonly known as: ZYLOPRIM  What changed: additional instructions   TAKE 1 TABLET BY MOUTH ONE TIME A DAY      fluticasone 50 MCG/ACT nasal spray  Commonly known as: Flonase  What changed:   · when to take this  · reasons to take this  · additional instructions   2 sprays, Nasal, Daily, 2 puffs each nostril      Lantus 100 UNIT/ML injection  Generic drug: insulin glargine  What changed:   · how much to take  · how to take this  · when to take this   Up to 50 units daily      metFORMIN  MG 24 hr tablet  Commonly known as: GLUCOPHAGE-XR  What changed: when to take this   500 mg, Oral, 2 Times Daily      pravastatin 40 MG tablet  Commonly known as: PRAVACHOL  What changed: when to take this   TAKE ONE TABLET BY MOUTH EVERY NIGHT AT BEDTIME      Trulicity 1.5 MG/0.5ML solution pen-injector  Generic drug: Dulaglutide  What changed: additional instructions   1.5 mg, Subcutaneous, Weekly, NO FURTHER REFILLS WITHOUT APPT         Continue These Medications      Instructions Start Date   Cinnamon 500 MG tablet   2,000 tablets, Oral, Daily       Coenzyme Q10 100 MG tablet   2 tablets, Oral, Daily      docusate sodium 100 MG capsule  Commonly known as: Colace   100 mg, Oral, Daily PRN      Jardiance 10 MG tablet tablet  Generic drug: empagliflozin   10 mg, Oral, Daily, Appointment needed for additional refills      l-methylfolate-algae-B6-B12 3-90.314-2-35 MG capsule capsule   TAKE 1 CAPSULE BY MOUTH TWICE A DAY.      LECITHIN PO   1,200 mg, Oral, Daily      leuprolide 22.5 MG injection  Commonly known as: LUPRON   22.5 mg, Intramuscular, Every 3 Months, Next injection due on 6/22/2022      metoprolol succinate  MG 24 hr tablet  Commonly known as: TOPROL-XL   TAKE 1 TABLET BY MOUTH EVERY DAY       ondansetron 8 MG tablet  Commonly known as: ZOFRAN   8 mg, Oral, 3 Times Daily PRN      Vitamin D3 50 MCG (2000 UT) capsule   2,000 Units, Oral, 2 Times Daily         Stop These Medications    apixaban 5 MG tablet tablet  Commonly known as: ELIQUIS     losartan-hydrochlorothiazide 100-25 MG per tablet  Commonly known as: HYZAAR            Allergies   Allergen Reactions   • Ampicillin Anaphylaxis   • Medrol [Methylprednisolone] Unknown - High Severity     Made his blood sugar get really high, can't take this   • Myrbetriq [Mirabegron] Unknown - High Severity     High BP   • Penicillins Anaphylaxis and Shortness Of Breath   • Bee Venom Swelling         Discharge Disposition:  Home or Self Care    Diet:  Hospital:  Diet Order   Procedures   • Diet Regular; Cardiac, Consistent Carbohydrate       Activity:      Restrictions or Other Recommendations:         CODE STATUS:    Code Status and Medical Interventions:   Ordered at: 06/03/22 0004     Level Of Support Discussed With:    Patient     Code Status (Patient has no pulse and is not breathing):    CPR (Attempt to Resuscitate)     Medical Interventions (Patient has pulse or is breathing):    Full Support       Future Appointments   Date Time Provider Department Center   6/22/2022  1:15 PM Shannan Ramon MD [facility]  [facility]   6/22/2022  1:30 PM CHAIR 2 -  F[facility] OP INF [facility]   9/14/2022  1:30 PM CHAIR 4 - [facility]   10/12/2022  2:45 PM Xavier Boston MD MGE END [facility]       Additional Instructions for the Follow-ups that You Need to Schedule     Ambulatory Referral to Home Health   As directed      Face to Face Visit Date: 6/7/2022    Follow-up provider for Plan of Care?: I will be treating the patient on an ongoing basis.  Please send me the Plan of Care for signature.    Follow-up provider: DAVID CHAUDHARY [[id number]]    Reason/Clinical Findings: Respiratory failure    Describe mobility limitations that make leaving home difficult: Impaired mobility    Nursing/Therapeutic Services Requested: Physical Therapy Occupational Therapy    PT orders: Therapeutic exercise Gait Training Strengthening Home safety assessment    Weight Bearing Status: Full Weight Bearing    Occupational orders: Activities of daily living Energy conservation Strengthening Home safety assessment    Frequency: 1 Week 1         Discharge Follow-up with PCP   As directed       Currently Documented PCP:    David Chaudhary MD    PCP Phone Number:    [phone]     Follow Up Details: in one week with PCP                     Ada Gautam MD  06/07/22      Time Spent on Discharge:  I spent  35  minutes on this discharge activity which included: face-to-face encounter with the patient, reviewing the data in the system, coordination of the care with the nursing staff as well as consultants, documentation, and entering orders.

## 2025-05-10 LAB — STAPHYLOCOCCUS SPEC CULT: NORMAL

## 2025-05-10 NOTE — PROGRESS NOTES
Physical Therapy  Physical Therapy Treatment Note    Patient Name: Zarina Holliday  MRN: [id number]  Today's Date: 2024  Time Calculation  Start Time: 745  Stop Time: 825  Time Calculation (min): 40 min    Insurance:  Visit number:  [place]  Authorization info: no auth   Insurance Type:  Medicare and  for Life   Medicare Certification Period: Beginnin24 Endin24  Onset date: 24     General:  Reason for visit: S/P L TKA  Referred by: Moses Schneider MD    Current Problem  1. Unilateral primary osteoarthritis, left knee  Follow Up In Physical Therapy          Precautions: STEADI Fall Risk Score (The score of 4 or more indicates an increased risk of falling): 5     Subjective:   Patient reports feeling much since taking 2 days off of exercises per clinician request and that they feel the only thing hurting is the front of the uninvolved LE.     Pain  Pain Assessment: 0-10  Pain Score: 2    Performing HEP?: Yes,        Objective:        ROM     Knee AROM (Degrees)                                         (R)                    (L)  Flexion:                        142                 122 post treatment                                           Extension:                    0                      -1                    Treatment Performed:    Bike 5' lvl 2.2  PA tibial mobilizations bilateral knees 10'  Calf stretch rocker board 1'x2  Hamstring stretch on stairs 1'x2  Squats airex pad 3x10  DBE 2x2'   Standing hip abduction 2x10 each way.  BOSU ball lunges  2x10   Heel slides x15  HR x10      Charges: TE x2 Manual x1     Assessment:   Pt tolerated treatment session well and is probably ready to attempt weighted machines again in next visit due to their tolerance in this treatment session. Pt reported and displayed difficulty with DBE machine.     Plan:  Attempt leg press and hamstring curl in next visit with lower weights than the previously attempted session.     Nilo Rg, PTA    0 (no pain/absence of nonverbal indicators of pain)

## 2025-05-13 PROBLEM — Z96.651 HISTORY OF RIGHT KNEE JOINT REPLACEMENT: Status: ACTIVE | Noted: 2025-05-13

## 2025-05-13 RX ORDER — PANTOPRAZOLE SODIUM 40 MG/1
40 TABLET, DELAYED RELEASE ORAL DAILY
Qty: 30 TABLET | Refills: 0 | Status: SHIPPED | OUTPATIENT
Start: 2025-05-13 | End: 2025-05-14 | Stop reason: HOSPADM

## 2025-05-13 RX ORDER — ASPIRIN 81 MG/1
81 TABLET ORAL 2 TIMES DAILY
Qty: 60 TABLET | Refills: 0 | Status: SHIPPED | OUTPATIENT
Start: 2025-05-13 | End: 2025-05-14 | Stop reason: HOSPADM

## 2025-05-13 RX ORDER — DOCUSATE SODIUM 100 MG/1
100 CAPSULE, LIQUID FILLED ORAL 2 TIMES DAILY
Qty: 60 CAPSULE | Refills: 0 | Status: SHIPPED | OUTPATIENT
Start: 2025-05-13 | End: 2025-05-14 | Stop reason: HOSPADM

## 2025-05-13 RX ORDER — ACETAMINOPHEN 500 MG
1000 TABLET ORAL EVERY 6 HOURS PRN
Qty: 240 TABLET | Refills: 0 | Status: SHIPPED | OUTPATIENT
Start: 2025-05-13 | End: 2025-05-14 | Stop reason: HOSPADM

## 2025-05-13 RX ORDER — TRAMADOL HYDROCHLORIDE 50 MG/1
50 TABLET ORAL EVERY 6 HOURS PRN
Qty: 28 TABLET | Refills: 0 | Status: SHIPPED | OUTPATIENT
Start: 2025-05-13 | End: 2025-05-14 | Stop reason: HOSPADM

## 2025-05-13 RX ORDER — MELOXICAM 15 MG/1
15 TABLET ORAL DAILY
Qty: 30 TABLET | Refills: 0 | Status: SHIPPED | OUTPATIENT
Start: 2025-05-13 | End: 2025-05-14 | Stop reason: HOSPADM

## 2025-05-13 RX ORDER — OXYCODONE HYDROCHLORIDE 5 MG/1
5 TABLET ORAL EVERY 6 HOURS PRN
Qty: 28 TABLET | Refills: 0 | Status: SHIPPED | OUTPATIENT
Start: 2025-05-13 | End: 2025-05-14 | Stop reason: HOSPADM

## 2025-05-13 RX ORDER — POLYETHYLENE GLYCOL 3350 17 G/17G
17 POWDER, FOR SOLUTION ORAL DAILY
Qty: 238 G | Refills: 0 | Status: SHIPPED | OUTPATIENT
Start: 2025-05-13 | End: 2025-05-14 | Stop reason: HOSPADM

## 2025-05-13 NOTE — DISCHARGE INSTRUCTIONS
MD Criss Sims MPAS, SHIRAZ, ATC  Adult Reconstruction and Joint Replacement Surgery  Phone: [phone]     Fax: [phone]        DISCHARGE INSTRUCTIONS      PLEASE READ CAREFULLY BEFORE CONTACTING YOUR PROVIDER.    WE WORK COLLABORATIVELY AS A TEAM. CALLING MULTIPLE STAFF MEMBERS REGARDING THE SAME ISSUE WILL DELAY YOUR CARE.    [employer] IS THE PREFERRED COMMUNICATION FOR ALL TEAM MEMBERS.    POSTOPERATIVE INSTRUCTIONS: TOTAL HIP & TOTAL KNEE ARTHROPLASTY    JOINT CARE TEAM  Please use the information below to contact your care team following surgery.  If you are leaving a message or using the [facility] Chart portal, please include your full name, date of birth and date of surgery so that we can correctly identify you.  Your call will be returned within 1-2 business days, please do not leave multiple messages with other staff regarding a single issue while you are awaiting a return call.     Who to call Contact Information Matters needing handled   Criss Rod PA-C  Physician Assistant [email or web address] Portal   Medical questions/concerns       Fabi Bain-    [email or web address]           [phone]  opt. 2    [phone] fax  [phone]         Scheduling office Visits  Medical questions/concerns  Leave of Absence or other paperwork  Any concerns more than 6 weeks from surgery - an appointment will need to be made    After Hour and Weekend Emergency Answering Service [phone]     Mary Beth Vinson MBA, BSN, RN-BC, ONC  LAURA Thornton, RN  Ortho Nurse Navigators [place]        __________________________________    Yfn Allan RN, BSN  Ortho Coordinator [facility] MITCHELLN-BC  Ortho Nurse Navigator [facility]       [phone]     _____________________    [phone] Please call staff at the institution in which you had surgery for prescription refills        Prescription Refills  Nursing, medical question related  questions or concerns within 6 weeks of surgery   Orders for Outpatient Physical Therapy             MEDICATION REFILLS - MyChart or Nurse Navigator (Above) at the institution in which you had surgery. [facility] or [facility].    -You will NOT receive a call indicating that your prescription has been filled.  Please contact your pharmacy with any questions.    Medication refills will be filled Monday-Friday 7am to 1pm ONLY. Please call the nurse navigator office or send a [employer] message for a refill request.  Any requests received outside of this timeframe will be handled on the next business day.  Please do not call multiple times or call other members of the care team for medication needs, this will cause the refill to take longer.    Per State and Institutional policy, pain medications can only be refilled every 7 days for up to six weeks following surgery.    My Chart Portal: If you are using the My Chart portal and are requesting a medication refill, please list what type of surgery you had and left or right side, medication that needs refilled, and pharmacy you would like your medication sent.     WEIGHT BEARING- weight bearing as tolerated to operative extremity     ACTIVITY-As Tolerated    DRIVING & TRAVEL AFTER SURGERY   Patients should anticipate waiting at least 4-6 weeks before traveling long distances after surgery.  You will need to stop to walk around ever 1 hour during your travel to help with blood clot prevention.    Patients may not drive until cleared by the joint nurse or the office and you are off of all narcotics.    DENTAL PROCEDURES & CLEANINGS  You must wait a minimum of 3 months for elective dental appointments after a total joint replacement, including routine cleanings or dental work including bridges, crowns, extractions, etc.. Unless, it is an emergency. You will need a prophylactic antibiotic lifelong prior to any dental visit, cleaning or procedure. Your surgeon's office or your  dentist may provide a prescription antibiotic. Antibiotics are a lifelong need before dental appointments.      You do not need antibiotics for endoscopic procedures such as colonoscopy or EGD, dermatologic biopsies or eye surgeries.    WOUND CARE  If you experience continued drainage or bleeding, you may cover with abdominal/Maxi pads (purchase at local drug store).  Knee replacements should wrap with an ace wrap.  You may shower with waterproof dressing on. Your surgical bandage will be removed by your home therapist 1 week after surgery. If you have staples intact, home care will remove in 2 weeks. If you have sutures intact, you will need to return to the office in 2 weeks for suture removal. Once the dressing is removed by home care, you may continue to shower. Let soap and water wash over the wound. DO NOT SCRUB.  Steri-strips under the bandage will remain in place until they fall off on their own.  If they are loose, you may gently remove.  If they have not fallen off in two weeks, gently peel them off. Do not remove if pulling causes resistance against the incision.  You will see suture tails sticking out of the ends of the incision.  DO NOT CUT THEM.  They will fall off when the sutures dissolve.  If they are bothersome, cover with a band aid.  Do not soak in a bath tub, hot tub, pool or lake until you are 8 weeks out from surgery.  Do not apply lotions, creams or ointments until you are 6 weeks out from surgery,    PAIN, SWELLING, BRUISING & CLICKING  Pain and swelling are a natural part of your recovery which is considered normal for up to a year after surgery.  Symptoms may be treated with movement, ice, compression stockings, elevating your leg, and by following the pain medication regimen as prescribed.  Bruising is normal for several weeks after surgery. You may also have leg swelling and pain in your shin.  You may ice areas that are tender to help with discomfort.  You are required to wear the  provided compression stockings, every day, for 4 weeks following surgery.  Remove the stockings at night and place them back on in the morning.  Pain and swelling may temporarily increase with an increase in activity or exercise.  Use ice after activity.  Audible clicking with movement or exercises is considered normal following joint replacement.  If this persists at 6 months or 1 year, please notify your surgeon.  You may also feel decreased sensation or numbness near the incision site.  This is normal and sensation may or may not return.    PERSONAL HYGIENE  You may shower upon discharging from the hospital.  Soap and water is permitted to run over the surgical dressing, steri-strips and incision.  Do not scrub directly over these items.  DO NOT soak your incision in a bath, hot tub, pool or pond/lake for a minimum of 8 weeks following your surgery.  DO NOT use lotions, creams, ointments on your wound for a minimum of 6 weeks following your surgery. At that time you may use vitamin E to assist with softening of your incision.      RESTARTING HOME ROUTINE - DIET & MEDICATIONS  Post-operative constipation can result due to a combination of inactivity, anesthesia and pain medication. To help prevent this, you should increase your water and fiber intake. Physical activity such as walking will also help stimulate the bowels.   You may resume your normal diet when you discharge home.    To avoid constipation, choose foods that help promote good bowel habits, such as foods high in fiber.  You may restart your home medications the following day after your surgery UNLESS you have been given alternate instructions.  Follow the instructions given to you on your hospital discharge instructions for more information regarding your home medications.  IF YOU EXPERIENCE NAUSEA OR DIARRHEA, FOLLOW THE B.R.A.T. DIET UNTIL SYMPTOMS RESOLVE.  If you are experiencing vomiting that lasts more than 24 hours, please contact your joint  nurse.      IN-HOME PHYSICAL THERAPY & OUTPATIENT PHYSICAL THERAPY  In-home physical therapy will start 1-2 days after you get home from the hospital.    The home care agency will call within the first 24-48 hours to set up their first visit.  Please do not call your care team to inquire during this timeframe.  Continue the exercises you were given in the hospital until you have been seen by in-home therapy.  Make sure to provide a phone number with the ability for the home care staff to leave a message if you do not answer your phone.    Outpatient physical therapy following knee replacement surgery should begin 2-3 weeks after surgery.  You will be given physical therapy order prior to discharge from the hospital. You should call to schedule this appointment ASAP if not already scheduled before surgery.  Waiting until you are ready for outpatient physical therapy will cause a delay in your care.  You may choose any outpatient physical therapy location.      EMERGENCIES - WHEN TO CONTACT THE SURGEON'S OFFICE IMMEDIATELY  Fever >101 with chills that has been present for at least 48 hours.   Excessive bleeding from incision that will not slow down. A small amount of drainage is normal and expected.  Once pressure is applied and the area is covered, do not continue to check the area regularly.  This will remove pressure and bleeding will continue.  Leave in place for 4-6 hours.  Signs of infection of incision-excessive drainage that is soaking through your dressing (especially if it is pus-like), redness that is spreading out from the edges of your incision, or increased warmth around the area.  Excruciating pain for which the pain medication, taken as instructed, is not helping.  Severe calf pain.  Go directly to the emergency room or call 911, if you are experiencing chest pain or difficulty breathing.    After Hour and Weekend Emergency Answering Service [phone]    ICE & COLD THERAPY INSTRUCTIONS    To assist  with pain control and post-op swelling, you should be using ice regularly throughout recovery, especially for the first 6 weeks, regardless of the cold therapy method you use.      Always make sure there is a layer of protection between the cold pad and your skin.    If you are using ICE PACKS or GEL PACKS, you will need to alternate 20 minutes on, 20 minutes off twice per hour.    If you are using an ICE MACHINE, please follow the provided ice machine instructions.  These devices differ from ice or ice packs whereas the mechanism circulates water through tubing and a pad to provide longer periods of cold therapy to the desired site.  You can use your cold devices around the clock for optimal comfort.  We recommend using cold therapy after working with therapy or completing exercises on your own.  There is no set schedule in which you must follow while using cold therapy.  Below are a few points to remember when using a cold therapy device:    You do not need to need to use the 20 on, 20 off method.  Detach the pad from the cooler and ambulate at least once every hour.  You can check your skin under the pad at this time.  You may wear the cold therapy device during periods of sleep including overnight.  If you wake up during the night, you can check the skin at this time.  You do not need to wake up specifically to perform skin checks.  Empty the cooler and pad when device is not in use.  Follow 's instructions for cleaning your cold therapy device.    DISCHARGE MEDICATIONS - Please reference the sample schedule on the reverse side for instructions on how to best schedule medications.    PAIN MEDICATION    ___X_ Tramadol / Oxycodone  Tramadol and Oxycodone have been prescribed for post-operative pain control.    These medications will only be refilled ONCE every 7 days for a period of up to 6 weeks following surgery.  After 6 weeks, you will transition to acetaminophen and over -the- counter  anti-inflammatories such as Ibuprofen, Advil or Aleve in conjunction with ICE/COLD THERAPY.   Side effects may be constipation and nausea, vomiting, sleepiness, dizziness, lightheadedness, headache, blurred vision, dry mouth sweating, itching (if you have itching, over-the -counter Benadryl can be used as needed).  You may NOT operate a motor vehicle while taking these medications or have been cleared by your care team.     ___X_ Acetaminophen (Tylenol)  Acetaminophen has been prescribed as an adjunct for pain control. Take two 500 mg tablets every 6 hours for 4 weeks. You will not receive a refill on this medication.  Do not exceed 4000mg of acetaminophen within a 24 hour period.  Side effects may include nausea, heartburn, drowsiness, and headache.    ___X_ Meloxicam (Mobic)-Meloxicam has been prescribed as an adjunct anti-inflammatory to assist in pain control.    Take one 15mg tablet once daily for 4 weeks.  You will not receive refills on this medication.   Side effects may include nausea.  May not be prescribed if you are on a more potent blood thinner than aspirin or have chronic kidney disease.    BLOOD THINNER    ___X_ Blood Thinner   A blood thinner has been prescribed to prevent blood clots in your leg or lungs. Take as prescribed on the bottle for 4 weeks. You will not receive a refill on this medication.    ANTI NAUSEA    ___X_ Proton Pump Inhibitor (PPI)-Stomach Acid Reduction Medication  If you are already on a PPI, you will continue your regular medication. If you are not, you will be prescribed Pantoprazole to help with nausea and protect your stomach while taking pain medication.  You will not receive a refill on this medication.    STOOL SOFTENERS    ___X_ Colace (Docusate Sodium) & Miralax (polyethylene glycol)  Take both medications to help with constipation while using the Oxycodone and Tramadol for pain control.  You will not receive a refill on this medication.    Continued Constipation  If  you continue to be constipated despite daily use of Miralax and Colace, you try an over-the-counter Dulcolax Suppository and use per instructions on the package.       SPECIAL INSTRUCTIONS       You will not receive refills on the following medications.   Acetaminophen (Tylenol  Meloxicam  Miralax  Colace  Proton Pump Inhibitor (PPI)  Blood Thinner    Pain Medication Refills - Ortho Nurse Navigator or MyChart- Monday through Friday 7am-1pm    FOLLOW-UP- You should have an appointment with either Dr. Schneider or DEREK Menard in 6 weeks.         SAMPLE              The times below are an example of how to organize medications to optimize pain control  Your actual medication schedule may vary based on your last dose taken IN THE HOSPITAL      Time 3:00 am 6:00 am 9:00 am 12:00 pm 3:00 pm 6:00 pm 9:00 pm 12:00 am   Medications Tramadol Tylenol  Oxycodone  Miralax   Blood Thinner  Colace  Pantoprazole or other PPI  Tramadol  Meloxicam Tylenol  Oxycodone Tramadol Tylenol  Oxycodone  Miralax Blood Thinner  Colace  Tramadol   Tylenol  Oxycodone            You may begin to wean off the pain medication as your pain remains controlled with increased activity.  The schedules provided are meant to serve as an example.  You may wean off based on your pain control.  Please note that pain medications are not filled beyond 6 weeks after surgery.              The times below are an example of how to WEAN OFF medications WHILE CONTINUING TO OPTIMIZE PAIN CONTROL.  Your actual medication schedule may vary based on your last dose taken.    Time 12:00am 4:00am 8:00am 12:00pm 4:00pm 8:00pm   Med Tramadol Oxycodone   Tramadol Oxycodone Tramadol Oxycodone     Time 12:00am 6:00am 12:00pm 6:00pm   Med Tramadol Oxycodone   Tramadol Oxycodone     Time 12:00am 8:00am 4:00pm   Med Tramadol Oxycodone   Tramadol     Time 12:00am 12:00pm   Med Tramadol Tramadol         TOTAL KNEE REPLACEMENT PATIENTS SHOULD TRANSITION TO OUTPATIENT PHYSICAL  THERAPY NO MORE THAN 3 WEEKS FOLLOWING SURGERY.  PLEASE SEE THE LIST OF  FACILITIES BELOW.  CALL TO SCHEDULE YOUR FIRST APPOINTMENT BEFORE YOU HAVE YOUR SURGERY.

## 2025-05-14 ENCOUNTER — PHARMACY VISIT (OUTPATIENT)
Dept: PHARMACY | Facility: CLINIC | Age: 80
End: 2025-05-14
Payer: COMMERCIAL

## 2025-05-14 ENCOUNTER — HOSPITAL ENCOUNTER (OUTPATIENT)
Facility: HOSPITAL | Age: 80
Discharge: HOME | End: 2025-05-15
Attending: ORTHOPAEDIC SURGERY | Admitting: ORTHOPAEDIC SURGERY
Payer: MEDICARE

## 2025-05-14 ENCOUNTER — ANESTHESIA (OUTPATIENT)
Dept: OPERATING ROOM | Facility: HOSPITAL | Age: 80
End: 2025-05-14
Payer: MEDICARE

## 2025-05-14 ENCOUNTER — ANESTHESIA EVENT (OUTPATIENT)
Dept: OPERATING ROOM | Facility: HOSPITAL | Age: 80
End: 2025-05-14
Payer: MEDICARE

## 2025-05-14 ENCOUNTER — APPOINTMENT (OUTPATIENT)
Dept: PHYSICAL THERAPY | Facility: CLINIC | Age: 80
End: 2025-05-14
Payer: MEDICARE

## 2025-05-14 ENCOUNTER — HOME HEALTH ADMISSION (OUTPATIENT)
Dept: HOME HEALTH SERVICES | Facility: HOME HEALTH | Age: 80
End: 2025-05-14
Payer: MEDICARE

## 2025-05-14 DIAGNOSIS — Z47.1 AFTERCARE FOLLOWING LEFT KNEE JOINT REPLACEMENT SURGERY: ICD-10-CM

## 2025-05-14 DIAGNOSIS — M62.838 MUSCLE SPASM: ICD-10-CM

## 2025-05-14 DIAGNOSIS — Z96.652 AFTERCARE FOLLOWING LEFT KNEE JOINT REPLACEMENT SURGERY: ICD-10-CM

## 2025-05-14 DIAGNOSIS — Z96.651 HISTORY OF RIGHT KNEE JOINT REPLACEMENT: ICD-10-CM

## 2025-05-14 DIAGNOSIS — M17.11 UNILATERAL PRIMARY OSTEOARTHRITIS, RIGHT KNEE: Primary | ICD-10-CM

## 2025-05-14 PROBLEM — K21.9 GASTROESOPHAGEAL REFLUX DISEASE: Status: ACTIVE | Noted: 2025-05-14

## 2025-05-14 PROBLEM — E78.5 HYPERLIPIDEMIA: Status: ACTIVE | Noted: 2025-05-14

## 2025-05-14 PROBLEM — I63.9 CVA (CEREBRAL VASCULAR ACCIDENT) (MULTI): Status: ACTIVE | Noted: 2025-05-14

## 2025-05-14 PROCEDURE — 2500000004 HC RX 250 GENERAL PHARMACY W/ HCPCS (ALT 636 FOR OP/ED): Performed by: PHYSICIAN ASSISTANT

## 2025-05-14 PROCEDURE — A27447 PR TOTAL KNEE ARTHROPLASTY: Performed by: STUDENT IN AN ORGANIZED HEALTH CARE EDUCATION/TRAINING PROGRAM

## 2025-05-14 PROCEDURE — 7100000001 HC RECOVERY ROOM TIME - INITIAL BASE CHARGE: Performed by: ORTHOPAEDIC SURGERY

## 2025-05-14 PROCEDURE — 2500000005 HC RX 250 GENERAL PHARMACY W/O HCPCS: Performed by: PHYSICIAN ASSISTANT

## 2025-05-14 PROCEDURE — 3600000010 HC OR TIME - EACH INCREMENTAL 1 MINUTE - PROCEDURE LEVEL FIVE: Performed by: ORTHOPAEDIC SURGERY

## 2025-05-14 PROCEDURE — 99222 1ST HOSP IP/OBS MODERATE 55: CPT | Performed by: EMERGENCY MEDICINE

## 2025-05-14 PROCEDURE — A4649 SURGICAL SUPPLIES: HCPCS | Performed by: ORTHOPAEDIC SURGERY

## 2025-05-14 PROCEDURE — 2500000004 HC RX 250 GENERAL PHARMACY W/ HCPCS (ALT 636 FOR OP/ED): Performed by: ANESTHESIOLOGY

## 2025-05-14 PROCEDURE — 64447 NJX AA&/STRD FEMORAL NRV IMG: CPT | Performed by: ANESTHESIOLOGY

## 2025-05-14 PROCEDURE — 7100000011 HC EXTENDED STAY RECOVERY HOURLY - NURSING UNIT

## 2025-05-14 PROCEDURE — 2500000001 HC RX 250 WO HCPCS SELF ADMINISTERED DRUGS (ALT 637 FOR MEDICARE OP): Performed by: PHYSICIAN ASSISTANT

## 2025-05-14 PROCEDURE — A27447 PR TOTAL KNEE ARTHROPLASTY: Performed by: NURSE ANESTHETIST, CERTIFIED REGISTERED

## 2025-05-14 PROCEDURE — 2500000004 HC RX 250 GENERAL PHARMACY W/ HCPCS (ALT 636 FOR OP/ED): Mod: JZ | Performed by: PHYSICIAN ASSISTANT

## 2025-05-14 PROCEDURE — 2780000003 HC OR 278 NO HCPCS: Performed by: ORTHOPAEDIC SURGERY

## 2025-05-14 PROCEDURE — 3700000001 HC GENERAL ANESTHESIA TIME - INITIAL BASE CHARGE: Performed by: ORTHOPAEDIC SURGERY

## 2025-05-14 PROCEDURE — 7100000002 HC RECOVERY ROOM TIME - EACH INCREMENTAL 1 MINUTE: Performed by: ORTHOPAEDIC SURGERY

## 2025-05-14 PROCEDURE — 99100 ANES PT EXTEME AGE<1 YR&>70: CPT | Performed by: STUDENT IN AN ORGANIZED HEALTH CARE EDUCATION/TRAINING PROGRAM

## 2025-05-14 PROCEDURE — 27447 TOTAL KNEE ARTHROPLASTY: CPT | Performed by: ORTHOPAEDIC SURGERY

## 2025-05-14 PROCEDURE — 2500000004 HC RX 250 GENERAL PHARMACY W/ HCPCS (ALT 636 FOR OP/ED): Mod: JZ | Performed by: ORTHOPAEDIC SURGERY

## 2025-05-14 PROCEDURE — 3600000005 HC OR TIME - INITIAL BASE CHARGE - PROCEDURE LEVEL FIVE: Performed by: ORTHOPAEDIC SURGERY

## 2025-05-14 PROCEDURE — 2500000001 HC RX 250 WO HCPCS SELF ADMINISTERED DRUGS (ALT 637 FOR MEDICARE OP): Performed by: PHYSICAL THERAPIST

## 2025-05-14 PROCEDURE — C1713 ANCHOR/SCREW BN/BN,TIS/BN: HCPCS | Performed by: ORTHOPAEDIC SURGERY

## 2025-05-14 PROCEDURE — 2500000004 HC RX 250 GENERAL PHARMACY W/ HCPCS (ALT 636 FOR OP/ED): Mod: JZ | Performed by: NURSE ANESTHETIST, CERTIFIED REGISTERED

## 2025-05-14 PROCEDURE — 2500000004 HC RX 250 GENERAL PHARMACY W/ HCPCS (ALT 636 FOR OP/ED): Mod: JZ

## 2025-05-14 PROCEDURE — C1776 JOINT DEVICE (IMPLANTABLE): HCPCS | Performed by: ORTHOPAEDIC SURGERY

## 2025-05-14 PROCEDURE — RXMED WILLOW AMBULATORY MEDICATION CHARGE

## 2025-05-14 PROCEDURE — 2720000007 HC OR 272 NO HCPCS: Performed by: ORTHOPAEDIC SURGERY

## 2025-05-14 PROCEDURE — 2500000005 HC RX 250 GENERAL PHARMACY W/O HCPCS: Performed by: NURSE ANESTHETIST, CERTIFIED REGISTERED

## 2025-05-14 PROCEDURE — 2500000004 HC RX 250 GENERAL PHARMACY W/ HCPCS (ALT 636 FOR OP/ED): Performed by: PHYSICAL THERAPIST

## 2025-05-14 PROCEDURE — 3700000002 HC GENERAL ANESTHESIA TIME - EACH INCREMENTAL 1 MINUTE: Performed by: ORTHOPAEDIC SURGERY

## 2025-05-14 DEVICE — ATTUNE KNEE SYSTEM TIBIAL INSERT FIXED BEARING POSTERIOR STABILIZED 5 7MM AOX
Type: IMPLANTABLE DEVICE | Site: KNEE | Status: FUNCTIONAL
Brand: ATTUNE

## 2025-05-14 DEVICE — TIBAL BASE ATTUNE FB, SZ 4 CEM: Type: IMPLANTABLE DEVICE | Site: KNEE | Status: FUNCTIONAL

## 2025-05-14 DEVICE — ATTUNE PATELLA MEDIALIZED DOME 35MM CEMENTED AOX
Type: IMPLANTABLE DEVICE | Site: KNEE | Status: FUNCTIONAL
Brand: ATTUNE

## 2025-05-14 DEVICE — SMARTSET HV HIGH VISCOSITY BONE CEMENT 40G
Type: IMPLANTABLE DEVICE | Site: KNEE | Status: FUNCTIONAL
Brand: SMARTSET

## 2025-05-14 DEVICE — ATTUNE KNEE SYSTEM FEMORAL POSTERIOR STABILIZED NARROW SIZE 5N RIGHT CEMENTED
Type: IMPLANTABLE DEVICE | Site: KNEE | Status: FUNCTIONAL
Brand: ATTUNE

## 2025-05-14 RX ORDER — ACETAMINOPHEN 325 MG/1
650 TABLET ORAL EVERY 4 HOURS PRN
Status: DISCONTINUED | OUTPATIENT
Start: 2025-05-14 | End: 2025-05-14 | Stop reason: HOSPADM

## 2025-05-14 RX ORDER — OXYCODONE HYDROCHLORIDE 5 MG/1
5 TABLET ORAL EVERY 6 HOURS PRN
Status: DISCONTINUED | OUTPATIENT
Start: 2025-05-14 | End: 2025-05-15 | Stop reason: HOSPADM

## 2025-05-14 RX ORDER — OXYCODONE HYDROCHLORIDE 5 MG/1
5 TABLET ORAL EVERY 4 HOURS PRN
Status: DISCONTINUED | OUTPATIENT
Start: 2025-05-14 | End: 2025-05-14 | Stop reason: HOSPADM

## 2025-05-14 RX ORDER — AZELASTINE 1 MG/ML
2 SPRAY, METERED NASAL 2 TIMES DAILY
Status: DISCONTINUED | OUTPATIENT
Start: 2025-05-14 | End: 2025-05-15 | Stop reason: RX

## 2025-05-14 RX ORDER — SODIUM CHLORIDE, SODIUM LACTATE, POTASSIUM CHLORIDE, CALCIUM CHLORIDE 600; 310; 30; 20 MG/100ML; MG/100ML; MG/100ML; MG/100ML
50 INJECTION, SOLUTION INTRAVENOUS CONTINUOUS
Status: DISCONTINUED | OUTPATIENT
Start: 2025-05-14 | End: 2025-05-15 | Stop reason: HOSPADM

## 2025-05-14 RX ORDER — ONDANSETRON HYDROCHLORIDE 2 MG/ML
4 INJECTION, SOLUTION INTRAVENOUS EVERY 8 HOURS PRN
Status: DISCONTINUED | OUTPATIENT
Start: 2025-05-14 | End: 2025-05-15 | Stop reason: HOSPADM

## 2025-05-14 RX ORDER — NALOXONE HYDROCHLORIDE 0.4 MG/ML
0.2 INJECTION, SOLUTION INTRAMUSCULAR; INTRAVENOUS; SUBCUTANEOUS EVERY 5 MIN PRN
Status: DISCONTINUED | OUTPATIENT
Start: 2025-05-14 | End: 2025-05-15 | Stop reason: HOSPADM

## 2025-05-14 RX ORDER — FENTANYL CITRATE 50 UG/ML
INJECTION, SOLUTION INTRAMUSCULAR; INTRAVENOUS AS NEEDED
Status: DISCONTINUED | OUTPATIENT
Start: 2025-05-14 | End: 2025-05-14

## 2025-05-14 RX ORDER — MELOXICAM 7.5 MG/1
7.5 TABLET ORAL ONCE
Status: DISCONTINUED | OUTPATIENT
Start: 2025-05-14 | End: 2025-05-14

## 2025-05-14 RX ORDER — ATORVASTATIN CALCIUM 20 MG/1
10 TABLET, FILM COATED ORAL DAILY
Status: DISCONTINUED | OUTPATIENT
Start: 2025-05-15 | End: 2025-05-15 | Stop reason: HOSPADM

## 2025-05-14 RX ORDER — CEFAZOLIN SODIUM 2 G/100ML
2 INJECTION, SOLUTION INTRAVENOUS EVERY 8 HOURS
Status: COMPLETED | OUTPATIENT
Start: 2025-05-14 | End: 2025-05-15

## 2025-05-14 RX ORDER — OXYCODONE HCL 10 MG/1
10 TABLET, FILM COATED, EXTENDED RELEASE ORAL ONCE
Status: DISCONTINUED | OUTPATIENT
Start: 2025-05-14 | End: 2025-05-14

## 2025-05-14 RX ORDER — CEFAZOLIN SODIUM 2 G/100ML
2 INJECTION, SOLUTION INTRAVENOUS ONCE
Status: DISCONTINUED | OUTPATIENT
Start: 2025-05-14 | End: 2025-05-14

## 2025-05-14 RX ORDER — ROPIVACAINE HYDROCHLORIDE 5 MG/ML
INJECTION, SOLUTION EPIDURAL; INFILTRATION; PERINEURAL
Status: COMPLETED | OUTPATIENT
Start: 2025-05-14 | End: 2025-05-14

## 2025-05-14 RX ORDER — OXYBUTYNIN CHLORIDE 5 MG/1
5 TABLET ORAL 3 TIMES DAILY
Status: DISCONTINUED | OUTPATIENT
Start: 2025-05-14 | End: 2025-05-15 | Stop reason: HOSPADM

## 2025-05-14 RX ORDER — SODIUM CHLORIDE, SODIUM LACTATE, POTASSIUM CHLORIDE, CALCIUM CHLORIDE 600; 310; 30; 20 MG/100ML; MG/100ML; MG/100ML; MG/100ML
75 INJECTION, SOLUTION INTRAVENOUS CONTINUOUS
Status: DISCONTINUED | OUTPATIENT
Start: 2025-05-14 | End: 2025-05-14 | Stop reason: HOSPADM

## 2025-05-14 RX ORDER — FLUTICASONE PROPIONATE 50 MCG
2 SPRAY, SUSPENSION (ML) NASAL DAILY
Status: DISCONTINUED | OUTPATIENT
Start: 2025-05-14 | End: 2025-05-15 | Stop reason: RX

## 2025-05-14 RX ORDER — MIDAZOLAM HYDROCHLORIDE 1 MG/ML
2 INJECTION, SOLUTION INTRAMUSCULAR; INTRAVENOUS ONCE
Status: COMPLETED | OUTPATIENT
Start: 2025-05-14 | End: 2025-05-14

## 2025-05-14 RX ORDER — SCOPOLAMINE 1 MG/3D
1 PATCH, EXTENDED RELEASE TRANSDERMAL
Status: DISCONTINUED | OUTPATIENT
Start: 2025-05-14 | End: 2025-05-14

## 2025-05-14 RX ORDER — ONDANSETRON 4 MG/1
4 TABLET, ORALLY DISINTEGRATING ORAL EVERY 8 HOURS PRN
Status: DISCONTINUED | OUTPATIENT
Start: 2025-05-14 | End: 2025-05-15 | Stop reason: HOSPADM

## 2025-05-14 RX ORDER — CHOLECALCIFEROL (VITAMIN D3) 25 MCG
25 TABLET ORAL DAILY
Status: DISCONTINUED | OUTPATIENT
Start: 2025-05-15 | End: 2025-05-15 | Stop reason: HOSPADM

## 2025-05-14 RX ORDER — POLYETHYLENE GLYCOL 3350 17 G/17G
17 POWDER, FOR SOLUTION ORAL DAILY
Status: DISCONTINUED | OUTPATIENT
Start: 2025-05-15 | End: 2025-05-15 | Stop reason: HOSPADM

## 2025-05-14 RX ORDER — PROPOFOL 10 MG/ML
INJECTION, EMULSION INTRAVENOUS AS NEEDED
Status: DISCONTINUED | OUTPATIENT
Start: 2025-05-14 | End: 2025-05-14

## 2025-05-14 RX ORDER — TRANEXAMIC ACID 100 MG/ML
INJECTION, SOLUTION INTRAVENOUS AS NEEDED
Status: DISCONTINUED | OUTPATIENT
Start: 2025-05-14 | End: 2025-05-14

## 2025-05-14 RX ORDER — MELOXICAM 7.5 MG/1
7.5 TABLET ORAL ONCE
Status: DISCONTINUED | OUTPATIENT
Start: 2025-05-14 | End: 2025-05-14 | Stop reason: SDUPTHER

## 2025-05-14 RX ORDER — ALBUTEROL SULFATE 0.83 MG/ML
2.5 SOLUTION RESPIRATORY (INHALATION) ONCE AS NEEDED
Status: DISCONTINUED | OUTPATIENT
Start: 2025-05-14 | End: 2025-05-14 | Stop reason: HOSPADM

## 2025-05-14 RX ORDER — METOCLOPRAMIDE 10 MG/1
10 TABLET ORAL EVERY 6 HOURS PRN
Status: DISCONTINUED | OUTPATIENT
Start: 2025-05-14 | End: 2025-05-15 | Stop reason: HOSPADM

## 2025-05-14 RX ORDER — ACETAMINOPHEN 325 MG/1
975 TABLET ORAL ONCE
Status: DISCONTINUED | OUTPATIENT
Start: 2025-05-14 | End: 2025-05-14

## 2025-05-14 RX ORDER — PHENYLEPHRINE HCL IN 0.9% NACL 1 MG/10 ML
SYRINGE (ML) INTRAVENOUS AS NEEDED
Status: DISCONTINUED | OUTPATIENT
Start: 2025-05-14 | End: 2025-05-14

## 2025-05-14 RX ORDER — DOCUSATE SODIUM 100 MG/1
100 CAPSULE, LIQUID FILLED ORAL 2 TIMES DAILY
Status: DISCONTINUED | OUTPATIENT
Start: 2025-05-14 | End: 2025-05-15 | Stop reason: HOSPADM

## 2025-05-14 RX ORDER — PREGABALIN 75 MG/1
75 CAPSULE ORAL ONCE
Status: DISCONTINUED | OUTPATIENT
Start: 2025-05-14 | End: 2025-05-14 | Stop reason: SDUPTHER

## 2025-05-14 RX ORDER — ACETAMINOPHEN 325 MG/1
650 TABLET ORAL EVERY 6 HOURS SCHEDULED
Status: DISCONTINUED | OUTPATIENT
Start: 2025-05-14 | End: 2025-05-15 | Stop reason: HOSPADM

## 2025-05-14 RX ORDER — ACETAMINOPHEN 325 MG/1
975 TABLET ORAL ONCE
Status: COMPLETED | OUTPATIENT
Start: 2025-05-14 | End: 2025-05-14

## 2025-05-14 RX ORDER — CYCLOBENZAPRINE HCL 10 MG
5 TABLET ORAL 3 TIMES DAILY PRN
Status: DISCONTINUED | OUTPATIENT
Start: 2025-05-14 | End: 2025-05-15 | Stop reason: HOSPADM

## 2025-05-14 RX ORDER — KETOROLAC TROMETHAMINE 30 MG/ML
INJECTION, SOLUTION INTRAMUSCULAR; INTRAVENOUS AS NEEDED
Status: DISCONTINUED | OUTPATIENT
Start: 2025-05-14 | End: 2025-05-14

## 2025-05-14 RX ORDER — OXYCODONE HCL 10 MG/1
10 TABLET, FILM COATED, EXTENDED RELEASE ORAL ONCE
Status: COMPLETED | OUTPATIENT
Start: 2025-05-14 | End: 2025-05-14

## 2025-05-14 RX ORDER — OXYCODONE HYDROCHLORIDE 5 MG/1
10 TABLET ORAL EVERY 4 HOURS PRN
Status: DISCONTINUED | OUTPATIENT
Start: 2025-05-14 | End: 2025-05-15 | Stop reason: HOSPADM

## 2025-05-14 RX ORDER — ONDANSETRON HYDROCHLORIDE 2 MG/ML
4 INJECTION, SOLUTION INTRAVENOUS ONCE AS NEEDED
Status: DISCONTINUED | OUTPATIENT
Start: 2025-05-14 | End: 2025-05-14 | Stop reason: HOSPADM

## 2025-05-14 RX ORDER — VITAMIN B COMPLEX
1 CAPSULE ORAL DAILY
Status: DISCONTINUED | OUTPATIENT
Start: 2025-05-14 | End: 2025-05-14

## 2025-05-14 RX ORDER — ROPIVACAINE/EPI/CLONIDINE/KET 2.46-0.005
SYRINGE (ML) INJECTION AS NEEDED
Status: DISCONTINUED | OUTPATIENT
Start: 2025-05-14 | End: 2025-05-14 | Stop reason: HOSPADM

## 2025-05-14 RX ORDER — BISACODYL 5 MG
10 TABLET, DELAYED RELEASE (ENTERIC COATED) ORAL DAILY PRN
Status: DISCONTINUED | OUTPATIENT
Start: 2025-05-14 | End: 2025-05-15 | Stop reason: HOSPADM

## 2025-05-14 RX ORDER — HYDROMORPHONE HYDROCHLORIDE 0.2 MG/ML
0.2 INJECTION INTRAMUSCULAR; INTRAVENOUS; SUBCUTANEOUS EVERY 5 MIN PRN
Status: DISCONTINUED | OUTPATIENT
Start: 2025-05-14 | End: 2025-05-14 | Stop reason: HOSPADM

## 2025-05-14 RX ORDER — PANTOPRAZOLE SODIUM 40 MG/1
40 TABLET, DELAYED RELEASE ORAL
Status: DISCONTINUED | OUTPATIENT
Start: 2025-05-15 | End: 2025-05-15 | Stop reason: HOSPADM

## 2025-05-14 RX ORDER — PREGABALIN 75 MG/1
75 CAPSULE ORAL ONCE
Status: DISCONTINUED | OUTPATIENT
Start: 2025-05-14 | End: 2025-05-14

## 2025-05-14 RX ORDER — FENTANYL CITRATE 50 UG/ML
50 INJECTION, SOLUTION INTRAMUSCULAR; INTRAVENOUS ONCE
Status: COMPLETED | OUTPATIENT
Start: 2025-05-14 | End: 2025-05-14

## 2025-05-14 RX ORDER — BISACODYL 10 MG/1
10 SUPPOSITORY RECTAL DAILY PRN
Status: DISCONTINUED | OUTPATIENT
Start: 2025-05-14 | End: 2025-05-15 | Stop reason: HOSPADM

## 2025-05-14 RX ORDER — CEFAZOLIN SODIUM 2 G/100ML
2 INJECTION, SOLUTION INTRAVENOUS ONCE
Status: DISCONTINUED | OUTPATIENT
Start: 2025-05-14 | End: 2025-05-14 | Stop reason: SDUPTHER

## 2025-05-14 RX ORDER — OXYCODONE HYDROCHLORIDE 5 MG/1
10 TABLET ORAL EVERY 4 HOURS PRN
Status: DISCONTINUED | OUTPATIENT
Start: 2025-05-14 | End: 2025-05-14 | Stop reason: HOSPADM

## 2025-05-14 RX ORDER — SCOPOLAMINE 1 MG/3D
1 PATCH, EXTENDED RELEASE TRANSDERMAL
Status: DISCONTINUED | OUTPATIENT
Start: 2025-05-14 | End: 2025-05-14 | Stop reason: SDUPTHER

## 2025-05-14 RX ORDER — KETOROLAC TROMETHAMINE 15 MG/ML
15 INJECTION, SOLUTION INTRAMUSCULAR; INTRAVENOUS EVERY 6 HOURS
Status: COMPLETED | OUTPATIENT
Start: 2025-05-14 | End: 2025-05-15

## 2025-05-14 RX ORDER — PREGABALIN 75 MG/1
75 CAPSULE ORAL ONCE
Status: COMPLETED | OUTPATIENT
Start: 2025-05-14 | End: 2025-05-14

## 2025-05-14 RX ORDER — ASPIRIN 81 MG/1
81 TABLET ORAL 2 TIMES DAILY
Status: DISCONTINUED | OUTPATIENT
Start: 2025-05-14 | End: 2025-05-15 | Stop reason: HOSPADM

## 2025-05-14 RX ORDER — MELOXICAM 7.5 MG/1
7.5 TABLET ORAL ONCE
Status: COMPLETED | OUTPATIENT
Start: 2025-05-14 | End: 2025-05-14

## 2025-05-14 RX ORDER — CEFAZOLIN SODIUM 2 G/100ML
INJECTION, SOLUTION INTRAVENOUS AS NEEDED
Status: DISCONTINUED | OUTPATIENT
Start: 2025-05-14 | End: 2025-05-14

## 2025-05-14 RX ORDER — ONDANSETRON HYDROCHLORIDE 2 MG/ML
INJECTION, SOLUTION INTRAVENOUS AS NEEDED
Status: DISCONTINUED | OUTPATIENT
Start: 2025-05-14 | End: 2025-05-14

## 2025-05-14 RX ORDER — LIDOCAINE HYDROCHLORIDE 10 MG/ML
INJECTION, SOLUTION EPIDURAL; INFILTRATION; INTRACAUDAL; PERINEURAL AS NEEDED
Status: DISCONTINUED | OUTPATIENT
Start: 2025-05-14 | End: 2025-05-14

## 2025-05-14 RX ORDER — OXYCODONE HCL 10 MG/1
10 TABLET, FILM COATED, EXTENDED RELEASE ORAL ONCE
Refills: 0 | Status: DISCONTINUED | OUTPATIENT
Start: 2025-05-14 | End: 2025-05-14 | Stop reason: SDUPTHER

## 2025-05-14 RX ORDER — DEXMEDETOMIDINE IN 0.9 % NACL 20 MCG/5ML
SYRINGE (ML) INTRAVENOUS
Status: COMPLETED | OUTPATIENT
Start: 2025-05-14 | End: 2025-05-14

## 2025-05-14 RX ORDER — ACETAMINOPHEN 325 MG/1
975 TABLET ORAL ONCE
Status: DISCONTINUED | OUTPATIENT
Start: 2025-05-14 | End: 2025-05-14 | Stop reason: SDUPTHER

## 2025-05-14 RX ORDER — CEFAZOLIN SODIUM 2 G/100ML
2 INJECTION, SOLUTION INTRAVENOUS ONCE
Status: COMPLETED | OUTPATIENT
Start: 2025-05-14 | End: 2025-05-14

## 2025-05-14 RX ORDER — METOCLOPRAMIDE HYDROCHLORIDE 5 MG/ML
10 INJECTION INTRAMUSCULAR; INTRAVENOUS EVERY 6 HOURS PRN
Status: DISCONTINUED | OUTPATIENT
Start: 2025-05-14 | End: 2025-05-15 | Stop reason: HOSPADM

## 2025-05-14 RX ORDER — SCOPOLAMINE 1 MG/3D
1 PATCH, EXTENDED RELEASE TRANSDERMAL
Status: DISCONTINUED | OUTPATIENT
Start: 2025-05-14 | End: 2025-05-15 | Stop reason: HOSPADM

## 2025-05-14 RX ADMIN — TRANEXAMIC ACID 1000 MG: 1 INJECTION, SOLUTION INTRAVENOUS at 13:30

## 2025-05-14 RX ADMIN — DOCUSATE SODIUM 100 MG: 100 CAPSULE, LIQUID FILLED ORAL at 20:42

## 2025-05-14 RX ADMIN — ONDANSETRON 4 MG: 2 INJECTION, SOLUTION INTRAMUSCULAR; INTRAVENOUS at 14:32

## 2025-05-14 RX ADMIN — DEXAMETHASONE SODIUM PHOSPHATE 4 MG: 4 INJECTION, SOLUTION INTRAMUSCULAR; INTRAVENOUS at 13:34

## 2025-05-14 RX ADMIN — MELOXICAM 7.5 MG: 7.5 TABLET ORAL at 11:51

## 2025-05-14 RX ADMIN — ASPIRIN 81 MG: 81 TABLET, COATED ORAL at 20:42

## 2025-05-14 RX ADMIN — CEFAZOLIN SODIUM 2 G: 2 INJECTION, SOLUTION INTRAVENOUS at 20:42

## 2025-05-14 RX ADMIN — POVIDONE-IODINE 1 APPLICATION: 5 SOLUTION TOPICAL at 11:52

## 2025-05-14 RX ADMIN — SODIUM CHLORIDE, SODIUM LACTATE, POTASSIUM CHLORIDE, AND CALCIUM CHLORIDE 50 ML/HR: 600; 310; 30; 20 INJECTION, SOLUTION INTRAVENOUS at 18:10

## 2025-05-14 RX ADMIN — OXYCODONE HYDROCHLORIDE 10 MG: 10 TABLET, FILM COATED, EXTENDED RELEASE ORAL at 11:51

## 2025-05-14 RX ADMIN — FENTANYL CITRATE 50 MCG: 50 INJECTION INTRAMUSCULAR; INTRAVENOUS at 12:31

## 2025-05-14 RX ADMIN — KETOROLAC TROMETHAMINE 15 MG: 15 INJECTION, SOLUTION INTRAMUSCULAR; INTRAVENOUS at 23:50

## 2025-05-14 RX ADMIN — LIDOCAINE HYDROCHLORIDE 5 ML: 10 INJECTION, SOLUTION EPIDURAL; INFILTRATION; INTRACAUDAL; PERINEURAL at 13:28

## 2025-05-14 RX ADMIN — DEXAMETHASONE SODIUM PHOSPHATE 5 MG: 4 INJECTION, SOLUTION INTRAMUSCULAR; INTRAVENOUS at 12:33

## 2025-05-14 RX ADMIN — Medication 8 MCG: at 12:33

## 2025-05-14 RX ADMIN — ACETAMINOPHEN 650 MG: 325 TABLET ORAL at 23:50

## 2025-05-14 RX ADMIN — MIDAZOLAM 2 MG: 1 INJECTION INTRAMUSCULAR; INTRAVENOUS at 12:31

## 2025-05-14 RX ADMIN — SODIUM CHLORIDE, POTASSIUM CHLORIDE, SODIUM LACTATE AND CALCIUM CHLORIDE: 600; 310; 30; 20 INJECTION, SOLUTION INTRAVENOUS at 13:02

## 2025-05-14 RX ADMIN — Medication 50 MCG: at 14:12

## 2025-05-14 RX ADMIN — CEFAZOLIN SODIUM 2 G: 2 INJECTION, SOLUTION INTRAVENOUS at 13:05

## 2025-05-14 RX ADMIN — CEFAZOLIN SODIUM 2 G: 2 INJECTION, SOLUTION INTRAVENOUS at 11:51

## 2025-05-14 RX ADMIN — MEPIVACAINE HYDROCHLORIDE 3.4 ML: 15 INJECTION, SOLUTION EPIDURAL; INFILTRATION at 13:25

## 2025-05-14 RX ADMIN — Medication 100 MCG: at 13:56

## 2025-05-14 RX ADMIN — ACETAMINOPHEN 975 MG: 325 TABLET ORAL at 11:51

## 2025-05-14 RX ADMIN — PREGABALIN 75 MG: 75 CAPSULE ORAL at 11:51

## 2025-05-14 RX ADMIN — SCOPOLAMINE 1 PATCH: 1.5 PATCH, EXTENDED RELEASE TRANSDERMAL at 11:51

## 2025-05-14 RX ADMIN — ONDANSETRON 4 MG: 2 INJECTION, SOLUTION INTRAMUSCULAR; INTRAVENOUS at 18:24

## 2025-05-14 RX ADMIN — TRANEXAMIC ACID 1000 MG: 1 INJECTION, SOLUTION INTRAVENOUS at 14:43

## 2025-05-14 RX ADMIN — Medication 50 MCG: at 14:03

## 2025-05-14 RX ADMIN — ROPIVACAINE HYDROCHLORIDE 20 ML: 5 INJECTION, SOLUTION EPIDURAL; INFILTRATION; PERINEURAL at 12:33

## 2025-05-14 RX ADMIN — KETOROLAC TROMETHAMINE 15 MG: 30 INJECTION, SOLUTION INTRAMUSCULAR at 14:32

## 2025-05-14 RX ADMIN — Medication 50 MCG: at 14:31

## 2025-05-14 RX ADMIN — PROPOFOL 500 MG: 10 INJECTION, EMULSION INTRAVENOUS at 13:28

## 2025-05-14 RX ADMIN — FENTANYL CITRATE 50 MCG: 0.05 INJECTION, SOLUTION INTRAMUSCULAR; INTRAVENOUS at 13:21

## 2025-05-14 RX ADMIN — KETOROLAC TROMETHAMINE 15 MG: 15 INJECTION, SOLUTION INTRAMUSCULAR; INTRAVENOUS at 18:10

## 2025-05-14 SDOH — SOCIAL STABILITY: SOCIAL INSECURITY: DO YOU FEEL ANYONE HAS EXPLOITED OR TAKEN ADVANTAGE OF YOU FINANCIALLY OR OF YOUR PERSONAL PROPERTY?: NO

## 2025-05-14 SDOH — ECONOMIC STABILITY: FOOD INSECURITY: HOW HARD IS IT FOR YOU TO PAY FOR THE VERY BASICS LIKE FOOD, HOUSING, MEDICAL CARE, AND HEATING?: NOT HARD AT ALL

## 2025-05-14 SDOH — SOCIAL STABILITY: SOCIAL INSECURITY: HAVE YOU HAD THOUGHTS OF HARMING ANYONE ELSE?: NO

## 2025-05-14 SDOH — ECONOMIC STABILITY: TRANSPORTATION INSECURITY: IN THE PAST 12 MONTHS, HAS LACK OF TRANSPORTATION KEPT YOU FROM MEDICAL APPOINTMENTS OR FROM GETTING MEDICATIONS?: NO

## 2025-05-14 SDOH — SOCIAL STABILITY: SOCIAL INSECURITY: ARE THERE ANY APPARENT SIGNS OF INJURIES/BEHAVIORS THAT COULD BE RELATED TO ABUSE/NEGLECT?: NO

## 2025-05-14 SDOH — ECONOMIC STABILITY: FOOD INSECURITY: WITHIN THE PAST 12 MONTHS, YOU WORRIED THAT YOUR FOOD WOULD RUN OUT BEFORE YOU GOT THE MONEY TO BUY MORE.: NEVER TRUE

## 2025-05-14 SDOH — SOCIAL STABILITY: SOCIAL INSECURITY: WITHIN THE LAST YEAR, HAVE YOU BEEN HUMILIATED OR EMOTIONALLY ABUSED IN OTHER WAYS BY YOUR PARTNER OR EX-PARTNER?: NO

## 2025-05-14 SDOH — ECONOMIC STABILITY: FOOD INSECURITY: WITHIN THE PAST 12 MONTHS, THE FOOD YOU BOUGHT JUST DIDN'T LAST AND YOU DIDN'T HAVE MONEY TO GET MORE.: NEVER TRUE

## 2025-05-14 SDOH — SOCIAL STABILITY: SOCIAL INSECURITY: HAVE YOU HAD ANY THOUGHTS OF HARMING ANYONE ELSE?: NO

## 2025-05-14 SDOH — ECONOMIC STABILITY: HOUSING INSECURITY: IN THE PAST 12 MONTHS, HOW MANY TIMES HAVE YOU MOVED WHERE YOU WERE LIVING?: 1

## 2025-05-14 SDOH — SOCIAL STABILITY: SOCIAL INSECURITY: WITHIN THE LAST YEAR, HAVE YOU BEEN AFRAID OF YOUR PARTNER OR EX-PARTNER?: NO

## 2025-05-14 SDOH — ECONOMIC STABILITY: HOUSING INSECURITY: IN THE LAST 12 MONTHS, WAS THERE A TIME WHEN YOU WERE NOT ABLE TO PAY THE MORTGAGE OR RENT ON TIME?: NO

## 2025-05-14 SDOH — SOCIAL STABILITY: SOCIAL INSECURITY
WITHIN THE LAST YEAR, HAVE YOU BEEN RAPED OR FORCED TO HAVE ANY KIND OF SEXUAL ACTIVITY BY YOUR PARTNER OR EX-PARTNER?: NO

## 2025-05-14 SDOH — SOCIAL STABILITY: SOCIAL INSECURITY: ARE YOU OR HAVE YOU BEEN THREATENED OR ABUSED PHYSICALLY, EMOTIONALLY, OR SEXUALLY BY ANYONE?: NO

## 2025-05-14 SDOH — ECONOMIC STABILITY: INCOME INSECURITY: IN THE PAST 12 MONTHS HAS THE ELECTRIC, GAS, OIL, OR WATER COMPANY THREATENED TO SHUT OFF SERVICES IN YOUR HOME?: NO

## 2025-05-14 SDOH — ECONOMIC STABILITY: HOUSING INSECURITY: AT ANY TIME IN THE PAST 12 MONTHS, WERE YOU HOMELESS OR LIVING IN A SHELTER (INCLUDING NOW)?: NO

## 2025-05-14 SDOH — SOCIAL STABILITY: SOCIAL INSECURITY: WERE YOU ABLE TO COMPLETE ALL THE BEHAVIORAL HEALTH SCREENINGS?: YES

## 2025-05-14 SDOH — ECONOMIC STABILITY: HOUSING INSECURITY: DO YOU FEEL UNSAFE GOING BACK TO THE PLACE WHERE YOU LIVE?: NO

## 2025-05-14 SDOH — SOCIAL STABILITY: SOCIAL INSECURITY: HAS ANYONE EVER THREATENED TO HURT YOUR FAMILY OR YOUR PETS?: NO

## 2025-05-14 SDOH — SOCIAL STABILITY: SOCIAL INSECURITY: DO YOU FEEL UNSAFE GOING BACK TO THE PLACE WHERE YOU ARE LIVING?: NO

## 2025-05-14 SDOH — SOCIAL STABILITY: SOCIAL INSECURITY: DOES ANYONE TRY TO KEEP YOU FROM HAVING/CONTACTING OTHER FRIENDS OR DOING THINGS OUTSIDE YOUR HOME?: NO

## 2025-05-14 SDOH — SOCIAL STABILITY: SOCIAL INSECURITY: ABUSE: ADULT

## 2025-05-14 ASSESSMENT — COGNITIVE AND FUNCTIONAL STATUS - GENERAL
TURNING FROM BACK TO SIDE WHILE IN FLAT BAD: A LITTLE
MOVING FROM LYING ON BACK TO SITTING ON SIDE OF FLAT BED WITH BEDRAILS: A LITTLE
MOVING TO AND FROM BED TO CHAIR: A LITTLE
HELP NEEDED FOR BATHING: A LITTLE
STANDING UP FROM CHAIR USING ARMS: A LITTLE
WALKING IN HOSPITAL ROOM: A LITTLE
STANDING UP FROM CHAIR USING ARMS: A LITTLE
HELP NEEDED FOR BATHING: A LITTLE
MOVING TO AND FROM BED TO CHAIR: A LITTLE
DRESSING REGULAR UPPER BODY CLOTHING: A LITTLE
CLIMB 3 TO 5 STEPS WITH RAILING: A LITTLE
STANDING UP FROM CHAIR USING ARMS: A LITTLE
HELP NEEDED FOR BATHING: A LITTLE
DAILY ACTIVITIY SCORE: 20
DRESSING REGULAR UPPER BODY CLOTHING: A LITTLE
DAILY ACTIVITIY SCORE: 20
DRESSING REGULAR LOWER BODY CLOTHING: A LITTLE
TOILETING: A LITTLE
DRESSING REGULAR LOWER BODY CLOTHING: A LITTLE
MOBILITY SCORE: 23
DRESSING REGULAR LOWER BODY CLOTHING: A LITTLE
MOBILITY SCORE: 16
MOVING FROM LYING ON BACK TO SITTING ON SIDE OF FLAT BED WITH BEDRAILS: A LITTLE
DAILY ACTIVITIY SCORE: 21
PATIENT BASELINE BEDBOUND: NO
WALKING IN HOSPITAL ROOM: A LOT
TURNING FROM BACK TO SIDE WHILE IN FLAT BAD: A LITTLE
DRESSING REGULAR UPPER BODY CLOTHING: A LITTLE
TOILETING: A LITTLE
MOBILITY SCORE: 18
CLIMB 3 TO 5 STEPS WITH RAILING: A LOT

## 2025-05-14 ASSESSMENT — PAIN - FUNCTIONAL ASSESSMENT
PAIN_FUNCTIONAL_ASSESSMENT: 0-10
PAIN_FUNCTIONAL_ASSESSMENT: WONG-BAKER FACES
PAIN_FUNCTIONAL_ASSESSMENT: 0-10

## 2025-05-14 ASSESSMENT — LIFESTYLE VARIABLES
AUDIT-C TOTAL SCORE: 0
HOW OFTEN DO YOU HAVE 6 OR MORE DRINKS ON ONE OCCASION: NEVER
AUDIT-C TOTAL SCORE: 0
SKIP TO QUESTIONS 9-10: 1
HOW MANY STANDARD DRINKS CONTAINING ALCOHOL DO YOU HAVE ON A TYPICAL DAY: PATIENT DOES NOT DRINK
HOW OFTEN DO YOU HAVE A DRINK CONTAINING ALCOHOL: NEVER

## 2025-05-14 ASSESSMENT — PAIN SCALES - GENERAL
PAINLEVEL_OUTOF10: 0 - NO PAIN

## 2025-05-14 ASSESSMENT — ACTIVITIES OF DAILY LIVING (ADL)
HEARING - LEFT EAR: FUNCTIONAL
DRESSING YOURSELF: INDEPENDENT
TOILETING: NEEDS ASSISTANCE
JUDGMENT_ADEQUATE_SAFELY_COMPLETE_DAILY_ACTIVITIES: YES
HEARING - RIGHT EAR: FUNCTIONAL
GROOMING: INDEPENDENT
ADEQUATE_TO_COMPLETE_ADL: YES
PATIENT'S MEMORY ADEQUATE TO SAFELY COMPLETE DAILY ACTIVITIES?: YES
WALKS IN HOME: NEEDS ASSISTANCE
BATHING: NEEDS ASSISTANCE
LACK_OF_TRANSPORTATION: NO
LACK_OF_TRANSPORTATION: NO
ASSISTIVE_DEVICE: WALKER
FEEDING YOURSELF: INDEPENDENT

## 2025-05-14 ASSESSMENT — PATIENT HEALTH QUESTIONNAIRE - PHQ9
SUM OF ALL RESPONSES TO PHQ9 QUESTIONS 1 & 2: 0
1. LITTLE INTEREST OR PLEASURE IN DOING THINGS: NOT AT ALL
2. FEELING DOWN, DEPRESSED OR HOPELESS: NOT AT ALL

## 2025-05-14 ASSESSMENT — COLUMBIA-SUICIDE SEVERITY RATING SCALE - C-SSRS
2. HAVE YOU ACTUALLY HAD ANY THOUGHTS OF KILLING YOURSELF?: NO
6. HAVE YOU EVER DONE ANYTHING, STARTED TO DO ANYTHING, OR PREPARED TO DO ANYTHING TO END YOUR LIFE?: NO
1. IN THE PAST MONTH, HAVE YOU WISHED YOU WERE DEAD OR WISHED YOU COULD GO TO SLEEP AND NOT WAKE UP?: NO

## 2025-05-14 NOTE — DISCHARGE SUMMARY
MD Criss Sims, MPAS, PASofiaC, ATC  Adult Reconstruction and Joint Replacement Surgery  Phone: [phone]     Fax:[phone]             Discharge Summary    Discharge Diagnosis  Right Total Knee Arthroplasty    Issues Requiring Follow-Up  Home care services to start within 48 hours. Outpatient PT to start 2 weeks  S/P total Joint for Knees only. Hips optional.    Test Results Pending At Discharge  Pending Labs       No current pending labs.          Hospital Course  Patient underwent Right Total Knee Arthroplasty on 5/14/25 without complications. The patient was then taken to the PACU in stable condition. Patient was then transferred to the or.  Pain was appropriately controlled. Diet was advanced as tolerated. Patient progressed adequately through their recovery during hospital stay including PT/ OT and were recommended for discharge. Patient was then discharged on  to home in stable condition. Patient had uneventful hospital course. Patient was instructed on the use of pain medications as needed for pain. The signs and symptoms of infection were discussed and the patient was given our number to call should they have any questions or concerns following discharge.    Based on my clinical judgment, the patient was provided with a 7-day prescription for opioid medication at 30 MED, indicated for treatment of acute pain in the setting of recent Total Joint Arthroplasty. OARRS report was run and has demonstrated an appropriate time course.  The patient has been provided with counseling pertaining to safe use of opioid medication.    Patient may use operative extremity WBAT with use of walker for assistance with ambulation .  Mepilex dressing to be removed POD # 7 by home care and incision left open to air  OAC for DVT prophylaxis started on POD #1 and to be taken for 30 days    Patient is to follow-up in 6 weeks at scheduled post-op visit.     Face-to Face after surgery progress  note  Pertinent Physical Exam At Time of Discharge  Review of Systems   Constitutional: Negative.  Negative for activity change, chills, fatigue and fever.   HENT: Negative.     Eyes: Negative.    Respiratory: Negative.  Negative for cough, chest tightness, shortness of breath and wheezing.    Cardiovascular: Negative.  Negative for palpitations.   Gastrointestinal:  Negative for abdominal pain, blood in stool, nausea and vomiting.   Endocrine: Negative.  Negative for cold intolerance and polyuria.   Genitourinary: Negative.  Negative for difficulty urinating, dysuria, frequency, hematuria and urgency.   Musculoskeletal:  Positive for gait problem and joint swelling. Negative for arthralgias and back pain.   Skin: Negative.  Negative for color change, pallor, rash and wound.   Allergic/Immunologic: Negative.  Negative for environmental allergies.   Neurological:  Negative for dizziness, weakness and light-headedness.   Hematological: Negative.    Psychiatric/Behavioral:  Negative for agitation, confusion and suicidal ideas. The patient is not nervous/anxious.    All other systems reviewed and are negative    Physical Exam  side: right lower extremity exam   Vitals and nursing note reviewed. VSS, Afebrile  Constitutional:       Appearance: Normal appearance, awake and alert.  HENT:      Head: Normocephalic and atraumatic.       Pupils: Pupils are equal, round, and reactive to light.   Cardiovascular:      Rate and Rhythm: Normal rate and regular rhythm.   Pulmonary:      Effort: Pulmonary effort is normal.     Abdominal:         Palpations: Abdomen is soft.   Musculoskeletal:   Sensation intact bilaterally, sural/saph/sp/tibal n.  Motor intact flexion/extension/DF/PF/EHL/FHL bilaterally. Palpable symmetric DP/PT pulse bilaterally. Spinal wearing off.    Skin:      Bulky Dressing intact to the surgical extremity. No signs of gross bloody or purulent drainage.     General: Skin is warm and dry.      Capillary Refill:  Capillary refill takes less than 2 seconds.   Neurological:      General: No focal deficit present.      Mental Status: She is alert and oriented to person, place, and time. Mental status is at baseline.   Psychiatric:         Mood and Affect: Mood normal.        Home Medications  Scheduled medications  Current Medications[1]     PRN medications  PRN Medications[2]    Discharge medications     Your medication list        START taking these medications        Instructions Last Dose Given Next Dose Due   acetaminophen 500 mg tablet  Commonly known as: Tylenol Extra Strength      Take 2 tablets (1,000 mg) by mouth every 6 hours if needed for mild pain (1 - 3).       docusate sodium 100 mg capsule  Commonly known as: Colace      Take 1 capsule (100 mg) by mouth 2 times a day.       meloxicam 15 mg tablet  Commonly known as: Mobic      Take 1 tablet (15 mg) by mouth once daily.       oxyCODONE 5 mg immediate release tablet  Commonly known as: Roxicodone      Take 1 tablet (5 mg) by mouth every 6 hours if needed for severe pain (7 - 10) for up to 7 days.       pantoprazole 40 mg EC tablet  Commonly known as: ProtoNix      Take 1 tablet (40 mg) by mouth once daily. Do not crush, chew, or split.       traMADol 50 mg tablet  Commonly known as: Ultram      Take 1 tablet (50 mg) by mouth every 6 hours if needed for severe pain (7 - 10) for up to 7 days.              CHANGE how you take these medications        Instructions Last Dose Given Next Dose Due   aspirin 81 mg EC tablet  What changed: when to take this      Take 1 tablet (81 mg) by mouth 2 times a day.              CONTINUE taking these medications        Instructions Last Dose Given Next Dose Due   azelastine 137 mcg (0.1 %) nasal spray  Commonly known as: Astelin      USE 2 SPRAYS IN EACH NOSTRIL IN THE MORNING AND 2 SPRAYS BEFORE BEDTIME       calcium citrate-vitamin D3 250 mg-5 mcg (200 unit) tablet           cholecalciferol 25 mcg (1,000 units) capsule  Commonly  known as: Vitamin D-3           Claritin 10 mg tablet  Generic drug: loratadine           fish oil concentrate 120-180 mg capsule  Commonly known as: Omega-3           fluticasone 50 mcg/actuation nasal spray  Commonly known as: Flonase      Administer 2 sprays into each nostril once daily.       Myrbetriq 50 mg tablet extended release 24 hr 24 hr tablet  Generic drug: mirabegron      TAKE 1 TABLET DAILY       nitrofurantoin (macrocrystal-monohydrate) 100 mg capsule  Commonly known as: Macrobid      Take 1 capsule (100 mg) by mouth once daily.       nitrofurantoin 100 mg capsule  Commonly known as: Macrodantin           polyethylene glycol 17 gram/dose powder  Commonly known as: Glycolax, Miralax      Mix 1 cap (17g) into 8 ounces of fluid and drink once daily.       rosuvastatin 5 mg tablet  Commonly known as: Crestor           VITAMIN B COMPLEX ORAL                  STOP taking these medications      chlorhexidine 0.12 % solution  Commonly known as: Peridex        chlorhexidine 4 % external liquid  Commonly known as: Hibiclens        cyclobenzaprine 5 mg tablet  Commonly known as: Flexeril        estradiol 0.01 % (0.1 mg/gram) vaginal cream  Commonly known as: Estrace        ondansetron ODT 4 mg disintegrating tablet  Commonly known as: Zofran-ODT                  Where to Get Your Medications        These medications were sent to [facility]  [address], [facility] [address] [address]      Hours: 8 AM to 6 PM Mon-Fri, 9 AM to 1 PM Saturday Phone: [phone]   acetaminophen 500 mg tablet  aspirin 81 mg EC tablet  docusate sodium 100 mg capsule  meloxicam 15 mg tablet  oxyCODONE 5 mg immediate release tablet  pantoprazole 40 mg EC tablet  polyethylene glycol 17 gram/dose powder  traMADol 50 mg tablet         You have not been prescribed any medications.     Outpatient Follow-Up  Patient to follow-up with /Criss Rod PA-C.  Thank you for trusting us with your care. You should be  scheduled for a follow-up post-surgical visit in 6 weeks.    Special Instructions    Per the patient's request the patient did not want any of the discharge medications as she expressed that she had them at home.  They were discharged from the pharmacy the day of surgery.         Please read discharge instructions provided by your surgeon before calling with questions as this will delay care.    Medication refills-Oxycodone and Tramadol will be refilled every 7 days per state law. Request refills through [facility] Navigator at the institution in which you had surgery or MyChart. All medication requests may take up to 72 hours to refill and refills after Friday 1pm will be refilled on the next business day.      No future appointments.    SYLVIA Patton PA-C ATC  Orthopedic Physician Assisant  Adult Reconstruction and Total Joint Replacement  General Orthopedics  Department of Orthopaedic Surgery  [place] [address]  [email or web address] messaging preferred        [1]   Current Facility-Administered Medications:     lactated Ringer's infusion, 50 mL/hr, intravenous, Continuous, Criss Rod PA-C, Last Rate: 125 mL/hr at 05/14/25 1302, New Bag at 05/14/25 1302    ropivacaine-epinephrine-clonidine-ketorolac 2.46-0.005- 0.0008-0.3mg/mL periarticular syringe, , , PRN, Moses Schneider MD, 50 mL at 05/14/25 1422    scopolamine (Transderm-Scop) patch 1 patch, 1 patch, transdermal, q72h, Criss Rod PA-C, 1 patch at 05/14/25 1151    Facility-Administered Medications Ordered in Other Encounters:     ceFAZolin (Ancef) in dextrose (iso) IV, , intravenous, PRN, Roro L Grobelny, APRN-CRNA, 2 g at 05/14/25 1305    fentaNYL PF (Sublimaze) injection, , intravenous, PRN, Roro L Grobelny, APRN-CRNA, 50 mcg at 05/14/25 1321    ketorolac (Toradol) injection, , intravenous, PRN, Roro L Grobelny, APRN-CRNA, 15 mg at 05/14/25 1432    lidocaine PF (Xylocaine)  10 mg/mL (1 %) injection, , epidural, PRN, Roro L Grobelny, APRN-CRNA, 5 mL at 05/14/25 1328    mepivacaine (Carbocaine) 15 mg/mL (1.5 %) injection, , intrathecal, PRN, Roro L Grobelny, APRN-CRNA, 3.4 mL at 05/14/25 1325    ondansetron (Zofran) injection, , intravenous, PRN, Roro L Grobelny, APRN-CRNA, 4 mg at 05/14/25 1432    phenylephrine in NS (Hernandez-Synephrine) 100 mcg/mL syringe, , intravenous, PRN, Roro L Grobelny, APRN-CRNA, 50 mcg at 05/14/25 1431    propofol (Diprivan) injection, , intravenous, PRN, Roro L Grobelny, APRN-CRNA, 500 mg at 05/14/25 1328    tranexamic acid (Cyklokapron) injection, , intravenous, PRN, Roro L Grobelny, APRN-CRNA, 1,000 mg at 05/14/25 1443  [2] PRN medications: ropivacaine-epinephrine-clonidine-ketorolac

## 2025-05-14 NOTE — PROGRESS NOTES
79 yr old female admitted extended recovery following right knee replacement with Dr. Schneider.  Plan is home tomorrow with [facility] PT. SOC confirmed on 5/16/25.  Post op follow up on 6/19/25 at 10:30 am-Alphonse.  Spouse to transport home and assist with care as needed.      05/14/25 1721   Discharge Planning   Living Arrangements Spouse/significant other   Support Systems Spouse/significant other   Assistance Needed transportation   Type of Residence Private residence   Number of Stairs to Enter Residence 2   Number of Stairs Within Residence 0   Do you have animals or pets at home? No   Who is requesting discharge planning? Provider   Home or Post Acute Services In home services   Type of Home Care Services Home PT   Expected Discharge Disposition Home H  ( Home Care)   Does the patient need discharge transport arranged? No   Financial Resource Strain   How hard is it for you to pay for the very basics like food, housing, medical care, and heating? Not hard   Housing Stability   In the last 12 months, was there a time when you were not able to pay the mortgage or rent on time? N   In the past 12 months, how many times have you moved where you were living? 0   At any time in the past 12 months, were you homeless or living in a shelter (including now)? N   Transportation Needs   In the past 12 months, has lack of transportation kept you from medical appointments or from getting medications? no   In the past 12 months, has lack of transportation kept you from meetings, work, or from getting things needed for daily living? No   Patient Choice   Provider Choice list and CMS website (https://medicare.gov/care-compare#search) for post-acute Quality and Resource Measure Data were provided and reviewed with: Patient   Patient / Family choosing to utilize agency / facility established prior to hospitalization No   Stroke Family Assessment   Stroke Family Assessment Needed No

## 2025-05-14 NOTE — OP NOTE
Knee Replacement Total Cement Unilat (R) Operative Note     Date: 2025  OR Location: [facility]    Name: Zarina Holliday, : 1945, Age: 79 y.o., MRN: [id number], Sex: female    Diagnosis  Pre-op Diagnosis      * Unilateral primary osteoarthritis, right knee [M17.11] Post-op Diagnosis     * Unilateral primary osteoarthritis, right knee [M17.11]     Procedures  Knee Replacement Total Cement Unilat  67397 - OR ARTHRP KNE CONDYLE&PLATU MEDIAL&LAT COMPARTMENTS      Surgeons      * Moses Schneider - Primary    Resident/Fellow/Other Assistant:  Surgeons and Role:  * No surgeons found with a matching role *    Staff:   Circulator: Milagros  Scrub Person: Loyda  Relief Circulator: Jodi  Scrub Person: Chel  Relief Scrub: Kristin  Relief Scrub: Ashley  Relief Circulator: Cynthia    Anesthesia Staff: Anesthesiologist: Jimena Salvador MD  CRNA: MEGHNA Dumont-CRNA    Procedure Summary  Anesthesia: Anesthesia type not filed in the log.  ASA: III  Estimated Blood Loss: 25mL  Intra-op Medications:   Administrations occurring from 1235 to 1515 on 25:   Medication Name Total Dose   ropivacaine-epinephrine-clonidine-ketorolac 2.46-0.005- 0.0008-0.3mg/mL periarticular syringe 50 mL   ceFAZolin (Ancef) IV 2 g in 100 mL dextrose (iso) - premix 2 g   fentaNYL (Sublimaze) injection 50 mcg/mL 50 mcg   ketorolac (Toradol) injection 30 mg 15 mg   lactated Ringer's infusion 277.08 mL   lidocaine PF (Xylocaine-MPF) local injection 1 % 5 mL   mepivacaine (Carbocaine) injection 1.5 % 3.4 mL   ondansetron (Zofran) 2 mg/mL injection 4 mg   phenylephrine 100 mcg/mL syringe 10 mL (prefilled) 250 mcg   propofol (Diprivan) injection 10 mg/mL 500 mg   tranexamic acid (Cyklokapron) injection 1,000 mg   dexAMETHasone (Decadron) 4 mg/mL IV Syringe 2 mL 4 mg              Anesthesia Record               Intraprocedure I/O Totals          Intake    Tranexamic Acid 0.00 mL    The total shown is the total volume documented since  Anesthesia Start was filed.    Total Intake 0 mL          Specimen: No specimens collected       Drains and/or Catheters: * None in log *    Tourniquet Times:   * Missing tourniquet times found for documented tourniquets in lo *     Implants:  Implants       Type Name Action Serial No.      Joint Knee BONE CEMENT, SMART SET, HIGH VISCOSITY, 40GM - [id number] Implanted      Joint Knee FEMORAL, ATTUNE PS, CAMELIA, NRW, SZ 5, RT - [id number] Implanted      Joint Knee TIBAL BASE ATTUNE FB, SZ 4 CAMELIA - [id number] Implanted      Joint Knee INSERT, ATTUNE PS FB, SZ 5, 7MM - [id number] Implanted      Joint Knee DOME, PATELLA, MEDIALIZED, 35MM - [id number] Implanted               Findings: advanced OA    Indications: Zarina Holliday is an 79 y.o. female who is having surgery for Unilateral primary osteoarthritis, right knee [M17.11].     The patient was seen in the preoperative area. The risks, benefits, complications, treatment options, non-operative alternatives, expected recovery and outcomes were discussed with the patient. The possibilities of reaction to medication, pulmonary aspiration, injury to surrounding structures, bleeding, recurrent infection, the need for additional procedures, failure to diagnose a condition, and creating a complication requiring transfusion or operation were discussed with the patient. The patient concurred with the proposed plan, giving informed consent.  The site of surgery was properly noted/marked if necessary per policy. The patient has been actively warmed in preoperative area. Preoperative antibiotics have been ordered and given within 1 hours of incision. Venous thrombosis prophylaxis have been ordered including bilateral sequential compression devices    Procedure Details: R TKA  Evidence of Infection: No   Complications:  None; patient tolerated the procedure well.    Disposition: PACU - hemodynamically stable.  Condition: stable     PREOPERATIVE DIAGNOSIS:  right knee  osteoarthritis     POSTOPERATIVE DIAGNOSIS:  right knee osteoarthritis     OPERATION/PROCEDURE:  right total knee arthroplasty     SURGEON:  Moses Schneider MD     ASSISTANT(S):  Wilfredo Fernando MD PGY4     ANESTHESIA:  Spinal, adductor canal block     LOCATION:  [facility]     ESTIMATED BLOOD LOSS AND INTRAVENOUS FLUIDS:  Please see Anesthesia record     COMPONENTS USED:  DePuy Attune knee system  1. 5N femur  2. 4 tibia  3. 35 patella  4. 7mm insert     BRIEF CLINICAL NOTE:  The patient is a 80 yo female with advanced osteoarthritis of  their right knee.  They failed conservative treatment and wished to  proceed with total knee arthroplasty which is indicated at this time.  We discussed the risks, benefits, and alternatives of surgery  including but not limited to infection, damage to vessel or nerve,  bleeding, soft tissue pain, DVT, PE, problems with anesthesia, lack  of range of motion, continued soft tissue pain, need for further  surgery, etc.  Consent was obtained.  They were taken to the operating  room in order to undergo the procedure.    PRE-OP ROM:      OPERATIVE REPORT:  The patient was transferred to the operating room table.  Time-out  was performed confirming patient name, medical record number,  surgical site, and adequate and appropriate imaging.  The patient  received appropriate IV antibiotics as well as tranexamic acid.  Once we were prepped and draped,midline skin incision was performed.  Hemostasis was obtained using electrocautery.  Underlying extensor mechanism was easily identified and entered using a standard medial parapatellar arthrotomy performedsharply.  The infrapatellar and suprapatellar fat pads were debrided.Initial medial release was performed.  The patella was subluxed laterally.  Distal femur was entered using a step drill.  Distal  femoral cut was performed, and the femur was sized and prepared.  The tibia was subluxed forward using a double-prong PCL retractor.  The  proximal tibia was cut in neutral mechanical axis using an  extramedullary tibial guide.  We then used the lamina  to clear out the posterior osteophytes and clear the meniscal remnants. We then sized the tibia and prepared it. We cut the patella freehand using a caliper to restore the native patellar height. We then trialed with multiple polyethylene inserts.  Once I was happy with the position of components and stability of the knee, all trial components were removed.  The  cut bony surfaces were thoroughly irrigated and dried.  The posterior capsule and surrounding soft tissues were injected with ESTEPHANIA solution we then cemented into place the real components.  The knee was held in extension until all cement was hardened.  All extraneous cement was  removed.  We then closed the extensor mechanism over a drain using interrupted #2 Ethibond as well as interrupted 0 Vicryl.  The subcu was closed with interrupted 2-0 Vicryl.  The skin was closed with  running 3-0 Biosyn followed by Dermabond and Steri-Strips.  Dry sterile dressing was placed.  The patient was transferred back to the hospital bed without incident or complication.  She will be  weightbearing as tolerated.  They will be on ASA and SCDs for DVT prophylaxis.     Additional Details: WBAT, ASA    Attending Attestation: I was present and scrubbed for the key portions of the procedure.

## 2025-05-14 NOTE — CONSULTS
Consults    Reason For Consult  Medical management for history of CVA, GERD, recurrent UTIs, hyperlipidemia, postop nausea and vomiting and postop pain management    History Of Present Illness  Zarina Holliday is a 79 y.o. female presenting with right total knee replacement patient is hemodynamically stable upon arrival from PACU although she is suffering from nausea unable to eat.     Past Medical History  She has a past medical history of Anemia, Arthritis, CVA (cerebral vascular accident) (Multi) (2025), GERD (gastroesophageal reflux disease), History of recurrent UTIs, Hyperlipidemia, Leg edema, OAB (overactive bladder), Osteopenia after menopause, PONV (postoperative nausea and vomiting), and TIA (transient ischemic attack) (10/2022).    She has no past medical history of Asthma, Awareness under anesthesia, CHF (congestive heart failure), Chronic kidney disease, COPD (chronic obstructive pulmonary disease) (Multi), Coronary artery disease, Delayed emergence from general anesthesia, Diabetes mellitus type I (Multi), Disease of thyroid gland, Hard to intubate, History of blood transfusion, HL (hearing loss), Irregular heart beat, Malignant hyperthermia, Myocardial infarction (Multi), Personal history of irradiation, Pseudocholinesterase deficiency, Refusal of blood product, Seizure disorder (Multi), Sleep apnea, Type 2 diabetes mellitus, or Vision loss.    Surgical History  She has a past surgical history that includes  section, classic; Total abdominal hysterectomy w/ bilateral salpingoophorectomy; Tonsillectomy; Cataract extraction; MR angio neck wo IV contrast (2022); MR angio head wo IV contrast (2022); Colonoscopy; Total knee arthroplasty (Left, 2024); Salivary gland surgery (2023); Dental surgery; Facial cosmetic surgery; Other surgical history; Rhinoplasty; and Cosmetic surgery ().     Social History  She reports that she has never smoked. She has never used  smokeless tobacco. She reports that she does not currently use alcohol. She reports that she does not use drugs.    Family History  Family History[1]     Allergies  Adhesive tape-silicones    Review of Systems  10 system review is noncontributory  Physical Exam  Vital signs are stable  Patient is alert in no acute distress  Lungs are clear to auscultation and percussion  Cardiac is regular rate and rhythm normal S1-S2 without murmur gallop rub click S3 or S4  Abdomen is soft nontender positive bowel sounds there is no hepatosplenomegaly or CVA tenderness appreciated  Extremities without cyanosis clubbing erythema or edema  Operative site exam per Ortho extremity is warm pulses are intact patient can dorsiflex plantarflex  Last Recorded Vitals  /54 (BP Location: Left arm, Patient Position: Lying)   Pulse 65   Temp 36.5 °C (97.7 °F) (Temporal)   Resp 16   Wt 64.4 kg (142 lb)   SpO2 97%     Relevant Results    Current Facility-Administered Medications:     acetaminophen (Tylenol) tablet 650 mg, 650 mg, oral, q6h ANTONIA, Wilfredo Christensen DO, 650 mg at 05/14/25 1809    aspirin EC tablet 81 mg, 81 mg, oral, BID, Wilfredo Christensen DO    [START ON 5/15/2025] atorvastatin (Lipitor) tablet 10 mg, 10 mg, oral, Daily, Wilfredo Christensen DO    azelastine (Astelin) 137 mcg (0.1 %) nasal spray 2 spray, 2 spray, Each Nostril, BID, Wilfredo Christensen DO    benzocaine-menthol (Cepastat Sore Throat) lozenge 1 lozenge, 1 lozenge, Mouth/Throat, q4h PRN, Wilfredo Christensen DO    bisacodyl (Dulcolax) EC tablet 10 mg, 10 mg, oral, Daily PRN, Wilfredo Christensen DO    bisacodyl (Dulcolax) suppository 10 mg, 10 mg, rectal, Daily PRN, Wilfredo Christensen DO    ceFAZolin (Ancef) 2 g in dextrose (iso)  mL, 2 g, intravenous, q8h, Wilfredo Christensen DO    [START ON 5/15/2025] cholecalciferol (Vitamin D-3) tablet 25 mcg, 25 mcg, oral, Daily, Wilfredo Christensen DO    cyclobenzaprine (Flexeril) tablet 5 mg, 5 mg, oral, TID  PRN, Wilfredo Christensen DO    docusate sodium (Colace) capsule 100 mg, 100 mg, oral, BID, Wilfredo Christensen DO    fluticasone (Flonase) nasal spray 2 spray, 2 spray, Each Nostril, Daily, Wilfredo Christensen DO    HYDROmorphone (Dilaudid) injection 0.5 mg, 0.5 mg, intravenous, q2h PRN, Wilfredo Christensen DO    ketorolac (Toradol) injection 15 mg, 15 mg, intravenous, q6h, Wilfredo Christensen DO, 15 mg at 05/14/25 1810    lactated Ringer's infusion, 50 mL/hr, intravenous, Continuous, Wilfredo Christensen DO, Last Rate: 50 mL/hr at 05/14/25 1810, 50 mL/hr at 05/14/25 1810    lactated Ringer's infusion, 50 mL/hr, intravenous, Continuous, Criss Rod PA-C, Last Rate: 800 mL/hr at 05/14/25 1521, Rate Change at 05/14/25 1521    metoclopramide (Reglan) tablet 10 mg, 10 mg, oral, q6h PRN **OR** metoclopramide (Reglan) injection 10 mg, 10 mg, intravenous, q6h PRN, Wilfredo Christensen DO    naloxone (Narcan) injection 0.2 mg, 0.2 mg, intravenous, q5 min PRN, Wilfredo Christensen DO    ondansetron ODT (Zofran-ODT) disintegrating tablet 4 mg, 4 mg, oral, q8h PRN **OR** ondansetron (Zofran) injection 4 mg, 4 mg, intravenous, q8h PRN, Wilfredo Christensen DO, 4 mg at 05/14/25 1824    oxyBUTYnin (Ditropan) tablet 5 mg, 5 mg, oral, TID, Wilfredo Christensen DO    oxyCODONE (Roxicodone) immediate release tablet 10 mg, 10 mg, oral, q4h PRN, Wilfredo Christensen DO    oxyCODONE (Roxicodone) immediate release tablet 5 mg, 5 mg, oral, q6h PRN, Wilfredo Christensen DO    oxygen (O2) therapy, 2 L/min, inhalation, Continuous, Wilfredo Christensen DO    [START ON 5/15/2025] pantoprazole (ProtoNix) EC tablet 40 mg, 40 mg, oral, Daily before breakfast, Wilfredo Christensen DO    [START ON 5/15/2025] polyethylene glycol (Glycolax, Miralax) packet 17 g, 17 g, oral, Daily, Wilfredo Christensen DO    scopolamine (Transderm-Scop) patch 1 patch, 1 patch, transdermal, q72h, Criss Rod PA-C, 1 patch at 05/14/25 1151    No results found for this or  any previous visit (from the past 24 hours).        Assessment/Plan   Status post right total knee replaced  -Management per Ortho  -PT  -Pain control using multimodal narcotics/nonnarcotics and providing for medication for breakthrough pain  -Supportive care    Hyperlipidemia  -Continue Crestor    Overactive bladder  -Continue Ditropan    GERD  - Continue protonix    Postop nausea and vomiting  -Give IV Zofran every 4 hours if after second dose patient is still nauseous or vomiting changed to Phenergan 6.25 mg IV    DVT prophylaxis  -SCDs  -Aspirin protocol  -Ambulate      Sylvie Huber MD             [1]   Family History  Problem Relation Name Age of Onset    Other (stroke) Mother Madyson REYES Julius Quiles 72    Leukemia Father Boubacar Fletcher sr     Alcohol abuse Father Boubacar Fletcher sr     Asthma Father Boubacar Fletcher sr     COPD Father Boubacar Fletcher sr     Depression Father Boubacar Fletcher sr     Polycythemia Father Boubacar Fletcher sr     Other (htn) Sister      Lung disease Brother boubacar Sin Paternal Grandmother      Depression Other FAMILY HISTORY     COPD Other FAMILY HISTORY     Asthma Other FAMILY HISTORY     Diabetes type II Other FAMILY HISTORY

## 2025-05-14 NOTE — ANESTHESIA PROCEDURE NOTES
Spinal Block    Patient location during procedure: OR  Start time: 5/14/2025 1:10 PM  End time: 5/14/2025 1:25 PM  Reason for block: primary anesthetic  Staffing  Performed: attending   Authorized by: Jimena Salvador MD    Performed by: REMEDIOS Dumont    Preanesthetic Checklist  Completed: patient identified, IV checked, risks and benefits discussed, surgical consent, pre-op evaluation, timeout performed and sterile techniques followed  Block Timeout  RN/Licensed healthcare professional reads aloud to the Anesthesia provider and entire team: Patient identity, procedure with side and site, patient position, and as applicable the availability of implants/special equipment/special requirements.  Patient on coagulant treatment: no  Timeout performed at:   Spinal Block  Patient position: sitting  Prep: Betadine  Sterility prep: cap, drape, gloves, hand hygiene and mask  Sedation level: no sedation  Patient monitoring: continuous pulse oximetry, blood pressure and heart rate  Approach: midline  Vertebral space: L3-4  Injection technique: single-shot  Needle  Needle type: Caren   Needle gauge: 22 G  Needle length: 3.5 in  Free flowing CSF: yes    Assessment bilateral  Block outcome: patient comfortable  Procedure assessment: patient tolerated procedure well with no immediate complications  Additional Notes  Spinal kit expiration 6-30-26

## 2025-05-14 NOTE — PERIOPERATIVE NURSING NOTE
Pt did well in recovery. Pt moving bilateral lower ext. Pt with no c/o pain or ponv. Pt report called to floor. Pt ready for floor transfer.

## 2025-05-14 NOTE — ANESTHESIA PREPROCEDURE EVALUATION
Patient: Zarina Holliday    Procedure Information       Date/Time: 25 3398    Procedure: Knee Replacement Total Cement Unilat (DePuy Attune Knee, Pineapple [place]) ** Overnight ** (Right: Knee) - DePuy Attune Knee, Pineapple [place]    [facility]    Surgeons: Moses Schneider MD            Relevant Problems   Anesthesia (within normal limits)      Cardiac   (+) Hyperlipidemia      Neuro   (+) CVA (cerebral vascular accident) (Multi) (TIA)   (+) Peripheral neuropathy      GI   (+) Gastroesophageal reflux disease      /Renal   (+) UTI (urinary tract infection)      Endocrine   (+) Adult myxedema      Hematology   (+) Anemia      Musculoskeletal   (+) Osteoarthritis   (+) Primary osteoarthritis of both knees   (+) Primary osteoarthritis of left knee   (+) Unilateral primary osteoarthritis, left knee   (+) Unilateral primary osteoarthritis, right knee      ID   (+) UTI (urinary tract infection)     Past Medical History:   Diagnosis Date    Anemia     Arthritis     GERD (gastroesophageal reflux disease)     History of recurrent UTIs     on macrobid    Hyperlipidemia     Leg edema     OAB (overactive bladder)     Osteopenia after menopause     PONV (postoperative nausea and vomiting)     TIA (transient ischemic attack) 10/2022    Questionable: while on vacation in late 2022, she had new onset R eye blurriness and fatigue that was persistent on and off for 1-2 wks; Brain scans were negative. On Aspirin and Statin.     Past Surgical History:   Procedure Laterality Date    CATARACT EXTRACTION       SECTION, CLASSIC      COLONOSCOPY      COSMETIC SURGERY      DENTAL SURGERY      FACIAL COSMETIC SURGERY      face lift    MR HEAD ANGIO WO IV CONTRAST  2022    MR HEAD ANGIO WO IV CONTRAST 2022 [facility]    MR NECK ANGIO WO IV CONTRAST  2022    MR NECK ANGIO WO IV CONTRAST 2022 [facility]    OTHER SURGICAL HISTORY      RHINOPLASTY       SALIVARY GLAND SURGERY  04/17/2023    Left lower lip excision:Left lower lip, excision: - Mucocele and minor salivary glands exhibiting chronic sialadenitis.    TONSILLECTOMY      TOTAL ABDOMINAL HYSTERECTOMY W/ BILATERAL SALPINGOOPHORECTOMY      TOTAL KNEE ARTHROPLASTY Left 04/22/2024     Allergies   Allergen Reactions    Adhesive Tape-Silicones Other     Blisters, chin itching       Clinical information reviewed:                   NPO Detail:  No data recorded     Physical Exam    Airway  Mallampati: II  TM distance: >3 FB  Neck ROM: full  Mouth opening: 3 or more finger widths     Cardiovascular    Dental    Pulmonary    Abdominal            Anesthesia Plan    History of general anesthesia?: yes  History of complications of general anesthesia?: no    ASA 3     regional and spinal     intravenous induction   Postoperative pain plan includes opioids.  Anesthetic plan and risks discussed with patient.  Use of blood products discussed with patient who.

## 2025-05-14 NOTE — NURSING NOTE
Assumed care of patient from PACU. Patient underwent a right knee surgery today with Dr. Schneider. Patient is awake and alert. Expressed being tired from lack of sleep lastnight. No c/o of pain or nausea voiced at this time. Hemovac drain to surgical site. Patient has ace, teds, meplix ag and iceman to surgical leg. Oriented patient to room, menu and call light.

## 2025-05-14 NOTE — NURSING NOTE
Vitals:    05/14/25 1950   BP: 137/56   Pulse: 71   Resp: 16   Temp: 36.6 °C (97.9 °F)   SpO2: 97%     Assumed care of patient. Patient in bed resting; no complaint of pain at this time. Reviewed plan of care; no concern. Informed patient to call if need assistance to the bathroom. Vital signs are stable; IV fluids are infusing. Bed in low and locked position; call light within reach; bed alarm activated. Will continue to monitor treatment progress.

## 2025-05-14 NOTE — ANESTHESIA POSTPROCEDURE EVALUATION
Patient: Zarina Holliday    Procedure Summary       Date: 05/14/25 Room / [facility]: [facility] / [facility]    Anesthesia Start: 1303 Anesthesia Stop: 1528    Procedure: Knee Replacement Total Cement Unilat (Right: Knee) Diagnosis:       Unilateral primary osteoarthritis, right knee      (Unilateral primary osteoarthritis, right knee [M17.11])    Surgeons: Moses Schneider MD Responsible Provider: Jimena Salvador MD    Anesthesia Type: regional, spinal ASA Status: 3            Anesthesia Type: regional, spinal    Vitals Value Taken Time   /54 05/14/25 15:34   Temp 36 05/14/25 15:34   Pulse 68 05/14/25 15:34   Resp 16 05/14/25 15:34   SpO2 100 05/14/25 15:34       Anesthesia Post Evaluation    Patient location during evaluation: bedside  Patient participation: complete - patient participated  Level of consciousness: awake and alert  Pain management: adequate  Multimodal analgesia pain management approach  Airway patency: patent  Cardiovascular status: acceptable  Respiratory status: acceptable  Hydration status: acceptable  Postoperative Nausea and Vomiting: none        No notable events documented.

## 2025-05-14 NOTE — CARE PLAN
The patient's goals for the shift include  safety     The clinical goals for the shift include  pain

## 2025-05-14 NOTE — CARE PLAN
Patient resting comfortably at time of exam. Pain is uncontrolled.. States it is still a 6/10 at rest and 10/10 with movement. Request increase in pain meds. No nausea. Chart reviewed.  Vital signs are stable.  Nursing notes no issues.

## 2025-05-14 NOTE — ANESTHESIA PROCEDURE NOTES
Peripheral Block    Patient location during procedure: pre-op  Medication administered at: 5/14/2025 12:33 PM  End time: 5/14/2025 12:34 PM  Reason for block: at surgeon's request and post-op pain management  Staffing  Performed: attending   Authorized by: Gregory Nielsen MD    Performed by: Gregory Nielsen MD  Preanesthetic Checklist  Completed: patient identified, IV checked, site marked, risks and benefits discussed, surgical consent, monitors and equipment checked, pre-op evaluation and timeout performed   Timeout performed at: 5/14/2025 12:29 PM  Peripheral Block  Patient position: laying flat  Prep: ChloraPrep  Patient monitoring: heart rate, cardiac monitor and continuous pulse ox  Block type: adductor canal  Laterality: right  Injection technique: single-shot  Guidance: ultrasound guided  Needle  Needle gauge: 21 G  Needle length: 10 cm  Needle localization: ultrasound guidance  Test dose: negative  Assessment  Injection assessment: negative aspiration for heme, no paresthesia on injection, incremental injection and local visualized surrounding nerve on ultrasound  Paresthesia pain: none  Heart rate change: no  Slow fractionated injection: yes  Medications Administered  ropivacaine (NAROPIN) 5 MG/ML Perineural - perineural injection   20 mL - 5/14/2025 12:33:00 PM  dexmedeTOMIDine in 0.9 % NaCL - perineural injection   8 mcg - 5/14/2025 12:33:00 PM  dexAMETHasone (Decadron) injection - perineural injection   5 mg - 5/14/2025 12:33:00 PM

## 2025-05-15 ENCOUNTER — DOCUMENTATION (OUTPATIENT)
Dept: HOME HEALTH SERVICES | Facility: HOME HEALTH | Age: 80
End: 2025-05-15
Payer: MEDICARE

## 2025-05-15 VITALS
TEMPERATURE: 97.5 F | RESPIRATION RATE: 16 BRPM | HEIGHT: 64 IN | OXYGEN SATURATION: 97 % | BODY MASS INDEX: 24.24 KG/M2 | SYSTOLIC BLOOD PRESSURE: 112 MMHG | DIASTOLIC BLOOD PRESSURE: 47 MMHG | WEIGHT: 142 LBS | HEART RATE: 64 BPM

## 2025-05-15 PROBLEM — Z96.651 HISTORY OF RIGHT KNEE JOINT REPLACEMENT: Status: RESOLVED | Noted: 2025-05-13 | Resolved: 2025-05-15

## 2025-05-15 PROBLEM — M17.11 UNILATERAL PRIMARY OSTEOARTHRITIS, RIGHT KNEE: Status: RESOLVED | Noted: 2025-03-02 | Resolved: 2025-05-15

## 2025-05-15 LAB
ANION GAP SERPL CALC-SCNC: 15 MMOL/L (ref 10–20)
BUN SERPL-MCNC: 20 MG/DL (ref 6–23)
CALCIUM SERPL-MCNC: 9.2 MG/DL (ref 8.6–10.3)
CHLORIDE SERPL-SCNC: 102 MMOL/L (ref 98–107)
CO2 SERPL-SCNC: 25 MMOL/L (ref 21–32)
CREAT SERPL-MCNC: 0.89 MG/DL (ref 0.5–1.05)
EGFRCR SERPLBLD CKD-EPI 2021: 66 ML/MIN/1.73M*2
ERYTHROCYTE [DISTWIDTH] IN BLOOD BY AUTOMATED COUNT: 12.2 % (ref 11.5–14.5)
GLUCOSE SERPL-MCNC: 135 MG/DL (ref 74–99)
HCT VFR BLD AUTO: 35 % (ref 36–46)
HGB BLD-MCNC: 11.4 G/DL (ref 12–16)
MCH RBC QN AUTO: 30.3 PG (ref 26–34)
MCHC RBC AUTO-ENTMCNC: 32.6 G/DL (ref 32–36)
MCV RBC AUTO: 93 FL (ref 80–100)
NRBC BLD-RTO: ABNORMAL /100{WBCS}
PLATELET # BLD AUTO: 204 X10*3/UL (ref 150–450)
POTASSIUM SERPL-SCNC: 4.7 MMOL/L (ref 3.5–5.3)
RBC # BLD AUTO: 3.76 X10*6/UL (ref 4–5.2)
SODIUM SERPL-SCNC: 137 MMOL/L (ref 136–145)
WBC # BLD AUTO: 12.6 X10*3/UL (ref 4.4–11.3)

## 2025-05-15 PROCEDURE — 99221 1ST HOSP IP/OBS SF/LOW 40: CPT | Performed by: EMERGENCY MEDICINE

## 2025-05-15 PROCEDURE — 97530 THERAPEUTIC ACTIVITIES: CPT | Mod: GP

## 2025-05-15 PROCEDURE — 2500000004 HC RX 250 GENERAL PHARMACY W/ HCPCS (ALT 636 FOR OP/ED): Performed by: PHYSICAL THERAPIST

## 2025-05-15 PROCEDURE — 80048 BASIC METABOLIC PNL TOTAL CA: CPT | Performed by: PHYSICAL THERAPIST

## 2025-05-15 PROCEDURE — 2500000001 HC RX 250 WO HCPCS SELF ADMINISTERED DRUGS (ALT 637 FOR MEDICARE OP): Performed by: PHYSICAL THERAPIST

## 2025-05-15 PROCEDURE — 97116 GAIT TRAINING THERAPY: CPT | Mod: GP

## 2025-05-15 PROCEDURE — 97162 PT EVAL MOD COMPLEX 30 MIN: CPT | Mod: GP

## 2025-05-15 PROCEDURE — 36415 COLL VENOUS BLD VENIPUNCTURE: CPT | Performed by: PHYSICAL THERAPIST

## 2025-05-15 PROCEDURE — 2500000002 HC RX 250 W HCPCS SELF ADMINISTERED DRUGS (ALT 637 FOR MEDICARE OP, ALT 636 FOR OP/ED): Performed by: PHYSICAL THERAPIST

## 2025-05-15 PROCEDURE — 97110 THERAPEUTIC EXERCISES: CPT | Mod: GP

## 2025-05-15 PROCEDURE — 85027 COMPLETE CBC AUTOMATED: CPT | Performed by: PHYSICAL THERAPIST

## 2025-05-15 PROCEDURE — 7100000011 HC EXTENDED STAY RECOVERY HOURLY - NURSING UNIT

## 2025-05-15 RX ORDER — CYCLOBENZAPRINE HCL 5 MG
5 TABLET ORAL 3 TIMES DAILY PRN
Qty: 20 TABLET | Refills: 0 | Status: SHIPPED | OUTPATIENT
Start: 2025-05-15 | End: 2025-05-15 | Stop reason: HOSPADM

## 2025-05-15 RX ORDER — OXYCODONE HYDROCHLORIDE 5 MG/1
5 TABLET ORAL EVERY 6 HOURS PRN
Qty: 15 TABLET | Refills: 0 | Status: SHIPPED | OUTPATIENT
Start: 2025-05-15 | End: 2025-05-22

## 2025-05-15 RX ORDER — CYCLOBENZAPRINE HCL 10 MG
10 TABLET ORAL 3 TIMES DAILY PRN
Qty: 30 TABLET | Refills: 0 | Status: SHIPPED | OUTPATIENT
Start: 2025-05-15

## 2025-05-15 RX ORDER — ASPIRIN 81 MG/1
81 TABLET ORAL 2 TIMES DAILY
Qty: 90 TABLET | Refills: 0 | Status: SHIPPED | OUTPATIENT
Start: 2025-05-15 | End: 2025-06-29

## 2025-05-15 RX ADMIN — ACETAMINOPHEN 650 MG: 325 TABLET ORAL at 11:53

## 2025-05-15 RX ADMIN — DOCUSATE SODIUM 100 MG: 100 CAPSULE, LIQUID FILLED ORAL at 08:16

## 2025-05-15 RX ADMIN — KETOROLAC TROMETHAMINE 15 MG: 15 INJECTION, SOLUTION INTRAMUSCULAR; INTRAVENOUS at 11:53

## 2025-05-15 RX ADMIN — CEFAZOLIN SODIUM 2 G: 2 INJECTION, SOLUTION INTRAVENOUS at 04:05

## 2025-05-15 RX ADMIN — PANTOPRAZOLE SODIUM 40 MG: 40 TABLET, DELAYED RELEASE ORAL at 08:16

## 2025-05-15 RX ADMIN — Medication 25 MCG: at 08:16

## 2025-05-15 RX ADMIN — KETOROLAC TROMETHAMINE 15 MG: 15 INJECTION, SOLUTION INTRAMUSCULAR; INTRAVENOUS at 05:14

## 2025-05-15 RX ADMIN — POLYETHYLENE GLYCOL 3350 17 G: 17 POWDER, FOR SOLUTION ORAL at 08:16

## 2025-05-15 RX ADMIN — ASPIRIN 81 MG: 81 TABLET, COATED ORAL at 08:16

## 2025-05-15 RX ADMIN — ATORVASTATIN CALCIUM 10 MG: 20 TABLET, FILM COATED ORAL at 08:16

## 2025-05-15 RX ADMIN — OXYBUTYNIN CHLORIDE 5 MG: 5 TABLET ORAL at 08:16

## 2025-05-15 RX ADMIN — ACETAMINOPHEN 650 MG: 325 TABLET ORAL at 05:12

## 2025-05-15 ASSESSMENT — PAIN SCALES - GENERAL
PAINLEVEL_OUTOF10: 3
PAINLEVEL_OUTOF10: 0 - NO PAIN
PAINLEVEL_OUTOF10: 0 - NO PAIN
PAINLEVEL_OUTOF10: 1
PAINLEVEL_OUTOF10: 0 - NO PAIN

## 2025-05-15 ASSESSMENT — COGNITIVE AND FUNCTIONAL STATUS - GENERAL
WALKING IN HOSPITAL ROOM: A LITTLE
DRESSING REGULAR LOWER BODY CLOTHING: A LITTLE
HELP NEEDED FOR BATHING: A LITTLE
MOBILITY SCORE: 18
MOVING TO AND FROM BED TO CHAIR: A LITTLE
WALKING IN HOSPITAL ROOM: A LITTLE
TURNING FROM BACK TO SIDE WHILE IN FLAT BAD: A LITTLE
STANDING UP FROM CHAIR USING ARMS: A LITTLE
MOVING TO AND FROM BED TO CHAIR: A LITTLE
MOVING FROM LYING ON BACK TO SITTING ON SIDE OF FLAT BED WITH BEDRAILS: A LITTLE
MOBILITY SCORE: 21
TOILETING: A LITTLE
CLIMB 3 TO 5 STEPS WITH RAILING: A LITTLE
DAILY ACTIVITIY SCORE: 21
CLIMB 3 TO 5 STEPS WITH RAILING: A LITTLE

## 2025-05-15 ASSESSMENT — PAIN DESCRIPTION - DESCRIPTORS
DESCRIPTORS: DISCOMFORT
DESCRIPTORS: DISCOMFORT

## 2025-05-15 ASSESSMENT — PAIN - FUNCTIONAL ASSESSMENT
PAIN_FUNCTIONAL_ASSESSMENT: 0-10

## 2025-05-15 ASSESSMENT — ACTIVITIES OF DAILY LIVING (ADL): ADL_ASSISTANCE: INDEPENDENT

## 2025-05-15 NOTE — NURSING NOTE
Vitals:    05/15/25 0448   BP: 118/53   Pulse: 67   Resp: 16   Temp: 36.8 °C (98.2 °F)   SpO2: 98%     Patient in bed sleeping. No signs of discomfort. Patient ambulated to the bathroom during the night. Patient very unsteady and both legs buckling with x2 assist. Vitals are stable; IV fluids infusing. Bed in low and locked position; call light within reach; bed alarm activated.

## 2025-05-15 NOTE — NURSING NOTE
05/15/2025 13:56  NURSING DISCHARGE PLANNING NOTE  Patient discharged to [facility].  IV (x1) removed; catheter (x1) intact.  RN reviewed discharge paperwork with patient.  Patient indicated understanding and had no questions.  Pt received a copy of the paperwork.  All belongings in pt possession at the time of discharge.  Jessica Martinez RN

## 2025-05-15 NOTE — CARE PLAN
The patient's goals for the shift include Pain management    The clinical goals for the shift include Pain management and safety      Problem: Pain - Adult  Goal: Verbalizes/displays adequate comfort level or baseline comfort level  Outcome: Progressing     Problem: Safety - Adult  Goal: Free from fall injury  Outcome: Progressing     Problem: Discharge Planning  Goal: Discharge to home or other facility with appropriate resources  Outcome: Progressing     Problem: Chronic Conditions and Co-morbidities  Goal: Patient's chronic conditions and co-morbidity symptoms are monitored and maintained or improved  Outcome: Progressing     Problem: Nutrition  Goal: Nutrient intake appropriate for maintaining nutritional needs  Outcome: Progressing

## 2025-05-15 NOTE — HH CARE COORDINATION
Home Care received a Referral for Physical Therapy. We have processed the referral for a Start of Care on 5/16/25.     If you have any questions or concerns, please feel free to contact us at [phone]. Follow the prompts, enter your five digit zip code, and you will be directed to your care team on [email or web address].

## 2025-05-15 NOTE — NURSING NOTE
Vitals:    05/14/25 2352   BP: 112/80   Pulse: 59   Resp: 16   Temp: 36.3 °C (97.3 °F)   SpO2: 97%     Patient ambulated to the bathroom; tolerated fairly well; dizzy on standing. Patient reported no pain. Vitals stable and IV fluids infusing. Bed in low and locked position; call light within reach; bed alarm activated. Will continue to monitor treatment progress.

## 2025-05-15 NOTE — PROGRESS NOTES
Physical Therapy Evaluation & Treatment    Patient Name: Zarina Holliday  MRN: [id number]  Department: [facility]  Room: [address] [place] [address]  Today's Date: 5/15/2025   Time Calculation  Start Time: 0915  Stop Time: 1044  Time Calculation (min): 89 min    Assessment/Plan   PT Assessment  PT Assessment Results: Decreased strength, Impaired balance, Decreased mobility, Orthopedic restrictions, Pain  Rehab Prognosis: Good  Barriers to Discharge Home: Physical needs  Physical Needs: Intermittent mobility assistance needed  Evaluation/Treatment Tolerance: Patient tolerated treatment well  Medical Staff Made Aware: Yes  End of Session Communication: Bedside nurse  Assessment Comment: PT eval moderate with evolving status. Pt presenting to eval with deficits in functional mobility, impaired balance, and impaired strength. Pt reduced strength in R quad leading to R buckling with WB. PT donned knee immobilizer for safety and patient required continuous cueing to maintain knee extension with stance phase and focusing on task at hand. No buckle or LOB when patient stays focused with knee immobilizer. She was able to tolerate transfers, ambulation, and stair navigation with supervision to Cgx1 with RW and knee immobilizer in place. She is highly motivated for recovery.  Pt understanding of knee precautions and maintained them during session. Pt understanding of HEP and completed ther ex during session.. PT recommends [facility] PT with 24 hour supervision/assistance for continued improvement in mobility, strength, and pain management..    End of Session Patient Position: Up in chair, Alarm on (ice on R knee, call bell in reach, all needs met.)   IP OR SWING BED PT PLAN  Inpatient or Swing Bed: Inpatient  PT Plan  Treatment/Interventions: Transfer training, Stair training, Gait training, Strengthening, Endurance training, Range of motion, Therapeutic activity, Home exercise program, Positioning (education with knee immobilizer for RLE)  PT Plan:  Ongoing PT  PT Frequency: BID  PT Discharge Recommendations: Low intensity level of continued care  Equipment Recommended upon Discharge: Wheeled walker, Other (comment), Straight cane (knee immobilizer)  PT Recommended Transfer Status: Assistive device, Stand by assist  PT - OK to Discharge: Yes (with knee immobilizer on RLE)      Subjective     PT Visit Info:  PT Received On: 05/15/25  General Visit Information:  General  Reason for Referral: R TKA  Referred By: Dr. Schneider  Past Medical History Relevant to Rehab: GERD, PONV, UTI, anemia, OA, OAB, HLD, leg edema, TIA, C section, dental surgery, face lift, TKA, salivary gland surgery, hysterectomy, salpingoophorectomy, neuropathy  Family/Caregiver Present: No  Prior to Session Communication: Bedside nurse  Patient Position Received: Up in chair  General Comment: cleared by RN and agreeable to session  Home Living:  Home Living  Type of Home: Apartment  Lives With: Spouse  Home Adaptive Equipment: Walker rolling or standard, Cane  Home Layout: One level  Home Access: Stairs to enter with rails, Elevator  Entrance Stairs-Rails: Left  Entrance Stairs-Number of Steps: 3 steps with left hand rail to get to front door, then elevator to take her up to apartment floor level  Home Living Comments:  to assist ,daughter is available  Prior Level of Function:  Prior Function Per Pt/Caregiver Report  Level of [place]: Independent with ADLs and functional transfers  ADL Assistance: Independent  Homemaking Assistance: Independent  Ambulatory Assistance: Independent  Vocational: Retired  Leisure: swimming, yoga, pilates  Prior Function Comments: no falls within last 6 months  Precautions:  Precautions  LE Weight Bearing Status: Weight Bearing as Tolerated  Medical Precautions: Fall precautions  Post-Surgical Precautions: Right total knee precautions           Objective   Pain:  Pain Assessment  Pain Assessment: 0-10  0-10 (Numeric) Pain Score: 1  Pain Type: Surgical  pain  Pain Location: Knee  Pain Orientation: Right  Pain Descriptors: Discomfort  Pain Frequency: Intermittent  Pain Onset: Ongoing  Pain Interventions: Cold applied, Rest, Repositioned, Ambulation/increased activity, Elevated, Emotional support  Response to Interventions: Content/relaxed  Cognition:  Cognition  Overall Cognitive Status: Within Functional Limits  Orientation Level: Oriented X4  Attention: Within Functional Limits  Memory: Within Funtional Limits  Problem Solving: Within Functional Limits  Numeric Reasoning: Within Functional Limits  Abstract Reasoning: Within Functional Limits  Safety/Judgement: Within Functional Limits  Insight: Within function limits  Impulsive: Mildly  Processing Speed: Within funtional limits    General Assessments:  General Observation  General Observation: dressing dry and intact on R knee, hemovac intact and draining mild sanguinous fluid. PT removed drain and placed 2ABDs with ace wrap. no drainage once drain removed.       Activity Tolerance  Endurance: Endurance does not limit participation in activity    Sensation  Light Touch: No apparent deficits    Perception  Inattention/Neglect: Appears intact  Initiation: Appears intact  Motor Planning: Appears intact  Perseveration: Not present      Coordination  Movements are Fluid and Coordinated: Yes    Postural Control  Postural Control: Within Functional Limits    Static Sitting Balance  Static Sitting-Balance Support: Feet supported  Static Sitting-Level of Assistance: Independent  Dynamic Sitting Balance  Dynamic Sitting-Balance Support: Feet supported  Dynamic Sitting-Level of Assistance: Independent    Static Standing Balance  Static Standing-Balance Support: Bilateral upper extremity supported  Static Standing-Level of Assistance: Close supervision  Static Standing-Comment/Number of Minutes: with RW and knee immobilizer on RLE  Dynamic Standing Balance  Dynamic Standing-Balance Support: Bilateral upper extremity  supported  Dynamic Standing-Level of Assistance: Close supervision, Contact guard  Dynamic Standing-Comments: with RW and knee immobilizer on RLE  Functional Assessments:  Bed Mobility  Bed Mobility: No    Transfers  Transfer: Yes  Transfer 1  Technique 1: Sit to stand, Stand to sit  Transfer Device 1: Walker  Transfer Level of Assistance 1: Close supervision  Trials/Comments 1: RLE knee immobilizer donned - from chair x2 and from commode x1 with cueing for hand placement and knee extension on RLE  - no buckle or LOB but slower to complete tasks due to soft R knee.    Ambulation/Gait Training  Ambulation/Gait Training Performed: Yes  Ambulation/Gait Training 1  Surface 1: Level tile  Device 1: Rolling walker  Gait Support Devices: Knee immobilizer  Assistance 1: Close supervision  Quality of Gait 1: Diminished heel strike, Decreased step length, Antalgic, Knee(s) buckle (w/o knee immobilizer on RLE, pt kirill & requires assistance to stabilize with RLE knee immobilizer & cueing for knee extension, she has better stability with 2 episodes of her knee having a partial buckle. stabilized with UE support on RW.)  Comments/Distance (ft) 1: 150 feet [id number] - cueing for knee extension. non reciprocal stepping to reciprocal stepping.    Stairs  Stairs: Yes  Stairs  Rails 1: Left  Curb Step 1: No  Device 1: Railing, Single point cane  Support Devices 1:  (knee immobilizer)  Assistance 1: Close supervision  Comment/Number of Steps 1: 6 steps with railing and cane - no buckle, cued for knee extension with knee immobilizer on RLE in place, tolerated well. non reciprocally.  Stairs 2  Rails 2: Bilateral  Curb Step 2: No  Device 2: No device  Support Devices 2:  (RLE knee immobilizer)  Assistance 2: Close supervision  Comment/Number of Steps 2: 3 steps, cueing for knee extension on RLE, no buckle or lOB, cued for stepping pattern as well. tolerated well with focused knee strength  Extremity/Trunk Assessments:  RUE   RUE :  Within Functional Limits  LUE   LUE: Within Functional Limits  RLE   RLE : Exceptions to WFL  AROM RLE (degrees)  RLE AROM Comment: arom knee flexion 100 degrees and knee extension -5  Strength RLE  R Hip Flexion: 4/5  R Hip ABduction: 4/5  R Hip ADduction: 4/5  R Knee Flexion: 4-/5  R Knee Extension: 1/5  R Ankle Dorsiflexion: 4+/5  R Ankle Plantar Flexion: 4+/5  LLE   LLE : Within Functional Limits  Treatments:  Therapeutic Exercise  Therapeutic Exercise Performed: Yes (RLE x10 each)  Therapeutic Exercise Activity 1: ankle pump  Therapeutic Exercise Activity 2: quad set wit hassist  Therapeutic Exercise Activity 3: glute set  Therapeutic Exercise Activity 4: heel slide with assist for knee extension  Therapeutic Exercise Activity 5: hip abduction  Therapeutic Exercise Activity 6: SLR with assist for maintaining straight leg  Therapeutic Exercise Activity 7: SAQ with assist    Therapeutic Activity  Therapeutic Activity Performed: Yes  Therapeutic Activity 1: pt toileted without assist for hand hygiene or cleansing hygiene post void. Pt able to brush teeth standing at sink with 1 UE support and R knee immobilizer placed. no buckle or LOB  Therapeutic Activity 2: pt dressed BUE without assist and BLE with some assist to navigate around knee immobilizer  Outcome Measures:  [facility] Basic Mobility  Turning from your back to your side while in a flat bed without using bedrails: None  Moving from lying on your back to sitting on the side of a flat bed without using bedrails: None  Moving to and from bed to chair (including a wheelchair): A little (with R knee immobilizer)  Standing up from a chair using your arms (e.g. wheelchair or bedside chair): None (with R knee immobilizer)  To walk in hospital room: A little (with R knee immobilizer)  Climbing 3-5 steps with railing: A little (with R knee immobilizer)  Basic Mobility - Total Score: 21    Encounter Problems       Encounter Problems (Active)       Pain - Adult               Education Documentation  Handouts, taught by Mary Christensen PT at 5/15/2025  1:46 PM.  Learner: Significant Other, Patient  Readiness: Acceptance  Method: Explanation, Demonstration, Handout  Response: Verbalizes Understanding, Demonstrated Understanding    Precautions, taught by Mary Christensen PT at 5/15/2025  1:46 PM.  Learner: Significant Other, Patient  Readiness: Acceptance  Method: Explanation, Demonstration, Handout  Response: Verbalizes Understanding, Demonstrated Understanding    Body Mechanics, taught by Mary Christensen PT at 5/15/2025  1:46 PM.  Learner: Significant Other, Patient  Readiness: Acceptance  Method: Explanation, Demonstration, Handout  Response: Verbalizes Understanding, Demonstrated Understanding    Home Exercise Program, taught by Mary Christensen PT at 5/15/2025  1:46 PM.  Learner: Significant Other, Patient  Readiness: Acceptance  Method: Explanation, Demonstration, Handout  Response: Verbalizes Understanding, Demonstrated Understanding    Mobility Training, taught by Mary Christensen PT at 5/15/2025  1:46 PM.  Learner: Significant Other, Patient  Readiness: Acceptance  Method: Explanation, Demonstration, Handout  Response: Verbalizes Understanding, Demonstrated Understanding    Education Comments  educated on knee precautions: no kneeling, no twisting/ pivoting, not resting with knee flexed but in extension and elevated, ambulate each hour for reduction in stiffness and DVT risk, use of walker for all activity, allow knee flexion with activity, and complete exercises twice a day 3 sets of 10.     Educated on purpose of knee immobilizer, how to use knee immobilizer, when to wear the immobilizer and how to doff/don the immobilizer.     Mary Christensen, PT, DPT

## 2025-05-15 NOTE — PROGRESS NOTES
SYLVIA Patton, PASofiaC, ATC  Orthopedic Physician Assisant  Adult Reconstruction and Total Joint Replacement  General Orthopedics  Department of Orthopaedic Surgery  [place] [address]      YUNI CervantesC

## 2025-05-15 NOTE — CONSULTS
Consults    Reason For Consult  Medical management for history of CVA, GERD, recurrent UTIs, hyperlipidemia, and management of postop pain    History Of Present Illness  Zarina Holliday is a 79 y.o. female presenting with right total knee replacement she has remained hemodynamically stable postoperatively without complications and she is medically stable to be discharged to home.     Past Medical History  She has a past medical history of Anemia, Arthritis, CVA (cerebral vascular accident) (Multi) (2025), GERD (gastroesophageal reflux disease), History of recurrent UTIs, Hyperlipidemia, Leg edema, OAB (overactive bladder), Osteopenia after menopause, PONV (postoperative nausea and vomiting), and TIA (transient ischemic attack) (10/2022).    She has no past medical history of Asthma, Awareness under anesthesia, CHF (congestive heart failure), Chronic kidney disease, COPD (chronic obstructive pulmonary disease) (Multi), Coronary artery disease, Delayed emergence from general anesthesia, Diabetes mellitus type I (Multi), Disease of thyroid gland, Hard to intubate, History of blood transfusion, HL (hearing loss), Irregular heart beat, Malignant hyperthermia, Myocardial infarction (Multi), Personal history of irradiation, Pseudocholinesterase deficiency, Refusal of blood product, Seizure disorder (Multi), Sleep apnea, Type 2 diabetes mellitus, or Vision loss.    Surgical History  She has a past surgical history that includes  section, classic; Total abdominal hysterectomy w/ bilateral salpingoophorectomy; Tonsillectomy; Cataract extraction; MR angio neck wo IV contrast (2022); MR angio head wo IV contrast (2022); Colonoscopy; Total knee arthroplasty (Left, 2024); Salivary gland surgery (2023); Dental surgery; Facial cosmetic surgery; Other surgical history; Rhinoplasty; and Cosmetic surgery ().     Social History  She reports that she has never smoked. She has never used  smokeless tobacco. She reports that she does not currently use alcohol. She reports that she does not use drugs.    Family History  Family History[1]     Allergies  Adhesive tape-silicones    Review of Systems  10 system review was noncontributory  Physical Exam  Vital signs are stable  Patient is alert in no acute distress  Lungs are clear to auscultation and percussion  Cardiac is regular rate and rhythm normal S1-S2 without murmur gallop rub click S3 or S4  Abdomen is soft nontender positive bowel sounds there is no hepatosplenomegaly or CVA tenderness appreciated  Extremities without cyanosis clubbing erythema or edema  Operative site exam per Ortho the extremity is warm pulses are intact and patient can dorsiflex plantarflex  Last Recorded Vitals  BP (!) 112/47 (BP Location: Left arm, Patient Position: Sitting)   Pulse 64   Temp 36.4 °C (97.5 °F) (Temporal)   Resp 16   Wt 64.4 kg (142 lb)   SpO2 97%     Relevant Results    Current Facility-Administered Medications:     acetaminophen (Tylenol) tablet 650 mg, 650 mg, oral, q6h ANTONIA, Wilfredo Cortezbrogno, DO, 650 mg at 05/15/25 1153    aspirin EC tablet 81 mg, 81 mg, oral, BID, Wilfredo Cortezbrogno, DO, 81 mg at 05/15/25 0816    atorvastatin (Lipitor) tablet 10 mg, 10 mg, oral, Daily, Wilfredo Cortezbrogno, DO, 10 mg at 05/15/25 0816    benzocaine-menthol (Cepastat Sore Throat) lozenge 1 lozenge, 1 lozenge, Mouth/Throat, q4h PRN, Wilfredo Durangno, DO    bisacodyl (Dulcolax) EC tablet 10 mg, 10 mg, oral, Daily PRN, Wilfredo Durangno, DO    bisacodyl (Dulcolax) suppository 10 mg, 10 mg, rectal, Daily PRN, Wilfredo Cheryo, DO    cholecalciferol (Vitamin D-3) tablet 25 mcg, 25 mcg, oral, Daily, Wilfredo LONGORIA Imbrogno, DO, 25 mcg at 05/15/25 0816    cyclobenzaprine (Flexeril) tablet 5 mg, 5 mg, oral, TID PRN, Wilfredo Christensen, DO    docusate sodium (Colace) capsule 100 mg, 100 mg, oral, BID, Wilfredo Christensen DO, 100 mg at 05/15/25 0816    HYDROmorphone (Dilaudid)  injection 0.5 mg, 0.5 mg, intravenous, q2h PRN, Wilfredo LONGORIA Rogergno, DO    lactated Ringer's infusion, 50 mL/hr, intravenous, Continuous, Wilfredo LONGORIA Rogergno, DO, Stopped at 05/15/25 0830    lactated Ringer's infusion, 50 mL/hr, intravenous, Continuous, Criss Rod PA-C, Last Rate: 800 mL/hr at 05/14/25 1521, Rate Change at 05/14/25 1521    metoclopramide (Reglan) tablet 10 mg, 10 mg, oral, q6h PRN **OR** metoclopramide (Reglan) injection 10 mg, 10 mg, intravenous, q6h PRN, Wilfredo LONGORIA Rogergno, DO    naloxone (Narcan) injection 0.2 mg, 0.2 mg, intravenous, q5 min PRN, Wilfredo M Viko, DO    ondansetron ODT (Zofran-ODT) disintegrating tablet 4 mg, 4 mg, oral, q8h PRN **OR** ondansetron (Zofran) injection 4 mg, 4 mg, intravenous, q8h PRN, Wilfredo Cheryo, DO, 4 mg at 05/14/25 1824    oxyBUTYnin (Ditropan) tablet 5 mg, 5 mg, oral, TID, Wilfredo LONGORIA Rogeraneudyo, DO, 5 mg at 05/15/25 0816    oxyCODONE (Roxicodone) immediate release tablet 10 mg, 10 mg, oral, q4h PRN, Wilfredo M Rogergno, DO    oxyCODONE (Roxicodone) immediate release tablet 5 mg, 5 mg, oral, q6h PRN, Wilfredo LONGORIA Rogergno, DO    oxygen (O2) therapy, 2 L/min, inhalation, Continuous, Wilfredo M Rogergno, DO    pantoprazole (ProtoNix) EC tablet 40 mg, 40 mg, oral, Daily before breakfast, Wilfredo M Rogergno, DO, 40 mg at 05/15/25 0816    polyethylene glycol (Glycolax, Miralax) packet 17 g, 17 g, oral, Daily, Wilfredo LONGORIA Rogergno, DO, 17 g at 05/15/25 0816    scopolamine (Transderm-Scop) patch 1 patch, 1 patch, transdermal, q72h, Criss Rod PA-C, 1 patch at 05/14/25 1151    Results for orders placed or performed during the hospital encounter of 05/14/25 (from the past 24 hours)   CBC   Result Value Ref Range    WBC 12.6 (H) 4.4 - 11.3 x10*3/uL    nRBC      RBC 3.76 (L) 4.00 - 5.20 x10*6/uL    Hemoglobin 11.4 (L) 12.0 - 16.0 g/dL    Hematocrit 35.0 (L) 36.0 - 46.0 %    MCV 93 80 - 100 fL    MCH 30.3 26.0 - 34.0 pg    MCHC 32.6 32.0 - 36.0 g/dL    RDW 12.2 11.5  - 14.5 %    Platelets 204 150 - 450 x10*3/uL   Basic metabolic panel   Result Value Ref Range    Glucose 135 (H) 74 - 99 mg/dL    Sodium 137 136 - 145 mmol/L    Potassium 4.7 3.5 - 5.3 mmol/L    Chloride 102 98 - 107 mmol/L    Bicarbonate 25 21 - 32 mmol/L    Anion Gap 15 10 - 20 mmol/L    Urea Nitrogen 20 6 - 23 mg/dL    Creatinine 0.89 0.50 - 1.05 mg/dL    eGFR 66 >60 mL/min/1.73m*2    Calcium 9.2 8.6 - 10.3 mg/dL     *Note: Due to a large number of results and/or encounters for the requested time period, some results have not been displayed. A complete set of results can be found in Results Review.           Assessment/Plan   Status post right total knee replaced  -Management per Ortho  -PT  -Pain control using multimodal narcotics/nonnarcotics and providing for medication for breakthrough pain  -Supportive care     Hyperlipidemia  -Continue Crestor     Overactive bladder  -Continue Ditropan     GERD  - Continue protonix     Postop nausea and vomiting  -Give IV Zofran every 4 hours if after second dose patient is still nauseous or vomiting changed to Phenergan 6.25 mg IV   05/15: Postop nausea and vomiting have resolved  DVT prophylaxis  -SCDs  -Aspirin protocol  -Ambulate    Patient's chart and lab data have been reviewed patient is medically stable to be discharged to home    Sylvie Huber MD             [1]   Family History  Problem Relation Name Age of Onset    Other (stroke) Mother Madyson REYES Julius Quiles 72    Leukemia Father Boubacar Fletcher sr     Alcohol abuse Father Boubacar Fletcher sr     Asthma Father Boubacar Fletcher sr     COPD Father Boubacar Fletcher sr     Depression Father Boubacar Fletcher sr     Polycythemia Father Boubacar Fletcher sr     Other (htn) Sister      Lung disease Brother boubacar     [place] Paternal Grandmother      Depression Other FAMILY HISTORY     COPD Other FAMILY HISTORY     Asthma Other FAMILY HISTORY     Diabetes type II Other FAMILY HISTORY

## 2025-05-15 NOTE — DISCHARGE INSTR - ACTIVITY
Patient Education: Knee Immobilizer   The purpose of wearing the knee immobilizer is to stabilize the knee joint to prevent buckling in the presence of quad (thigh muscle) weakness.  The brace will help prevent falls and support the knee during ambulation and stairs.  Please read and follow the instructions carefully to maintain your safety.    Be sure to walk every 1 hour while awake at home.   You may walk without the brace:  _________ONCE HOME PT HAS EVALUATED AND DEEMED IT UNNECESSARY TO WEAR________  IF YOU ARE STILL EXPERIENCING muscle weakness/buckling after the instructed use time, call your surgeon. Keep using the brace until you meet with physical therapist or surgeon.       Why is an immobilizer needed?  Anesthesia (spinal or nerve block) may cause quad weakness that can last for several hours causing knee buckling or knee hyperextension when bearing weight on the leg.  Some patients metabolize medication more slowly than others which may lead to longer lasting anesthesia and prolonged quad weakness.   When should I wear the immobilizer?  Any time you are up walking/completing stairs/standing up/ weight bearing  Wear the immobilizer for the period of time listed above   Remove the brace during periods of rest and personal hygiene  How do I put on the immobilizer?   The larger end of the brace should be at the top of the leg with the smaller end at the ankle  Position the brace with the black fabric metal liners along the inner and outer sides of the knee, the hard lining is also touching back of knee   The opening on the front of the immobilizer should surround the knee joint   Securely Velcro all straps, ensuring they are tight enough to keep the brace in place on the leg  Ensure the immobilizer is not causing numbness/tingling/limited blood flow to the foot/ lower leg  Safety Considerations  You must continue to use your prescribed walker, in addition to the immobilizer, when up and moving  Make sure  the immobilizer is applied correctly before getting up to move  Make sure you stand up/ sit down with walker in front of you while wearing the brace

## 2025-05-15 NOTE — CARE PLAN
Pt POD#1 right knee replacement.  Plan is home today with [facility] PT.  SOC confirmed for 5/15/25.  Post follow up on 6/19/25 at 10:30 am-Alphonse  Spouse to transport and assist with care as needed.

## 2025-05-15 NOTE — CARE PLAN
The patient's goals for the shift include Pain management    The clinical goals for the shift include Pain management and safety    Over the shift, the patient did not make progress toward the following goals. Barriers to progression include ***. Recommendations to address these barriers include ***.

## 2025-05-15 NOTE — NURSING NOTE
Assumed care of pt from night shift nurse Christel. Went in to introduce myself pt was in bed with eyes close, with equal breath movements. Call light within reach.

## 2025-05-16 ENCOUNTER — APPOINTMENT (OUTPATIENT)
Dept: PHYSICAL THERAPY | Facility: CLINIC | Age: 80
End: 2025-05-16
Payer: MEDICARE

## 2025-05-16 ENCOUNTER — HOME CARE VISIT (OUTPATIENT)
Dept: HOME HEALTH SERVICES | Facility: HOME HEALTH | Age: 80
End: 2025-05-16
Payer: MEDICARE

## 2025-05-16 PROCEDURE — G0151 HHCP-SERV OF PT,EA 15 MIN: HCPCS | Mod: HHH

## 2025-05-16 PROCEDURE — 169592 NO-PAY CLAIM PROCEDURE

## 2025-05-17 VITALS — HEIGHT: 64 IN | BODY MASS INDEX: 23.73 KG/M2 | WEIGHT: 139 LBS

## 2025-05-17 ASSESSMENT — ENCOUNTER SYMPTOMS
DEPRESSION: 0
PAIN SEVERITY GOAL: 0/10
OCCASIONAL FEELINGS OF UNSTEADINESS: 1
FATIGUES EASILY: 1
LOWEST PAIN SEVERITY IN PAST 24 HOURS: 3/10
SUBJECTIVE PAIN PROGRESSION: GRADUALLY IMPROVING
PAIN LOCATION - PAIN DURATION: -
PAIN LOCATION - PAIN QUALITY: ACHE
LOSS OF SENSATION IN FEET: 0
PAIN LOCATION - PAIN SEVERITY: 3/10
PAIN LOCATION - PAIN FREQUENCY: CONSTANT
PAIN LOCATION: RIGHT KNEE
PERSON REPORTING PAIN: PATIENT
ARTHRALGIAS: 1
DIZZINESS: 1
MUSCLE WEAKNESS: 1
PAIN: 1
LOWER EXTREMITY EDEMA: 1
LIMITED RANGE OF MOTION: 1
HIGHEST PAIN SEVERITY IN PAST 24 HOURS: 8/10

## 2025-05-17 ASSESSMENT — ACTIVITIES OF DAILY LIVING (ADL)
AMBULATION_DISTANCE/DURATION_TOLERATED: 30 FT
AMBULATION ASSISTANCE: 1
PHYSICAL_TRANSFERS_DEVICES: FWW
AMBULATION ASSISTANCE ON FLAT SURFACES: 1
ENTERING_EXITING_HOME: MODERATE ASSIST
CURRENT_FUNCTION: CONTACT GUARD ASSIST
OASIS_M1830: 05
AMBULATION ASSISTANCE: CONTACT GUARD ASSIST
PHYSICAL TRANSFERS ASSESSED: 1

## 2025-05-17 NOTE — HOME HEALTH
uses [facility] , [place], , [facility]   Subjective:  Social-  Pt lives With family member/ With SO / alone.                    Pain-  ___/10   ___ knee                    C/o's-     Assessment (Including Primary Reason for Home Care and Skilled Needs): ___  TKR    Precautions: Universal, TKR    Prior function: Pt was swimming 1 1/4 mi . cant swim x 8 wks.      ADLs     Used ___ AD  and ___  assist to walk and  on steps.     Instruction provided: Incision care, Safety Awareness and Body Mechanics, Optimal Positioning, Gait Training, Modalities, HEP seated exs, ROM & Strengthening exs.     Pt and/or CG  response to instruction: Pt verbalizes understanding    Patient and/or Caregiver instructed on plan of care and visit frequency:   w    Patient and/or Caregiver in agreement with plan of care and visit frequency.    Patient and/or Caregiver in agreement with the following services:    Interdisciplinary communication: to ___  re ___    Plan for next visit: Cont incision care: Answer questions; instruction in falls prevention, instruction in gait pattern and quality, cont'd HEP , add standing exs, ROM & Strength exs.     Medication reconciliation: Yes/  No    Pt demonstrates adherence : Yes/ No    Pt verbalizes issues/concerns : Yes/  No     Provider notified of issues/concerns;  Yes/  No    Caregiver notified of issues/concerns: Yes/  No/  na    Patient/caregiver response to instructions:  Patient/caregiver verbalized understanding/  Further instructions needed in ___

## 2025-05-19 ENCOUNTER — APPOINTMENT (OUTPATIENT)
Dept: PHYSICAL THERAPY | Facility: CLINIC | Age: 80
End: 2025-05-19
Payer: MEDICARE

## 2025-05-19 ENCOUNTER — HOME CARE VISIT (OUTPATIENT)
Dept: HOME HEALTH SERVICES | Facility: HOME HEALTH | Age: 80
End: 2025-05-19
Payer: MEDICARE

## 2025-05-19 PROCEDURE — G0151 HHCP-SERV OF PT,EA 15 MIN: HCPCS | Mod: HHH

## 2025-05-19 ASSESSMENT — ACTIVITIES OF DAILY LIVING (ADL)
PHYSICAL_TRANSFERS_DEVICES: FWW
AMBULATION ASSISTANCE: 1
CURRENT_FUNCTION: CONTACT GUARD ASSIST
AMBULATION ASSISTANCE: STAND BY ASSIST
PHYSICAL TRANSFERS ASSESSED: 1

## 2025-05-19 ASSESSMENT — ENCOUNTER SYMPTOMS
PAIN LOCATION - EXACERBATING FACTORS: AMB , WB
PERSON REPORTING PAIN: PATIENT
PAIN LOCATION: RIGHT KNEE
PAIN LOCATION - PAIN SEVERITY: 2/10
PAIN: 1
PAIN LOCATION - PAIN DURATION: 4-5 MIN
PAIN LOCATION - PAIN QUALITY: ACHE
PAIN LOCATION - RELIEVING FACTORS: ICE, PAIN MEDS
PAIN LOCATION - PAIN FREQUENCY: CONSTANT

## 2025-05-21 ENCOUNTER — APPOINTMENT (OUTPATIENT)
Dept: PHYSICAL THERAPY | Facility: CLINIC | Age: 80
End: 2025-05-21
Payer: MEDICARE

## 2025-05-22 ENCOUNTER — HOME CARE VISIT (OUTPATIENT)
Dept: HOME HEALTH SERVICES | Facility: HOME HEALTH | Age: 80
End: 2025-05-22
Payer: MEDICARE

## 2025-05-22 PROCEDURE — G0151 HHCP-SERV OF PT,EA 15 MIN: HCPCS | Mod: HHH

## 2025-05-22 ASSESSMENT — ENCOUNTER SYMPTOMS
PERSON REPORTING PAIN: PATIENT
PAIN: 1
SUBJECTIVE PAIN PROGRESSION: GRADUALLY IMPROVING

## 2025-05-22 ASSESSMENT — ACTIVITIES OF DAILY LIVING (ADL)
AMBULATION ASSISTANCE: 1
PHYSICAL TRANSFERS ASSESSED: 1
PHYSICAL_TRANSFERS_DEVICES: FWW

## 2025-05-27 ENCOUNTER — APPOINTMENT (OUTPATIENT)
Dept: PHYSICAL THERAPY | Facility: CLINIC | Age: 80
End: 2025-05-27
Payer: MEDICARE

## 2025-05-27 ENCOUNTER — HOME CARE VISIT (OUTPATIENT)
Dept: HOME HEALTH SERVICES | Facility: HOME HEALTH | Age: 80
End: 2025-05-27
Payer: MEDICARE

## 2025-05-27 PROCEDURE — G0151 HHCP-SERV OF PT,EA 15 MIN: HCPCS | Mod: HHH

## 2025-05-27 ASSESSMENT — ACTIVITIES OF DAILY LIVING (ADL)
ADLS_COMMENTS: INDEP
PHYSICAL_TRANSFERS_DEVICES: SPC
PHYSICAL TRANSFERS ASSESSED: 1
AMBULATION ASSISTANCE: SUPERVISION
AMBULATION ASSISTANCE: 1

## 2025-05-27 ASSESSMENT — ENCOUNTER SYMPTOMS
PERSON REPORTING PAIN: PATIENT
PAIN LOCATION: RIGHT KNEE
PAIN: 1
PAIN LOCATION - PAIN DURATION: 5 MIN
PAIN LOCATION - PAIN QUALITY: ACHE
PAIN LOCATION - PAIN SEVERITY: 3/10
PAIN LOCATION - PAIN FREQUENCY: FREQUENT

## 2025-05-28 ENCOUNTER — HOME CARE VISIT (OUTPATIENT)
Dept: HOME HEALTH SERVICES | Facility: HOME HEALTH | Age: 80
End: 2025-05-28
Payer: MEDICARE

## 2025-05-28 PROCEDURE — G0151 HHCP-SERV OF PT,EA 15 MIN: HCPCS | Mod: HHH

## 2025-05-28 ASSESSMENT — ACTIVITIES OF DAILY LIVING (ADL)
PHYSICAL_TRANSFERS_DEVICES: SPC
PHYSICAL TRANSFERS ASSESSED: 1
AMBULATION ASSISTANCE: 1

## 2025-05-28 ASSESSMENT — ENCOUNTER SYMPTOMS
PAIN LOCATION - PAIN SEVERITY: 4/10
PAIN LOCATION: RIGHT KNEE
PERSON REPORTING PAIN: PATIENT
PAIN: 1
PAIN LOCATION - EXACERBATING FACTORS: AMB, WB
PAIN LOCATION - PAIN FREQUENCY: FREQUENT
PAIN LOCATION - PAIN DURATION: 4-5 MIN
PAIN LOCATION - PAIN QUALITY: ACHE

## 2025-05-28 NOTE — PROGRESS NOTES
Physical Therapy  Physical Therapy Orthopedic Evaluation    Patient Name: Zarina Holliday  MRN: [id number]  Today's Date: 5/29/2025  Time Calculation  Start Time: 1500  Stop Time: 1540  Time Calculation (min): 40 min    Insurance:  Visit number: 1 of [place]  Authorization info: NAN  Insurance Type: Medicare and  for life  Cert date start: 5/29/25        Cert date end: 8/29/25                General:  Reason for visit: Right knee pain s/p TKA 5/14/25  Referred by: Dr. Schneider    Current Problem  1. Acute pain of right knee  Follow Up In Physical Therapy      2. History of right knee joint replacement  Referral to Physical Therapy          Precautions  STEADI Fall Risk Score (The score of 4 or more indicates an increased risk of falling): 4  Precautions Comment: Right TKA    Medical History Form: Reviewed (scanned into chart)    Subjective:     Chief Complaint: Patient presents to clinic with right knee pain s/p TKA. Transitioning from home PT. Has been having issues with wound healing and allergic reaction to adhesive. Has open wound on back of leg. Gets cramps in upper thigh which causes discomfort.   Onset Date: 5/14/25    Current Condition:   Better    Pain:  Pain Assessment: 0-10  0-10 (Numeric) Pain Score: 3  Location: medial right knee  Description: sharp, tight, cramping   Aggravating Factors: Standing, Prolonged sitting, and Walking  Relieving Factors:  Ice, Tylenol     Relevant Information (PMH & Previous Tests/Imaging): see chart  Previous Interventions/Treatments: Physical Therapy and Injections    Prior Level of Function (PLOF)  Patient previously independent with all ADLs  Exercise/Physical Activity: pilates 3-4x/week, yoga, swim 3x/week, walking   Work/School: retired     Patients Living Environment: Reviewed and no concern  Stairs: no stairs- has elevator      Primary Language: English    Patient's Goal(s) for Therapy: Wants to get back to all physical activities.     Red Flags: Do you have any  of the following? No  Fever/chills, unexplained weight changes, dizziness/fainting, unexplained change in bowel or bladder functions, unexplained malaise or muscle weakness, night pain/sweats, numbness or tingling    Objective:    Gait: decreased stance time RLE, decreased hip/knee flexion with standard cane     Balance: SLS-     R: 30 sec.           L: 30 sec.    Palpation: TTP around incision that is covered by steri-strips, open wound posterior knee covered by gauze and tape- being monitored by PCP    Flexibility: mod tight R quad     Orthotics: yes OTC    Mid-patella Edema:   R:  44 cm             L: 39 cm      ROM      Knee AROM (Degrees)      (R)  (L)  Flexion: 103  125      Extension: 2  0         Strength Testing    LE strength MMT  R L  Hip flexion  4-/5 5/5  Hip extension  3+/5 4/5  Hip abduction  4-/5 4/5  Hip adduction  4-/5 4/5  Hip IR   4-/5 4/5  Hip ER   4-/5 4/5  Knee extension 4-/5 4+/5  Knee flexion  4/5 5/5          Functional Screening  Sit to stand: requires UE support      Outcome Measures:  Other Measures  Lower Extremity Funtional Score (LEFS): 27     EDUCATION: home exercise program, plan of care, activity modifications, pain management, and injury pathology       Goals: Set and discussed today  Active       PT Problem       PT Goals       Start:  05/29/25    Expected End:  08/27/25       STG:  Patient will decrease pain to 0-2/10 in 4 weeks.   Patient will increase strength by >/= 1/2 mm grade in 4 weeks.  LEFS: +9 in 4 weeks.    LTG:  Patient will be able to walk >30 mins without increased pain in 8 weeks.  Patient will be able to ascend/descend stairs with step through pattern in 8 weeks.   Patient will increase LE flexibility to min tight in 8 weeks.                Plan of care was developed with input and agreement by the patient      Treatment Performed:  Access Code: [email or web address]    Exercises  - Supine Ankle Pumps  - 3-4 x daily - 7 x weekly - 3 sets - 10 reps  - Supine Quadricep Sets  -  2 x daily - 7 x weekly - 2 sets - 10 reps - 5 sec hold  - Sitting Heel Slide with Towel  - 2 x daily - 7 x weekly - 2 sets - 10 reps - 5 sec hold  - Supine Active Straight Leg Raise  - 2 x daily - 7 x weekly - 2 sets - 10 reps  - Sidelying Hip Abduction  - 2 x daily - 7 x weekly - 2 sets - 10 reps  - Long Sitting Calf Stretch with Strap (Mirrored)  - 2 x daily - 7 x weekly - 1 sets - 2 reps - 60 sec. hold      Charges: Low complexity eval, TE, self care   Clinical Presentation: Stable  Prognosis/Rehab Potential: Good    Assessment: Patient presents with signs and symptoms consistent with right TKA, resulting in limited participation in pain-free ADLs and inability to perform at their prior level of function. Pt would benefit from physical therapy to address the impairments found & listed previously in the objective section in order to return to safe and pain-free ADLs and prior level of function.       Plan:     Planned Interventions include: therapeutic exercise, self-care home management, manual therapy, therapeutic activities, gait training, neuromuscular coordination, vasopneumatic, dry needling, aquatic therapy  Frequency: 1-2 x Week  Duration: 8 Weeks    Radha Fregoso, PT

## 2025-05-29 ENCOUNTER — APPOINTMENT (OUTPATIENT)
Dept: PHYSICAL THERAPY | Facility: CLINIC | Age: 80
End: 2025-05-29
Payer: MEDICARE

## 2025-05-29 ENCOUNTER — EVALUATION (OUTPATIENT)
Dept: PHYSICAL THERAPY | Facility: CLINIC | Age: 80
End: 2025-05-29
Payer: MEDICARE

## 2025-05-29 DIAGNOSIS — Z96.651 HISTORY OF RIGHT KNEE JOINT REPLACEMENT: ICD-10-CM

## 2025-05-29 DIAGNOSIS — M25.561 ACUTE PAIN OF RIGHT KNEE: Primary | ICD-10-CM

## 2025-05-29 PROCEDURE — 97110 THERAPEUTIC EXERCISES: CPT | Mod: GP

## 2025-05-29 PROCEDURE — 97161 PT EVAL LOW COMPLEX 20 MIN: CPT | Mod: GP

## 2025-05-29 PROCEDURE — 97535 SELF CARE MNGMENT TRAINING: CPT | Mod: GP

## 2025-05-29 ASSESSMENT — ENCOUNTER SYMPTOMS
LOSS OF SENSATION IN FEET: 0
OCCASIONAL FEELINGS OF UNSTEADINESS: 0
DEPRESSION: 0

## 2025-05-29 ASSESSMENT — PAIN - FUNCTIONAL ASSESSMENT: PAIN_FUNCTIONAL_ASSESSMENT: 0-10

## 2025-05-29 ASSESSMENT — PAIN SCALES - GENERAL: PAINLEVEL_OUTOF10: 3

## 2025-05-30 ENCOUNTER — APPOINTMENT (OUTPATIENT)
Dept: ORTHOPEDIC SURGERY | Facility: CLINIC | Age: 80
End: 2025-05-30
Payer: MEDICARE

## 2025-05-30 ENCOUNTER — APPOINTMENT (OUTPATIENT)
Dept: PHYSICAL THERAPY | Facility: CLINIC | Age: 80
End: 2025-05-30
Payer: MEDICARE

## 2025-05-31 SDOH — HEALTH STABILITY: PHYSICAL HEALTH: EXERCISE TYPE: COMPLETED

## 2025-05-31 ASSESSMENT — ACTIVITIES OF DAILY LIVING (ADL)
AMBULATION ASSISTANCE: INDEPENDENT
CURRENT_FUNCTION: INDEPENDENT
OASIS_M1830: 01
AMBULATION_DISTANCE/DURATION_TOLERATED: 150 FT
AMBULATION ASSISTANCE ON FLAT SURFACES: 1
HOME_HEALTH_OASIS: 00

## 2025-06-02 ENCOUNTER — APPOINTMENT (OUTPATIENT)
Dept: RADIOLOGY | Facility: HOSPITAL | Age: 80
End: 2025-06-02
Payer: MEDICARE

## 2025-06-02 ENCOUNTER — APPOINTMENT (OUTPATIENT)
Dept: PHYSICAL THERAPY | Facility: CLINIC | Age: 80
End: 2025-06-02
Payer: MEDICARE

## 2025-06-02 ENCOUNTER — APPOINTMENT (OUTPATIENT)
Dept: CARDIOLOGY | Facility: HOSPITAL | Age: 80
End: 2025-06-02
Payer: MEDICARE

## 2025-06-02 ENCOUNTER — HOSPITAL ENCOUNTER (INPATIENT)
Facility: HOSPITAL | Age: 80
LOS: 1 days | Discharge: HOME | End: 2025-06-03
Attending: EMERGENCY MEDICINE | Admitting: STUDENT IN AN ORGANIZED HEALTH CARE EDUCATION/TRAINING PROGRAM
Payer: MEDICARE

## 2025-06-02 DIAGNOSIS — M17.12 ARTHRITIS OF KNEE, LEFT: ICD-10-CM

## 2025-06-02 DIAGNOSIS — M25.561 ACUTE PAIN OF RIGHT KNEE: Primary | ICD-10-CM

## 2025-06-02 DIAGNOSIS — J30.9 ALLERGIC RHINITIS, UNSPECIFIED SEASONALITY, UNSPECIFIED TRIGGER: ICD-10-CM

## 2025-06-02 DIAGNOSIS — M25.561 ACUTE PAIN OF RIGHT KNEE: ICD-10-CM

## 2025-06-02 DIAGNOSIS — J18.9 PNEUMONIA OF BOTH UPPER LOBES DUE TO INFECTIOUS ORGANISM: Primary | ICD-10-CM

## 2025-06-02 DIAGNOSIS — R11.2 NAUSEA AND VOMITING, UNSPECIFIED VOMITING TYPE: ICD-10-CM

## 2025-06-02 DIAGNOSIS — R06.89 ACUTE RESPIRATORY INSUFFICIENCY: ICD-10-CM

## 2025-06-02 LAB
ALBUMIN SERPL BCP-MCNC: 3.7 G/DL (ref 3.4–5)
ALP SERPL-CCNC: 58 U/L (ref 33–136)
ALT SERPL W P-5'-P-CCNC: 9 U/L (ref 7–45)
ANION GAP SERPL CALC-SCNC: 15 MMOL/L (ref 10–20)
AST SERPL W P-5'-P-CCNC: 14 U/L (ref 9–39)
ATRIAL RATE: 97 BPM
BASOPHILS # BLD AUTO: 0.05 X10*3/UL (ref 0–0.1)
BASOPHILS NFR BLD AUTO: 0.5 %
BILIRUB SERPL-MCNC: 0.9 MG/DL (ref 0–1.2)
BUN SERPL-MCNC: 12 MG/DL (ref 6–23)
CALCIUM SERPL-MCNC: 8.8 MG/DL (ref 8.6–10.3)
CHLORIDE SERPL-SCNC: 99 MMOL/L (ref 98–107)
CO2 SERPL-SCNC: 22 MMOL/L (ref 21–32)
CREAT SERPL-MCNC: 0.67 MG/DL (ref 0.5–1.05)
EGFRCR SERPLBLD CKD-EPI 2021: 88 ML/MIN/1.73M*2
EOSINOPHIL # BLD AUTO: 0 X10*3/UL (ref 0–0.4)
EOSINOPHIL NFR BLD AUTO: 0 %
ERYTHROCYTE [DISTWIDTH] IN BLOOD BY AUTOMATED COUNT: 13 % (ref 11.5–14.5)
GLUCOSE SERPL-MCNC: 100 MG/DL (ref 74–99)
HCT VFR BLD AUTO: 32.1 % (ref 36–46)
HGB BLD-MCNC: 10.3 G/DL (ref 12–16)
IMM GRANULOCYTES # BLD AUTO: 0.05 X10*3/UL (ref 0–0.5)
IMM GRANULOCYTES NFR BLD AUTO: 0.5 % (ref 0–0.9)
LIPASE SERPL-CCNC: 11 U/L (ref 9–82)
LYMPHOCYTES # BLD AUTO: 0.42 X10*3/UL (ref 0.8–3)
LYMPHOCYTES NFR BLD AUTO: 3.9 %
MAGNESIUM SERPL-MCNC: 1.77 MG/DL (ref 1.6–2.4)
MCH RBC QN AUTO: 30.3 PG (ref 26–34)
MCHC RBC AUTO-ENTMCNC: 32.1 G/DL (ref 32–36)
MCV RBC AUTO: 94 FL (ref 80–100)
MONOCYTES # BLD AUTO: 0.55 X10*3/UL (ref 0.05–0.8)
MONOCYTES NFR BLD AUTO: 5.2 %
NEUTROPHILS # BLD AUTO: 9.59 X10*3/UL (ref 1.6–5.5)
NEUTROPHILS NFR BLD AUTO: 89.9 %
NRBC BLD-RTO: 0 /100 WBCS (ref 0–0)
P AXIS: 72 DEGREES
P OFFSET: 211 MS
P ONSET: 160 MS
PLATELET # BLD AUTO: 245 X10*3/UL (ref 150–450)
POTASSIUM SERPL-SCNC: 3.8 MMOL/L (ref 3.5–5.3)
PR INTERVAL: 124 MS
PROT SERPL-MCNC: 6.3 G/DL (ref 6.4–8.2)
Q ONSET: 222 MS
QRS COUNT: 16 BEATS
QRS DURATION: 80 MS
QT INTERVAL: 346 MS
QTC CALCULATION(BAZETT): 439 MS
QTC FREDERICIA: 405 MS
R AXIS: 73 DEGREES
RBC # BLD AUTO: 3.4 X10*6/UL (ref 4–5.2)
SODIUM SERPL-SCNC: 132 MMOL/L (ref 136–145)
T AXIS: 78 DEGREES
T OFFSET: 395 MS
VENTRICULAR RATE: 97 BPM
WBC # BLD AUTO: 10.7 X10*3/UL (ref 4.4–11.3)

## 2025-06-02 PROCEDURE — 71046 X-RAY EXAM CHEST 2 VIEWS: CPT | Performed by: RADIOLOGY

## 2025-06-02 PROCEDURE — 2500000004 HC RX 250 GENERAL PHARMACY W/ HCPCS (ALT 636 FOR OP/ED): Performed by: NURSE PRACTITIONER

## 2025-06-02 PROCEDURE — 87449 NOS EACH ORGANISM AG IA: CPT | Mod: AHULAB | Performed by: NURSE PRACTITIONER

## 2025-06-02 PROCEDURE — 51798 US URINE CAPACITY MEASURE: CPT

## 2025-06-02 PROCEDURE — 2500000001 HC RX 250 WO HCPCS SELF ADMINISTERED DRUGS (ALT 637 FOR MEDICARE OP): Performed by: NURSE PRACTITIONER

## 2025-06-02 PROCEDURE — 36415 COLL VENOUS BLD VENIPUNCTURE: CPT

## 2025-06-02 PROCEDURE — 74177 CT ABD & PELVIS W/CONTRAST: CPT

## 2025-06-02 PROCEDURE — 96376 TX/PRO/DX INJ SAME DRUG ADON: CPT

## 2025-06-02 PROCEDURE — 96374 THER/PROPH/DIAG INJ IV PUSH: CPT

## 2025-06-02 PROCEDURE — 74177 CT ABD & PELVIS W/CONTRAST: CPT | Performed by: RADIOLOGY

## 2025-06-02 PROCEDURE — 2500000002 HC RX 250 W HCPCS SELF ADMINISTERED DRUGS (ALT 637 FOR MEDICARE OP, ALT 636 FOR OP/ED): Performed by: NURSE PRACTITIONER

## 2025-06-02 PROCEDURE — 83690 ASSAY OF LIPASE: CPT

## 2025-06-02 PROCEDURE — 80053 COMPREHEN METABOLIC PANEL: CPT

## 2025-06-02 PROCEDURE — 2500000001 HC RX 250 WO HCPCS SELF ADMINISTERED DRUGS (ALT 637 FOR MEDICARE OP)

## 2025-06-02 PROCEDURE — 71275 CT ANGIOGRAPHY CHEST: CPT

## 2025-06-02 PROCEDURE — 71275 CT ANGIOGRAPHY CHEST: CPT | Performed by: RADIOLOGY

## 2025-06-02 PROCEDURE — 71046 X-RAY EXAM CHEST 2 VIEWS: CPT

## 2025-06-02 PROCEDURE — 84075 ASSAY ALKALINE PHOSPHATASE: CPT

## 2025-06-02 PROCEDURE — 83735 ASSAY OF MAGNESIUM: CPT

## 2025-06-02 PROCEDURE — 2500000004 HC RX 250 GENERAL PHARMACY W/ HCPCS (ALT 636 FOR OP/ED)

## 2025-06-02 PROCEDURE — 99285 EMERGENCY DEPT VISIT HI MDM: CPT | Mod: 25 | Performed by: EMERGENCY MEDICINE

## 2025-06-02 PROCEDURE — 1200000002 HC GENERAL ROOM WITH TELEMETRY DAILY

## 2025-06-02 PROCEDURE — 96375 TX/PRO/DX INJ NEW DRUG ADDON: CPT

## 2025-06-02 PROCEDURE — 99223 1ST HOSP IP/OBS HIGH 75: CPT | Performed by: NURSE PRACTITIONER

## 2025-06-02 PROCEDURE — 96361 HYDRATE IV INFUSION ADD-ON: CPT

## 2025-06-02 PROCEDURE — 87899 AGENT NOS ASSAY W/OPTIC: CPT | Mod: AHULAB | Performed by: NURSE PRACTITIONER

## 2025-06-02 PROCEDURE — 93005 ELECTROCARDIOGRAM TRACING: CPT

## 2025-06-02 PROCEDURE — 85025 COMPLETE CBC W/AUTO DIFF WBC: CPT

## 2025-06-02 PROCEDURE — 2550000001 HC RX 255 CONTRASTS: Performed by: EMERGENCY MEDICINE

## 2025-06-02 RX ORDER — ASPIRIN 81 MG/1
81 TABLET ORAL 2 TIMES DAILY
Status: DISCONTINUED | OUTPATIENT
Start: 2025-06-02 | End: 2025-06-03 | Stop reason: HOSPADM

## 2025-06-02 RX ORDER — ONDANSETRON HYDROCHLORIDE 2 MG/ML
4 INJECTION, SOLUTION INTRAVENOUS EVERY 6 HOURS PRN
Status: DISCONTINUED | OUTPATIENT
Start: 2025-06-02 | End: 2025-06-03 | Stop reason: HOSPADM

## 2025-06-02 RX ORDER — ACETAMINOPHEN 325 MG/1
975 TABLET ORAL ONCE
Status: COMPLETED | OUTPATIENT
Start: 2025-06-02 | End: 2025-06-02

## 2025-06-02 RX ORDER — SODIUM CHLORIDE 9 MG/ML
75 INJECTION, SOLUTION INTRAVENOUS CONTINUOUS
Status: DISCONTINUED | OUTPATIENT
Start: 2025-06-02 | End: 2025-06-03 | Stop reason: HOSPADM

## 2025-06-02 RX ORDER — CETIRIZINE HYDROCHLORIDE 10 MG/1
10 TABLET ORAL DAILY
Status: DISCONTINUED | OUTPATIENT
Start: 2025-06-02 | End: 2025-06-03 | Stop reason: HOSPADM

## 2025-06-02 RX ORDER — NITROFURANTOIN 25; 75 MG/1; MG/1
100 CAPSULE ORAL DAILY
Status: DISCONTINUED | OUTPATIENT
Start: 2025-06-02 | End: 2025-06-03 | Stop reason: HOSPADM

## 2025-06-02 RX ORDER — OXYBUTYNIN CHLORIDE 5 MG/1
5 TABLET ORAL 3 TIMES DAILY
Status: DISCONTINUED | OUTPATIENT
Start: 2025-06-02 | End: 2025-06-03 | Stop reason: HOSPADM

## 2025-06-02 RX ORDER — ROSUVASTATIN CALCIUM 10 MG/1
5 TABLET, COATED ORAL
Status: DISCONTINUED | OUTPATIENT
Start: 2025-06-02 | End: 2025-06-03 | Stop reason: HOSPADM

## 2025-06-02 RX ORDER — CHOLECALCIFEROL (VITAMIN D3) 25 MCG
25 TABLET ORAL DAILY
Status: DISCONTINUED | OUTPATIENT
Start: 2025-06-02 | End: 2025-06-03 | Stop reason: HOSPADM

## 2025-06-02 RX ORDER — ACETAMINOPHEN 325 MG/1
650 TABLET ORAL EVERY 4 HOURS PRN
Status: DISCONTINUED | OUTPATIENT
Start: 2025-06-02 | End: 2025-06-03 | Stop reason: HOSPADM

## 2025-06-02 RX ORDER — METOCLOPRAMIDE HYDROCHLORIDE 5 MG/ML
10 INJECTION INTRAMUSCULAR; INTRAVENOUS ONCE
Status: COMPLETED | OUTPATIENT
Start: 2025-06-02 | End: 2025-06-02

## 2025-06-02 RX ORDER — ACETAMINOPHEN 160 MG/5ML
650 SOLUTION ORAL EVERY 4 HOURS PRN
Status: DISCONTINUED | OUTPATIENT
Start: 2025-06-02 | End: 2025-06-03 | Stop reason: HOSPADM

## 2025-06-02 RX ORDER — CEFTRIAXONE 1 G/50ML
1 INJECTION, SOLUTION INTRAVENOUS ONCE
Status: DISCONTINUED | OUTPATIENT
Start: 2025-06-02 | End: 2025-06-02 | Stop reason: SDUPTHER

## 2025-06-02 RX ORDER — ACETAMINOPHEN 650 MG/1
650 SUPPOSITORY RECTAL EVERY 4 HOURS PRN
Status: DISCONTINUED | OUTPATIENT
Start: 2025-06-02 | End: 2025-06-03 | Stop reason: HOSPADM

## 2025-06-02 RX ORDER — TRAMADOL HYDROCHLORIDE 50 MG/1
50 TABLET, FILM COATED ORAL EVERY 6 HOURS PRN
Status: DISCONTINUED | OUTPATIENT
Start: 2025-06-02 | End: 2025-06-03 | Stop reason: HOSPADM

## 2025-06-02 RX ORDER — PANTOPRAZOLE SODIUM 40 MG/1
40 TABLET, DELAYED RELEASE ORAL
Status: DISCONTINUED | OUTPATIENT
Start: 2025-06-03 | End: 2025-06-03 | Stop reason: HOSPADM

## 2025-06-02 RX ORDER — ONDANSETRON HYDROCHLORIDE 2 MG/ML
4 INJECTION, SOLUTION INTRAVENOUS ONCE
Status: COMPLETED | OUTPATIENT
Start: 2025-06-02 | End: 2025-06-02

## 2025-06-02 RX ORDER — MELOXICAM 7.5 MG/1
15 TABLET ORAL DAILY
Status: DISCONTINUED | OUTPATIENT
Start: 2025-06-02 | End: 2025-06-03 | Stop reason: HOSPADM

## 2025-06-02 RX ORDER — CYCLOBENZAPRINE HCL 10 MG
10 TABLET ORAL 3 TIMES DAILY PRN
Status: DISCONTINUED | OUTPATIENT
Start: 2025-06-02 | End: 2025-06-03 | Stop reason: HOSPADM

## 2025-06-02 RX ORDER — CEFTRIAXONE 2 G/50ML
2 INJECTION, SOLUTION INTRAVENOUS EVERY 24 HOURS
Status: DISCONTINUED | OUTPATIENT
Start: 2025-06-02 | End: 2025-06-03 | Stop reason: HOSPADM

## 2025-06-02 RX ADMIN — SODIUM CHLORIDE, POTASSIUM CHLORIDE, SODIUM LACTATE AND CALCIUM CHLORIDE 1000 ML: 600; 310; 30; 20 INJECTION, SOLUTION INTRAVENOUS at 13:24

## 2025-06-02 RX ADMIN — ONDANSETRON 4 MG: 2 INJECTION, SOLUTION INTRAMUSCULAR; INTRAVENOUS at 09:54

## 2025-06-02 RX ADMIN — NITROFURANTOIN MONOHYDRATE/MACROCRYSTALLINE 100 MG: 25; 75 CAPSULE ORAL at 19:46

## 2025-06-02 RX ADMIN — ONDANSETRON 4 MG: 2 INJECTION, SOLUTION INTRAMUSCULAR; INTRAVENOUS at 13:24

## 2025-06-02 RX ADMIN — OXYBUTYNIN CHLORIDE 5 MG: 5 TABLET ORAL at 20:50

## 2025-06-02 RX ADMIN — ACETAMINOPHEN 975 MG: 325 TABLET ORAL at 12:24

## 2025-06-02 RX ADMIN — SODIUM CHLORIDE, POTASSIUM CHLORIDE, SODIUM LACTATE AND CALCIUM CHLORIDE 1000 ML: 600; 310; 30; 20 INJECTION, SOLUTION INTRAVENOUS at 10:38

## 2025-06-02 RX ADMIN — SODIUM CHLORIDE 75 ML/HR: 0.9 INJECTION, SOLUTION INTRAVENOUS at 19:47

## 2025-06-02 RX ADMIN — ROSUVASTATIN 5 MG: 10 TABLET, FILM COATED ORAL at 19:46

## 2025-06-02 RX ADMIN — AZITHROMYCIN MONOHYDRATE 500 MG: 500 INJECTION, POWDER, LYOPHILIZED, FOR SOLUTION INTRAVENOUS at 17:07

## 2025-06-02 RX ADMIN — METOCLOPRAMIDE 10 MG: 5 INJECTION, SOLUTION INTRAMUSCULAR; INTRAVENOUS at 11:02

## 2025-06-02 RX ADMIN — CYCLOBENZAPRINE 10 MG: 10 TABLET, FILM COATED ORAL at 20:49

## 2025-06-02 RX ADMIN — CEFTRIAXONE 2 G: 2 INJECTION, SOLUTION INTRAVENOUS at 16:30

## 2025-06-02 RX ADMIN — ACETAMINOPHEN 650 MG: 325 TABLET, FILM COATED ORAL at 19:45

## 2025-06-02 RX ADMIN — ASPIRIN 81 MG: 81 TABLET, COATED ORAL at 20:50

## 2025-06-02 RX ADMIN — IOHEXOL 75 ML: 350 INJECTION, SOLUTION INTRAVENOUS at 11:40

## 2025-06-02 SDOH — HEALTH STABILITY: MENTAL HEALTH
DO YOU FEEL STRESS - TENSE, RESTLESS, NERVOUS, OR ANXIOUS, OR UNABLE TO SLEEP AT NIGHT BECAUSE YOUR MIND IS TROUBLED ALL THE TIME - THESE DAYS?: NOT AT ALL

## 2025-06-02 SDOH — SOCIAL STABILITY: SOCIAL INSECURITY: DO YOU FEEL ANYONE HAS EXPLOITED OR TAKEN ADVANTAGE OF YOU FINANCIALLY OR OF YOUR PERSONAL PROPERTY?: NO

## 2025-06-02 SDOH — ECONOMIC STABILITY: INCOME INSECURITY: IN THE PAST 12 MONTHS HAS THE ELECTRIC, GAS, OIL, OR WATER COMPANY THREATENED TO SHUT OFF SERVICES IN YOUR HOME?: NO

## 2025-06-02 SDOH — SOCIAL STABILITY: SOCIAL NETWORK: HOW OFTEN DO YOU GET TOGETHER WITH FRIENDS OR RELATIVES?: ONCE A WEEK

## 2025-06-02 SDOH — ECONOMIC STABILITY: FOOD INSECURITY: WITHIN THE PAST 12 MONTHS, YOU WORRIED THAT YOUR FOOD WOULD RUN OUT BEFORE YOU GOT THE MONEY TO BUY MORE.: NEVER TRUE

## 2025-06-02 SDOH — HEALTH STABILITY: PHYSICAL HEALTH: ON AVERAGE, HOW MANY MINUTES DO YOU ENGAGE IN EXERCISE AT THIS LEVEL?: 30 MIN

## 2025-06-02 SDOH — HEALTH STABILITY: MENTAL HEALTH: HOW OFTEN DO YOU HAVE A DRINK CONTAINING ALCOHOL?: NEVER

## 2025-06-02 SDOH — ECONOMIC STABILITY: TRANSPORTATION INSECURITY: IN THE PAST 12 MONTHS, HAS LACK OF TRANSPORTATION KEPT YOU FROM MEDICAL APPOINTMENTS OR FROM GETTING MEDICATIONS?: NO

## 2025-06-02 SDOH — SOCIAL STABILITY: SOCIAL INSECURITY: ARE YOU MARRIED, WIDOWED, DIVORCED, SEPARATED, NEVER MARRIED, OR LIVING WITH A PARTNER?: MARRIED

## 2025-06-02 SDOH — SOCIAL STABILITY: SOCIAL INSECURITY: HAS ANYONE EVER THREATENED TO HURT YOUR FAMILY OR YOUR PETS?: NO

## 2025-06-02 SDOH — SOCIAL STABILITY: SOCIAL NETWORK
DO YOU BELONG TO ANY CLUBS OR ORGANIZATIONS SUCH AS CHURCH GROUPS, UNIONS, FRATERNAL OR ATHLETIC GROUPS, OR SCHOOL GROUPS?: YES

## 2025-06-02 SDOH — SOCIAL STABILITY: SOCIAL INSECURITY: WITHIN THE LAST YEAR, HAVE YOU BEEN HUMILIATED OR EMOTIONALLY ABUSED IN OTHER WAYS BY YOUR PARTNER OR EX-PARTNER?: NO

## 2025-06-02 SDOH — HEALTH STABILITY: MENTAL HEALTH: HOW OFTEN DO YOU HAVE SIX OR MORE DRINKS ON ONE OCCASION?: NEVER

## 2025-06-02 SDOH — SOCIAL STABILITY: SOCIAL NETWORK: HOW OFTEN DO YOU ATTEND MEETINGS OF THE CLUBS OR ORGANIZATIONS YOU BELONG TO?: MORE THAN 4 TIMES PER YEAR

## 2025-06-02 SDOH — SOCIAL STABILITY: SOCIAL INSECURITY: ABUSE: ADULT

## 2025-06-02 SDOH — ECONOMIC STABILITY: FOOD INSECURITY: HOW HARD IS IT FOR YOU TO PAY FOR THE VERY BASICS LIKE FOOD, HOUSING, MEDICAL CARE, AND HEATING?: NOT HARD AT ALL

## 2025-06-02 SDOH — HEALTH STABILITY: PHYSICAL HEALTH: ON AVERAGE, HOW MANY DAYS PER WEEK DO YOU ENGAGE IN MODERATE TO STRENUOUS EXERCISE (LIKE A BRISK WALK)?: 5 DAYS

## 2025-06-02 SDOH — ECONOMIC STABILITY: HOUSING INSECURITY: AT ANY TIME IN THE PAST 12 MONTHS, WERE YOU HOMELESS OR LIVING IN A SHELTER (INCLUDING NOW)?: NO

## 2025-06-02 SDOH — HEALTH STABILITY: MENTAL HEALTH: HOW MANY DRINKS CONTAINING ALCOHOL DO YOU HAVE ON A TYPICAL DAY WHEN YOU ARE DRINKING?: PATIENT DOES NOT DRINK

## 2025-06-02 SDOH — SOCIAL STABILITY: SOCIAL INSECURITY: WITHIN THE LAST YEAR, HAVE YOU BEEN AFRAID OF YOUR PARTNER OR EX-PARTNER?: NO

## 2025-06-02 SDOH — SOCIAL STABILITY: SOCIAL NETWORK: HOW OFTEN DO YOU ATTEND CHURCH OR RELIGIOUS SERVICES?: MORE THAN 4 TIMES PER YEAR

## 2025-06-02 SDOH — SOCIAL STABILITY: SOCIAL INSECURITY: ARE YOU OR HAVE YOU BEEN THREATENED OR ABUSED PHYSICALLY, EMOTIONALLY, OR SEXUALLY BY ANYONE?: NO

## 2025-06-02 SDOH — ECONOMIC STABILITY: FOOD INSECURITY: WITHIN THE PAST 12 MONTHS, THE FOOD YOU BOUGHT JUST DIDN'T LAST AND YOU DIDN'T HAVE MONEY TO GET MORE.: NEVER TRUE

## 2025-06-02 SDOH — ECONOMIC STABILITY: HOUSING INSECURITY: IN THE LAST 12 MONTHS, WAS THERE A TIME WHEN YOU WERE NOT ABLE TO PAY THE MORTGAGE OR RENT ON TIME?: NO

## 2025-06-02 SDOH — SOCIAL STABILITY: SOCIAL INSECURITY: HAVE YOU HAD THOUGHTS OF HARMING ANYONE ELSE?: NO

## 2025-06-02 SDOH — SOCIAL STABILITY: SOCIAL INSECURITY: DOES ANYONE TRY TO KEEP YOU FROM HAVING/CONTACTING OTHER FRIENDS OR DOING THINGS OUTSIDE YOUR HOME?: NO

## 2025-06-02 SDOH — SOCIAL STABILITY: SOCIAL INSECURITY: WERE YOU ABLE TO COMPLETE ALL THE BEHAVIORAL HEALTH SCREENINGS?: YES

## 2025-06-02 SDOH — SOCIAL STABILITY: SOCIAL NETWORK: IN A TYPICAL WEEK, HOW MANY TIMES DO YOU TALK ON THE PHONE WITH FAMILY, FRIENDS, OR NEIGHBORS?: ONCE A WEEK

## 2025-06-02 SDOH — SOCIAL STABILITY: SOCIAL INSECURITY: HAVE YOU HAD ANY THOUGHTS OF HARMING ANYONE ELSE?: NO

## 2025-06-02 SDOH — SOCIAL STABILITY: SOCIAL INSECURITY: ARE THERE ANY APPARENT SIGNS OF INJURIES/BEHAVIORS THAT COULD BE RELATED TO ABUSE/NEGLECT?: NO

## 2025-06-02 SDOH — SOCIAL STABILITY: SOCIAL INSECURITY: DO YOU FEEL UNSAFE GOING BACK TO THE PLACE WHERE YOU ARE LIVING?: NO

## 2025-06-02 ASSESSMENT — LIFESTYLE VARIABLES
AUDIT-C TOTAL SCORE: 0
HOW OFTEN DO YOU HAVE A DRINK CONTAINING ALCOHOL: NEVER
HOW OFTEN DO YOU HAVE 6 OR MORE DRINKS ON ONE OCCASION: NEVER
SKIP TO QUESTIONS 9-10: 1
HOW MANY STANDARD DRINKS CONTAINING ALCOHOL DO YOU HAVE ON A TYPICAL DAY: PATIENT DOES NOT DRINK
SKIP TO QUESTIONS 9-10: 1

## 2025-06-02 ASSESSMENT — ACTIVITIES OF DAILY LIVING (ADL)
GROOMING: INDEPENDENT
LACK_OF_TRANSPORTATION: NO
FEEDING YOURSELF: INDEPENDENT
JUDGMENT_ADEQUATE_SAFELY_COMPLETE_DAILY_ACTIVITIES: YES
PATIENT'S MEMORY ADEQUATE TO SAFELY COMPLETE DAILY ACTIVITIES?: YES
ADEQUATE_TO_COMPLETE_ADL: YES
LACK_OF_TRANSPORTATION: NO
WALKS IN HOME: NEEDS ASSISTANCE
ASSISTIVE_DEVICE: CANE;WALKER
TOILETING: INDEPENDENT
BATHING: INDEPENDENT
DRESSING YOURSELF: INDEPENDENT
HEARING - LEFT EAR: FUNCTIONAL
HEARING - RIGHT EAR: FUNCTIONAL

## 2025-06-02 ASSESSMENT — ENCOUNTER SYMPTOMS
MUSCULOSKELETAL NEGATIVE: 1
GASTROINTESTINAL NEGATIVE: 1
RESPIRATORY NEGATIVE: 1
PSYCHIATRIC NEGATIVE: 1

## 2025-06-02 ASSESSMENT — COGNITIVE AND FUNCTIONAL STATUS - GENERAL
WALKING IN HOSPITAL ROOM: A LITTLE
WALKING IN HOSPITAL ROOM: A LITTLE
MOBILITY SCORE: 22
CLIMB 3 TO 5 STEPS WITH RAILING: A LITTLE
CLIMB 3 TO 5 STEPS WITH RAILING: A LITTLE
MOBILITY SCORE: 22
CLIMB 3 TO 5 STEPS WITH RAILING: A LITTLE
DAILY ACTIVITIY SCORE: 24
DAILY ACTIVITIY SCORE: 24
MOBILITY SCORE: 22
PATIENT BASELINE BEDBOUND: NO
DAILY ACTIVITIY SCORE: 24
WALKING IN HOSPITAL ROOM: A LITTLE

## 2025-06-02 ASSESSMENT — PAIN DESCRIPTION - LOCATION
LOCATION: GENERALIZED
LOCATION: HIP
LOCATION: KNEE

## 2025-06-02 ASSESSMENT — PATIENT HEALTH QUESTIONNAIRE - PHQ9
1. LITTLE INTEREST OR PLEASURE IN DOING THINGS: NOT AT ALL
SUM OF ALL RESPONSES TO PHQ9 QUESTIONS 1 & 2: 0
2. FEELING DOWN, DEPRESSED OR HOPELESS: NOT AT ALL

## 2025-06-02 ASSESSMENT — PAIN SCALES - GENERAL
PAINLEVEL_OUTOF10: 5 - MODERATE PAIN
PAINLEVEL_OUTOF10: 5 - MODERATE PAIN
PAINLEVEL_OUTOF10: 3
PAINLEVEL_OUTOF10: 0 - NO PAIN
PAINLEVEL_OUTOF10: 0 - NO PAIN

## 2025-06-02 ASSESSMENT — PAIN - FUNCTIONAL ASSESSMENT
PAIN_FUNCTIONAL_ASSESSMENT: 0-10

## 2025-06-02 NOTE — PROGRESS NOTES
Pharmacy Medication History     Source of Information: Per  bedside     Additional concerns with the patient's PTA list.   N/a  Notified Provider via Haiku : Yes    The following updates were made to the Prior to Admission medication list:     Medications ADDED:   N/a  Medications CHANGED:  Rosuvastatin is 1/2 tablet daily   Claritin is 1 tab up to twice daily prn   Azelastine is prn   Medications REMOVED:   Tramadol   Medications NOT TAKING:   The vitamins have been on hold due to surgery. Patient plans to restart     Allergy reviewed : Yes    Meds 2 Beds : Yes    Outpatient pharmacy confirmed and updated in chart : Yes    [facility]     The list below reflectives the updated PTA list. Please review each medication in order reconciliation for additional clarification and justification.    Prior to Admission Medications   Prescriptions Last Dose   Myrbetriq 50 mg tablet extended release 24 hr 24 hr tablet 5/31/2025   Sig: TAKE 1 TABLET DAILY   VITAMIN B COMPLEX ORAL    Sig: Take 1 tablet by mouth once daily.   acetaminophen (Tylenol) 500 mg tablet 6/2/2025 at  4:00 AM   Sig: Take 2 tablets (1,000 mg) by mouth every 6 hours if needed for mild pain (1 - 3). 2 tabs every 6 hours PRN for Pain 1-3/10   aspirin 81 mg EC tablet 5/31/2025 Evening   Sig: Take 1 tablet (81 mg) by mouth 2 times a day.   azelastine (Astelin) 137 mcg (0.1 %) nasal spray    Sig: USE 2 SPRAYS IN EACH NOSTRIL IN THE MORNING AND 2 SPRAYS BEFORE BEDTIME   Patient taking differently: Administer 2 sprays into each nostril 2 times a day as needed for allergies.   calcium citrate-vitamin D3 250 mg-5 mcg (200 unit) tablet    Sig: Take 1 tablet by mouth once daily.   cholecalciferol (Vitamin D-3) 25 MCG (1000 UT) capsule    Sig: Take 1 capsule (25 mcg) by mouth once daily.   cyclobenzaprine (Flexeril) 10 mg tablet    Sig: Take 1 tablet (10 mg) by mouth 3 times a day as needed for muscle spasms.   docusate sodium (Colace) 100 mg  capsule 5/31/2025   Sig: Take 1 capsule (100 mg) by mouth 2 times a day.   fish oil concentrate (Omega-3) 120-180 mg capsule    Sig: Take 1 capsule (1 g) by mouth once daily.   fluticasone (Flonase) 50 mcg/actuation nasal spray    Sig: Administer 2 sprays into each nostril once daily.   Patient taking differently: Administer 2 sprays into each nostril once daily as needed for allergies.   loratadine (Claritin) 10 mg tablet    Sig: Take 1 tablet (10 mg) by mouth 2 times a day as needed for allergies.   meloxicam (Mobic) 15 mg tablet 5/31/2025   Sig: Take 1 tablet (15 mg) by mouth once daily.   nitrofurantoin, macrocrystal-monohydrate, (Macrobid) 100 mg capsule 5/31/2025   Sig: Take 1 capsule (100 mg) by mouth once daily.   pantoprazole (ProtoNix) 40 mg EC tablet 5/31/2025   Sig: Take 1 tablet (40 mg) by mouth once daily in the morning. Take before meals. Do not crush chew or split   rosuvastatin (Crestor) 5 mg tablet 5/31/2025   Sig: Take 0.5 tablets (2.5 mg) by mouth early in the morning.. 1/2 pill daily per doctors order  Take half tablet      Facility-Administered Medications: None       The list below reflectives the updated allergy list. Please review each documented allergy for additional clarification and justification.    Allergies   Allergen Reactions    Adhesive Tape-Silicones Other     Blisters, chin itching          06/02/25 at 4:12 PM - Amy Kenny

## 2025-06-02 NOTE — ED TRIAGE NOTES
Pt. To ED from home for nausea vomiting dizziness light headedness that started on Saturday. Patient had knee surgery about 3 weeks ago and states she had a fever last night. Patient endorses being unable to walk do to the severity of the light headedness. Patient also endorses abdomen discomfort.

## 2025-06-02 NOTE — H&P
History Of Present Illness  Zarina Holliday is a 79 y.o. female with a past medical history GERD, hyperlipidemia, CVA/TIA, peripheral neuropathy, overactive bladder, recurrent UTI, osteoarthritis of bilateral knees recently underwent right TKA on 5/14/2025 presenting with dizziness, nausea/vomiting, fever.  Patient reports her symptoms started suddenly  Saturday evening and progressively got worse.  Patient also reports abdominal pain and bloating.  Patient was noted to be hypoxic in the emergency department, and placed on 2 L nasal cannula.  CT angio chest for PE was obtained and reviewed that showed No evidence of pulmonary embolism.  Mild smooth interlobular septal thickening suggestive of pulmonary edema.  Scattered tree-in-bud opacities in the upper lobes bilaterally and small cluster in the right lower lobe suggesting atypical infection.  CT abdomen and pelvis with IV contrast showed No acute abnormality in the abdomen or pelvis.  Prominent right inguinal lymph nodes favored reactive.  Patient was treated with IV azithromycin, Rocephin, Reglan, Zofran as well as 1 L lactated Ringer's along with Tylenol with some improvement of her symptoms.  Patient denies shortness of breath or cough.  Patient reports a poor appetite for the past 2 days.  Patient right knee appears to be healing, does have postop swelling,  no signs of infection.  CBC 10.7, hemoglobin 10.3, hematocrit 33.1, sodium 132.    Patient admitted for further workup.     Past Medical History  She has a past medical history of Anemia, Arthritis, CVA (cerebral vascular accident) (Multi) (05/14/2025), GERD (gastroesophageal reflux disease), History of recurrent UTIs, Hyperlipidemia, Leg edema, OAB (overactive bladder), Osteopenia after menopause, PONV (postoperative nausea and vomiting), and TIA (transient ischemic attack) (10/2022).    She has no past medical history of Asthma, Awareness under anesthesia, CHF (congestive heart failure), Chronic kidney  disease, COPD (chronic obstructive pulmonary disease) (Multi), Coronary artery disease, Delayed emergence from general anesthesia, Diabetes mellitus type I (Multi), Disease of thyroid gland, Hard to intubate, History of blood transfusion, HL (hearing loss), Irregular heart beat, Malignant hyperthermia, Myocardial infarction (Multi), Personal history of irradiation, Pseudocholinesterase deficiency, Refusal of blood product, Seizure disorder (Multi), Sleep apnea, Type 2 diabetes mellitus, or Vision loss.    Surgical History  She has a past surgical history that includes  section, classic; Total abdominal hysterectomy w/ bilateral salpingoophorectomy; Tonsillectomy; Cataract extraction; MR angio neck wo IV contrast (2022); MR angio head wo IV contrast (2022); Colonoscopy; Total knee arthroplasty (Left, 2024); Salivary gland surgery (2023); Dental surgery; Facial cosmetic surgery; Other surgical history; Rhinoplasty; and Cosmetic surgery ().     Social History  She reports that she has never smoked. She has never used smokeless tobacco. She reports that she does not currently use alcohol. She reports that she does not use drugs.    Family History  Family History[1]     Allergies  Adhesive tape-silicones    Review of Systems   HENT: Negative.     Respiratory: Negative.     Gastrointestinal: Negative.    Genitourinary: Negative.    Musculoskeletal: Negative.    Psychiatric/Behavioral: Negative.          Physical Exam  HENT:      Head: Normocephalic.      Mouth/Throat:      Mouth: Mucous membranes are moist.   Cardiovascular:      Rate and Rhythm: Normal rate.      Pulses: Normal pulses.   Pulmonary:      Effort: Pulmonary effort is normal.      Breath sounds: Normal breath sounds.   Abdominal:      General: Bowel sounds are normal.      Palpations: Abdomen is soft.   Musculoskeletal:      Cervical back: Neck supple.   Skin:     General: Skin is warm.      Comments: Right knee  swelling/steri strips    Neurological:      Mental Status: She is oriented to person, place, and time.   Psychiatric:         Mood and Affect: Mood normal.          Last Recorded Vitals  /62   Pulse 96   Temp 37.6 °C (99.6 °F) (Oral)   Resp 15   Wt 63 kg (139 lb)   SpO2 100%     Relevant Results        Results for orders placed or performed during the hospital encounter of 06/02/25 (from the past 24 hours)   ECG 12 lead   Result Value Ref Range    Ventricular Rate 97 BPM    Atrial Rate 97 BPM    AZ Interval 124 ms    QRS Duration 80 ms    QT Interval 346 ms    QTC Calculation(Bazett) 439 ms    P Axis 72 degrees    R Axis 73 degrees    T Axis 78 degrees    QRS Count 16 beats    Q Onset 222 ms    P Onset 160 ms    P Offset 211 ms    T Offset 395 ms    QTC Fredericia 405 ms   CBC and Auto Differential   Result Value Ref Range    WBC 10.7 4.4 - 11.3 x10*3/uL    nRBC 0.0 0.0 - 0.0 /100 WBCs    RBC 3.40 (L) 4.00 - 5.20 x10*6/uL    Hemoglobin 10.3 (L) 12.0 - 16.0 g/dL    Hematocrit 32.1 (L) 36.0 - 46.0 %    MCV 94 80 - 100 fL    MCH 30.3 26.0 - 34.0 pg    MCHC 32.1 32.0 - 36.0 g/dL    RDW 13.0 11.5 - 14.5 %    Platelets 245 150 - 450 x10*3/uL    Neutrophils % 89.9 40.0 - 80.0 %    Immature Granulocytes %, Automated 0.5 0.0 - 0.9 %    Lymphocytes % 3.9 13.0 - 44.0 %    Monocytes % 5.2 2.0 - 10.0 %    Eosinophils % 0.0 0.0 - 6.0 %    Basophils % 0.5 0.0 - 2.0 %    Neutrophils Absolute 9.59 (H) 1.60 - 5.50 x10*3/uL    Immature Granulocytes Absolute, Automated 0.05 0.00 - 0.50 x10*3/uL    Lymphocytes Absolute 0.42 (L) 0.80 - 3.00 x10*3/uL    Monocytes Absolute 0.55 0.05 - 0.80 x10*3/uL    Eosinophils Absolute 0.00 0.00 - 0.40 x10*3/uL    Basophils Absolute 0.05 0.00 - 0.10 x10*3/uL   Comprehensive metabolic panel   Result Value Ref Range    Glucose 100 (H) 74 - 99 mg/dL    Sodium 132 (L) 136 - 145 mmol/L    Potassium 3.8 3.5 - 5.3 mmol/L    Chloride 99 98 - 107 mmol/L    Bicarbonate 22 21 - 32 mmol/L    Anion Gap 15  10 - 20 mmol/L    Urea Nitrogen 12 6 - 23 mg/dL    Creatinine 0.67 0.50 - 1.05 mg/dL    eGFR 88 >60 mL/min/1.73m*2    Calcium 8.8 8.6 - 10.3 mg/dL    Albumin 3.7 3.4 - 5.0 g/dL    Alkaline Phosphatase 58 33 - 136 U/L    Total Protein 6.3 (L) 6.4 - 8.2 g/dL    AST 14 9 - 39 U/L    Bilirubin, Total 0.9 0.0 - 1.2 mg/dL    ALT 9 7 - 45 U/L   Lipase   Result Value Ref Range    Lipase 11 9 - 82 U/L   Magnesium   Result Value Ref Range    Magnesium 1.77 1.60 - 2.40 mg/dL   '       Assessment/Plan   Assessment & Plan  Pneumonia of both upper lobes due to infectious organism  Dehydration   Dizziness   N/V    Zarina Holliday is a 79 y.o. female with a past medical history GERD, hyperlipidemia, CVA/TIA, peripheral neuropathy, overactive bladder, recurrent UTI, osteoarthritis of bilateral knees recently underwent right TKA on 5/14/2025 presenting with dizziness, nausea/vomiting, fever.  Patient reports her symptoms started suddenly  Saturday evening and progressively got worse.  Patient also reports abdominal pain and bloating.  Patient was noted to be hypoxic in the emergency department, and placed on 2 L nasal cannula.  CT angio chest for PE was obtained and reviewed that showed No evidence of pulmonary embolism.  Mild smooth interlobular septal thickening suggestive of pulmonary edema.  Scattered tree-in-bud opacities in the upper lobes bilaterally and small cluster in the right lower lobe suggesting atypical infection.  CT abdomen and pelvis with IV contrast showed No acute abnormality in the abdomen or pelvis.  Prominent right inguinal lymph nodes favored reactive.  Patient was treated with IV azithromycin, Rocephin, Reglan, Zofran as well as 1 L lactated Ringer's along with Tylenol with some improvement of her symptoms.  Patient denies shortness of breath or cough.  Patient reports a poor appetite for the past 2 days.  Patient right knee appears to be healing, does have postop swelling,  no signs of infection.  CBC  10.7, hemoglobin 10.3, hematocrit 33.1, sodium 132.    Patient admitted for further workup.      Plan:  Rocephin/azithromycin  IV hydration  Check procalcitonin, urine antigens  Antiemetics     Hx CVA/TIA,   hyperlipidemia  -asa, Crestor     overactive bladder  -ditropan   recurrent UTI  -daily nitrofurantoin      osteoarthritis of bilateral knees recently underwent right TKA on 5/14/2025   -asa bid, pt/ot     GERD  -pantoprazole     DVT prophylaxis- Patient on asa 81 mg bid, s/p right TKA       MEGHNA Ojeda-CNP         [1]   Family History  Problem Relation Name Age of Onset    Other (stroke) Mother Madyson REYES Julius Quiles 72    Leukemia Father Boubacra Fletcher sr     Alcohol abuse Father Boubacar Fletcher sr     Asthma Father Boubacar Fletcher sr     COPD Father Boubacar Fletcher sr     Depression Father Boubacar Fletcher sr     Polycythemia Father Boubacar Fletcher sr     Other (htn) Sister      Lung disease Brother boubacar Sin Paternal Grandmother      Depression Other FAMILY HISTORY     COPD Other FAMILY HISTORY     Asthma Other FAMILY HISTORY     Diabetes type II Other FAMILY HISTORY

## 2025-06-03 VITALS
BODY MASS INDEX: 23.73 KG/M2 | OXYGEN SATURATION: 96 % | HEART RATE: 82 BPM | HEIGHT: 64 IN | RESPIRATION RATE: 22 BRPM | TEMPERATURE: 98.8 F | WEIGHT: 139 LBS | SYSTOLIC BLOOD PRESSURE: 124 MMHG | DIASTOLIC BLOOD PRESSURE: 58 MMHG

## 2025-06-03 LAB
ANION GAP SERPL CALC-SCNC: 12 MMOL/L (ref 10–20)
BASOPHILS # BLD AUTO: 0.06 X10*3/UL (ref 0–0.1)
BASOPHILS NFR BLD AUTO: 1.1 %
BUN SERPL-MCNC: 10 MG/DL (ref 6–23)
CALCIUM SERPL-MCNC: 8.4 MG/DL (ref 8.6–10.3)
CHLORIDE SERPL-SCNC: 107 MMOL/L (ref 98–107)
CO2 SERPL-SCNC: 23 MMOL/L (ref 21–32)
CREAT SERPL-MCNC: 0.56 MG/DL (ref 0.5–1.05)
EGFRCR SERPLBLD CKD-EPI 2021: >90 ML/MIN/1.73M*2
EOSINOPHIL # BLD AUTO: 0.15 X10*3/UL (ref 0–0.4)
EOSINOPHIL NFR BLD AUTO: 2.7 %
ERYTHROCYTE [DISTWIDTH] IN BLOOD BY AUTOMATED COUNT: 12.8 % (ref 11.5–14.5)
GLUCOSE SERPL-MCNC: 94 MG/DL (ref 74–99)
HCT VFR BLD AUTO: 27.6 % (ref 36–46)
HGB BLD-MCNC: 9 G/DL (ref 12–16)
IMM GRANULOCYTES # BLD AUTO: 0.02 X10*3/UL (ref 0–0.5)
IMM GRANULOCYTES NFR BLD AUTO: 0.4 % (ref 0–0.9)
LEGIONELLA AG UR QL: NEGATIVE
LYMPHOCYTES # BLD AUTO: 0.56 X10*3/UL (ref 0.8–3)
LYMPHOCYTES NFR BLD AUTO: 9.9 %
MCH RBC QN AUTO: 30.5 PG (ref 26–34)
MCHC RBC AUTO-ENTMCNC: 32.6 G/DL (ref 32–36)
MCV RBC AUTO: 94 FL (ref 80–100)
MONOCYTES # BLD AUTO: 0.6 X10*3/UL (ref 0.05–0.8)
MONOCYTES NFR BLD AUTO: 10.6 %
NEUTROPHILS # BLD AUTO: 4.27 X10*3/UL (ref 1.6–5.5)
NEUTROPHILS NFR BLD AUTO: 75.3 %
NRBC BLD-RTO: 0 /100 WBCS (ref 0–0)
PLATELET # BLD AUTO: 188 X10*3/UL (ref 150–450)
POTASSIUM SERPL-SCNC: 4 MMOL/L (ref 3.5–5.3)
PROCALCITONIN SERPL-MCNC: 0.14 NG/ML
RBC # BLD AUTO: 2.95 X10*6/UL (ref 4–5.2)
S PNEUM AG UR QL: NEGATIVE
SODIUM SERPL-SCNC: 138 MMOL/L (ref 136–145)
WBC # BLD AUTO: 5.7 X10*3/UL (ref 4.4–11.3)

## 2025-06-03 PROCEDURE — 84145 PROCALCITONIN (PCT): CPT | Mod: AHULAB | Performed by: NURSE PRACTITIONER

## 2025-06-03 PROCEDURE — 97161 PT EVAL LOW COMPLEX 20 MIN: CPT | Mod: GP

## 2025-06-03 PROCEDURE — 85025 COMPLETE CBC W/AUTO DIFF WBC: CPT | Performed by: NURSE PRACTITIONER

## 2025-06-03 PROCEDURE — 80048 BASIC METABOLIC PNL TOTAL CA: CPT | Performed by: NURSE PRACTITIONER

## 2025-06-03 PROCEDURE — 2500000001 HC RX 250 WO HCPCS SELF ADMINISTERED DRUGS (ALT 637 FOR MEDICARE OP): Performed by: NURSE PRACTITIONER

## 2025-06-03 PROCEDURE — 2500000002 HC RX 250 W HCPCS SELF ADMINISTERED DRUGS (ALT 637 FOR MEDICARE OP, ALT 636 FOR OP/ED): Performed by: NURSE PRACTITIONER

## 2025-06-03 PROCEDURE — 97110 THERAPEUTIC EXERCISES: CPT | Mod: GP

## 2025-06-03 PROCEDURE — 97116 GAIT TRAINING THERAPY: CPT | Mod: GP

## 2025-06-03 PROCEDURE — 36415 COLL VENOUS BLD VENIPUNCTURE: CPT | Performed by: NURSE PRACTITIONER

## 2025-06-03 PROCEDURE — 99239 HOSP IP/OBS DSCHRG MGMT >30: CPT | Performed by: STUDENT IN AN ORGANIZED HEALTH CARE EDUCATION/TRAINING PROGRAM

## 2025-06-03 PROCEDURE — 2500000004 HC RX 250 GENERAL PHARMACY W/ HCPCS (ALT 636 FOR OP/ED): Performed by: NURSE PRACTITIONER

## 2025-06-03 RX ORDER — FLUTICASONE PROPIONATE 50 MCG
2 SPRAY, SUSPENSION (ML) NASAL DAILY PRN
Start: 2025-06-03

## 2025-06-03 RX ORDER — ONDANSETRON 4 MG/1
4 TABLET, ORALLY DISINTEGRATING ORAL EVERY 8 HOURS PRN
Qty: 20 TABLET | Refills: 0 | Status: SHIPPED | OUTPATIENT
Start: 2025-06-03

## 2025-06-03 RX ORDER — DOXYCYCLINE 100 MG/1
100 CAPSULE ORAL 2 TIMES DAILY
Qty: 10 CAPSULE | Refills: 0 | Status: SHIPPED | OUTPATIENT
Start: 2025-06-03 | End: 2025-06-08

## 2025-06-03 RX ORDER — TRAMADOL HYDROCHLORIDE 50 MG/1
50 TABLET, FILM COATED ORAL EVERY 6 HOURS PRN
Start: 2025-06-03

## 2025-06-03 RX ADMIN — ACETAMINOPHEN 650 MG: 325 TABLET, FILM COATED ORAL at 11:03

## 2025-06-03 RX ADMIN — ACETAMINOPHEN 650 MG: 325 TABLET, FILM COATED ORAL at 00:14

## 2025-06-03 RX ADMIN — NITROFURANTOIN MONOHYDRATE/MACROCRYSTALLINE 100 MG: 25; 75 CAPSULE ORAL at 11:03

## 2025-06-03 RX ADMIN — CETIRIZINE HYDROCHLORIDE 10 MG: 10 TABLET, FILM COATED ORAL at 11:03

## 2025-06-03 RX ADMIN — CYCLOBENZAPRINE 10 MG: 10 TABLET, FILM COATED ORAL at 02:13

## 2025-06-03 RX ADMIN — ACETAMINOPHEN 650 MG: 325 TABLET, FILM COATED ORAL at 15:46

## 2025-06-03 RX ADMIN — ASPIRIN 81 MG: 81 TABLET, COATED ORAL at 11:03

## 2025-06-03 RX ADMIN — ACETAMINOPHEN 650 MG: 325 TABLET, FILM COATED ORAL at 04:16

## 2025-06-03 RX ADMIN — SODIUM CHLORIDE 75 ML/HR: 0.9 INJECTION, SOLUTION INTRAVENOUS at 11:21

## 2025-06-03 RX ADMIN — OXYBUTYNIN CHLORIDE 5 MG: 5 TABLET ORAL at 11:03

## 2025-06-03 RX ADMIN — PANTOPRAZOLE SODIUM 40 MG: 40 TABLET, DELAYED RELEASE ORAL at 06:49

## 2025-06-03 RX ADMIN — ROSUVASTATIN 5 MG: 10 TABLET, FILM COATED ORAL at 06:44

## 2025-06-03 ASSESSMENT — COGNITIVE AND FUNCTIONAL STATUS - GENERAL
CLIMB 3 TO 5 STEPS WITH RAILING: A LITTLE
DAILY ACTIVITIY SCORE: 24
MOBILITY SCORE: 22
WALKING IN HOSPITAL ROOM: A LITTLE
MOBILITY SCORE: 22
CLIMB 3 TO 5 STEPS WITH RAILING: A LITTLE
WALKING IN HOSPITAL ROOM: A LITTLE

## 2025-06-03 ASSESSMENT — PAIN SCALES - GENERAL
PAINLEVEL_OUTOF10: 0 - NO PAIN
PAINLEVEL_OUTOF10: 3
PAINLEVEL_OUTOF10: 0 - NO PAIN
PAINLEVEL_OUTOF10: 2
PAINLEVEL_OUTOF10: 0 - NO PAIN
PAINLEVEL_OUTOF10: 3
PAINLEVEL_OUTOF10: 0 - NO PAIN

## 2025-06-03 ASSESSMENT — PAIN DESCRIPTION - LOCATION
LOCATION: HEAD
LOCATION: KNEE

## 2025-06-03 ASSESSMENT — PAIN DESCRIPTION - DESCRIPTORS
DESCRIPTORS: ACHING
DESCRIPTORS: DISCOMFORT
DESCRIPTORS: ACHING
DESCRIPTORS: ACHING

## 2025-06-03 ASSESSMENT — PAIN - FUNCTIONAL ASSESSMENT
PAIN_FUNCTIONAL_ASSESSMENT: 0-10

## 2025-06-03 ASSESSMENT — PAIN DESCRIPTION - ORIENTATION
ORIENTATION: RIGHT

## 2025-06-03 ASSESSMENT — ACTIVITIES OF DAILY LIVING (ADL)
LACK_OF_TRANSPORTATION: NO
ADL_ASSISTANCE: INDEPENDENT

## 2025-06-03 NOTE — PROGRESS NOTES
06/03/25 1208   Discharge Planning   Living Arrangements Spouse/significant other   Support Systems Spouse/significant other   Type of Residence Private residence   Number of Stairs to Enter Residence 2   Number of Stairs Within Residence 0   Home or Post Acute Services Other (Comment)  (outpatient PT OT)   Expected Discharge Disposition Home   Does the patient need discharge transport arranged? No   Financial Resource Strain   How hard is it for you to pay for the very basics like food, housing, medical care, and heating? Not hard   Housing Stability   In the last 12 months, was there a time when you were not able to pay the mortgage or rent on time? N   At any time in the past 12 months, were you homeless or living in a shelter (including now)? N   Transportation Needs   In the past 12 months, has lack of transportation kept you from medical appointments or from getting medications? no   In the past 12 months, has lack of transportation kept you from meetings, work, or from getting things needed for daily living? No   Intensity of Service   Intensity of Service 0-30 min     T[facility] met with pt and  at bedside. Explained my role as discharge planner. Lives with . Walks with a walker. Recently started outpatient PT OT and will resume outpatient when dc. Pt concerned that her steri strips to knee have not fallen off. Ortho PA reviewing updated pics in the chart. Adod today.

## 2025-06-03 NOTE — PROGRESS NOTES
Physical Therapy    Physical Therapy Evaluation & Treatment    Patient Name: Zarina Holliday  MRN: [id number]  Department: [place] [address]  Room: [address] [place] [address]  Today's Date: 6/3/2025   Time Calculation  Start Time: 0852  Stop Time: 0938  Time Calculation (min): 46 min    Assessment/Plan   PT Assessment  PT Assessment Results: Decreased strength, Decreased range of motion  Rehab Prognosis: Good  Barriers to Discharge Home: No anticipated barriers  Evaluation/Treatment Tolerance: Patient tolerated treatment well  Medical Staff Made Aware: Yes  Strengths: Ability to acquire knowledge, Attitude of self, Coping skills, Housing layout, Insight into problems, Living arrangement secure, Rehab experience, Premorbid level of function, Support and attitude of living partners  Barriers to Participation:  (None)  End of Session Communication: Care Coordinator, Bedside nurse (secure chat sent to sandy MAN from ortho team regarding patient's bandaging and incision)  Assessment Comment: Pt is an 78 YO female currently with medical diagnosis of PNA and recent R TKA on 5/14. Pt met in room fowlers in bed finishing [facility]. Pt completed PT eval as per grids. It was noted by therapist that steri-strips where still present in surgical site with granulated tissue around it. Pt presented with good BM and ambulation, needing VCs to improve ANGIE, strength is WFL and AROM/PROM of R knee is progressing. Current bariers are slight decrease in endurance and stability during ambulation requiring close SPV and VCs for correction  End of Session Patient Position: Alarm on, Up in chair   IP OR SWING BED PT PLAN  Inpatient or Swing Bed: Inpatient  PT Plan  Treatment/Interventions: Gait training, Bed mobility, Transfer training, Stair training, Balance training, Strengthening, Endurance training, Range of motion, Therapeutic exercise, Therapeutic activity, Home exercise program  PT Plan: Ongoing PT  PT Frequency: 3 times per week  PT Discharge  Recommendations: Low intensity level of continued care  Equipment Recommended upon Discharge:  (has needed equipment)  PT Recommended Transfer Status: Stand by assist  PT - OK to Discharge: Yes      Subjective     PT Visit Info:  PT Received On: 06/03/25  General Visit Information:  General  Reason for Referral: Current medical diagnosis of PNA, had TKA on 5/14  Referred By: MEGHNA Ojeda-CNP  Past Medical History Relevant to Rehab:   Past Medical History:   Diagnosis Date    Anemia     Arthritis     CVA (cerebral vascular accident) (Multi) 05/14/2025    GERD (gastroesophageal reflux disease)     History of recurrent UTIs     on macrobid    Hyperlipidemia     Leg edema     OAB (overactive bladder)     Osteopenia after menopause     PONV (postoperative nausea and vomiting)     TIA (transient ischemic attack) 10/2022    Questionable: while on vacation in late October 2022, she had new onset R eye blurriness and fatigue that was persistent on and off for 1-2 wks; Brain scans were negative. On Aspirin and Statin.      Family/Caregiver Present: Yes  Caregiver Feedback: Yes  Prior to Session Communication: Care Coordinator, Bedside nurse  Patient Position Received: Bed, 1 rail up, Alarm on  Preferred Learning Style: verbal, visual, written  Home Living:  Home Living  Type of Home: Independent living  Lives With: Spouse  Home Adaptive Equipment: Walker rolling or standard, Cane  Home Layout: One level  Home Access: Stairs to enter with rails  Entrance Stairs-Number of Steps: 3  Home Living Comments: has elevator  Prior Level of Function:  Prior Function Per Pt/Caregiver Report  Level of [place]: Independent with ADLs and functional transfers  Receives Help From: Family  ADL Assistance: Independent  Homemaking Assistance: Independent  Ambulatory Assistance: Independent (mod I with FWW)  Hand Dominance: Right  Prior Function Comments: Status post R TKE 5/14 completed HH, has initiated OP  PT  Precautions:  Precautions  LE Weight Bearing Status: Weight Bearing as Tolerated  Medical Precautions: Fall precautions  Post-Surgical Precautions: Right total knee precautions  Precautions Comment: allergy to adhesive tape     Date/Time Vitals Session Patient Position Pulse Resp SpO2 BP MAP (mmHg)    06/03/25 1239 --  --  --  --  96 %  124/58  80             Objective   Pain:  Pain Assessment  Pain Assessment: 0-10  0-10 (Numeric) Pain Score: 0 - No pain  Clinical Progression: Not changed  Pain Interventions: Cold applied, Repositioned, Ambulation/increased activity, Distraction, Emotional support, Therapeutic presence  Response to Interventions: Content/relaxed  Cognition:  Cognition  Overall Cognitive Status: Within Functional Limits  Attention: Within Functional Limits  Memory: Within Funtional Limits  Safety/Judgement: Within Functional Limits  Insight: Mild  Impulsive: Within functional limits  Processing Speed: Within funtional limits    General Assessments:  General Observation  General Observation: Pt completed PT eval as per grids, required VCs for safety and to impove ANGIE during ambulation with FWW, no pain reported during encounter with PT       Activity Tolerance  Endurance: Tolerates 30 min exercise with multiple rests    Sensation  Light Touch: No apparent deficits  Proprioception: No apparent deficits    Strength  Strength Comments: overall MMT 3/5 functional to needs  Strength  Strength Comments: overall MMT 3/5 functional to needs      Coordination  Movements are Fluid and Coordinated: Yes    Postural Control  Postural Control: Within Functional Limits    Static Sitting Balance  Static Sitting-Balance Support: No upper extremity supported  Static Sitting-Level of Assistance: Independent  Dynamic Sitting Balance  Dynamic Sitting-Balance Support: No upper extremity supported  Dynamic Sitting-Level of Assistance: Independent    Static Standing Balance  Static Standing-Comment/Number of Minutes:  SPV with FWW  Functional Assessments:  Bed Mobility  Bed Mobility: Yes  Bed Mobility 1  Bed Mobility 1: Supine to sitting, Side lying left to sit, Rolling left  Level of Assistance 1: Modified independent  Bed Mobility Comments 1: requires extra time, uses bedrails to get EOB    Transfers  Transfer: Yes  Transfer 1  Transfer From 1: Bed to  Transfer to 1: Stand  Technique 1: Sit to stand  Transfer Device 1: Walker, Gait belt  Transfer Level of Assistance 1: Close supervision  Trials/Comments 1: VCs for safety  Transfers 2  Transfer From 2: Stand to  Transfer to 2: Sit  Technique 2: Stand to sit  Transfer Device 2: Walker  Transfer Level of Assistance 2: Close supervision  Trials/Comments 2: VCs for safety    Ambulation/Gait Training  Ambulation/Gait Training Performed: Yes  Ambulation/Gait Training 1  Surface 1: Level tile  Device 1: Rolling walker  Assistance 1: Close supervision  Quality of Gait 1: Narrow base of support  Comments/Distance (ft) 1: 150   Extremity/Trunk Assessments:  RLE   RLE : Exceptions to WFL  AROM RLE (degrees)  R Knee Flexion 0-130: 70  Treatments:  Therapeutic Exercise  Therapeutic Exercise Performed: Yes  Therapeutic Exercise Activity 1: Pt sat in recliner chair and participated in 1x15 ther ex BLE SAQ, marching and APs    Therapeutic Activity  Therapeutic Activity Performed: Yes  Therapeutic Activity 1: patient educated and reviewed with therapist about current functional status, overall safety with FWW ambulation and how to improve ANGIE.  also educate on same, reviewed vitals with patient; able to maintain SPo2 pre and post activity. Pt also educated on energy conservation skills at home    Ambulation/Gait Training  Ambulation/Gait Training Performed: Yes  Ambulation/Gait Training 1  Surface 1: Level tile  Device 1: Rolling walker  Assistance 1: Close supervision  Quality of Gait 1: Narrow base of support  Comments/Distance (ft) 1: 150 (Pt provided with VCs and TCs for corrections  in order to improve ANGIE while ambulating with FWW. Pt receptive of education provided by therapist)  Outcome Measures:  [facility] Basic Mobility  Turning from your back to your side while in a flat bed without using bedrails: None  Moving from lying on your back to sitting on the side of a flat bed without using bedrails: None  Moving to and from bed to chair (including a wheelchair): None  Standing up from a chair using your arms (e.g. wheelchair or bedside chair): None  To walk in hospital room: A little  Climbing 3-5 steps with railing: A little  Basic Mobility - Total Score: 22    Encounter Problems       Encounter Problems (Active)       Mobility       STG - Patient will ambulate (Progressing)       Start:  06/03/25    Expected End:  06/17/25       Patient will be able to ambulate at least 300 ft mod I level with FWW no Vcs or Tcs needed , no safety concerns         Patient will be able to teach back TKA precautions as wells as safety with use of DME in outdoor mobility (Progressing)       Start:  06/03/25    Expected End:  06/17/25               PT Transfers       Pt will be able to demonstrate mod I transfers with LRAD  (Progressing)       Start:  06/03/25    Expected End:  06/17/25               Pain - Adult              Education Documentation  Precautions, taught by Danna Avila, PT at 6/3/2025 12:50 PM.  Learner: Significant Other, Patient  Readiness: Acceptance  Method: Explanation, Demonstration  Response: Verbalizes Understanding, Demonstrated Understanding, Needs Reinforcement  Comment: see note    Body Mechanics, taught by Danna Avila, PT at 6/3/2025 12:50 PM.  Learner: Significant Other, Patient  Readiness: Acceptance  Method: Explanation, Demonstration  Response: Verbalizes Understanding, Demonstrated Understanding, Needs Reinforcement  Comment: see note    Home Exercise Program, taught by Danna Avila, PT at 6/3/2025 12:50 PM.  Learner: Significant Other,  Patient  Readiness: Acceptance  Method: Explanation, Demonstration  Response: Verbalizes Understanding, Demonstrated Understanding, Needs Reinforcement  Comment: see note    Mobility Training, taught by Danna Avila, PT at 6/3/2025 12:50 PM.  Learner: Significant Other, Patient  Readiness: Acceptance  Method: Explanation, Demonstration  Response: Verbalizes Understanding, Demonstrated Understanding, Needs Reinforcement  Comment: see note    Education Comments  No comments found.

## 2025-06-03 NOTE — PROGRESS NOTES
Occupational Therapy                 Therapy Communication Note    Patient Name: Zarina Holliday  MRN: [id number]  Department: [facility] [place] [address]  Room: [address] [place] [address]  Today's Date: 6/3/2025     Discipline: Occupational Therapy    Missed Visit: OT Missed Visit: Yes     Missed Visit Reason: Missed Visit Reason: Other (Comment) (Spoke with PT, pt. at functional baseline for OT and denied having concerns for homegoing OT. OT to d/c order at this time.)    Missed Time: Attempt

## 2025-06-03 NOTE — NURSING NOTE
Discharge instructions provided using teach back method. Pt's health related  risk factors discussed with pt. pt educated to look for any worsening sign and symptoms. Pt educated to seek medical attention if experience any medical emergency. Pt aware to follow up with outpatient clinics as scheduled. Home going meds reviewed with pt. Pt verbalized understanding of disposition and discharge instructions. All questions answered to patient's satisfaction and within nursing scope of practice. Pt educated on medication side effect, med education handout given to pt. Pt verbalized understanding of med education. Ortho manager at bedside. Vitals stable, IV removed.

## 2025-06-03 NOTE — DISCHARGE SUMMARY
Discharge Diagnosis  Pneumonia of both upper lobes due to infectious organism           Issues Requiring Follow-Up  Post hospital follow up    Discharge Meds     Medication List      START taking these medications     doxycycline 100 mg capsule; Commonly known as: Vibramycin; Take 1   capsule (100 mg) by mouth 2 times a day for 5 days. Take with at least 8   ounces (large glass) of water, do not lie down for 30 minutes after   ondansetron ODT 4 mg disintegrating tablet; Commonly known as:   Zofran-ODT; Dissolve 1 tablet (4 mg) in the mouth every 8 hours if needed   for nausea or vomiting.     CHANGE how you take these medications     azelastine 137 mcg (0.1 %) nasal spray; Commonly known as: Astelin; USE   2 SPRAYS IN EACH NOSTRIL IN THE MORNING AND 2 SPRAYS BEFORE BEDTIME     CONTINUE taking these medications     acetaminophen 500 mg tablet; Commonly known as: Tylenol   aspirin 81 mg EC tablet; Take 1 tablet (81 mg) by mouth 2 times a day.   calcium citrate-vitamin D3 250 mg-5 mcg (200 unit) tablet   cholecalciferol 25 mcg (1,000 units) capsule; Commonly known as: Vitamin   D-3   Claritin 10 mg tablet; Generic drug: loratadine   cyclobenzaprine 10 mg tablet; Commonly known as: Flexeril; Take 1 tablet   (10 mg) by mouth 3 times a day as needed for muscle spasms.   docusate sodium 100 mg capsule; Commonly known as: Colace   fish oil concentrate 120-180 mg capsule; Commonly known as: Omega-3   fluticasone 50 mcg/actuation nasal spray; Commonly known as: Flonase;   Administer 2 sprays into each nostril once daily as needed for allergies.   meloxicam 15 mg tablet; Commonly known as: Mobic   Myrbetriq 50 mg tablet extended release 24 hr 24 hr tablet; Generic   drug: mirabegron; TAKE 1 TABLET DAILY   nitrofurantoin (macrocrystal-monohydrate) 100 mg capsule; Commonly known   as: Macrobid; Take 1 capsule (100 mg) by mouth once daily.   pantoprazole 40 mg EC tablet; Commonly known as: ProtoNix   rosuvastatin 5 mg tablet;  Commonly known as: Crestor   VITAMIN B COMPLEX ORAL       Test Results Pending At Discharge  Pending Labs       Order Current Status    Basic metabolic panel In process    Legionella Antigen, Urine In process    Procalcitonin In process    Streptococcus pneumoniae Antigen, Urine In process            Hospital Course   Zarina Holliday is a 79 y.o. female with a past medical history GERD, hyperlipidemia, CVA/TIA, peripheral neuropathy, overactive bladder, recurrent UTI, osteoarthritis of bilateral knees recently underwent right TKA on 5/14/2025 presenting with dizziness, nausea/vomiting, fever. She is found to have acute hypoxic respiratory failure 2/2 CAP. CT chest shows Scattered tree-in-bud opacities in the upper lobes bilaterally and small cluster in the right lower lobe suggesting atypical infection. She is started on IV abx. She was quickly weaned to RA. She is transitioned to doxycycline. Pt is hemodynamically stable for discharge at this time with close outpatient follow ups.     The patient was discharged in satisfactory condition.   Medications and side effect profile reviewed with patient.  More than 30 minutes were spent in coordinating patient discharge       Pertinent Physical Exam At Time of Discharge  Physical Exam  Vitals and nursing note reviewed.   Constitutional:       General: She is not in acute distress.     Appearance: Normal appearance. She is not ill-appearing or toxic-appearing.   HENT:      Head: Normocephalic and atraumatic.      Nose: Nose normal.      Mouth/Throat:      Mouth: Mucous membranes are moist.   Eyes:      Extraocular Movements: Extraocular movements intact.      Conjunctiva/sclera: Conjunctivae normal.      Pupils: Pupils are equal, round, and reactive to light.   Cardiovascular:      Rate and Rhythm: Normal rate and regular rhythm.      Heart sounds: No murmur heard.     No gallop.   Pulmonary:      Effort: Pulmonary effort is normal. No respiratory distress.      Breath  sounds: Normal breath sounds. No wheezing, rhonchi or rales.   Abdominal:      General: Abdomen is flat. Bowel sounds are normal. There is no distension.      Palpations: Abdomen is soft. There is no mass.      Tenderness: There is no abdominal tenderness.   Musculoskeletal:         General: Tenderness present. No swelling. Normal range of motion.      Cervical back: Normal range of motion and neck supple.      Comments: Surgical site: c/d/i   Skin:     General: Skin is warm and dry.   Neurological:      General: No focal deficit present.      Mental Status: She is alert and oriented to person, place, and time. Mental status is at baseline.   Psychiatric:         Mood and Affect: Mood normal.         Behavior: Behavior normal.         Thought Content: Thought content normal.         Judgment: Judgment normal.         Outpatient Follow-Up  Future Appointments   Date Time Provider Department [place]   6/4/2025  7:45 AM Nilo Bucy, PTA CMCSWoPT1 [facility]   6/9/2025  9:45 AM Radha I Turoff, PT [facility]1 [facility]   6/11/2025 10:00 AM Radha I Turoff, PT [facility]   6/16/2025  8:30 AM Nilo Bucy, PTA [facility]   6/18/2025  8:30 AM Nilo Bucy, PTA [facility]   6/19/2025 10:30 AM Moses Schneider MD [id number] [facility]   6/23/2025  9:45 AM Radha I Turoff, PT [facility]   6/25/2025  8:30 AM Radha I Turoff, PT [facility]   6/30/2025  9:45 AM Radha I Turoff, PT [facility]   7/2/2025  8:30 AM Radha I Turoff, PT [facility]   7/25/2025 10:45 AM Sylvie Key MD MPH [id number] [facility]   1/16/2026  9:30 AM Sylvie Key MD MPH [id number] [facility]         Dahlia Sanchez MD  Hospitalist

## 2025-06-03 NOTE — CARE PLAN
The patient's goals for the shift include      The clinical goals for the shift include pt will remain safe and comfortable throughout shift      Problem: Pain - Adult  Goal: Verbalizes/displays adequate comfort level or baseline comfort level  Flowsheets (Taken 6/2/2025 2359)  Verbalizes/displays adequate comfort level or baseline comfort level:   Encourage patient to monitor pain and request assistance   Administer analgesics based on type and severity of pain and evaluate response   Consider cultural and social influences on pain and pain management   Assess pain using appropriate pain scale   Implement non-pharmacological measures as appropriate and evaluate response   Notify Licensed Independent Practitioner if interventions unsuccessful or patient reports new pain     Problem: Safety - Adult  Goal: Free from fall injury  Flowsheets (Taken 6/2/2025 2359)  Free from fall injury:   Instruct family/caregiver on patient safety   Based on caregiver fall risk screen, instruct family/caregiver to ask for assistance with transferring infant if caregiver noted to have fall risk factors     Problem: Discharge Planning  Goal: Discharge to home or other facility with appropriate resources  Flowsheets (Taken 6/2/2025 2359)  Discharge to home or other facility with appropriate resources:   Identify barriers to discharge with patient and caregiver   Identify discharge learning needs (meds, wound care, etc)   Refer to discharge planning if patient needs post-hospital services based on physician order or complex needs related to functional status, cognitive ability or social support system   Arrange for needed discharge resources and transportation as appropriate   Arrange for interpreters to assist at discharge as needed     Problem: Chronic Conditions and Co-morbidities  Goal: Patient's chronic conditions and co-morbidity symptoms are monitored and maintained or improved  Flowsheets (Taken 6/2/2025 2359)  Care Plan -  Patient's Chronic Conditions and Co-Morbidity Symptoms are Monitored and Maintained or Improved:   Monitor and assess patient's chronic conditions and comorbid symptoms for stability, deterioration, or improvement   Collaborate with multidisciplinary team to address chronic and comorbid conditions and prevent exacerbation or deterioration   Update acute care plan with appropriate goals if chronic or comorbid symptoms are exacerbated and prevent overall improvement and discharge

## 2025-06-03 NOTE — CARE PLAN
Problem: Pain - Adult  Goal: Verbalizes/displays adequate comfort level or baseline comfort level  6/3/2025 0011 by Erin Gordon RN  Flowsheets (Taken 6/2/2025 2359)  Verbalizes/displays adequate comfort level or baseline comfort level:   Encourage patient to monitor pain and request assistance   Administer analgesics based on type and severity of pain and evaluate response   Consider cultural and social influences on pain and pain management   Assess pain using appropriate pain scale   Implement non-pharmacological measures as appropriate and evaluate response   Notify Licensed Independent Practitioner if interventions unsuccessful or patient reports new pain  6/2/2025 2359 by Erin Gordon RN  Flowsheets (Taken 6/2/2025 2359)  Verbalizes/displays adequate comfort level or baseline comfort level:   Encourage patient to monitor pain and request assistance   Administer analgesics based on type and severity of pain and evaluate response   Consider cultural and social influences on pain and pain management   Assess pain using appropriate pain scale   Implement non-pharmacological measures as appropriate and evaluate response   Notify Licensed Independent Practitioner if interventions unsuccessful or patient reports new pain     Problem: Safety - Adult  Goal: Free from fall injury  6/3/2025 0011 by Erin Gordon RN  Flowsheets (Taken 6/2/2025 2359)  Free from fall injury:   Instruct family/caregiver on patient safety   Based on caregiver fall risk screen, instruct family/caregiver to ask for assistance with transferring infant if caregiver noted to have fall risk factors  6/2/2025 2359 by Erin Gordon RN  Flowsheets (Taken 6/2/2025 2359)  Free from fall injury:   Instruct family/caregiver on patient safety   Based on caregiver fall risk screen, instruct family/caregiver to ask for assistance with transferring infant if caregiver noted to have fall risk factors     Problem: Discharge Planning  Goal:  Discharge to home or other facility with appropriate resources  6/3/2025 0011 by Erin Gordon RN  Flowsheets (Taken 6/2/2025 2359)  Discharge to home or other facility with appropriate resources:   Identify barriers to discharge with patient and caregiver   Identify discharge learning needs (meds, wound care, etc)   Refer to discharge planning if patient needs post-hospital services based on physician order or complex needs related to functional status, cognitive ability or social support system   Arrange for needed discharge resources and transportation as appropriate   Arrange for interpreters to assist at discharge as needed  6/2/2025 2359 by Erin Gordon RN  Flowsheets (Taken 6/2/2025 2359)  Discharge to home or other facility with appropriate resources:   Identify barriers to discharge with patient and caregiver   Identify discharge learning needs (meds, wound care, etc)   Refer to discharge planning if patient needs post-hospital services based on physician order or complex needs related to functional status, cognitive ability or social support system   Arrange for needed discharge resources and transportation as appropriate   Arrange for interpreters to assist at discharge as needed     Problem: Chronic Conditions and Co-morbidities  Goal: Patient's chronic conditions and co-morbidity symptoms are monitored and maintained or improved  6/3/2025 0011 by Erin Gordon RN  Flowsheets (Taken 6/2/2025 2359)  Care Plan - Patient's Chronic Conditions and Co-Morbidity Symptoms are Monitored and Maintained or Improved:   Monitor and assess patient's chronic conditions and comorbid symptoms for stability, deterioration, or improvement   Collaborate with multidisciplinary team to address chronic and comorbid conditions and prevent exacerbation or deterioration   Update acute care plan with appropriate goals if chronic or comorbid symptoms are exacerbated and prevent overall improvement and discharge  6/2/2025  0718 by Erin Gordon RN  Flowsheets (Taken 6/2/2025 9895)  Care Plan - Patient's Chronic Conditions and Co-Morbidity Symptoms are Monitored and Maintained or Improved:   Monitor and assess patient's chronic conditions and comorbid symptoms for stability, deterioration, or improvement   Collaborate with multidisciplinary team to address chronic and comorbid conditions and prevent exacerbation or deterioration   Update acute care plan with appropriate goals if chronic or comorbid symptoms are exacerbated and prevent overall improvement and discharge   The patient's goals for the shift include      The clinical goals for the shift include pt will remain safe and comfortable throughout shift

## 2025-06-04 ENCOUNTER — APPOINTMENT (OUTPATIENT)
Dept: PHYSICAL THERAPY | Facility: CLINIC | Age: 80
End: 2025-06-04
Payer: MEDICARE

## 2025-06-04 DIAGNOSIS — M25.561 ACUTE PAIN OF RIGHT KNEE: Primary | ICD-10-CM

## 2025-06-05 ENCOUNTER — TREATMENT (OUTPATIENT)
Dept: PHYSICAL THERAPY | Facility: CLINIC | Age: 80
End: 2025-06-05
Payer: MEDICARE

## 2025-06-05 ENCOUNTER — APPOINTMENT (OUTPATIENT)
Dept: ORTHOPEDIC SURGERY | Facility: CLINIC | Age: 80
End: 2025-06-05
Payer: MEDICARE

## 2025-06-05 DIAGNOSIS — M25.561 ACUTE PAIN OF RIGHT KNEE: Primary | ICD-10-CM

## 2025-06-05 PROCEDURE — 97140 MANUAL THERAPY 1/> REGIONS: CPT | Mod: GP,CQ

## 2025-06-05 PROCEDURE — 97110 THERAPEUTIC EXERCISES: CPT | Mod: GP,CQ

## 2025-06-05 ASSESSMENT — PAIN SCALES - GENERAL: PAINLEVEL_OUTOF10: 4

## 2025-06-05 ASSESSMENT — PAIN - FUNCTIONAL ASSESSMENT: PAIN_FUNCTIONAL_ASSESSMENT: 0-10

## 2025-06-05 NOTE — PROGRESS NOTES
Physical Therapy    Physical Therapy Treatment    Patient Name: Zarina Holliday  MRN: [id number]  Today's Date: 6/5/2025    Time Calculation  Start Time: 0900  Stop Time: 0945  Time Calculation (min): 45 min    Insurance:  Visit number:  2 out of [place]  Authorization information: no auth  Insurance Type: Medicare and  for life     General:  Reason for visit: Right knee pain s/p TKA 5/14/25  Referred by: Dr. Schneider    Current Problem:   1. Acute pain of right knee  Follow Up In Physical Therapy          SUBJECTIVE:   Pt reports that they went to the hospital after throwing up and was diagnosised and treated for pneumonia.     Precautions:   Precautions  STEADI Fall Risk Score (The score of 4 or more indicates an increased risk of falling): 4  Precautions Comment: Right TKA     Pain:   Pain Assessment  Pain Assessment: 0-10  0-10 (Numeric) Pain Score: 4    OBJECTIVE:      Flex 110   Extension 0     Treatments:  Therapeutic Exercise: ( 30 minutes)  Bike 5'   Heel raises   Quad sets   Clamshell 2x10   LAQ 2x10   Walking fwd parallel bars 3 laps NO UE  Walking laterally parallel bars 3 laps NO UE  Manual 15'  Manual STM to to quad 15'      HEP: [id number]       ASSESSMENT:   Pt tolerated treatment session well despite having pain throughout the session intermittently.   Pt was instructed to massage at home and to decrease inflammatory movements for ASIS painful area.     Charges:   Therapeutic Exercise: 3 unit       PLAN:     Continue to progress per patient tolerance strengthening and pain relief/soft tissue elasticity.

## 2025-06-06 ENCOUNTER — OFFICE VISIT (OUTPATIENT)
Dept: ORTHOPEDIC SURGERY | Facility: CLINIC | Age: 80
End: 2025-06-06
Payer: MEDICARE

## 2025-06-06 ENCOUNTER — APPOINTMENT (OUTPATIENT)
Dept: RADIOLOGY | Facility: HOSPITAL | Age: 80
End: 2025-06-06
Payer: MEDICARE

## 2025-06-06 DIAGNOSIS — Z96.651 S/P TKR (TOTAL KNEE REPLACEMENT) USING CEMENT, RIGHT: Primary | ICD-10-CM

## 2025-06-06 PROCEDURE — 99024 POSTOP FOLLOW-UP VISIT: CPT | Performed by: PHYSICIAN ASSISTANT

## 2025-06-06 PROCEDURE — 99212 OFFICE O/P EST SF 10 MIN: CPT | Performed by: PHYSICIAN ASSISTANT

## 2025-06-06 PROCEDURE — 1111F DSCHRG MED/CURRENT MED MERGE: CPT | Performed by: PHYSICIAN ASSISTANT

## 2025-06-06 RX ORDER — CEPHALEXIN 500 MG/1
500 CAPSULE ORAL 2 TIMES DAILY
Qty: 20 CAPSULE | Refills: 0 | Status: SHIPPED | OUTPATIENT
Start: 2025-06-06 | End: 2025-06-16

## 2025-06-06 NOTE — PROGRESS NOTES
SYLVIA Patton, PASofiaC, ATC  Adult Reconstruction and Joint Replacement Surgery  Phone: [phone]     Fax:[phone]          HPI:  Zarina Holliday is a pleasant 79 y.o. year-old female here for follow-up of their Right Total Knee Arthroplasty by Dr. Schneider.  The patient is approximately 3 weeks ago.  She was just hospitalized for pneumonia.  She had her PCP make a housecall.  She was concerned about a possible infection.  She is here today for a follow-up.She has no fever, chills, myalgias. No evidence of drainage or purulence to surgical site. Steri-strips intact.    Review of Systems  Medical History[1]  Problem List[2]  Medication Documentation Review Audit       Reviewed by Amy Kenny (Technician) on 06/02/25 at 1612      Medication Order Taking? Sig Documenting Provider Last Dose Status   acetaminophen (Tylenol) 500 mg tablet [id number] Yes Take 2 tablets (1,000 mg) by mouth every 6 hours if needed for mild pain (1 - 3). 2 tabs every 6 hours PRN for Pain 1-3/10 Historical Provider, MD 6/2/2025  4:00 AM Active   aspirin 81 mg EC tablet [id number] No Take 1 tablet (81 mg) by mouth 2 times a day. Sylvie Huber MD 5/31/2025 Evening Active   azelastine (Astelin) 137 mcg (0.1 %) nasal spray [id number]  USE 2 SPRAYS IN EACH NOSTRIL IN THE MORNING AND 2 SPRAYS BEFORE BEDTIME   Patient taking differently: Administer 2 sprays into each nostril 2 times a day as needed for allergies.    Josefina Hatfield MD  Active   calcium citrate-vitamin D3 250 mg-5 mcg (200 unit) tablet [id number]  Take 1 tablet by mouth once daily. Historical Provider, MD  Active   cholecalciferol (Vitamin D-3) 25 MCG (1000 UT) capsule [id number]  Take 1 capsule (25 mcg) by mouth once daily. Historical Provider, MD  Active   cyclobenzaprine (Flexeril) 10 mg tablet [id number]  Take 1 tablet (10 mg) by mouth 3 times a day as needed for muscle spasms. Sylvie Huber MD  Active   docusate sodium (Colace) 100 mg capsule [id number]  No Take 1 capsule (100 mg) by mouth 2 times a day. Moses Schneider MD 5/31/2025 Active   fish oil concentrate (Omega-3) 120-180 mg capsule [id number]  Take 1 capsule (1 g) by mouth once daily. Historical Provider, MD  Active   fluticasone (Flonase) 50 mcg/actuation nasal spray [id number]  Administer 2 sprays into each nostril once daily.   Patient taking differently: Administer 2 sprays into each nostril once daily as needed for allergies.    Nneka Albarran MD  Active   loratadine (Claritin) 10 mg tablet [id number]  Take 1 tablet (10 mg) by mouth 2 times a day as needed for allergies. Historical Provider, MD  Active   meloxicam (Mobic) 15 mg tablet [id number] No Take 1 tablet (15 mg) by mouth once daily. Moses Schneider MD 5/31/2025 Active   Myrbetriq 50 mg tablet extended release 24 hr 24 hr tablet [id number] No TAKE 1 TABLET DAILY Maria Espinoza MD [facility] 5/31/2025 Active   nitrofurantoin, macrocrystal-monohydrate, (Macrobid) 100 mg capsule [id number] No Take 1 capsule (100 mg) by mouth once daily. Sylvie Key MD [facility] 5/31/2025 Active   pantoprazole (ProtoNix) 40 mg EC tablet [id number] No Take 1 tablet (40 mg) by mouth once daily in the morning. Take before meals. Do not crush chew or split Moses Schneider MD 5/31/2025 Active   rosuvastatin (Crestor) 5 mg tablet [id number] No Take 0.5 tablets (2.5 mg) by mouth early in the morning.. 1/2 pill daily per doctors order  Take half tablet Carmen Castellanos MD 5/31/2025 Active   VITAMIN B COMPLEX ORAL [id number]  Take 1 tablet by mouth once daily. Historical Provider, MD  Active                  RX Allergies[3]  Social History     Socioeconomic History    Marital status:      Spouse name: Not on file    Number of children: Not on file    Years of education: Not on file    Highest education level: Not on file   Occupational History    Not on file   Tobacco Use    Smoking status: Never    Smokeless tobacco: Never   Vaping Use    Vaping status:  Never Used   Substance and Sexual Activity    Alcohol use: Not Currently     Comment: quit drinking 2 years ago    Drug use: Never    Sexual activity: Not on file   Other Topics Concern    Not on file   Social History Narrative    Not on file     Social Drivers of Health     Financial Resource Strain: Low Risk  (6/3/2025)    Overall Financial Resource Strain (CARDIA)     Difficulty of Paying Living Expenses: Not hard at all   Food Insecurity: No Food Insecurity (6/2/2025)    Hunger Vital Sign     Worried About Running Out of Food in the Last Year: Never true     Ran Out of Food in the Last Year: Never true   Transportation Needs: No Transportation Needs (6/3/2025)    PRAPARE - Transportation     Lack of Transportation (Medical): No     Lack of Transportation (Non-Medical): No   Physical Activity: Sufficiently Active (6/2/2025)    Exercise Vital Sign     Days of Exercise per Week: 5 days     Minutes of Exercise per Session: 30 min   Stress: No Stress Concern Present (6/2/2025)    [employer] [place] [employer] - Occupational Stress Questionnaire     Feeling of Stress : Not at all   Social Connections: Moderately Integrated (6/2/2025)    Social Connection and Isolation Panel [NHANES]     Frequency of Communication with Friends and Family: Once a week     Frequency of Social Gatherings with Friends and Family: Once a week     Attends [employer] Services: More than 4 times per year     Active Member of Clubs or Organizations: Yes     Attends Club or Organization Meetings: More than 4 times per year     Marital Status:    Intimate Partner Violence: Not At Risk (6/2/2025)    Humiliation, Afraid, Rape, and Kick questionnaire     Fear of Current or Ex-Partner: No     Emotionally Abused: No     Physically Abused: No     Sexually Abused: No   Housing Stability: Low Risk  (6/3/2025)    Housing Stability Vital Sign     Unable to Pay for Housing in the Last Year: No     Number of Times Moved in the Last Year: 1      Homeless in the Last Year: No     Surgical History[4]    Physical Exam  side: right knee  There were no vitals filed for this visit.  AxO x 3 in NAD.   Assistive Device: cane. Coordination and balance intact.  Normal bilateral upper and lower extremities.  No erythema, ecchymosis, temperature changes. No popliteal lymphadenopathy,  No overlying lesion  Mood: euthymic  Respirations non-labored    Neurovascular exam is at baseline.  Range of motion slowly returning  5/5 hip flexion/knee extension/DF/PF/EHL  SILT in bijal/saph/ per/tib distribution   Extremities warm and well perfused.  No lower extremity calf tenderness, warmth or swelling. Lower extremity well perfused  2+ Femoral/DP/PT pulses bilaterally    The incision is midline  with well healing slight erythema.  Possibly blood product under the skin.  No evidence of drainage or discharge.    Impression/Plan  Zarina Holliday and I discussed the etiology of symptoms.  We discussed in detail a treatment plan that he/she is comfortable with.    1. S/P Right Total Knee Arthroplasty -  We will send her home with keflex. Steri-strips removed and wound cleaned with chlorhexidine.  Bandage was applied.she has some slight erythema and intermittent warmth.  There is no warmth on exam today.  It looks more like blood product under the skin.  We will send her home with Keflex today to cover against infection but I have a low suspicion.  she knows the signs and symptoms to look for in an infection. If this occurs over the weekend, she has been instructed to call the on-call number or go to the ER. I spoke with PCP with plan and agrees. Continue to ice and elevate. Pain meds as needed.    Follow-up  1 week    SYLVIA Patton, PA-C, ATC  Orthopedic Physician Assisant  Adult Reconstruction and Total Joint Replacement  General Orthopedics  Department of Orthopaedic Surgery  [address]  [address]  [employer] messaging preferred         [1]   Past Medical History:  Diagnosis Date    Anemia     Arthritis     CVA (cerebral vascular accident) (Multi) 2025    GERD (gastroesophageal reflux disease)     History of recurrent UTIs     on macrobid    Hyperlipidemia     Leg edema     OAB (overactive bladder)     Osteopenia after menopause     PONV (postoperative nausea and vomiting)     TIA (transient ischemic attack) 10/2022    Questionable: while on vacation in late 2022, she had new onset R eye blurriness and fatigue that was persistent on and off for 1-2 wks; Brain scans were negative. On Aspirin and Statin.   [2]   Patient Active Problem List  Diagnosis    Effusion of left knee    Arthritis of knee, left    Edema of both legs    Episodic lightheadedness    Dizziness    Cerumen impaction    Borderline hyperglycemia    Bilateral knee pain    Arthralgia of left hip    Anemia    Allergic rhinitis    Adult myxedema    Abnormal mammogram    Abnormal finding in urine    Shoulder pain, right    Segmental and somatic dysfunction of pelvic region    Primary osteoarthritis of both knees    Peripheral neuropathy    Osteopenia    Osteoarthritis    OAB (overactive bladder)    Lower extremity edema    Lesion of bladder    Imbalance    Venous insufficiency    Varicose veins with pain    UTI (urinary tract infection)    Urge incontinence of urine    Unilateral primary osteoarthritis, left knee    Arthritis of left knee    Primary osteoarthritis of left knee    Aftercare following left knee joint replacement surgery    Hyperlipidemia    Gastroesophageal reflux disease    CVA (cerebral vascular accident) (Multi)    Acute pain of right knee    Pneumonia of both upper lobes due to infectious organism   [3]   Allergies  Allergen Reactions    Adhesive Tape-Silicones Other     Blisters, chin itching   [4]   Past Surgical History:  Procedure Laterality Date    CATARACT EXTRACTION       SECTION, CLASSIC       COLONOSCOPY      COSMETIC SURGERY  1996    DENTAL SURGERY      FACIAL COSMETIC SURGERY      face lift    MR HEAD ANGIO WO IV CONTRAST  11/02/2022    MR HEAD ANGIO WO IV CONTRAST 11/2/2022 [facility]    MR NECK ANGIO WO IV CONTRAST  11/02/2022    MR NECK ANGIO WO IV CONTRAST 11/2/2022 [facility]    OTHER SURGICAL HISTORY      RHINOPLASTY      SALIVARY GLAND SURGERY  04/17/2023    Left lower lip excision:Left lower lip, excision: - Mucocele and minor salivary glands exhibiting chronic sialadenitis.    TONSILLECTOMY      TOTAL ABDOMINAL HYSTERECTOMY W/ BILATERAL SALPINGOOPHORECTOMY      TOTAL KNEE ARTHROPLASTY Left 04/22/2024

## 2025-06-09 ENCOUNTER — TREATMENT (OUTPATIENT)
Dept: PHYSICAL THERAPY | Facility: CLINIC | Age: 80
End: 2025-06-09
Payer: MEDICARE

## 2025-06-09 ENCOUNTER — APPOINTMENT (OUTPATIENT)
Dept: PHYSICAL THERAPY | Facility: CLINIC | Age: 80
End: 2025-06-09
Payer: MEDICARE

## 2025-06-09 DIAGNOSIS — M25.561 ACUTE PAIN OF RIGHT KNEE: Primary | ICD-10-CM

## 2025-06-09 PROCEDURE — 97112 NEUROMUSCULAR REEDUCATION: CPT | Mod: GP

## 2025-06-09 PROCEDURE — 97110 THERAPEUTIC EXERCISES: CPT | Mod: GP

## 2025-06-09 PROCEDURE — 97016 VASOPNEUMATIC DEVICE THERAPY: CPT | Mod: GP

## 2025-06-09 ASSESSMENT — PAIN - FUNCTIONAL ASSESSMENT: PAIN_FUNCTIONAL_ASSESSMENT: 0-10

## 2025-06-09 ASSESSMENT — PAIN SCALES - GENERAL: PAINLEVEL_OUTOF10: 0 - NO PAIN

## 2025-06-10 NOTE — PROGRESS NOTES
"Physical Therapy    Physical Therapy Treatment    Patient Name: Zarina Holliday  MRN: [id number]  Today's Date: 6/11/2025         Insurance:  Visit number:  4 out of [place]  Authorization information: no auth  Insurance Type: Medicare and  for life     General:  Reason for visit: Right knee pain s/p TKA 5/14/25  Referred by: Dr. Schneider    Current Problem:   1. Acute pain of right knee            SUBJECTIVE:   Pt reports that she is finally back to feeling 75% of her normal. Was also put on Keflex. Incision is less red today and sees PA Thursday to take a look.      Precautions:         Pain:        OBJECTIVE:      Right knee ROM: 0-115 degrees    Treatments:  R. Bike, L5, 5'   DBE 2' x 2 directions  Slow airex MIP 2'  Fwd bosu lunged x 10 B  6\" step up and hold 5\" x 10 B  Heel slides with strap 5\" hold x 10  Grx10', R knee, mod comp (50 deg)    Charges: TE, NM, Vaso         HEP: [id number]       ASSESSMENT:   Patient able to gain 5 more degrees of knee flexion today. Advised to increase walking slowly so lungs and knee can adjust to increased activity level.       PLAN:     Continue to increase LE strength, flexibility, balance and WB endurance for return to PLOF.  "

## 2025-06-11 ENCOUNTER — APPOINTMENT (OUTPATIENT)
Dept: RADIOLOGY | Facility: HOSPITAL | Age: 80
End: 2025-06-11
Payer: MEDICARE

## 2025-06-11 ENCOUNTER — APPOINTMENT (OUTPATIENT)
Dept: PHYSICAL THERAPY | Facility: CLINIC | Age: 80
End: 2025-06-11
Payer: MEDICARE

## 2025-06-11 ENCOUNTER — APPOINTMENT (OUTPATIENT)
Dept: CARDIOLOGY | Facility: HOSPITAL | Age: 80
End: 2025-06-11
Payer: MEDICARE

## 2025-06-11 ENCOUNTER — HOSPITAL ENCOUNTER (EMERGENCY)
Facility: HOSPITAL | Age: 80
Discharge: HOME | End: 2025-06-11
Attending: STUDENT IN AN ORGANIZED HEALTH CARE EDUCATION/TRAINING PROGRAM
Payer: MEDICARE

## 2025-06-11 VITALS
HEIGHT: 64 IN | BODY MASS INDEX: 24.05 KG/M2 | WEIGHT: 140.87 LBS | OXYGEN SATURATION: 100 % | SYSTOLIC BLOOD PRESSURE: 131 MMHG | RESPIRATION RATE: 16 BRPM | DIASTOLIC BLOOD PRESSURE: 74 MMHG | HEART RATE: 71 BPM | TEMPERATURE: 97.5 F

## 2025-06-11 DIAGNOSIS — R53.1 GENERALIZED WEAKNESS: Primary | ICD-10-CM

## 2025-06-11 DIAGNOSIS — M25.561 ACUTE PAIN OF RIGHT KNEE: Primary | ICD-10-CM

## 2025-06-11 LAB
ALBUMIN SERPL BCP-MCNC: 3.7 G/DL (ref 3.4–5)
ALP SERPL-CCNC: 60 U/L (ref 33–136)
ALT SERPL W P-5'-P-CCNC: 14 U/L (ref 7–45)
ANION GAP SERPL CALC-SCNC: 15 MMOL/L (ref 10–20)
APPEARANCE UR: CLEAR
AST SERPL W P-5'-P-CCNC: 14 U/L (ref 9–39)
ATRIAL RATE: 69 BPM
BASE EXCESS BLDV CALC-SCNC: 3.9 MMOL/L (ref -2–3)
BASOPHILS # BLD AUTO: 0.07 X10*3/UL (ref 0–0.1)
BASOPHILS NFR BLD AUTO: 1.6 %
BILIRUB SERPL-MCNC: 0.4 MG/DL (ref 0–1.2)
BILIRUB UR STRIP.AUTO-MCNC: NEGATIVE MG/DL
BODY TEMPERATURE: 37 DEGREES CELSIUS
BUN SERPL-MCNC: 10 MG/DL (ref 6–23)
CALCIUM SERPL-MCNC: 9 MG/DL (ref 8.6–10.3)
CARDIAC TROPONIN I PNL SERPL HS: 4 NG/L (ref 0–13)
CHLORIDE SERPL-SCNC: 107 MMOL/L (ref 98–107)
CO2 SERPL-SCNC: 22 MMOL/L (ref 21–32)
COLOR UR: COLORLESS
CREAT SERPL-MCNC: 0.61 MG/DL (ref 0.5–1.05)
CRP SERPL-MCNC: 0.81 MG/DL
EGFRCR SERPLBLD CKD-EPI 2021: >90 ML/MIN/1.73M*2
EOSINOPHIL # BLD AUTO: 0.16 X10*3/UL (ref 0–0.4)
EOSINOPHIL NFR BLD AUTO: 3.6 %
ERYTHROCYTE [DISTWIDTH] IN BLOOD BY AUTOMATED COUNT: 13 % (ref 11.5–14.5)
ERYTHROCYTE [SEDIMENTATION RATE] IN BLOOD BY WESTERGREN METHOD: 23 MM/H (ref 0–30)
FLUAV RNA RESP QL NAA+PROBE: NOT DETECTED
FLUBV RNA RESP QL NAA+PROBE: NOT DETECTED
GLUCOSE SERPL-MCNC: 73 MG/DL (ref 74–99)
GLUCOSE UR STRIP.AUTO-MCNC: NORMAL MG/DL
HCO3 BLDV-SCNC: 28.5 MMOL/L (ref 22–26)
HCT VFR BLD AUTO: 32.6 % (ref 36–46)
HGB BLD-MCNC: 10.7 G/DL (ref 12–16)
IMM GRANULOCYTES # BLD AUTO: 0.02 X10*3/UL (ref 0–0.5)
IMM GRANULOCYTES NFR BLD AUTO: 0.4 % (ref 0–0.9)
INHALED O2 CONCENTRATION: 21 %
INR PPP: 1 (ref 0.9–1.1)
KETONES UR STRIP.AUTO-MCNC: NEGATIVE MG/DL
LACTATE SERPL-SCNC: 1.7 MMOL/L (ref 0.4–2)
LEUKOCYTE ESTERASE UR QL STRIP.AUTO: NEGATIVE
LYMPHOCYTES # BLD AUTO: 0.81 X10*3/UL (ref 0.8–3)
LYMPHOCYTES NFR BLD AUTO: 18 %
MAGNESIUM SERPL-MCNC: 2.14 MG/DL (ref 1.6–2.4)
MCH RBC QN AUTO: 30.3 PG (ref 26–34)
MCHC RBC AUTO-ENTMCNC: 32.8 G/DL (ref 32–36)
MCV RBC AUTO: 92 FL (ref 80–100)
MONOCYTES # BLD AUTO: 0.37 X10*3/UL (ref 0.05–0.8)
MONOCYTES NFR BLD AUTO: 8.2 %
NEUTROPHILS # BLD AUTO: 3.06 X10*3/UL (ref 1.6–5.5)
NEUTROPHILS NFR BLD AUTO: 68.2 %
NITRITE UR QL STRIP.AUTO: NEGATIVE
NRBC BLD-RTO: 0 /100 WBCS (ref 0–0)
OXYHGB MFR BLDV: 32.6 % (ref 45–75)
P AXIS: -13 DEGREES
P OFFSET: 203 MS
P ONSET: 157 MS
PCO2 BLDV: 42 MM HG (ref 41–51)
PH BLDV: 7.44 PH (ref 7.33–7.43)
PH UR STRIP.AUTO: 8 [PH]
PLATELET # BLD AUTO: 319 X10*3/UL (ref 150–450)
PO2 BLDV: 25 MM HG (ref 35–45)
POTASSIUM SERPL-SCNC: 4.1 MMOL/L (ref 3.5–5.3)
PR INTERVAL: 130 MS
PROT SERPL-MCNC: 6.1 G/DL (ref 6.4–8.2)
PROT UR STRIP.AUTO-MCNC: NEGATIVE MG/DL
PROTHROMBIN TIME: 11 SECONDS (ref 9.8–12.4)
Q ONSET: 222 MS
QRS COUNT: 12 BEATS
QRS DURATION: 88 MS
QT INTERVAL: 416 MS
QTC CALCULATION(BAZETT): 445 MS
QTC FREDERICIA: 436 MS
R AXIS: -11 DEGREES
RBC # BLD AUTO: 3.53 X10*6/UL (ref 4–5.2)
RBC # UR STRIP.AUTO: NEGATIVE MG/DL
SAO2 % BLDV: 33 % (ref 45–75)
SARS-COV-2 RNA RESP QL NAA+PROBE: NOT DETECTED
SODIUM SERPL-SCNC: 140 MMOL/L (ref 136–145)
SP GR UR STRIP.AUTO: 1
T AXIS: -12 DEGREES
T OFFSET: 430 MS
T4 FREE SERPL-MCNC: 0.96 NG/DL (ref 0.61–1.12)
TSH SERPL-ACNC: 4.57 MIU/L (ref 0.44–3.98)
UROBILINOGEN UR STRIP.AUTO-MCNC: NORMAL MG/DL
VENTRICULAR RATE: 69 BPM
WBC # BLD AUTO: 4.5 X10*3/UL (ref 4.4–11.3)

## 2025-06-11 PROCEDURE — 80053 COMPREHEN METABOLIC PANEL: CPT | Performed by: STUDENT IN AN ORGANIZED HEALTH CARE EDUCATION/TRAINING PROGRAM

## 2025-06-11 PROCEDURE — 2500000004 HC RX 250 GENERAL PHARMACY W/ HCPCS (ALT 636 FOR OP/ED): Performed by: STUDENT IN AN ORGANIZED HEALTH CARE EDUCATION/TRAINING PROGRAM

## 2025-06-11 PROCEDURE — 85025 COMPLETE CBC W/AUTO DIFF WBC: CPT | Performed by: STUDENT IN AN ORGANIZED HEALTH CARE EDUCATION/TRAINING PROGRAM

## 2025-06-11 PROCEDURE — 73560 X-RAY EXAM OF KNEE 1 OR 2: CPT | Mod: RIGHT SIDE | Performed by: RADIOLOGY

## 2025-06-11 PROCEDURE — 85610 PROTHROMBIN TIME: CPT | Performed by: STUDENT IN AN ORGANIZED HEALTH CARE EDUCATION/TRAINING PROGRAM

## 2025-06-11 PROCEDURE — 84439 ASSAY OF FREE THYROXINE: CPT | Performed by: STUDENT IN AN ORGANIZED HEALTH CARE EDUCATION/TRAINING PROGRAM

## 2025-06-11 PROCEDURE — 93005 ELECTROCARDIOGRAM TRACING: CPT

## 2025-06-11 PROCEDURE — 71046 X-RAY EXAM CHEST 2 VIEWS: CPT | Performed by: RADIOLOGY

## 2025-06-11 PROCEDURE — 83735 ASSAY OF MAGNESIUM: CPT | Performed by: STUDENT IN AN ORGANIZED HEALTH CARE EDUCATION/TRAINING PROGRAM

## 2025-06-11 PROCEDURE — 99285 EMERGENCY DEPT VISIT HI MDM: CPT | Mod: 25 | Performed by: STUDENT IN AN ORGANIZED HEALTH CARE EDUCATION/TRAINING PROGRAM

## 2025-06-11 PROCEDURE — 86140 C-REACTIVE PROTEIN: CPT | Performed by: STUDENT IN AN ORGANIZED HEALTH CARE EDUCATION/TRAINING PROGRAM

## 2025-06-11 PROCEDURE — 84484 ASSAY OF TROPONIN QUANT: CPT | Performed by: STUDENT IN AN ORGANIZED HEALTH CARE EDUCATION/TRAINING PROGRAM

## 2025-06-11 PROCEDURE — 81003 URINALYSIS AUTO W/O SCOPE: CPT | Performed by: STUDENT IN AN ORGANIZED HEALTH CARE EDUCATION/TRAINING PROGRAM

## 2025-06-11 PROCEDURE — 87040 BLOOD CULTURE FOR BACTERIA: CPT | Mod: 59,AHULAB | Performed by: STUDENT IN AN ORGANIZED HEALTH CARE EDUCATION/TRAINING PROGRAM

## 2025-06-11 PROCEDURE — 85652 RBC SED RATE AUTOMATED: CPT | Performed by: STUDENT IN AN ORGANIZED HEALTH CARE EDUCATION/TRAINING PROGRAM

## 2025-06-11 PROCEDURE — 84443 ASSAY THYROID STIM HORMONE: CPT | Performed by: STUDENT IN AN ORGANIZED HEALTH CARE EDUCATION/TRAINING PROGRAM

## 2025-06-11 PROCEDURE — 83605 ASSAY OF LACTIC ACID: CPT | Performed by: STUDENT IN AN ORGANIZED HEALTH CARE EDUCATION/TRAINING PROGRAM

## 2025-06-11 PROCEDURE — 96360 HYDRATION IV INFUSION INIT: CPT

## 2025-06-11 PROCEDURE — 73560 X-RAY EXAM OF KNEE 1 OR 2: CPT | Mod: RT

## 2025-06-11 PROCEDURE — 87636 SARSCOV2 & INF A&B AMP PRB: CPT | Performed by: STUDENT IN AN ORGANIZED HEALTH CARE EDUCATION/TRAINING PROGRAM

## 2025-06-11 PROCEDURE — 36415 COLL VENOUS BLD VENIPUNCTURE: CPT | Performed by: STUDENT IN AN ORGANIZED HEALTH CARE EDUCATION/TRAINING PROGRAM

## 2025-06-11 PROCEDURE — 71046 X-RAY EXAM CHEST 2 VIEWS: CPT

## 2025-06-11 PROCEDURE — 82810 BLOOD GASES O2 SAT ONLY: CPT | Performed by: STUDENT IN AN ORGANIZED HEALTH CARE EDUCATION/TRAINING PROGRAM

## 2025-06-11 RX ADMIN — SODIUM CHLORIDE 500 ML: 9 INJECTION, SOLUTION INTRAVENOUS at 10:42

## 2025-06-11 ASSESSMENT — PAIN SCALES - GENERAL
PAINLEVEL_OUTOF10: 0 - NO PAIN

## 2025-06-11 ASSESSMENT — PAIN - FUNCTIONAL ASSESSMENT
PAIN_FUNCTIONAL_ASSESSMENT: 0-10
PAIN_FUNCTIONAL_ASSESSMENT: 0-10

## 2025-06-11 NOTE — ED TRIAGE NOTES
"Arrived from home via EMS to ER for c/o weakness/dizziness since yesterday. Pt ambulated from bathroom across hallway to room upon EMS arrival to ER. \" I feel like I am dehydrated. I was here last week for pneumonia. I have been dizzy and feeling weak since yesterday\" per pt.   "

## 2025-06-11 NOTE — ED PROVIDER NOTES
History of Present Illness     History provided by: Patient  Limitations to History: None  External Records Reviewed with Brief Summary: Surgical history and recent orthopedic office visit    HPI:  Zarina Holliday is a 79 y.o. female with pertinent past medical history of recent total knee arthroplasty and complication with postoperative pneumonia who presents with chief concern of generalized weakness.  She states that she was recently on antibiotics for her diagnosed pneumonia which she finished on Sunday and did see orthopedic doctor and was having an overlying skin reaction to the adhesive put was placed on Keflex due to over abundance of caution, notes the redness on her incision site is extremely better, does not have any significant pain in the right knee but just feels weak all over.  Felt like she was unable to get out of bed today.  It seems to be progressively getting worse.  Does endorse some nausea and some constipation but no vomiting no abdominal pain.  No dysuria hematuria no fevers or chills.  She has had some lightheadedness and dizziness particular this morning when trying to get up.  She also felt tingly in her arms and her legs bilaterally.  She is never had symptoms like this before.  She is not taking any pain medication at this time.    Physical Exam   Triage vitals:  T 36.4 °C (97.5 °F)  HR 74  /75  RR 16  O2 100 % None (Room air)    Physical Exam  Vitals and nursing note reviewed.   Constitutional:       Appearance: She is not ill-appearing.   HENT:      Mouth/Throat:      Mouth: Mucous membranes are moist.      Pharynx: Oropharynx is clear.   Eyes:      Extraocular Movements: Extraocular movements intact.      Pupils: Pupils are equal, round, and reactive to light.   Cardiovascular:      Rate and Rhythm: Normal rate and regular rhythm.      Pulses: Normal pulses.   Pulmonary:      Effort: Pulmonary effort is normal.   Abdominal:      General: Abdomen is flat.   Musculoskeletal:          General: Tenderness (Very mild along the surgical site which is clean dry and intact) present. Normal range of motion.      Right lower leg: Edema (Slight nonpitting) present.   Skin:     General: Skin is warm and dry.      Capillary Refill: Capillary refill takes less than 2 seconds.      Findings: No erythema or lesion.      Comments: Right knee arthroplasty scar clean dry and intact with no surrounding erythema, fluctuance.   Neurological:      General: No focal deficit present.      Mental Status: She is alert and oriented to person, place, and time.      Sensory: No sensory deficit.      Motor: No weakness.          Medical Decision Making & ED Course   ED Course:  ED Course as of 06/11/25 2348   Wed Jun 11, 2025   1018 Patient is an 79-year-old female with pertinent past medical history of recent total knee arthroplasty and complication with postoperative pneumonia who presents with chief concern of generalized weakness.  She states that she was recently on antibiotics for her diagnosed pneumonia which she finished on Sunday and did see orthopedic doctor and was having an overlying skin reaction to the adhesive put was placed on Keflex due to over abundance of caution, notes the redness on her incision site is extremely better, does not have any significant pain in the right knee but just feels weak all over.  Felt like she was unable to get out of bed today.  It seems to be progressively getting worse.  Does endorse some nausea and some constipation but no vomiting no abdominal pain.  No dysuria hematuria no fevers or chills.  She has had some lightheadedness and dizziness particular this morning when trying to get up.  She also felt tingly in her arms and her legs bilaterally.  She is never had symptoms like this before.  She is not taking any pain medication at this time.    On exam, patient has some swelling of the right leg nonpitting, no pain in the leg with palpation, pulses equal good cap  refill.  Abdomen soft nontender heart lungs clear to auscultation.  Dry mucous membranes.    Differential diagnose includes relevant to underlying infection, could consider intra-articular/postoperative infection though the knee looks pretty good, maybe some slight increased warmth and swelling which would be somewhat expected, not erythematous or infected looking, minimal pain.  Could consider underlying infection including UTI, pneumonia with the cough, will complete broad lab work studies, chest and knee imaging. [KK]   1201 Thyroid Stimulating Hormone(!): 4.57 [KK]      ED Course User Index  [KK] Gasper Fitzpatrick,          Diagnoses as of 25 2348   Generalized weakness       Medical Decision Makin y.o. female who presented with weakness and fatigue status post knee arthroplasty complicated by pneumonia.  Workup and evaluation showing no leukocytosis or inflammatory markers elevated.  X-ray imaging unremarkable.  Patient had improvement in her symptoms with IV fluids.  On reevaluation had significantly improved.  Suspect likely dehydration secondary to antibiotic use and recent infection and some deconditioning.  Recommending close follow-up has follow-up with her orthopedic surgery office tomorrow.  I feel the patient will be appropriate for discharge to follow-up tomorrow morning.  ----  Discussed signs and symptoms to return the emergency department, all questions were answered, patient agreeable with disposition and discharged in stable condition.    Differential diagnoses considered include but are not limited to: see ED Course     EKG Independent Interpretation: Normal sinus, 69 bpm, , QRS 88, QTc 445.  Left axis deviation, no ST segment elevation or depression, no abnormal T wave versions, no conduction blocks or delays.    Independent Result Review and Interpretation: see ED course     Chronic conditions affecting the patient's care: as documented in ED course/MDM    The patient was  discussed with the following consultants/services: see ED course    Care Considerations: as documented in ED course/MDM      Disposition   As a result of the work-up, the patient was discharged home.  she was informed of her diagnosis and instructed to come back with any concerns or worsening of condition.  she and was agreeable to the plan as discussed above.  she was given the opportunity to ask questions.  All of the patient's questions were answered.    Procedures   Procedures       Gasper Fitzpatrick DO  06/11/25 9964

## 2025-06-12 ENCOUNTER — APPOINTMENT (OUTPATIENT)
Dept: ORTHOPEDIC SURGERY | Facility: CLINIC | Age: 80
End: 2025-06-12
Payer: MEDICARE

## 2025-06-12 ENCOUNTER — OFFICE VISIT (OUTPATIENT)
Dept: ORTHOPEDIC SURGERY | Facility: CLINIC | Age: 80
End: 2025-06-12
Payer: MEDICARE

## 2025-06-12 DIAGNOSIS — Z96.651 S/P TKR (TOTAL KNEE REPLACEMENT) USING CEMENT, RIGHT: Primary | ICD-10-CM

## 2025-06-12 PROCEDURE — 1111F DSCHRG MED/CURRENT MED MERGE: CPT | Performed by: PHYSICIAN ASSISTANT

## 2025-06-12 PROCEDURE — 1159F MED LIST DOCD IN RCRD: CPT | Performed by: PHYSICIAN ASSISTANT

## 2025-06-12 PROCEDURE — 1036F TOBACCO NON-USER: CPT | Performed by: PHYSICIAN ASSISTANT

## 2025-06-12 PROCEDURE — 99212 OFFICE O/P EST SF 10 MIN: CPT | Performed by: PHYSICIAN ASSISTANT

## 2025-06-12 PROCEDURE — 1125F AMNT PAIN NOTED PAIN PRSNT: CPT | Performed by: PHYSICIAN ASSISTANT

## 2025-06-12 PROCEDURE — 99024 POSTOP FOLLOW-UP VISIT: CPT | Performed by: PHYSICIAN ASSISTANT

## 2025-06-12 ASSESSMENT — PAIN SCALES - GENERAL: PAINLEVEL_OUTOF10: 3

## 2025-06-12 ASSESSMENT — PAIN - FUNCTIONAL ASSESSMENT: PAIN_FUNCTIONAL_ASSESSMENT: 0-10

## 2025-06-12 NOTE — PROGRESS NOTES
SYLVIA Patton, SHIRAZ, ATC  Adult Reconstruction and Joint Replacement Surgery  Phone: [phone]     Fax:[phone]          HPI:  Zarina Holliday is a pleasant 79 y.o. year-old female here for follow-up of their Right Total Knee Arthroplasty by Dr. Schneider.  The patient is approximately 4 week postop.  She is here today for follow-up on her wound.  She has been taking Keflex for the last couple of days with much improvement.  She has no drainage or discharge.  No erythema.  However, she does feel a sense of wooziness and unsteady on her feet.  She went to the ER yesterday and was given fluids.  Electrolytes were normal.  She is taking a bunch of medications that can be contributory.    Review of Systems  Medical History[1]  Problem List[2]  Medication Documentation Review Audit       Reviewed by Alicja Higgins LPN (Licensed Nurse) on 06/12/25 at 0747      Medication Order Taking? Sig Documenting Provider Last Dose Status   acetaminophen (Tylenol) 500 mg tablet [id number] No Take 2 tablets (1,000 mg) by mouth every 6 hours if needed for mild pain (1 - 3). 2 tabs every 6 hours PRN for Pain 1-3/10 Historical Provider, MD 6/2/2025  4:00 AM Active   aspirin 81 mg EC tablet [id number] No Take 1 tablet (81 mg) by mouth 2 times a day. Sylvie Huber MD 5/31/2025 Evening Active   azelastine (Astelin) 137 mcg (0.1 %) nasal spray [id number] No USE 2 SPRAYS IN EACH NOSTRIL IN THE MORNING AND 2 SPRAYS BEFORE BEDTIME Josefina Hatfield MD 5/13/2025 Active   calcium citrate-vitamin D3 250 mg-5 mcg (200 unit) tablet [id number] No Take 1 tablet by mouth once daily. Historical Provider, MD Past Month Active   cephalexin (Keflex) 500 mg capsule [id number]  Take 1 capsule (500 mg) by mouth 2 times a day for 10 days. Criss Rod PA-C  Active   cholecalciferol (Vitamin D-3) 25 MCG (1000 UT) capsule [id number] No Take 1 capsule (25 mcg) by mouth once daily. Historical Provider, MD Past Month Active   cyclobenzaprine  (Flexeril) 10 mg tablet [id number]  Take 1 tablet (10 mg) by mouth 3 times a day as needed for muscle spasms. Sylvie Huber MD  Active   docusate sodium (Colace) 100 mg capsule [id number] No Take 1 capsule (100 mg) by mouth 2 times a day. Moses Schneider MD 2025 Active   doxycycline (Vibramycin) 100 mg capsule [id number]  Take 1 capsule (100 mg) by mouth 2 times a day for 5 days. Take with at least 8 ounces (large glass) of water, do not lie down for 30 minutes after Dahlia Sanchez MD   25 2356   fish oil concentrate (Omega-3) 120-180 mg capsule [id number] No Take 1 capsule (1 g) by mouth once daily. Historical Provider, MD Past Month Active   fluticasone (Flonase) 50 mcg/actuation nasal spray [id number]  Administer 2 sprays into each nostril once daily as needed for allergies. Dahlia Sanchez MD  Active   loratadine (Claritin) 10 mg tablet [id number] No Take 1 tablet (10 mg) by mouth 2 times a day as needed for allergies. Historical Provider, MD 2025 Active   meloxicam (Mobic) 15 mg tablet [id number] No Take 1 tablet (15 mg) by mouth once daily. Moses Schnedier MD 2025 Active   Myrbetriq 50 mg tablet extended release 24 hr 24 hr tablet [id number] No TAKE 1 TABLET DAILY Maria Espinoza MD MPH 2025 Active   nitrofurantoin, macrocrystal-monohydrate, (Macrobid) 100 mg capsule [id number] No Take 1 capsule (100 mg) by mouth once daily. Sylvie Key MD MPH 2025 Active   ondansetron ODT (Zofran-ODT) 4 mg disintegrating tablet [id number]  Dissolve 1 tablet (4 mg) in the mouth every 8 hours if needed for nausea or vomiting. Dahlia Sanchez MD  Active   pantoprazole (ProtoNix) 40 mg EC tablet [id number] No Take 1 tablet (40 mg) by mouth once daily in the morning. Take before meals. Do not crush chew or split Moses Schneider MD 2025 Active   rosuvastatin (Crestor) 5 mg tablet [id number] No Take 0.5 tablets (2.5 mg) by mouth early in the morning.. 1/2 pill daily per doctors  order  Take half tablet Historical Provider, MD 5/31/2025 Active   traMADol (Ultram) 50 mg tablet [id number]  Take 1 tablet (50 mg) by mouth every 6 hours if needed for moderate pain (4 - 6). Dahlia Sanchez MD  Active   VITAMIN B COMPLEX ORAL [id number] No Take 1 tablet by mouth once daily. Historical Provider, MD Past Month Active                  RX Allergies[3]  Social History     Socioeconomic History    Marital status:      Spouse name: Not on file    Number of children: Not on file    Years of education: Not on file    Highest education level: Not on file   Occupational History    Not on file   Tobacco Use    Smoking status: Never    Smokeless tobacco: Never   Vaping Use    Vaping status: Never Used   Substance and Sexual Activity    Alcohol use: Not Currently     Comment: quit drinking 2 years ago    Drug use: Never    Sexual activity: Not on file   Other Topics Concern    Not on file   Social History Narrative    Not on file     Social Drivers of Health     Financial Resource Strain: Low Risk  (6/3/2025)    Overall Financial Resource Strain (CARDIA)     Difficulty of Paying Living Expenses: Not hard at all   Food Insecurity: No Food Insecurity (6/2/2025)    Hunger Vital Sign     Worried About Running Out of Food in the Last Year: Never true     Ran Out of Food in the Last Year: Never true   Transportation Needs: No Transportation Needs (6/3/2025)    PRAPARE - Transportation     Lack of Transportation (Medical): No     Lack of Transportation (Non-Medical): No   Physical Activity: Sufficiently Active (6/2/2025)    Exercise Vital Sign     Days of Exercise per Week: 5 days     Minutes of Exercise per Session: 30 min   Stress: No Stress Concern Present (6/2/2025)    [employer] [place] [place] [employer] - Occupational Stress Questionnaire     Feeling of Stress : Not at all   Social Connections: Moderately Integrated (6/2/2025)    Social Connection and Isolation Panel [NHANES]     Frequency of Communication  with Friends and Family: Once a week     Frequency of Social Gatherings with Friends and Family: Once a week     Attends Synagogue Services: More than 4 times per year     Active Member of Clubs or Organizations: Yes     Attends Club or Organization Meetings: More than 4 times per year     Marital Status:    Intimate Partner Violence: Not At Risk (6/2/2025)    Humiliation, Afraid, Rape, and Kick questionnaire     Fear of Current or Ex-Partner: No     Emotionally Abused: No     Physically Abused: No     Sexually Abused: No   Housing Stability: Low Risk  (6/3/2025)    Housing Stability Vital Sign     Unable to Pay for Housing in the Last Year: No     Number of Times Moved in the Last Year: 1     Homeless in the Last Year: No     Surgical History[4]    Physical Exam  side: right knee  There were no vitals filed for this visit.  AxO x 3 in NAD.   Assistive Device: cane. Coordination and balance intact.  Normal bilateral upper and lower extremities.  No erythema, ecchymosis, temperature changes. No popliteal lymphadenopathy,  No overlying lesion  Mood: euthymic  Respirations non-labored    Neurovascular exam is at baseline.  Range of motion slowly returning  5/5 hip flexion/knee extension/DF/PF/EHL  SILT in bijal/saph/ per/tib distribution   Extremities warm and well perfused.  No lower extremity calf tenderness, warmth or swelling. Lower extremity well perfused  2+ Femoral/DP/PT pulses bilaterally    The incision is midline  with well-healing incision.  No evidence of drainage or discharge.  No erythema.    Impression/Plan  Zarina Holliday and I discussed the etiology of symptoms.  We discussed in detail a treatment plan that he/she is comfortable with.    1. S/P Right Total Knee Arthroplasty I discussed with Paty today that her incision looks great.  Is no evidence of drainage or discharge.  She does have some wooziness.  We discussed doing a bland diet.  We went over her medication list and discontinued the  medications that she did not need to take anymore.  I would like her to just rest over the next week.  She can continue to do her home exercises.  If she continues to have the wooziness to call her family doctor again.  She can continue to use a cane for ambulating if she needs to.  She will start outpatient therapy the following week.  All questions were answered.    Follow-up  1 week    SYLVIA Patton, PA-C, ATC  Orthopedic Physician [employer]ant  Adult Reconstruction and Total Joint Replacement  General Orthopedics  Department of Orthopaedic Surgery  [place] [id number]  [email or web address] messaging preferred         [1]   Past Medical History:  Diagnosis Date    Anemia     Arthritis     CVA (cerebral vascular accident) (Multi) 05/14/2025    GERD (gastroesophageal reflux disease)     History of recurrent UTIs     on macrobid    Hyperlipidemia     Leg edema     OAB (overactive bladder)     Osteopenia after menopause     PONV (postoperative nausea and vomiting)     TIA (transient ischemic attack) 10/2022    Questionable: while on vacation in late October 2022, she had new onset R eye blurriness and fatigue that was persistent on and off for 1-2 wks; Brain scans were negative. On Aspirin and Statin.   [2]   Patient Active Problem List  Diagnosis    Effusion of left knee    Arthritis of knee, left    Edema of both legs    Episodic lightheadedness    Dizziness    Cerumen impaction    Borderline hyperglycemia    Bilateral knee pain    Arthralgia of left hip    Anemia    Allergic rhinitis    Adult myxedema    Abnormal mammogram    Abnormal finding in urine    Shoulder pain, right    Segmental and somatic dysfunction of pelvic region    Primary osteoarthritis of both knees    Peripheral neuropathy    Osteopenia    Osteoarthritis    OAB (overactive bladder)    Lower extremity edema    Lesion of bladder    Imbalance    Venous insufficiency    Varicose veins  with pain    UTI (urinary tract infection)    Urge incontinence of urine    Unilateral primary osteoarthritis, left knee    Arthritis of left knee    Primary osteoarthritis of left knee    Aftercare following left knee joint replacement surgery    Hyperlipidemia    Gastroesophageal reflux disease    CVA (cerebral vascular accident) (Multi)    Acute pain of right knee    Pneumonia of both upper lobes due to infectious organism   [3]   Allergies  Allergen Reactions    Adhesive Tape-Silicones Other     Blisters, chin itching   [4]   Past Surgical History:  Procedure Laterality Date    CATARACT EXTRACTION       SECTION, CLASSIC      COLONOSCOPY      COSMETIC SURGERY      DENTAL SURGERY      FACIAL COSMETIC SURGERY      face lift    MR HEAD ANGIO WO IV CONTRAST  2022    MR HEAD ANGIO WO IV CONTRAST 2022 [facility]    MR NECK ANGIO WO IV CONTRAST  2022    MR NECK ANGIO WO IV CONTRAST 2022 [facility]    OTHER SURGICAL HISTORY      RHINOPLASTY      SALIVARY GLAND SURGERY  2023    Left lower lip excision:Left lower lip, excision: - Mucocele and minor salivary glands exhibiting chronic sialadenitis.    TONSILLECTOMY      TOTAL ABDOMINAL HYSTERECTOMY W/ BILATERAL SALPINGOOPHORECTOMY      TOTAL KNEE ARTHROPLASTY Left 2024

## 2025-06-14 LAB
ATRIAL RATE: 97 BPM
P AXIS: 72 DEGREES
P OFFSET: 211 MS
P ONSET: 160 MS
PR INTERVAL: 124 MS
Q ONSET: 222 MS
QRS COUNT: 16 BEATS
QRS DURATION: 80 MS
QT INTERVAL: 346 MS
QTC CALCULATION(BAZETT): 439 MS
QTC FREDERICIA: 405 MS
R AXIS: 73 DEGREES
T AXIS: 78 DEGREES
T OFFSET: 395 MS
VENTRICULAR RATE: 97 BPM

## 2025-06-15 LAB
BACTERIA BLD CULT: NORMAL
BACTERIA BLD CULT: NORMAL

## 2025-06-16 ENCOUNTER — APPOINTMENT (OUTPATIENT)
Dept: PHYSICAL THERAPY | Facility: CLINIC | Age: 80
End: 2025-06-16
Payer: MEDICARE

## 2025-06-16 DIAGNOSIS — M25.561 ACUTE PAIN OF RIGHT KNEE: Primary | ICD-10-CM

## 2025-06-18 ENCOUNTER — APPOINTMENT (OUTPATIENT)
Dept: PHYSICAL THERAPY | Facility: CLINIC | Age: 80
End: 2025-06-18
Payer: MEDICARE

## 2025-06-18 DIAGNOSIS — M25.561 ACUTE PAIN OF RIGHT KNEE: Primary | ICD-10-CM

## 2025-06-19 ENCOUNTER — OFFICE VISIT (OUTPATIENT)
Dept: ORTHOPEDIC SURGERY | Facility: CLINIC | Age: 80
End: 2025-06-19
Payer: MEDICARE

## 2025-06-19 ENCOUNTER — HOSPITAL ENCOUNTER (OUTPATIENT)
Dept: RADIOLOGY | Facility: CLINIC | Age: 80
Discharge: HOME | End: 2025-06-19
Payer: MEDICARE

## 2025-06-19 DIAGNOSIS — Z96.651 S/P TKR (TOTAL KNEE REPLACEMENT) USING CEMENT, RIGHT: ICD-10-CM

## 2025-06-19 PROCEDURE — 99212 OFFICE O/P EST SF 10 MIN: CPT | Performed by: PHYSICIAN ASSISTANT

## 2025-06-19 PROCEDURE — 73562 X-RAY EXAM OF KNEE 3: CPT | Mod: RT

## 2025-06-19 PROCEDURE — 1159F MED LIST DOCD IN RCRD: CPT | Performed by: PHYSICIAN ASSISTANT

## 2025-06-19 PROCEDURE — 1111F DSCHRG MED/CURRENT MED MERGE: CPT | Performed by: PHYSICIAN ASSISTANT

## 2025-06-19 PROCEDURE — 99024 POSTOP FOLLOW-UP VISIT: CPT | Performed by: PHYSICIAN ASSISTANT

## 2025-06-19 PROCEDURE — 73562 X-RAY EXAM OF KNEE 3: CPT | Mod: RIGHT SIDE | Performed by: RADIOLOGY

## 2025-06-19 ASSESSMENT — PAIN - FUNCTIONAL ASSESSMENT: PAIN_FUNCTIONAL_ASSESSMENT: NO/DENIES PAIN

## 2025-06-19 NOTE — PROGRESS NOTES
SYLVIA Patton, SHIRAZ, ATC  Adult Reconstruction and Joint Replacement Surgery  Phone: [phone]     Fax:[phone]            Chief Complaint   Patient presents with    Right Knee - Post-op       HPI:  Zarina Holliday is a pleasant 79 y.o. year-old female here for follow-up of their side: right total knee arthroplasty by Dr. Schneider.  The patient is approximately 1 year(s) postop.The patient has no mechanical symptoms.  The patient has mild swelling and pain.   The patients wound has healed uneventfully.  The patient has been doing HEP and/or outpatient PT.  No complications postoperatively.  Some anemia after surgery. PCP following.    Review of Systems  Medical History[1]  Problem List[2]  Medication Documentation Review Audit       Reviewed by Regina Bagley MA (Medical Assistant) on 25 at 1022      Medication Order Taking? Sig Documenting Provider Last Dose Status   acetaminophen (Tylenol) 500 mg tablet [id number] No Take 2 tablets (1,000 mg) by mouth every 6 hours if needed for mild pain (1 - 3). 2 tabs every 6 hours PRN for Pain 1-3/10 Historical Provider, MD 2025  4:00 AM Active   aspirin 81 mg EC tablet [id number] No Take 1 tablet (81 mg) by mouth 2 times a day. Sylvie Huber MD 2025 Evening Active   azelastine (Astelin) 137 mcg (0.1 %) nasal spray [id number] No USE 2 SPRAYS IN EACH NOSTRIL IN THE MORNING AND 2 SPRAYS BEFORE BEDTIME Josefina Hatfield MD 2025 Active   calcium citrate-vitamin D3 250 mg-5 mcg (200 unit) tablet [id number] No Take 1 tablet by mouth once daily. Historical Provider, MD Past Month Active   cephalexin (Keflex) 500 mg capsule [id number]  Take 1 capsule (500 mg) by mouth 2 times a day for 10 days. Criss Rod PA-C   25 2359   cholecalciferol (Vitamin D-3) 25 MCG (1000 UT) capsule [id number] No Take 1 capsule (25 mcg) by mouth once daily. Historical Provider, MD Past Month Active   cyclobenzaprine (Flexeril) 10 mg tablet [id number]   Take 1 tablet (10 mg) by mouth 3 times a day as needed for muscle spasms. Sylvie Huber MD  Active   docusate sodium (Colace) 100 mg capsule [id number] No Take 1 capsule (100 mg) by mouth 2 times a day. Moses Schneider MD 5/31/2025 Active   fish oil concentrate (Omega-3) 120-180 mg capsule [id number] No Take 1 capsule (1 g) by mouth once daily. Carmen Castellanos MD Past Month Active   fluticasone (Flonase) 50 mcg/actuation nasal spray [id number]  Administer 2 sprays into each nostril once daily as needed for allergies. Dahlia Sanchez MD  Active   loratadine (Claritin) 10 mg tablet [id number] No Take 1 tablet (10 mg) by mouth 2 times a day as needed for allergies. Carmen Castellanos MD 5/13/2025 Active   meloxicam (Mobic) 15 mg tablet [id number] No Take 1 tablet (15 mg) by mouth once daily. Moses Schneider MD 5/31/2025 Active   Myrbetriq 50 mg tablet extended release 24 hr 24 hr tablet [id number] No TAKE 1 TABLET DAILY Maria Espinoza MD [facility] 5/31/2025 Active   nitrofurantoin, macrocrystal-monohydrate, (Macrobid) 100 mg capsule [id number] No Take 1 capsule (100 mg) by mouth once daily. Sylvie Key MD [facility] 5/31/2025 Active   ondansetron ODT (Zofran-ODT) 4 mg disintegrating tablet [id number]  Dissolve 1 tablet (4 mg) in the mouth every 8 hours if needed for nausea or vomiting. Dahlia Sanchez MD  Active   pantoprazole (ProtoNix) 40 mg EC tablet [id number] No Take 1 tablet (40 mg) by mouth once daily in the morning. Take before meals. Do not crush chew or split Moses Schneider MD 5/31/2025 Active   rosuvastatin (Crestor) 5 mg tablet [id number] No Take 0.5 tablets (2.5 mg) by mouth early in the morning.. 1/2 pill daily per doctors order  Take half tablet Carmen Castellanos MD 5/31/2025 Active   traMADol (Ultram) 50 mg tablet [id number]  Take 1 tablet (50 mg) by mouth every 6 hours if needed for moderate pain (4 - 6). Dahlia Sanchez MD  Active   VITAMIN B COMPLEX ORAL [id number] No Take 1 tablet by mouth once  daily. Historical Provider, MD Past Month Active                  RX Allergies[3]  Social History     Socioeconomic History    Marital status:      Spouse name: Not on file    Number of children: Not on file    Years of education: Not on file    Highest education level: Not on file   Occupational History    Not on file   Tobacco Use    Smoking status: Never    Smokeless tobacco: Never   Vaping Use    Vaping status: Never Used   Substance and Sexual Activity    Alcohol use: Not Currently     Comment: quit drinking 2 years ago    Drug use: Never    Sexual activity: Not on file   Other Topics Concern    Not on file   Social History Narrative    Not on file     Social Drivers of Health     Financial Resource Strain: Low Risk  (6/3/2025)    Overall Financial Resource Strain (CARDIA)     Difficulty of Paying Living Expenses: Not hard at all   Food Insecurity: No Food Insecurity (6/2/2025)    Hunger Vital Sign     Worried About Running Out of Food in the Last Year: Never true     Ran Out of Food in the Last Year: Never true   Transportation Needs: No Transportation Needs (6/3/2025)    PRAPARE - Transportation     Lack of Transportation (Medical): No     Lack of Transportation (Non-Medical): No   Physical Activity: Sufficiently Active (6/2/2025)    Exercise Vital Sign     Days of Exercise per Week: 5 days     Minutes of Exercise per Session: 30 min   Stress: No Stress Concern Present (6/2/2025)    [employer] [place] [employer] - Occupational Stress Questionnaire     Feeling of Stress : Not at all   Social Connections: Moderately Integrated (6/2/2025)    Social Connection and Isolation Panel [NHANES]     Frequency of Communication with Friends and Family: Once a week     Frequency of Social Gatherings with Friends and Family: Once a week     Attends Confucianism Services: More than 4 times per year     Active Member of Clubs or Organizations: Yes     Attends Club or Organization Meetings: More than 4 times per  year     Marital Status:    Intimate Partner Violence: Not At Risk (6/2/2025)    Humiliation, Afraid, Rape, and Kick questionnaire     Fear of Current or Ex-Partner: No     Emotionally Abused: No     Physically Abused: No     Sexually Abused: No   Housing Stability: Low Risk  (6/3/2025)    Housing Stability Vital Sign     Unable to Pay for Housing in the Last Year: No     Number of Times Moved in the Last Year: 1     Homeless in the Last Year: No     Surgical History[4]    Physical Exam  side: right Knee  There were no vitals filed for this visit.  AxO x 3 in NAD.   Assistive Device: cane. Coordination and balance intact.  Normal bilateral upper and lower extremities.  No erythema, ecchymosis, temperature changes. No popliteal lymphadenopathy,  No overlying lesion  Mood: euthymic  Respirations non-labored  The incision is midline healing well with no signs of surrounding infection, dehiscence or drainage.   Neurovascular exam is at baseline.  No instability varus or valgus stressing the knee at 0, 30 or 60 degrees.  No instability in the AP plane at 90 degrees.  Range of motion: 0 degrees extension, 120 degrees flexion  5/5 hip flexion/knee extension/DF/PF/EHL  SILT in bijal/saph/ per/tib distribution   Extremities warm and well perfused.  No lower extremity calf tenderness, warmth or swelling. Lower extremity well perfused  2+ Femoral/DP/PT pulses bilaterally    Imaging:  No images are attached to the encounter.    I personally reviewed multiple views of the knee today in clinic. Status post side: right Total Knee aArthroplasty. The implant is well fixed, well aligned.  No evidence of johanny-implant fracture, lucency or dislocation.    Impression/Plan:  Zarina Holliday is doing well post-operatively and happy with the results of the operation.     S/P side: right Total Knee Arthroplasty  I talked with patient at length about activity precautions and progression of activities. The patient understands their  permanent precautions. At this time, you may gradually increase your activities and get back to a normal, low-impact lifestyle. Please avoid running, jumping, and heavy lifting.      3. Continue HEP or outpatient PT, per protocol.    4. Continue Post-operative instructions.    5. Discussed the importance of dental prophylactic dental antibiotics lifelong. Patient may request medication refill through MyChart,       Pharmacy or surgeons office.    Follow-up in 2 weeks for LTKA    All questions answered.    Follow-up 1 year with x-rays at next visit.    SYLVIA Patton, PA-C, ATC  Orthopedic Physician Assisant  Adult Reconstruction and Total Joint Replacement  General Orthopedics  Department of Orthopaedic Surgery  [place] [address]  [employer] messaging preferred         [1]   Past Medical History:  Diagnosis Date    Anemia     Arthritis     CVA (cerebral vascular accident) (Multi) 05/14/2025    GERD (gastroesophageal reflux disease)     History of recurrent UTIs     on macrobid    Hyperlipidemia     Leg edema     OAB (overactive bladder)     Osteopenia after menopause     PONV (postoperative nausea and vomiting)     TIA (transient ischemic attack) 10/2022    Questionable: while on vacation in late October 2022, she had new onset R eye blurriness and fatigue that was persistent on and off for 1-2 wks; Brain scans were negative. On Aspirin and Statin.   [2]   Patient Active Problem List  Diagnosis    Effusion of left knee    Arthritis of knee, left    Edema of both legs    Episodic lightheadedness    Dizziness    Cerumen impaction    Borderline hyperglycemia    Bilateral knee pain    Arthralgia of left hip    Anemia    Allergic rhinitis    Adult myxedema    Abnormal mammogram    Abnormal finding in urine    Shoulder pain, right    Segmental and somatic dysfunction of pelvic region    Primary osteoarthritis of both knees    Peripheral  neuropathy    Osteopenia    Osteoarthritis    OAB (overactive bladder)    Lower extremity edema    Lesion of bladder    Imbalance    Venous insufficiency    Varicose veins with pain    UTI (urinary tract infection)    Urge incontinence of urine    Unilateral primary osteoarthritis, left knee    Arthritis of left knee    Primary osteoarthritis of left knee    Aftercare following left knee joint replacement surgery    Hyperlipidemia    Gastroesophageal reflux disease    CVA (cerebral vascular accident) (Multi)    Acute pain of right knee    Pneumonia of both upper lobes due to infectious organism   [3]   Allergies  Allergen Reactions    Adhesive Tape-Silicones Other     Blisters, chin itching   [4]   Past Surgical History:  Procedure Laterality Date    CATARACT EXTRACTION       SECTION, CLASSIC      COLONOSCOPY      COSMETIC SURGERY      DENTAL SURGERY      FACIAL COSMETIC SURGERY      face lift    MR HEAD ANGIO WO IV CONTRAST  2022    MR HEAD ANGIO WO IV CONTRAST 2022 [facility]    MR NECK ANGIO WO IV CONTRAST  2022    MR NECK ANGIO WO IV CONTRAST 2022 [facility]    OTHER SURGICAL HISTORY      RHINOPLASTY      SALIVARY GLAND SURGERY  2023    Left lower lip excision:Left lower lip, excision: - Mucocele and minor salivary glands exhibiting chronic sialadenitis.    TONSILLECTOMY      TOTAL ABDOMINAL HYSTERECTOMY W/ BILATERAL SALPINGOOPHORECTOMY      TOTAL KNEE ARTHROPLASTY Left 2024

## 2025-06-20 NOTE — PROGRESS NOTES
"Physical Therapy    Physical Therapy Treatment    Patient Name: Zarina Holliday  MRN: [id number]  Today's Date: 6/23/2025    Time Calculation  Start Time: 0940  Stop Time: 1025  Time Calculation (min): 45 min    Insurance:  [place]:  [place] of [place]  Authorization information: no auth  Insurance Type: Medicare and  for life     General:  Reason for visit: Right knee pain s/p TKA 5/14/25  Referred by: Dr. Schneider    Current Problem:   1. Acute pain of right knee  Follow Up In Physical Therapy          SUBJECTIVE:   Pt was battling an infection and was bedridden and lightheaded which sent her back to the ED. Is feeling better today but still gets a little light headed with changing positions quickly.      Precautions:   Precautions  Precautions Comment: Right TKA     Pain:   Pain Assessment  Pain Assessment: 0-10  0-10 (Numeric) Pain Score: 0 - No pain    OBJECTIVE:      Right knee ROM: 0-117 degrees    Treatments:  R. Bike, L5, 5'   Cybex hip abd/add 25#, 10x3 each  Leg press 70#, 10x2  DBE 2' x 2 directions  Fwd bosu lunged x 15 B  Heel slides with strap 5\" hold x 10  Grx10', R knee, mod comp (50 deg)    Charges: TE, NM, Vaso         HEP: [id number]       ASSESSMENT:   Patient's knee did great today with addition of weight machines. Able to make additional gains in flexion.       PLAN:     Continue to increase LE strength, flexibility, balance and WB endurance for return to PLOF.  "

## 2025-06-23 ENCOUNTER — TREATMENT (OUTPATIENT)
Dept: PHYSICAL THERAPY | Facility: CLINIC | Age: 80
End: 2025-06-23
Payer: MEDICARE

## 2025-06-23 DIAGNOSIS — M25.561 ACUTE PAIN OF RIGHT KNEE: Primary | ICD-10-CM

## 2025-06-23 LAB
ATRIAL RATE: 69 BPM
P AXIS: -13 DEGREES
P OFFSET: 203 MS
P ONSET: 157 MS
PR INTERVAL: 130 MS
Q ONSET: 222 MS
QRS COUNT: 12 BEATS
QRS DURATION: 88 MS
QT INTERVAL: 416 MS
QTC CALCULATION(BAZETT): 445 MS
QTC FREDERICIA: 436 MS
R AXIS: -11 DEGREES
T AXIS: -12 DEGREES
T OFFSET: 430 MS
VENTRICULAR RATE: 69 BPM

## 2025-06-23 PROCEDURE — 97112 NEUROMUSCULAR REEDUCATION: CPT | Mod: GP

## 2025-06-23 PROCEDURE — 97016 VASOPNEUMATIC DEVICE THERAPY: CPT | Mod: GP

## 2025-06-23 PROCEDURE — 97110 THERAPEUTIC EXERCISES: CPT | Mod: GP

## 2025-06-23 ASSESSMENT — PAIN SCALES - GENERAL: PAINLEVEL_OUTOF10: 0 - NO PAIN

## 2025-06-23 ASSESSMENT — PAIN - FUNCTIONAL ASSESSMENT: PAIN_FUNCTIONAL_ASSESSMENT: 0-10

## 2025-06-24 NOTE — PROGRESS NOTES
"Physical Therapy    Physical Therapy Treatment    Patient Name: Zarina Holliday  MRN: [id number]  Today's Date: 6/25/2025    Time Calculation  Start Time: 0830  Stop Time: 0912  Time Calculation (min): 42 min    Insurance:  Visit [place]:  5 out of [place]  Authorization information: no auth  Insurance Type: Medicare and  for life     General:  Reason for visit: Right knee pain s/p TKA 5/14/25  Referred by: Dr. Schneider    Current Problem:   1. Acute pain of right knee  Follow Up In Physical Therapy          SUBJECTIVE:   Pt reports that knee felt good after last session but was fatigued so took a nap.     Precautions:   Precautions  Precautions Comment: Right TKA     Pain:   Pain Assessment  Pain Assessment: 0-10  0-10 (Numeric) Pain Score: 0 - No pain    OBJECTIVE:      Right knee ROM: 0-123 degrees    Treatments:  R. stepper, seat 4, L4, 5'   DBE 2' x 2 directions   Fwd bosu lunges x 15 B   6\" step up and old 5\" x 10 B  Bravo side step 5#, 10x B  Cybex hip abd/add 25#, 10x3 each- not today  Leg press 70#, 10x2- not today  Heel slides with strap 5\" hold x 10  Grx10', R knee, mod comp (50 deg)    Charges: TE, NM, Vaso         HEP: [id number]       ASSESSMENT:   Patient with good quad contraction with step up and hold exercise.        PLAN:     Continue to increase LE strength, flexibility, balance and WB endurance for return to PLOF.  "

## 2025-06-25 ENCOUNTER — TREATMENT (OUTPATIENT)
Dept: PHYSICAL THERAPY | Facility: CLINIC | Age: 80
End: 2025-06-25
Payer: MEDICARE

## 2025-06-25 DIAGNOSIS — M25.561 ACUTE PAIN OF RIGHT KNEE: Primary | ICD-10-CM

## 2025-06-25 PROCEDURE — 97110 THERAPEUTIC EXERCISES: CPT | Mod: GP

## 2025-06-25 PROCEDURE — 97016 VASOPNEUMATIC DEVICE THERAPY: CPT | Mod: GP

## 2025-06-25 PROCEDURE — 97112 NEUROMUSCULAR REEDUCATION: CPT | Mod: GP

## 2025-06-25 ASSESSMENT — PAIN - FUNCTIONAL ASSESSMENT: PAIN_FUNCTIONAL_ASSESSMENT: 0-10

## 2025-06-25 ASSESSMENT — PAIN SCALES - GENERAL: PAINLEVEL_OUTOF10: 0 - NO PAIN

## 2025-06-27 ENCOUNTER — TELEPHONE (OUTPATIENT)
Dept: OBSTETRICS AND GYNECOLOGY | Facility: CLINIC | Age: 80
End: 2025-06-27
Payer: MEDICARE

## 2025-06-27 DIAGNOSIS — N39.0 RECURRENT UTI: ICD-10-CM

## 2025-06-27 NOTE — TELEPHONE ENCOUNTER
Patient think she has a UTI and she wants to know if she can take the red pill to help with the burning. She said she also be going to a lab to give the urine

## 2025-06-29 ENCOUNTER — TELEPHONE (OUTPATIENT)
Dept: OBSTETRICS AND GYNECOLOGY | Facility: CLINIC | Age: 80
End: 2025-06-29
Payer: MEDICARE

## 2025-06-29 DIAGNOSIS — N39.0 RECURRENT UTI: ICD-10-CM

## 2025-06-29 LAB — BACTERIA UR CULT: NORMAL

## 2025-06-30 ENCOUNTER — TREATMENT (OUTPATIENT)
Dept: PHYSICAL THERAPY | Facility: CLINIC | Age: 80
End: 2025-06-30
Payer: MEDICARE

## 2025-06-30 DIAGNOSIS — M25.561 ACUTE PAIN OF RIGHT KNEE: Primary | ICD-10-CM

## 2025-06-30 DIAGNOSIS — N39.0 ACUTE LOWER UTI: Primary | ICD-10-CM

## 2025-06-30 LAB — BACTERIA UR CULT: ABNORMAL

## 2025-06-30 PROCEDURE — 97110 THERAPEUTIC EXERCISES: CPT | Mod: GP

## 2025-06-30 PROCEDURE — 97112 NEUROMUSCULAR REEDUCATION: CPT | Mod: GP

## 2025-06-30 PROCEDURE — 97016 VASOPNEUMATIC DEVICE THERAPY: CPT | Mod: GP

## 2025-06-30 RX ORDER — NITROFURANTOIN 25; 75 MG/1; MG/1
100 CAPSULE ORAL DAILY
Qty: 30 CAPSULE | Refills: 1 | OUTPATIENT
Start: 2025-06-30

## 2025-06-30 RX ORDER — NITROFURANTOIN 25; 75 MG/1; MG/1
100 CAPSULE ORAL EVERY 12 HOURS SCHEDULED
Qty: 14 CAPSULE | Refills: 0 | Status: SHIPPED | OUTPATIENT
Start: 2025-06-30 | End: 2025-07-07

## 2025-06-30 ASSESSMENT — PAIN SCALES - GENERAL: PAINLEVEL_OUTOF10: 0 - NO PAIN

## 2025-06-30 ASSESSMENT — PAIN - FUNCTIONAL ASSESSMENT: PAIN_FUNCTIONAL_ASSESSMENT: 0-10

## 2025-06-30 NOTE — PROGRESS NOTES
"Physical Therapy    Physical Therapy Treatment    Patient Name: Zarina Holliday  MRN: [id number]  Today's Date: 7/2/2025    Time Calculation  Start Time: 0830  Stop Time: 0915  Time Calculation (min): 45 min    Insurance:  Visit [place]:  7 [place] of [place]  Authorization information: no auth  Insurance Type: Medicare and  for life     General:  Reason for visit: Right knee pain s/p TKA 5/14/25  Referred by: Dr. Schneider    Current Problem:   1. Acute pain of right knee  Follow Up In Physical Therapy          SUBJECTIVE:   Pt reports that she overdid it a little yesterday. Still working on building endurance.      Precautions:   Precautions  Precautions Comment: Right TKA     Pain:   Pain Assessment  Pain Assessment: 0-10  0-10 (Numeric) Pain Score: 0 - No pain    OBJECTIVE:      Right knee ROM: 0-126 degrees    Treatments:  R. stepper, seat 4, L4.5, 5'   DBE 2' x 2 directions    Fwd bosu lunges x 15 B   Leg press 75#, 15x2  Total hip abd/ext 22#, 15x B  Tandem 4 Kg KB swing cw/ccw x 10 B  Airex step up and hold 5\" x 10 B- not today  Heel slides with strap 5\" hold x 10  Grx10', R knee, mod comp     Charges: TE, NM, Vaso         HEP: [id number]       ASSESSMENT:   Patient able to increase weight on leg press without knee pain today.        PLAN:     Continue to increase LE strength, flexibility, balance and WB endurance for return to PLOF.  "

## 2025-06-30 NOTE — PROGRESS NOTES
"Physical Therapy    Physical Therapy Treatment    Patient Name: Zarina Holliday  MRN: [id number]  Today's Date: 6/30/2025    Time Calculation  Start Time: 0945  Stop Time: 1030  Time Calculation (min): 45 min    Insurance:  Visit [place]:  6 [place] of [place]  Authorization information: no auth  Insurance Type: Medicare and  for life     General:  Reason for visit: Right knee pain s/p TKA 5/14/25  Referred by: Dr. Schneider    Current Problem:   1. Acute pain of right knee  Follow Up In Physical Therapy          SUBJECTIVE:   Pt reports that her knee has been feeling good but now has a UTI which is affecting energy levels.      Precautions:   Precautions  Precautions Comment: Right TKA     Pain:   Pain Assessment  Pain Assessment: 0-10  0-10 (Numeric) Pain Score: 0 - No pain    OBJECTIVE:      Right knee ROM: 0-125 degrees    Treatments:  R. stepper, seat 4, L4, 5'   Leg press 70#, 15x2  Cybex hip abd/add 25#, 15x2  DBE 2' x 2 directions   Tandem 4 Kg KB swing cw/ccw x 10 B  Fwd bosu lunges x 15 B   Airex step up and hold 5\" x 10 B  Heel slides with strap 5\" hold x 10  Grx10', R knee, mod comp (50 deg)    Charges: TE, NM, Vaso         HEP: [id number]       ASSESSMENT:   Patient able to add KB swing without LOB.        PLAN:     Continue to increase LE strength, flexibility, balance and WB endurance for return to PLOF.  "

## 2025-07-02 ENCOUNTER — TREATMENT (OUTPATIENT)
Dept: PHYSICAL THERAPY | Facility: CLINIC | Age: 80
End: 2025-07-02
Payer: MEDICARE

## 2025-07-02 DIAGNOSIS — M25.561 ACUTE PAIN OF RIGHT KNEE: Primary | ICD-10-CM

## 2025-07-02 PROCEDURE — 97110 THERAPEUTIC EXERCISES: CPT | Mod: GP

## 2025-07-02 PROCEDURE — 97016 VASOPNEUMATIC DEVICE THERAPY: CPT | Mod: GP

## 2025-07-02 PROCEDURE — 97112 NEUROMUSCULAR REEDUCATION: CPT | Mod: GP

## 2025-07-02 ASSESSMENT — PAIN - FUNCTIONAL ASSESSMENT: PAIN_FUNCTIONAL_ASSESSMENT: 0-10

## 2025-07-02 ASSESSMENT — PAIN SCALES - GENERAL: PAINLEVEL_OUTOF10: 0 - NO PAIN

## 2025-07-07 DIAGNOSIS — Z96.651 S/P TKR (TOTAL KNEE REPLACEMENT) USING CEMENT, RIGHT: Primary | ICD-10-CM

## 2025-07-07 RX ORDER — AMOXICILLIN 500 MG/1
CAPSULE ORAL
Qty: 4 CAPSULE | Refills: 6 | Status: SHIPPED | OUTPATIENT
Start: 2025-07-07

## 2025-07-07 NOTE — PROGRESS NOTES
Physical Therapy  Physical Therapy Treatment    Patient Name: Zarina Holliday  MRN: [id number]  Today's Date: 7/8/2025    Time Calculation  Start Time: 0730  Stop Time: 0810  Time Calculation (min): 40 min    Insurance:  Visit [place]:  8 out of [place]  Authorization information: no auth  Insurance Type: Medicare and  for life     General:  Reason for visit: Right knee pain s/p TKA 5/14/25  Referred by: Dr. Schneider    Current Problem:   1. Acute pain of right knee  Follow Up In Physical Therapy          SUBJECTIVE:   Pt reports doing piliates and getting in the pool yesterday for the first time since this most recent surgery. Pt reports that they are stiff this morning.      Precautions:   Precautions  Precautions Comment: Right TKA     Pain:   Pain Assessment  Pain Assessment: 0-10  0-10 (Numeric) Pain Score: 0 - No pain    OBJECTIVE:    99/66 blood pressure  Right knee ROM: 0-125 degrees    Treatments:  R. stepper, seat 4, L4.5, 5'   Gastroc stretch 1'  Soelosu stretch 1'   Lateral steps 4 inch step 3x10   Heel to toe walking in bars x3   Leg press 3x10 80#   Side stepping with GTB 3 laps in bars at thighs   Cone taps SL on RLE 2x5     Charges: TE x3       HEP: [id number]       ASSESSMENT:   Pt tolerated treatment session fairly presenting difficulty with proper knee/hip alignment during functional activities such as step ups. Patient reported dizzness twice during session and blood pressure was taken accordingly.        PLAN:     Continue to increase LE strength, flexibility, balance and WB endurance for return to PLOF.

## 2025-07-08 ENCOUNTER — TREATMENT (OUTPATIENT)
Dept: PHYSICAL THERAPY | Facility: CLINIC | Age: 80
End: 2025-07-08
Payer: MEDICARE

## 2025-07-08 DIAGNOSIS — M25.561 ACUTE PAIN OF RIGHT KNEE: Primary | ICD-10-CM

## 2025-07-08 PROCEDURE — 97110 THERAPEUTIC EXERCISES: CPT | Mod: GP,CQ

## 2025-07-08 ASSESSMENT — PAIN - FUNCTIONAL ASSESSMENT: PAIN_FUNCTIONAL_ASSESSMENT: 0-10

## 2025-07-08 ASSESSMENT — PAIN SCALES - GENERAL: PAINLEVEL_OUTOF10: 0 - NO PAIN

## 2025-07-15 ENCOUNTER — TREATMENT (OUTPATIENT)
Dept: PHYSICAL THERAPY | Facility: CLINIC | Age: 80
End: 2025-07-15
Payer: MEDICARE

## 2025-07-15 DIAGNOSIS — M25.561 ACUTE PAIN OF RIGHT KNEE: Primary | ICD-10-CM

## 2025-07-15 PROCEDURE — 97016 VASOPNEUMATIC DEVICE THERAPY: CPT | Mod: GP

## 2025-07-15 PROCEDURE — 97110 THERAPEUTIC EXERCISES: CPT | Mod: GP

## 2025-07-15 PROCEDURE — 97112 NEUROMUSCULAR REEDUCATION: CPT | Mod: GP

## 2025-07-15 ASSESSMENT — PAIN - FUNCTIONAL ASSESSMENT: PAIN_FUNCTIONAL_ASSESSMENT: 0-10

## 2025-07-15 ASSESSMENT — PAIN SCALES - GENERAL: PAINLEVEL_OUTOF10: 0 - NO PAIN

## 2025-07-15 NOTE — PROGRESS NOTES
"Physical Therapy  Physical Therapy Treatment    Patient Name: Zarina Holliday  MRN: [id number]  Today's Date: 7/15/2025    Time Calculation  Start Time: 1010  Stop Time: 1055  Time Calculation (min): 45 min    Insurance:  Visit [place]:  9 out of [place]  Authorization information: no auth  Insurance Type: Medicare and  for life     General:  Reason for visit: Right knee pain s/p TKA 5/14/25  Referred by: Dr. Schneider    Current Problem:   1. Acute pain of right knee  Follow Up In Physical Therapy          SUBJECTIVE:   Pt reports that she has been feeling good in the water. Has been getting 7,000 steps a day. Is cooking again.      Precautions:   Precautions  Precautions Comment: Right TKA     Pain:   Pain Assessment  Pain Assessment: 0-10  0-10 (Numeric) Pain Score: 0 - No pain    OBJECTIVE:      Right knee ROM: 0-125 degrees    Treatments:  R. stepper, seat 4, L5, 5'   Fwd bosu lunges x 15 B   DBE 2' x 2 directions    Tandem 4 Kg KB swing cw/ccw x 15 B  Leg press 80#, 15x2  Total hip abd/ext 22#, 15x B  Airex step up and hold 5\" x 5 B  Heel slides with strap 5\" hold x 10  Grx10', R knee, mod comp      Charges: TE, NM, Vaso         HEP: [id number]       ASSESSMENT:   Pt with muscle fatigue but  no sharp pain. Better dynamic balance today.        PLAN:     Continue to increase LE strength, flexibility, balance and WB endurance for return to PLOF.  "

## 2025-07-17 NOTE — PROGRESS NOTES
"Physical Therapy  Physical Therapy Treatment    Patient Name: Zarina Holliday  MRN: [id number]  Today's Date: 7/18/2025    Time Calculation  Start Time: 0927  Stop Time: 1012  Time Calculation (min): 45 min    Insurance:  Visit [place]:  10 out of [place]  Authorization information: no auth  Insurance Type: Medicare and  for life     General:  Reason for visit: Right knee pain s/p TKA 5/14/25  Referred by: Dr. Schneider    Current Problem:   1. Acute pain of right knee  Follow Up In Physical Therapy          SUBJECTIVE:   Pt reports that everything is starting to feel normal again. Drove to her session today.      Precautions:   Precautions  Precautions Comment: Right TKA     Pain:   Pain Assessment  Pain Assessment: 0-10  0-10 (Numeric) Pain Score: 0 - No pain    OBJECTIVE:      Right knee ROM: 0-125 degrees    Treatments:  R. stepper, seat 4, L5, 5'   DBE 2' x 2 directions   Fwd bosu lunges x 15 B    Bravo side steps 7.5#, 10x B  Tandem 4 Kg KB swing cw/ccw x 15 B  Leg press 80#, 15x2  Total hip abd/ext 22#, 15x B  Heel slides with strap 5\" hold x 10  Grx10', R knee, mod comp      Charges: TE, NM, Vaso         HEP: [id number]       ASSESSMENT:   Pt challenged by Bravo side steps today especially leading with LLE.        PLAN:     Continue to increase LE strength, flexibility, balance and WB endurance for return to PLOF.  "

## 2025-07-18 ENCOUNTER — TREATMENT (OUTPATIENT)
Dept: PHYSICAL THERAPY | Facility: CLINIC | Age: 80
End: 2025-07-18
Payer: MEDICARE

## 2025-07-18 DIAGNOSIS — M25.561 ACUTE PAIN OF RIGHT KNEE: Primary | ICD-10-CM

## 2025-07-18 PROCEDURE — 97112 NEUROMUSCULAR REEDUCATION: CPT | Mod: GP

## 2025-07-18 PROCEDURE — 97016 VASOPNEUMATIC DEVICE THERAPY: CPT | Mod: GP

## 2025-07-18 PROCEDURE — 97110 THERAPEUTIC EXERCISES: CPT | Mod: GP

## 2025-07-18 ASSESSMENT — PAIN - FUNCTIONAL ASSESSMENT: PAIN_FUNCTIONAL_ASSESSMENT: 0-10

## 2025-07-18 ASSESSMENT — PAIN SCALES - GENERAL: PAINLEVEL_OUTOF10: 0 - NO PAIN

## 2025-07-21 ENCOUNTER — TREATMENT (OUTPATIENT)
Dept: PHYSICAL THERAPY | Facility: CLINIC | Age: 80
End: 2025-07-21
Payer: MEDICARE

## 2025-07-21 DIAGNOSIS — M25.561 ACUTE PAIN OF RIGHT KNEE: Primary | ICD-10-CM

## 2025-07-21 PROCEDURE — 97110 THERAPEUTIC EXERCISES: CPT | Mod: GP,CQ

## 2025-07-21 ASSESSMENT — PAIN - FUNCTIONAL ASSESSMENT: PAIN_FUNCTIONAL_ASSESSMENT: 0-10

## 2025-07-21 ASSESSMENT — PAIN SCALES - GENERAL: PAINLEVEL_OUTOF10: 4

## 2025-07-21 NOTE — PROGRESS NOTES
"Physical Therapy  Physical Therapy Treatment    Patient Name: Zarina Holliday  MRN: [id number]  Today's Date: 7/21/2025    Time Calculation  Start Time: 0915  Stop Time: 0955  Time Calculation (min): 40 min    Insurance:  Visit [place]:  11 out of [place]  Authorization information: no auth  Insurance Type: Medicare and  for life     General:  Reason for visit: Right knee pain s/p TKA 5/14/25  Referred by: Dr. Schneider    Current Problem:   1. Acute pain of right knee  Follow Up In Physical Therapy          SUBJECTIVE:   Pt reports that they are stiff this morning and feel like they aren't bending well and have some pain in the back of the knee.      Precautions:   Precautions  Precautions Comment: Right TKA     Pain:   Pain Assessment  Pain Assessment: 0-10  0-10 (Numeric) Pain Score: 4    OBJECTIVE:      Right knee ROM: 0-125 degrees    Treatments:  R. stepper, seat 4, L5, 5'   Gastroc stretch 1'   HS stretch 1'  Knee flexion stretch on steps 10 x 10\"   Leg press 80# x10 90# 2x10  HS curl 15# 3x10  SL reach + ball 4x5 each leg  Heel taps 3x10 6 inch step   Heel raises 3x10   Ice 10'   Charges: TE x3    HEP: [id number]       ASSESSMENT:   Lowered normal resistance with HS curl due to increased pain level reported with knee flexion. Patient reported difficulty with heel taps suggesting it was the most difficult exercise completed thus far in rehab.        PLAN:     Continue to increase LE strength, flexibility, balance and WB endurance for return to PLOF.  "

## 2025-07-23 ENCOUNTER — TREATMENT (OUTPATIENT)
Dept: PHYSICAL THERAPY | Facility: CLINIC | Age: 80
End: 2025-07-23
Payer: MEDICARE

## 2025-07-23 DIAGNOSIS — M25.561 ACUTE PAIN OF RIGHT KNEE: Primary | ICD-10-CM

## 2025-07-23 PROCEDURE — 97140 MANUAL THERAPY 1/> REGIONS: CPT | Mod: GP,CQ

## 2025-07-23 PROCEDURE — 97116 GAIT TRAINING THERAPY: CPT | Mod: GP,CQ

## 2025-07-23 PROCEDURE — 97110 THERAPEUTIC EXERCISES: CPT | Mod: GP,CQ

## 2025-07-23 ASSESSMENT — PAIN SCALES - GENERAL: PAINLEVEL_OUTOF10: 2

## 2025-07-23 ASSESSMENT — PAIN - FUNCTIONAL ASSESSMENT: PAIN_FUNCTIONAL_ASSESSMENT: 0-10

## 2025-07-23 NOTE — PROGRESS NOTES
"Physical Therapy  Physical Therapy Treatment    Patient Name: Zarina Holliday  MRN: [id number]  Today's Date: 7/23/2025    Time Calculation  Start Time: 0915  Stop Time: 0955  Time Calculation (min): 40 min    Insurance:  Visit number:  12 out of [place]  Authorization information: no auth  Insurance Type: Medicare and  for life     General:  Reason for visit: Right knee pain s/p TKA 5/14/25  Referred by: Dr. Schneider    Current Problem:   1. Acute pain of right knee  Follow Up In Physical Therapy          SUBJECTIVE:   Pt reports that they are concerned about their knee as it is swelling and feeling painful.      Precautions:   Precautions  Precautions Comment: Right TKA     Pain:   Pain Assessment  Pain Assessment: 0-10  0-10 (Numeric) Pain Score: 2    OBJECTIVE:      Right knee ROM: 0-125 degrees  Knee valgus RLE    Treatments:  R. stepper, seat 4, L5, 5'   STM posterior and lateral knee 10'   PA knee mobs 5'   Anti pronation with ER RTB 2x10 5\"   Sit to stands with RTB 3x10 (band at thighs)   Walking (cueing for proper knee alignment) 300 ft 10'     Charges: TE x1 gait x1 Man x1      - PT Therapeutic Procedures Time Entry  Therapeutic Exercise Time Entry: 15  Gait Training Time Entry: 10  Manual Therapy Time Entry: 15 -        HEP: [id number]       ASSESSMENT:   Pt required verbal cueing and education on proper knee alignment during squats and walking. Manual interventions were opted for due to reported increased pain levels today.     PLAN:     Continue to increase LE strength, flexibility, balance and WB endurance for return to PLOF.  "

## 2025-07-25 ENCOUNTER — APPOINTMENT (OUTPATIENT)
Dept: OBSTETRICS AND GYNECOLOGY | Facility: CLINIC | Age: 80
End: 2025-07-25
Payer: MEDICARE

## 2025-07-25 ENCOUNTER — OFFICE VISIT (OUTPATIENT)
Dept: ORTHOPEDIC SURGERY | Facility: CLINIC | Age: 80
End: 2025-07-25
Payer: MEDICARE

## 2025-07-25 ENCOUNTER — HOSPITAL ENCOUNTER (OUTPATIENT)
Dept: RADIOLOGY | Facility: CLINIC | Age: 80
Discharge: HOME | End: 2025-07-25
Payer: MEDICARE

## 2025-07-25 DIAGNOSIS — M17.12 PRIMARY OSTEOARTHRITIS OF LEFT KNEE: ICD-10-CM

## 2025-07-25 DIAGNOSIS — Z96.652 S/P TKR (TOTAL KNEE REPLACEMENT) USING CEMENT, LEFT: ICD-10-CM

## 2025-07-25 PROCEDURE — 73562 X-RAY EXAM OF KNEE 3: CPT | Mod: LT

## 2025-07-25 PROCEDURE — 99212 OFFICE O/P EST SF 10 MIN: CPT | Performed by: PHYSICIAN ASSISTANT

## 2025-07-25 RX ORDER — MELOXICAM 15 MG/1
15 TABLET ORAL DAILY
Qty: 90 TABLET | Refills: 3 | Status: SHIPPED | OUTPATIENT
Start: 2025-07-25

## 2025-07-25 NOTE — PROGRESS NOTES
SYLVIA Patton, SHIRAZ, ATC  Adult Reconstruction and Joint Replacement Surgery  Phone: [phone]     Fax:[phone]            Chief Complaint   Patient presents with    Left Knee - Follow-up       HPI:  Zarina Holliday is a pleasant 79 y.o. year-old female here for follow-up of their side: left total knee arthroplasty by Dr. Schneider.  The patient is approximately 1 year(s) postop.The patient has no mechanical symptoms.  The patient has no swelling and pain.   The patients wound has healed uneventfully.  The patient has been doing HEP and/or outpatient PT.  No complications postoperatively.  The patient is very happy with the result of the operation.    Review of Systems  Medical History[1]  Problem List[2]  Medication Documentation Review Audit       Reviewed by Leah Loza MA (Medical Assistant) on 07/25/25 at 0811      Medication Order Taking? Sig Documenting Provider Last Dose Status   acetaminophen (Tylenol) 500 mg tablet [id number] Yes Take 2 tablets (1,000 mg) by mouth every 6 hours if needed for mild pain (1 - 3). 2 tabs every 6 hours PRN for Pain 1-3/10 Historical Provider, MD 6/2/2025  4:00 AM Active   amoxicillin (Amoxil) 500 mg capsule [id number] Yes Take 4 Tablets 1 Hour Prior to Dental Procedure or Cleaning. Criss Rod PA-C  Active   azelastine (Astelin) 137 mcg (0.1 %) nasal spray [id number] Yes USE 2 SPRAYS IN EACH NOSTRIL IN THE MORNING AND 2 SPRAYS BEFORE BEDTIME Josefina Hatfield MD 5/13/2025 Active   calcium citrate-vitamin D3 250 mg-5 mcg (200 unit) tablet [id number] Yes Take 1 tablet by mouth once daily. Historical Provider, MD Past Month Active   cholecalciferol (Vitamin D-3) 25 MCG (1000 UT) capsule [id number] Yes Take 1 capsule (25 mcg) by mouth once daily. Historical Provider, MD Past Month Active   cyclobenzaprine (Flexeril) 10 mg tablet [id number] Yes Take 1 tablet (10 mg) by mouth 3 times a day as needed for muscle spasms. Sylvie Huber MD  Active   docusate sodium  (Colace) 100 mg capsule [id number] Yes Take 1 capsule (100 mg) by mouth 2 times a day. Moses Schneider MD 5/31/2025 Active   fish oil concentrate (Omega-3) 120-180 mg capsule [id number] Yes Take 1 capsule (1 g) by mouth once daily. Historical Provider, MD Past Month Active   fluticasone (Flonase) 50 mcg/actuation nasal spray [id number] Yes Administer 2 sprays into each nostril once daily as needed for allergies. Dahlia Sanchez MD  Active   loratadine (Claritin) 10 mg tablet [id number] Yes Take 1 tablet (10 mg) by mouth 2 times a day as needed for allergies. Carmen Castellanos MD 5/13/2025 Active   meloxicam (Mobic) 15 mg tablet [id number] Yes Take 1 tablet (15 mg) by mouth once daily. Moses Schneider MD 5/31/2025 Active   Myrbetriq 50 mg tablet extended release 24 hr 24 hr tablet [id number] Yes TAKE 1 TABLET DAILY Maria Espinoza MD [facility] 5/31/2025 Active   ondansetron ODT (Zofran-ODT) 4 mg disintegrating tablet [id number] Yes Dissolve 1 tablet (4 mg) in the mouth every 8 hours if needed for nausea or vomiting. Dahlia Sanchez MD  Active   pantoprazole (ProtoNix) 40 mg EC tablet [id number] Yes Take 1 tablet (40 mg) by mouth once daily in the morning. Take before meals. Do not crush chew or split Moses Schneider MD 5/31/2025 Active   rosuvastatin (Crestor) 5 mg tablet [id number] Yes Take 0.5 tablets (2.5 mg) by mouth early in the morning.. 1/2 pill daily per doctors order  Take half tablet Carmen Castellanos MD 5/31/2025 Active   traMADol (Ultram) 50 mg tablet [id number] Yes Take 1 tablet (50 mg) by mouth every 6 hours if needed for moderate pain (4 - 6). Dahlia Sanchez MD  Active   VITAMIN B COMPLEX ORAL [id number] Yes Take 1 tablet by mouth once daily. Historical Provider, MD Past Month Active                  RX Allergies[3]  Social History     Socioeconomic History    Marital status:      Spouse name: Not on file    Number of children: Not on file    Years of education: Not on file    Highest education  level: Not on file   Occupational History    Not on file   Tobacco Use    Smoking status: Never    Smokeless tobacco: Never   Vaping Use    Vaping status: Never Used   Substance and Sexual Activity    Alcohol use: Not Currently     Comment: quit drinking 2 years ago    Drug use: Never    Sexual activity: Not on file   Other Topics Concern    Not on file   Social History Narrative    Not on file     Social Drivers of Health     Financial Resource Strain: Low Risk  (6/3/2025)    Overall Financial Resource Strain (CARDIA)     Difficulty of Paying Living Expenses: Not hard at all   Food Insecurity: No Food Insecurity (6/2/2025)    Hunger Vital Sign     Worried About Running Out of Food in the Last Year: Never true     Ran Out of Food in the Last Year: Never true   Transportation Needs: No Transportation Needs (6/3/2025)    PRAPARE - Transportation     Lack of Transportation (Medical): No     Lack of Transportation (Non-Medical): No   Physical Activity: Sufficiently Active (6/2/2025)    Exercise Vital Sign     Days of Exercise per Week: 5 days     Minutes of Exercise per Session: 30 min   Stress: No Stress Concern Present (6/2/2025)    [employer] - Occupational Stress Questionnaire     Feeling of Stress : Not at all   Social Connections: Moderately Integrated (6/2/2025)    Social Connection and Isolation Panel     Frequency of Communication with Friends and Family: Once a week     Frequency of Social Gatherings with Friends and Family: Once a week     Attends [employer] Services: More than 4 times per year     Active Member of Clubs or Organizations: Yes     Attends Club or Organization Meetings: More than 4 times per year     Marital Status:    Intimate Partner Violence: Not At Risk (6/2/2025)    Humiliation, Afraid, Rape, and Kick questionnaire     Fear of Current or Ex-Partner: No     Emotionally Abused: No     Physically Abused: No     Sexually Abused: No   Housing Stability: Low  Risk  (6/3/2025)    Housing Stability Vital Sign     Unable to Pay for Housing in the Last Year: No     Number of Times Moved in the Last Year: 1     Homeless in the Last Year: No     Surgical History[4]    Physical Exam  side: left Knee  There were no vitals filed for this visit.  AxO x 3 in NAD.   Assistive Device: no device. Coordination and balance intact.  Normal bilateral upper and lower extremities.  No erythema, ecchymosis, temperature changes. No popliteal lymphadenopathy,  No overlying lesion  Mood: euthymic  Respirations non-labored  The incision is midline healing well with no signs of surrounding infection, dehiscence or drainage.   Neurovascular exam is at baseline.  No instability varus or valgus stressing the knee at 0, 30 or 60 degrees.  No instability in the AP plane at 90 degrees.  Range of motion: 0 degrees extension, 120 degrees flexion  5/5 hip flexion/knee extension/DF/PF/EHL  SILT in bijal/saph/ per/tib distribution   Extremities warm and well perfused.  No lower extremity calf tenderness, warmth or swelling. Lower extremity well perfused  2+ Femoral/DP/PT pulses bilaterally    Imaging:    I personally reviewed multiple views of the knee today in clinic. Status post side: left Total Knee Arthroplasty. The implant is well fixed, well aligned.  No evidence of johanny-implant fracture, lucency or dislocation.    Impression/Plan:  Zarina Holliday is doing well post-operatively and happy with the results of the operation.     S/P side: left Total Knee Arthroplasty  I talked with patient at length about activity precautions and progression of activities. The patient understands their permanent precautions.   3. Discussed the importance of dental prophylactic dental antibiotics lifelong. Patient may request medication refill through MyChart,       Pharmacy or surgeons office.    All questions answered.    Follow-up 5 years with x-rays at next visit.    Criss Rod, MPAS, PA-C, ATC  Orthopedic  Physician Assisant  Adult Reconstruction and Total Joint Replacement  General Orthopedics  Department of Orthopaedic Surgery  [place] [address]  [employer] messaging preferred         [1]   Past Medical History:  Diagnosis Date    Anemia     Arthritis     CVA (cerebral vascular accident) (Multi) 05/14/2025    GERD (gastroesophageal reflux disease)     History of recurrent UTIs     on macrobid    Hyperlipidemia     Leg edema     OAB (overactive bladder)     Osteopenia after menopause     PONV (postoperative nausea and vomiting)     TIA (transient ischemic attack) 10/2022    Questionable: while on vacation in late October 2022, she had new onset R eye blurriness and fatigue that was persistent on and off for 1-2 wks; Brain scans were negative. On Aspirin and Statin.   [2]   Patient Active Problem List  Diagnosis    Effusion of left knee    Arthritis of knee, left    Edema of both legs    Episodic lightheadedness    Dizziness    Cerumen impaction    Borderline hyperglycemia    Bilateral knee pain    Arthralgia of left hip    Anemia    Allergic rhinitis    Adult myxedema    Abnormal mammogram    Abnormal finding in urine    Shoulder pain, right    Segmental and somatic dysfunction of pelvic region    Primary osteoarthritis of both knees    Peripheral neuropathy    Osteopenia    Osteoarthritis    OAB (overactive bladder)    Lower extremity edema    Lesion of bladder    Imbalance    Venous insufficiency    Varicose veins with pain    UTI (urinary tract infection)    Urge incontinence of urine    Unilateral primary osteoarthritis, left knee    Arthritis of left knee    Primary osteoarthritis of left knee    Aftercare following left knee joint replacement surgery    Hyperlipidemia    Gastroesophageal reflux disease    CVA (cerebral vascular accident) (Multi)    Acute pain of right knee    Pneumonia of both upper lobes due to infectious organism   [3]    Allergies  Allergen Reactions    Adhesive Tape-Silicones Other     Blisters, chin itching   [4]   Past Surgical History:  Procedure Laterality Date    CATARACT EXTRACTION       SECTION, CLASSIC      COLONOSCOPY      COSMETIC SURGERY      DENTAL SURGERY      FACIAL COSMETIC SURGERY      face lift    MR HEAD ANGIO WO IV CONTRAST  2022    MR HEAD ANGIO WO IV CONTRAST 2022 [facility]    MR NECK ANGIO WO IV CONTRAST  2022    MR NECK ANGIO WO IV CONTRAST 2022 [facility]    OTHER SURGICAL HISTORY      RHINOPLASTY      SALIVARY GLAND SURGERY  2023    Left lower lip excision:Left lower lip, excision: - Mucocele and minor salivary glands exhibiting chronic sialadenitis.    TONSILLECTOMY      TOTAL ABDOMINAL HYSTERECTOMY W/ BILATERAL SALPINGOOPHORECTOMY      TOTAL KNEE ARTHROPLASTY Left 2024

## 2025-07-28 ENCOUNTER — TREATMENT (OUTPATIENT)
Dept: PHYSICAL THERAPY | Facility: CLINIC | Age: 80
End: 2025-07-28
Payer: MEDICARE

## 2025-07-28 DIAGNOSIS — M25.561 ACUTE PAIN OF RIGHT KNEE: Primary | ICD-10-CM

## 2025-07-28 PROCEDURE — 97110 THERAPEUTIC EXERCISES: CPT | Mod: GP

## 2025-07-28 PROCEDURE — 97016 VASOPNEUMATIC DEVICE THERAPY: CPT | Mod: GP

## 2025-07-28 PROCEDURE — 97112 NEUROMUSCULAR REEDUCATION: CPT | Mod: GP

## 2025-07-28 ASSESSMENT — PAIN SCALES - GENERAL: PAINLEVEL_OUTOF10: 1

## 2025-07-28 ASSESSMENT — PAIN - FUNCTIONAL ASSESSMENT: PAIN_FUNCTIONAL_ASSESSMENT: 0-10

## 2025-07-28 NOTE — PROGRESS NOTES
"Physical Therapy  Physical Therapy Treatment    Patient Name: Zarina Holliday  MRN: [id number]  Today's Date: 7/28/2025    Time Calculation  Start Time: 0945  Stop Time: 1030  Time Calculation (min): 45 min    Insurance:  Visit number:  13 out of [place]  Authorization information: no auth  Insurance Type: Medicare and  for life     General:  Reason for visit: Right knee pain s/p TKA 5/14/25  Referred by: Dr. Schneider    Current Problem:   1. Acute pain of right knee  Follow Up In Physical Therapy          SUBJECTIVE:   Pt reports that her knee is starting to feel better after starting Mobic and stretching last week.     Precautions:   Precautions  Precautions Comment: Right TKA     Pain:   Pain Assessment  Pain Assessment: 0-10  0-10 (Numeric) Pain Score: 1    OBJECTIVE:      Right knee ROM: 0-125 degrees  Knee valgus RLE    Treatments:  R. stepper, seat 4, L5, 5'   Airex step up and hold 5\" x 10 B  Fwd bosu lunges x 15 B    Bravo side steps 7.5#, 10x B  Tandem airex 4 Kg KB swing cw/ccw x 10 B  Leg press 80#, 15x2  Heel slides with strap 5\" hold x 10  Grx10', R knee, mod comp      Charges: TE, NM, Vaso     HEP: [id number]       ASSESSMENT:   Pt challenged by addition of airex to KB swing today.    PLAN:     Assess for discharge next visit.  "

## 2025-07-29 ENCOUNTER — TELEPHONE (OUTPATIENT)
Dept: OBSTETRICS AND GYNECOLOGY | Facility: CLINIC | Age: 80
End: 2025-07-29
Payer: MEDICARE

## 2025-07-29 DIAGNOSIS — N39.0 RECURRENT UTI: ICD-10-CM

## 2025-07-29 NOTE — TELEPHONE ENCOUNTER
Possible UTI.  Instructed patient to go to any [facility] location after 30 minutes.  *asking if Dr. Key will prescribe something today*

## 2025-08-01 ENCOUNTER — RESULTS FOLLOW-UP (OUTPATIENT)
Dept: OBSTETRICS AND GYNECOLOGY | Facility: CLINIC | Age: 80
End: 2025-08-01
Payer: MEDICARE

## 2025-08-01 ENCOUNTER — OFFICE VISIT (OUTPATIENT)
Dept: OBSTETRICS AND GYNECOLOGY | Facility: CLINIC | Age: 80
End: 2025-08-01
Payer: MEDICARE

## 2025-08-01 VITALS
BODY MASS INDEX: 23.49 KG/M2 | HEIGHT: 64 IN | WEIGHT: 137.6 LBS | DIASTOLIC BLOOD PRESSURE: 75 MMHG | HEART RATE: 82 BPM | SYSTOLIC BLOOD PRESSURE: 166 MMHG

## 2025-08-01 DIAGNOSIS — N39.0 RECURRENT UTI: ICD-10-CM

## 2025-08-01 PROCEDURE — 99214 OFFICE O/P EST MOD 30 MIN: CPT | Performed by: OBSTETRICS & GYNECOLOGY

## 2025-08-01 PROCEDURE — 81003 URINALYSIS AUTO W/O SCOPE: CPT | Performed by: OBSTETRICS & GYNECOLOGY

## 2025-08-01 PROCEDURE — 1159F MED LIST DOCD IN RCRD: CPT | Performed by: OBSTETRICS & GYNECOLOGY

## 2025-08-01 PROCEDURE — 1125F AMNT PAIN NOTED PAIN PRSNT: CPT | Performed by: OBSTETRICS & GYNECOLOGY

## 2025-08-01 RX ORDER — SULFAMETHOXAZOLE AND TRIMETHOPRIM 800; 160 MG/1; MG/1
1 TABLET ORAL 2 TIMES DAILY
Qty: 14 TABLET | Refills: 0 | Status: SHIPPED | OUTPATIENT
Start: 2025-08-01 | End: 2025-08-05 | Stop reason: SDUPTHER

## 2025-08-01 RX ORDER — SULFAMETHOXAZOLE AND TRIMETHOPRIM 800; 160 MG/1; MG/1
1 TABLET ORAL 2 TIMES DAILY
Qty: 14 TABLET | Refills: 0 | Status: SHIPPED | OUTPATIENT
Start: 2025-08-01 | End: 2025-08-01 | Stop reason: SDUPTHER

## 2025-08-01 ASSESSMENT — ENCOUNTER SYMPTOMS
OCCASIONAL FEELINGS OF UNSTEADINESS: 0
LOSS OF SENSATION IN FEET: 0
DEPRESSION: 0

## 2025-08-01 ASSESSMENT — PAIN SCALES - GENERAL: PAINLEVEL_OUTOF10: 6

## 2025-08-01 NOTE — TELEPHONE ENCOUNTER
----- Message from Sylvie Key sent at 8/1/2025  7:36 AM EDT -----  Rx for bactrim sent to her pharmacy. Please hav her stop hiprex while taking this, then restart it when she completes her abx. Thanks  ags  ----- Message -----  From: [employer] [employer] Results In  Sent: 7/31/2025   1:58 AM EDT  To: Sylvie Key MD MPH    
Zarina Holliday informed of urine culture results and that rx was sent for Antibiotic: Bactrim. Pt has already picked up medication and taken the first dose. Questions answered and pt verbalized understanding.  
Yes
no lymph node enlargement

## 2025-08-01 NOTE — PROGRESS NOTES
[facility] Department of Urogynecology   Sylvie Key MD, MPH   [phone]    ASSESSMENT AND PLAN:   79 y.o. female with OAB, Malinda, and vaginal atrophy. Comorbidities include: Venous insufficiency.      Diagnoses:   #1 Malinda  #2 vaginal atrophy  #3 OAB    Plan:   1. Mlainda  - She is currently on Bactrim for an acute UTI.   - It is fine to restart Advil and Tylenol for pain relief. She doesn't need to wait until her UTI is resolved to take these.   - Of note, her last culture was resistant to Macrobid.   - Once she finishes her abx for her current UTI, she will restart Hiprex.   - Do not take Hiprex while on antibiotics.    - Continue tv estrogen cream.   - She was given another rx for Bactrim to take if she develops a UTI while she is traveling for one month. If she develops a UTI while traveling and sxs do not improve on Bactrim, go to urgent care or call the office.   - Leave a urine sample when she is symptomatic of UTI. If she is very uncomfortable, she can call the office to request empiric antibiotic tx after leaving a sample. There is a risk the infection won't be susceptible to whatever antibiotic was prescribed before the culture returns. She has standing urine culture orders.   - Do not test urine unless she is symptomatic for an infection.     2. Atrophy  - continue vaginal estrogen    3. Oab  - mirabegron 50     Follow-up with Dr. Key.     Scribe Attestation:   I, Leola Martinez, am scribing for virtually, and in the presence of Sylvie Key MD MPH on 08/01/2025 at 3:18 PM.     Problem List Items Addressed This Visit    None  Visit Diagnoses         Recurrent UTI        Relevant Orders    POCT UA Automated manually resulted           I spent a total of 40 minutes in face to face and non face to face time.   I Dr. Key, personally performed the services described in the documentation as scribed in my presence and confirm it is both complete and accurate.  Sylvie Key MD, MPH,  FACOG       Sylvie Key MD, MPH, FACOG     Established    HISTORY OF PRESENT ILLNESS:     Zarina Holliday is a 79 y.o. female who presents for Malinda.     Record Review:   - 5/2/25 Dr. Key note: Malinda - Start daily Macrobid for suppression, continue Macrobid until she is 1 month s/p TKR. OAB - Continue Myrbetriq 50 mg.   - 1/17/2025 Dr. Sylvie Key note re: Malinda - No recent UTI within the past x9 months. Her last urine culture on 1/17/2025 was negative with mixed urogenital last. However, her orthopedic surgeon would not proceed with knee replacement surgery as they were concerned about infection and plan to start her on preventative antibiotic and has been on Hiprex previously.   - 7/19/2024 Dr. Sylvie Key note reviewed: Malinda - on Hiprex and vaginal estrogen cream.   - 4/5/2024 Dr. Key note RE: Recurrent UTIs - Last positive urine culture was on 4/19/2023 with Proteus mirabilis. She has concerns today of acute UTI. Urine sent for culture. Rx for Bactrim -160 mg tablet to be taken once by mouth 2x/day for 5 days. She is allowed to take Azo but instructed not to take Hiprex again until she finishes antibiotics. GSM - Continue vaginal estrogen cream.  - 4/4/24 Ortho note (Dr. Schneider) - Discussed upcoming left total knee arthroplasty.   - 1/3/24 note: Malinda - Last positive urine cx was on 04/19/23 with Proteus mirabilis. Uses vaginal estrogen 2x per week and takes Hiprex 1x daily.   - 12/18/2023 Creatinine was 0.82   - 11/1/2023 Dr. Sylvie Key note reviewed:  Last positive urine cx was on 04/19/23 with Proteus mirabilis. Still on Hiprex, she was told she can discontinue Hiprex and continue with tv estrogen cream. If she did have a UTI at this visit, she should take Hiprex BID after finishing the antibiotics (she had been taking Hiprex 1x daily).    - 5/3/2023 Dr. Sylvie Key note reviewed: Malinda - She was on Hiprex and after 6 months we would consider discontinuing. Last positive urine cx  was on 4/19/2023 with Proteus mirabilis. Using tv estrogen cream 2x per week. She was to call if she had UTI sxs.   - 4/20/2023 UA Micro: 47 RBC/HPF, but she had a UTI.     Urine Cx:   - 7/29/25: Greater than 100,000 CFU/mL of Proteus mirabilis  50,000-100,000 CFU/mL of Gram negative bacilli   - 6/27/25: 10,000-49,000 CFU/mL of Enterococcus faecalis   - 1/17/25: Clinically insignificant growth based on current clinical standards.      Urinary Symptoms:   - She has started her abx for her current UTI.   - Patient is not leaking urine.        Past Medical History:     - Occasionally, she does feel disoriented. Particularly after her TKR, she's felt differently.   Medical History[1]       Past Surgical History:     Surgical History[2]      Medications:     Prior to Admission medications   Medication Sig Start Date End Date Taking? Authorizing Provider   acetaminophen (Tylenol) 500 mg tablet Take 2 tablets (1,000 mg) by mouth every 6 hours if needed for mild pain (1 - 3). 2 tabs every 6 hours PRN for Pain 1-3/10 5/16/25  Yes Historical Provider, MD   amoxicillin (Amoxil) 500 mg capsule Take 4 Tablets 1 Hour Prior to Dental Procedure or Cleaning. 7/7/25  Yes Criss Rod PA-C   azelastine (Astelin) 137 mcg (0.1 %) nasal spray USE 2 SPRAYS IN EACH NOSTRIL IN THE MORNING AND 2 SPRAYS BEFORE BEDTIME 12/13/23  Yes Josefina Hatfield MD   cyclobenzaprine (Flexeril) 10 mg tablet Take 1 tablet (10 mg) by mouth 3 times a day as needed for muscle spasms. 5/15/25  Yes Sylvie Huber MD   docusate sodium (Colace) 100 mg capsule Take 1 capsule (100 mg) by mouth 2 times a day. 5/16/25  Yes Moses Schneider MD   fluticasone (Flonase) 50 mcg/actuation nasal spray Administer 2 sprays into each nostril once daily as needed for allergies. 6/3/25  Yes Dahlia Sanchez MD   loratadine (Claritin) 10 mg tablet Take 1 tablet (10 mg) by mouth 2 times a day as needed for allergies.   Yes Historical Provider, MD   meloxicam (Mobic) 15 mg tablet Take  "1 tablet (15 mg) by mouth once daily. 7/25/25  Yes Criss Rod PA-C   Myrbetriq 50 mg tablet extended release 24 hr 24 hr tablet TAKE 1 TABLET DAILY 10/17/24  Yes Maria Espinoza MD MPH   ondansetron ODT (Zofran-ODT) 4 mg disintegrating tablet Dissolve 1 tablet (4 mg) in the mouth every 8 hours if needed for nausea or vomiting. 6/3/25  Yes Dahlia Sanchez MD   pantoprazole (ProtoNix) 40 mg EC tablet Take 1 tablet (40 mg) by mouth once daily in the morning. Take before meals. Do not crush chew or split 5/16/25  Yes Moses Schneider MD   rosuvastatin (Crestor) 5 mg tablet Take 0.5 tablets (2.5 mg) by mouth early in the morning.. 1/2 pill daily per doctors order  Take half tablet 8/5/24  Yes Historical Provider, MD   sulfamethoxazole-trimethoprim (Bactrim DS) 800-160 mg tablet Take 1 tablet by mouth 2 times a day for 7 days. 8/1/25 8/8/25 Yes Sylvie Key MD MPH   traMADol (Ultram) 50 mg tablet Take 1 tablet (50 mg) by mouth every 6 hours if needed for moderate pain (4 - 6). 6/3/25  Yes Dahlia Sanchez MD   calcium citrate-vitamin D3 250 mg-5 mcg (200 unit) tablet Take 1 tablet by mouth once daily. 11/1/13   Historical Provider, MD   cholecalciferol (Vitamin D-3) 25 MCG (1000 UT) capsule Take 1 capsule (25 mcg) by mouth once daily. 11/1/13   Historical Provider, MD   fish oil concentrate (Omega-3) 120-180 mg capsule Take 1 capsule (1 g) by mouth once daily.    Historical Provider, MD   VITAMIN B COMPLEX ORAL Take 1 tablet by mouth once daily.    Historical Provider, MD RAINES  Review of Systems       PHYSICAL EXAM:    /75 (Patient Position: Sitting)   Pulse 82   Ht 1.626 m (5' 4\")   Wt 62.4 kg (137 lb 9.6 oz)   BMI 23.62 kg/m²   No LMP recorded. Patient has had a hysterectomy.    Declines chaperone for physical exam.      Well developed, well nourished, in no apparent distress.   Neurologic/Psychiatric:  Awake, Alert and Oriented times 3.  Affect normal.     Data and DIAGNOSTIC STUDIES REVIEWED "   Imaging  No results found.    Cardiology, Vascular, and Other Imaging  No other imaging results found for the past 7 days     Lab Results   Component Value Date    URINECULTURE SEE NOTE (A) 2025      Lab Results   Component Value Date    GLUCOSE 73 (L) 2025    CALCIUM 9.0 2025     2025    K 4.1 2025    CO2 22 2025     2025    BUN 10 2025    CREATININE 0.61 2025     Lab Results   Component Value Date    WBC 4.5 2025    HGB 10.7 (L) 2025    HCT 32.6 (L) 2025    MCV 92 2025     2025             [1]   Past Medical History:  Diagnosis Date    Anemia     Arthritis     CVA (cerebral vascular accident) (Multi) 2025    GERD (gastroesophageal reflux disease)     History of recurrent UTIs     on macrobid    Hyperlipidemia     Leg edema     OAB (overactive bladder)     Osteopenia after menopause     PONV (postoperative nausea and vomiting)     TIA (transient ischemic attack) 10/2022    Questionable: while on vacation in late 2022, she had new onset R eye blurriness and fatigue that was persistent on and off for 1-2 wks; Brain scans were negative. On Aspirin and Statin.   [2]   Past Surgical History:  Procedure Laterality Date    CATARACT EXTRACTION       SECTION, CLASSIC      COLONOSCOPY      COSMETIC SURGERY      DENTAL SURGERY      FACIAL COSMETIC SURGERY      face lift    MR HEAD ANGIO WO IV CONTRAST  2022    MR HEAD ANGIO WO IV CONTRAST 2022 [facility]    MR NECK ANGIO WO IV CONTRAST  2022    MR NECK ANGIO WO IV CONTRAST 2022 [facility]    OTHER SURGICAL HISTORY  Knee 25    RHINOPLASTY      SALIVARY GLAND SURGERY  2023    Left lower lip excision:Left lower lip, excision: - Mucocele and minor salivary glands exhibiting chronic sialadenitis.    TONSILLECTOMY      TOTAL ABDOMINAL HYSTERECTOMY W/ BILATERAL SALPINGOOPHORECTOMY      TOTAL KNEE  ARTHROPLASTY Left 04/22/2024

## 2025-08-02 LAB — BACTERIA UR CULT: ABNORMAL

## 2025-08-04 LAB — BACTERIA UR CULT: NORMAL

## 2025-08-05 ENCOUNTER — TELEPHONE (OUTPATIENT)
Dept: OBSTETRICS AND GYNECOLOGY | Facility: CLINIC | Age: 80
End: 2025-08-05
Payer: MEDICARE

## 2025-08-05 DIAGNOSIS — N39.0 RECURRENT UTI: ICD-10-CM

## 2025-08-05 DIAGNOSIS — Z96.651 S/P TKR (TOTAL KNEE REPLACEMENT) USING CEMENT, RIGHT: ICD-10-CM

## 2025-08-05 RX ORDER — SULFAMETHOXAZOLE AND TRIMETHOPRIM 800; 160 MG/1; MG/1
1 TABLET ORAL 2 TIMES DAILY
Qty: 14 TABLET | Refills: 0 | Status: SHIPPED | OUTPATIENT
Start: 2025-08-05 | End: 2025-08-12

## 2025-08-05 RX ORDER — AMOXICILLIN 500 MG/1
CAPSULE ORAL
Qty: 4 CAPSULE | Refills: 6 | Status: SHIPPED | OUTPATIENT
Start: 2025-08-05

## 2025-08-05 NOTE — TELEPHONE ENCOUNTER
Refill request:  sulfamethoxazole 800 mg-trimethoprim 160 mg tablet (Bactrim DS)     Pharmacy:  [facility]    Next appointment: 01/06/2026      **Also, is there a standing order for urine test? Does she have to have urine test done by 8/8?**

## 2025-08-08 DIAGNOSIS — N39.0 RECURRENT UTI: ICD-10-CM

## 2025-08-11 DIAGNOSIS — I63.9 CEREBROVASCULAR ACCIDENT (CVA), UNSPECIFIED MECHANISM (MULTI): Primary | ICD-10-CM

## 2025-08-12 ENCOUNTER — TELEPHONE (OUTPATIENT)
Dept: OBSTETRICS AND GYNECOLOGY | Facility: CLINIC | Age: 80
End: 2025-08-12
Payer: MEDICARE

## 2025-08-12 DIAGNOSIS — N39.0 RECURRENT UTI: ICD-10-CM

## 2025-08-12 DIAGNOSIS — N95.2 VAGINAL ATROPHY: ICD-10-CM

## 2025-08-12 RX ORDER — ESTRADIOL 0.1 MG/G
1 CREAM VAGINAL 2 TIMES WEEKLY
Qty: 42.5 G | Refills: 3 | Status: SHIPPED | OUTPATIENT
Start: 2025-08-14

## 2025-08-12 RX ORDER — ROSUVASTATIN CALCIUM 5 MG/1
5 TABLET, COATED ORAL DAILY
Qty: 90 TABLET | Refills: 0 | Status: SHIPPED | OUTPATIENT
Start: 2025-08-12 | End: 2025-11-10

## 2025-08-12 RX ORDER — METHENAMINE HIPPURATE 1000 MG/1
1 TABLET ORAL 2 TIMES DAILY
Qty: 60 TABLET | Refills: 11 | Status: SHIPPED | OUTPATIENT
Start: 2025-08-12 | End: 2026-08-12

## 2025-08-29 ENCOUNTER — DOCUMENTATION (OUTPATIENT)
Dept: PHYSICAL THERAPY | Facility: CLINIC | Age: 80
End: 2025-08-29
Payer: MEDICARE

## 2025-08-29 DIAGNOSIS — M25.561 ACUTE PAIN OF RIGHT KNEE: Primary | ICD-10-CM

## (undated) DEVICE — Device

## (undated) DEVICE — GLOVE, SURGICAL, PROTEXIS PI , 6.5, PF, LF

## (undated) DEVICE — CHANNEL DRAIN, BARD, 15FR, ROUND HUBLESS, FULL FLUTED

## (undated) DEVICE — CEMENT, MIXEVAC III, 10S BOWL, KNEES

## (undated) DEVICE — CUFF, TOURNIQUET, 30 X 4, DUAL PORT/SNGL BLADDER, DISP, LF

## (undated) DEVICE — SYRINGE, 60 CC, IRRIGATION, BULB, CONTRO-BULB, PAPER POUCH

## (undated) DEVICE — SYRINGE, 50 CC, LUER LOCK

## (undated) DEVICE — STRIP, SKIN CLOSURE, STERI STRIP, REINFORCED, 0.5 X 4 IN

## (undated) DEVICE — TUBING, IRRIGATION, HIGH FLOW, HAND PIECE SET

## (undated) DEVICE — HOOD, SURGICAL, FLYTE HYBRID

## (undated) DEVICE — BLANKET, LOWER BODY, VHA PLUS, ADULT

## (undated) DEVICE — 400CC THREE-SPRING HEMOVAC-TYPE EVACUATOR KIT W/ 3.2MM PVC DRAIN, 12IN HOLE PATTERN, TROCAR & Y-CONNECTOR TUBING

## (undated) DEVICE — GLOVE, SURGICAL, PROTEXIS PI , 8.0, PF, LF

## (undated) DEVICE — DRAPE, ISOLATION, INCISE, W/POUCH, STERI DRAPE, 125 X 83 IN, DISPOSABLE, STERILE

## (undated) DEVICE — SUTURE, MONOCRYL, 3-0, 27 IN, PS-2, UNDYED

## (undated) DEVICE — DRAPE, ISOLATION, ANTIMICROBIAL, IOBAN, W/FILM & POUCH, LARGE, 32 X 70 CM

## (undated) DEVICE — DRAPE, SHEET, U, W/ADHESIVE STRIP, IMPERVIOUS, 60 X 70 IN, DISPOSABLE, LF, STERILE

## (undated) DEVICE — SUTURE, VICRYL, 2-0, 18 IN CP-2, UNDYED

## (undated) DEVICE — DRESSING, MEPILEX, BORDER LIGHT, 3 X 3 IN

## (undated) DEVICE — BLADE, SAW, RECIPROCATING, DOUBLE-SIDED, 70 X 12.5 X 1 MM, STAINLESS STEEL

## (undated) DEVICE — DRAIN, WOUND, ROUND, W/TROCAR, HOLE PATTERN, 10 IN, MEDIUM/LARGE, 3/16 X 49 IN

## (undated) DEVICE — SUTURE, MONOCRYL, 3-0, PS-1 27IN, UNDYED

## (undated) DEVICE — NEEDLE, SPINAL, QUINCKE, 18 G X 3.5 IN, PINK HUB

## (undated) DEVICE — DRAPE, IRRIGATION, W/POUCH, ADHESIVE STRIP, STERI DRAPE, 19 X 23 IN, DISPOSABLE, STERILE

## (undated) DEVICE — SUTURE, ETHIBOND, P2, V-37, 30 IN, GREEN

## (undated) DEVICE — 10IN STRYKER SMOKE EVACUATION TUBING

## (undated) DEVICE — BLADE, SAW, DUAL SAGITTAL, 1.9 X 90 X 30

## (undated) DEVICE — GLOVE, SURGICAL, PROTEXIS PI , 7.5, PF, LF

## (undated) DEVICE — BANDAGE, COFLEX, 6 X 5 YDS, TAN, STERILE, LF

## (undated) DEVICE — GLOVE, SURGICAL, PROTEXIS PI ORTHO, 8.0, PF, LF

## (undated) DEVICE — SUTURE, VICRYL, 0, 18 IN, CT-1, UNDYED

## (undated) DEVICE — GLOVE, SURGICAL, PROTEXIS PI BLUE W/NEUTHERA, 7.0, PF, LF

## (undated) DEVICE — DRAPE, INCISE, STERI DRAPE, LARGE, 23 X 17 IN, DISPOSABLE, STERILE

## (undated) DEVICE — ADULT REM POLYHESIVE II PATIENT RETURN ELECTRODE W/9 FT (2.7 M) ATTACHED CORD

## (undated) DEVICE — DRESSING, TRANSPARENT, TEGADERM, 4 X 4.5

## (undated) DEVICE — 3/16IN OD X 49IN, MEDIUM-LARGE SINGLE ROUND SILICONE DRAIN W/TROCAR, 10IN HOLE PATTERN

## (undated) DEVICE — SOLUTION, INJECTION, 0.9% SODIUM CHL, USP LIFECARE 1000 MI